# Patient Record
Sex: FEMALE | Race: WHITE | NOT HISPANIC OR LATINO | Employment: FULL TIME | URBAN - METROPOLITAN AREA
[De-identification: names, ages, dates, MRNs, and addresses within clinical notes are randomized per-mention and may not be internally consistent; named-entity substitution may affect disease eponyms.]

---

## 2018-03-06 ENCOUNTER — APPOINTMENT (OUTPATIENT)
Dept: GENERAL RADIOLOGY | Facility: HOSPITAL | Age: 53
End: 2018-03-06

## 2018-03-06 ENCOUNTER — HOSPITAL ENCOUNTER (INPATIENT)
Facility: HOSPITAL | Age: 53
LOS: 5 days | Discharge: HOME | End: 2018-03-11
Attending: EMERGENCY MEDICINE | Admitting: INTERNAL MEDICINE

## 2018-03-06 DIAGNOSIS — J18.9 PNEUMONIA OF RIGHT UPPER LOBE DUE TO INFECTIOUS ORGANISM: ICD-10-CM

## 2018-03-06 DIAGNOSIS — E11.11 DIABETIC KETOACIDOSIS WITH COMA ASSOCIATED WITH TYPE 2 DIABETES MELLITUS (HCC): Primary | ICD-10-CM

## 2018-03-06 DIAGNOSIS — IMO0001: ICD-10-CM

## 2018-03-06 LAB
ALBUMIN SERPL-MCNC: 3.3 G/DL (ref 3.5–5.2)
ALBUMIN SERPL-MCNC: 4.7 G/DL (ref 3.5–5.2)
ALBUMIN/GLOB SERPL: 1.1 G/DL
ALBUMIN/GLOB SERPL: 1.4 G/DL
ALP SERPL-CCNC: 135 U/L (ref 39–117)
ALP SERPL-CCNC: 98 U/L (ref 39–117)
ALT SERPL W P-5'-P-CCNC: 13 U/L (ref 1–33)
ALT SERPL W P-5'-P-CCNC: 14 U/L (ref 1–33)
AMORPH URATE CRY URNS QL MICRO: ABNORMAL /HPF
ANION GAP SERPL CALCULATED.3IONS-SCNC: 34.3 MMOL/L
ANION GAP SERPL CALCULATED.3IONS-SCNC: 34.7 MMOL/L
AST SERPL-CCNC: 18 U/L (ref 1–32)
AST SERPL-CCNC: 19 U/L (ref 1–32)
ATMOSPHERIC PRESS: 747.2 MMHG
B-OH-BUTYR SERPL-SCNC: 11.18 MMOL/L (ref 0.02–0.27)
BACTERIA UR QL AUTO: ABNORMAL /HPF
BASE EXCESS BLDV CALC-SCNC: -23.8 MMOL/L
BDY SITE: ABNORMAL
BILIRUB SERPL-MCNC: <0.2 MG/DL (ref 0.1–1.2)
BILIRUB SERPL-MCNC: <0.2 MG/DL (ref 0.1–1.2)
BILIRUB UR QL STRIP: NEGATIVE
BUN BLD-MCNC: 29 MG/DL (ref 6–20)
BUN BLD-MCNC: 30 MG/DL (ref 6–20)
BUN/CREAT SERPL: 18 (ref 7–25)
BUN/CREAT SERPL: 21.9 (ref 7–25)
CALCIUM SPEC-SCNC: 7.9 MG/DL (ref 8.6–10.5)
CALCIUM SPEC-SCNC: 8.9 MG/DL (ref 8.6–10.5)
CHLORIDE SERPL-SCNC: 85 MMOL/L (ref 98–107)
CHLORIDE SERPL-SCNC: 95 MMOL/L (ref 98–107)
CLARITY UR: ABNORMAL
CO2 SERPL-SCNC: 2.3 MMOL/L (ref 22–29)
CO2 SERPL-SCNC: 6.7 MMOL/L (ref 22–29)
COLOR UR: YELLOW
CREAT BLD-MCNC: 1.37 MG/DL (ref 0.57–1)
CREAT BLD-MCNC: 1.61 MG/DL (ref 0.57–1)
D-LACTATE SERPL-SCNC: 2.3 MMOL/L (ref 0.5–2)
DEPRECATED RDW RBC AUTO: 48.6 FL (ref 37–54)
ERYTHROCYTE [DISTWIDTH] IN BLOOD BY AUTOMATED COUNT: 12.8 % (ref 11.7–13)
FLUAV AG NPH QL: NEGATIVE
FLUBV AG NPH QL IA: NEGATIVE
GFR SERPL CREATININE-BSD FRML MDRD: 33 ML/MIN/1.73
GFR SERPL CREATININE-BSD FRML MDRD: 40 ML/MIN/1.73
GLOBULIN UR ELPH-MCNC: 2.9 GM/DL
GLOBULIN UR ELPH-MCNC: 3.4 GM/DL
GLUCOSE BLD-MCNC: 651 MG/DL (ref 65–99)
GLUCOSE BLD-MCNC: 706 MG/DL (ref 65–99)
GLUCOSE BLD-MCNC: 781 MG/DL (ref 65–99)
GLUCOSE BLDC GLUCOMTR-MCNC: >599 MG/DL (ref 70–130)
GLUCOSE UR STRIP-MCNC: ABNORMAL MG/DL
HBA1C MFR BLD: 13.2 % (ref 4.8–5.6)
HCO3 BLDV-SCNC: 5.2 MMOL/L (ref 22–28)
HCT VFR BLD AUTO: 43.5 % (ref 35.6–45.5)
HGB BLD-MCNC: 12.9 G/DL (ref 11.9–15.5)
HGB UR QL STRIP.AUTO: ABNORMAL
HOLD SPECIMEN: NORMAL
HYALINE CASTS UR QL AUTO: ABNORMAL /LPF
KETONES UR QL STRIP: ABNORMAL
LEUKOCYTE ESTERASE UR QL STRIP.AUTO: NEGATIVE
LYMPHOCYTES # BLD MANUAL: 3.51 10*3/MM3 (ref 0.9–4.8)
LYMPHOCYTES NFR BLD MANUAL: 14 % (ref 19.6–45.3)
LYMPHOCYTES NFR BLD MANUAL: 7 % (ref 5–12)
MAGNESIUM SERPL-MCNC: 3 MG/DL (ref 1.6–2.6)
MCH RBC QN AUTO: 30.9 PG (ref 26.9–32)
MCHC RBC AUTO-ENTMCNC: 29.7 G/DL (ref 32.4–36.3)
MCV RBC AUTO: 104.1 FL (ref 80.5–98.2)
METAMYELOCYTES NFR BLD MANUAL: 2 % (ref 0–0)
MODALITY: ABNORMAL
MONOCYTES # BLD AUTO: 1.76 10*3/MM3 (ref 0.2–1.2)
NEUTROPHILS # BLD AUTO: 19.32 10*3/MM3 (ref 1.9–8.1)
NEUTROPHILS NFR BLD MANUAL: 77 % (ref 42.7–76)
NEUTS VAC BLD QL SMEAR: ABNORMAL
NITRITE UR QL STRIP: NEGATIVE
PCO2 BLDV: 19.4 MM HG (ref 41–51)
PH BLDV: 7.04 PH UNITS (ref 7.31–7.41)
PH UR STRIP.AUTO: <=5 [PH] (ref 5–8)
PHOSPHATE SERPL-MCNC: 7.2 MG/DL (ref 2.5–4.5)
PLAT MORPH BLD: NORMAL
PLATELET # BLD AUTO: 215 10*3/MM3 (ref 140–500)
PMV BLD AUTO: 10.8 FL (ref 6–12)
PO2 BLDV: 31.5 MM HG (ref 35–45)
POTASSIUM BLD-SCNC: 5.2 MMOL/L (ref 3.5–5.2)
POTASSIUM BLD-SCNC: 6.2 MMOL/L (ref 3.5–5.2)
PROCALCITONIN SERPL-MCNC: 47.8 NG/ML (ref 0.1–0.25)
PROT SERPL-MCNC: 6.2 G/DL (ref 6–8.5)
PROT SERPL-MCNC: 8.1 G/DL (ref 6–8.5)
PROT UR QL STRIP: ABNORMAL
RBC # BLD AUTO: 4.18 10*6/MM3 (ref 3.9–5.2)
RBC # UR: ABNORMAL /HPF
RBC MORPH BLD: NORMAL
REF LAB TEST METHOD: ABNORMAL
SAO2 % BLDCOA: 38.5 % (ref 92–99)
SCAN SLIDE: NORMAL
SODIUM BLD-SCNC: 126 MMOL/L (ref 136–145)
SODIUM BLD-SCNC: 132 MMOL/L (ref 136–145)
SP GR UR STRIP: 1.03 (ref 1–1.03)
SQUAMOUS #/AREA URNS HPF: ABNORMAL /HPF
TOTAL RATE: 20 BREATHS/MINUTE
TROPONIN T SERPL-MCNC: <0.01 NG/ML (ref 0–0.03)
UROBILINOGEN UR QL STRIP: ABNORMAL
WBC NRBC COR # BLD: 25.09 10*3/MM3 (ref 4.5–10.7)
WBC UR QL AUTO: ABNORMAL /HPF

## 2018-03-06 PROCEDURE — 25010000002 ENOXAPARIN PER 10 MG: Performed by: INTERNAL MEDICINE

## 2018-03-06 PROCEDURE — 80053 COMPREHEN METABOLIC PANEL: CPT | Performed by: EMERGENCY MEDICINE

## 2018-03-06 PROCEDURE — 82803 BLOOD GASES ANY COMBINATION: CPT | Performed by: INTERNAL MEDICINE

## 2018-03-06 PROCEDURE — 25010000002 VANCOMYCIN 10 G RECONSTITUTED SOLUTION: Performed by: EMERGENCY MEDICINE

## 2018-03-06 PROCEDURE — 85025 COMPLETE CBC W/AUTO DIFF WBC: CPT | Performed by: INTERNAL MEDICINE

## 2018-03-06 PROCEDURE — 81015 MICROSCOPIC EXAM OF URINE: CPT | Performed by: EMERGENCY MEDICINE

## 2018-03-06 PROCEDURE — 83735 ASSAY OF MAGNESIUM: CPT | Performed by: INTERNAL MEDICINE

## 2018-03-06 PROCEDURE — 63710000001 INSULIN REGULAR HUMAN PER 5 UNITS: Performed by: INTERNAL MEDICINE

## 2018-03-06 PROCEDURE — 36600 WITHDRAWAL OF ARTERIAL BLOOD: CPT | Performed by: INTERNAL MEDICINE

## 2018-03-06 PROCEDURE — 87804 INFLUENZA ASSAY W/OPTIC: CPT | Performed by: EMERGENCY MEDICINE

## 2018-03-06 PROCEDURE — 87581 M.PNEUMON DNA AMP PROBE: CPT | Performed by: INTERNAL MEDICINE

## 2018-03-06 PROCEDURE — 25010000002 PIPERACILLIN SOD-TAZOBACTAM PER 1 G: Performed by: EMERGENCY MEDICINE

## 2018-03-06 PROCEDURE — 93005 ELECTROCARDIOGRAM TRACING: CPT | Performed by: EMERGENCY MEDICINE

## 2018-03-06 PROCEDURE — 87798 DETECT AGENT NOS DNA AMP: CPT | Performed by: INTERNAL MEDICINE

## 2018-03-06 PROCEDURE — 83036 HEMOGLOBIN GLYCOSYLATED A1C: CPT | Performed by: INTERNAL MEDICINE

## 2018-03-06 PROCEDURE — 83605 ASSAY OF LACTIC ACID: CPT | Performed by: EMERGENCY MEDICINE

## 2018-03-06 PROCEDURE — 25810000003 POTASSIUM CHLORIDE PER 2 MEQ: Performed by: INTERNAL MEDICINE

## 2018-03-06 PROCEDURE — 80048 BASIC METABOLIC PNL TOTAL CA: CPT | Performed by: INTERNAL MEDICINE

## 2018-03-06 PROCEDURE — 82330 ASSAY OF CALCIUM: CPT | Performed by: INTERNAL MEDICINE

## 2018-03-06 PROCEDURE — 85007 BL SMEAR W/DIFF WBC COUNT: CPT | Performed by: INTERNAL MEDICINE

## 2018-03-06 PROCEDURE — 87040 BLOOD CULTURE FOR BACTERIA: CPT | Performed by: EMERGENCY MEDICINE

## 2018-03-06 PROCEDURE — 36415 COLL VENOUS BLD VENIPUNCTURE: CPT

## 2018-03-06 PROCEDURE — 51702 INSERT TEMP BLADDER CATH: CPT

## 2018-03-06 PROCEDURE — 84145 PROCALCITONIN (PCT): CPT | Performed by: EMERGENCY MEDICINE

## 2018-03-06 PROCEDURE — 87633 RESP VIRUS 12-25 TARGETS: CPT | Performed by: INTERNAL MEDICINE

## 2018-03-06 PROCEDURE — 93010 ELECTROCARDIOGRAM REPORT: CPT | Performed by: INTERNAL MEDICINE

## 2018-03-06 PROCEDURE — 82962 GLUCOSE BLOOD TEST: CPT

## 2018-03-06 PROCEDURE — 81003 URINALYSIS AUTO W/O SCOPE: CPT | Performed by: EMERGENCY MEDICINE

## 2018-03-06 PROCEDURE — 99291 CRITICAL CARE FIRST HOUR: CPT

## 2018-03-06 PROCEDURE — 71045 X-RAY EXAM CHEST 1 VIEW: CPT

## 2018-03-06 PROCEDURE — 84100 ASSAY OF PHOSPHORUS: CPT | Performed by: INTERNAL MEDICINE

## 2018-03-06 PROCEDURE — 82010 KETONE BODYS QUAN: CPT | Performed by: EMERGENCY MEDICINE

## 2018-03-06 PROCEDURE — 82803 BLOOD GASES ANY COMBINATION: CPT

## 2018-03-06 PROCEDURE — 84484 ASSAY OF TROPONIN QUANT: CPT | Performed by: EMERGENCY MEDICINE

## 2018-03-06 PROCEDURE — 83735 ASSAY OF MAGNESIUM: CPT | Performed by: EMERGENCY MEDICINE

## 2018-03-06 PROCEDURE — 80053 COMPREHEN METABOLIC PANEL: CPT | Performed by: INTERNAL MEDICINE

## 2018-03-06 PROCEDURE — 82947 ASSAY GLUCOSE BLOOD QUANT: CPT | Performed by: INTERNAL MEDICINE

## 2018-03-06 PROCEDURE — 87486 CHLMYD PNEUM DNA AMP PROBE: CPT | Performed by: INTERNAL MEDICINE

## 2018-03-06 RX ORDER — POTASSIUM CHLORIDE 1.5 G/1.77G
20 POWDER, FOR SOLUTION ORAL AS NEEDED
Status: DISCONTINUED | OUTPATIENT
Start: 2018-03-06 | End: 2018-03-10

## 2018-03-06 RX ORDER — SODIUM CHLORIDE 450 MG/100ML
250 INJECTION, SOLUTION INTRAVENOUS CONTINUOUS
Status: DISCONTINUED | OUTPATIENT
Start: 2018-03-06 | End: 2018-03-07

## 2018-03-06 RX ORDER — POTASSIUM CHLORIDE 1.5 G/1.77G
40 POWDER, FOR SOLUTION ORAL AS NEEDED
Status: CANCELLED | OUTPATIENT
Start: 2018-03-06

## 2018-03-06 RX ORDER — POTASSIUM CHLORIDE 1.5 G/1.77G
10 POWDER, FOR SOLUTION ORAL AS NEEDED
Status: CANCELLED | OUTPATIENT
Start: 2018-03-06

## 2018-03-06 RX ORDER — POTASSIUM CHLORIDE 750 MG/1
10 CAPSULE, EXTENDED RELEASE ORAL AS NEEDED
Status: CANCELLED | OUTPATIENT
Start: 2018-03-06

## 2018-03-06 RX ORDER — DEXTROSE, SODIUM CHLORIDE, AND POTASSIUM CHLORIDE 5; .45; .15 G/100ML; G/100ML; G/100ML
150 INJECTION INTRAVENOUS CONTINUOUS PRN
Status: CANCELLED | OUTPATIENT
Start: 2018-03-06

## 2018-03-06 RX ORDER — DEXTROSE MONOHYDRATE 25 G/50ML
12.5 INJECTION, SOLUTION INTRAVENOUS
Status: CANCELLED | OUTPATIENT
Start: 2018-03-06

## 2018-03-06 RX ORDER — SODIUM CHLORIDE AND POTASSIUM CHLORIDE 150; 450 MG/100ML; MG/100ML
250 INJECTION, SOLUTION INTRAVENOUS CONTINUOUS PRN
Status: DISCONTINUED | OUTPATIENT
Start: 2018-03-06 | End: 2018-03-10

## 2018-03-06 RX ORDER — DEXTROSE MONOHYDRATE 25 G/50ML
12.5 INJECTION, SOLUTION INTRAVENOUS
Status: DISCONTINUED | OUTPATIENT
Start: 2018-03-06 | End: 2018-03-10

## 2018-03-06 RX ORDER — POTASSIUM CHLORIDE 7.46 G/1000ML
INJECTION, SOLUTION INTRAVENOUS
Status: CANCELLED | OUTPATIENT
Start: 2018-03-06

## 2018-03-06 RX ORDER — POTASSIUM CHLORIDE 1.5 G/1.77G
10 POWDER, FOR SOLUTION ORAL AS NEEDED
Status: DISCONTINUED | OUTPATIENT
Start: 2018-03-06 | End: 2018-03-10

## 2018-03-06 RX ORDER — POTASSIUM CHLORIDE 750 MG/1
20 CAPSULE, EXTENDED RELEASE ORAL AS NEEDED
Status: CANCELLED | OUTPATIENT
Start: 2018-03-06

## 2018-03-06 RX ORDER — POTASSIUM CHLORIDE 7.46 G/1000ML
10 INJECTION, SOLUTION INTRAVENOUS
Status: DISCONTINUED | OUTPATIENT
Start: 2018-03-06 | End: 2018-03-10

## 2018-03-06 RX ORDER — POTASSIUM CHLORIDE 750 MG/1
40 CAPSULE, EXTENDED RELEASE ORAL AS NEEDED
Status: CANCELLED | OUTPATIENT
Start: 2018-03-06

## 2018-03-06 RX ORDER — MAGNESIUM SULFATE HEPTAHYDRATE 40 MG/ML
4 INJECTION, SOLUTION INTRAVENOUS AS NEEDED
Status: DISCONTINUED | OUTPATIENT
Start: 2018-03-06 | End: 2018-03-10

## 2018-03-06 RX ORDER — MAGNESIUM SULFATE HEPTAHYDRATE 40 MG/ML
2 INJECTION, SOLUTION INTRAVENOUS AS NEEDED
Status: DISCONTINUED | OUTPATIENT
Start: 2018-03-06 | End: 2018-03-10

## 2018-03-06 RX ORDER — POTASSIUM CHLORIDE 750 MG/1
20 CAPSULE, EXTENDED RELEASE ORAL AS NEEDED
Status: DISCONTINUED | OUTPATIENT
Start: 2018-03-06 | End: 2018-03-10

## 2018-03-06 RX ORDER — SODIUM CHLORIDE AND POTASSIUM CHLORIDE 150; 450 MG/100ML; MG/100ML
250 INJECTION, SOLUTION INTRAVENOUS CONTINUOUS PRN
Status: CANCELLED | OUTPATIENT
Start: 2018-03-06

## 2018-03-06 RX ORDER — POTASSIUM CHLORIDE 7.46 G/1000ML
10 INJECTION, SOLUTION INTRAVENOUS
Status: CANCELLED | OUTPATIENT
Start: 2018-03-06

## 2018-03-06 RX ORDER — DEXTROSE, SODIUM CHLORIDE, AND POTASSIUM CHLORIDE 5; .45; .15 G/100ML; G/100ML; G/100ML
150 INJECTION INTRAVENOUS CONTINUOUS PRN
Status: DISCONTINUED | OUTPATIENT
Start: 2018-03-06 | End: 2018-03-07

## 2018-03-06 RX ORDER — DEXTROSE AND SODIUM CHLORIDE 5; .45 G/100ML; G/100ML
150 INJECTION, SOLUTION INTRAVENOUS CONTINUOUS PRN
Status: CANCELLED | OUTPATIENT
Start: 2018-03-06

## 2018-03-06 RX ORDER — POTASSIUM CHLORIDE 1.5 G/1.77G
20 POWDER, FOR SOLUTION ORAL AS NEEDED
Status: CANCELLED | OUTPATIENT
Start: 2018-03-06

## 2018-03-06 RX ORDER — POTASSIUM CHLORIDE 1.5 G/1.77G
40 POWDER, FOR SOLUTION ORAL AS NEEDED
Status: DISCONTINUED | OUTPATIENT
Start: 2018-03-06 | End: 2018-03-10

## 2018-03-06 RX ORDER — SODIUM CHLORIDE 0.9 % (FLUSH) 0.9 %
1-10 SYRINGE (ML) INJECTION AS NEEDED
Status: DISCONTINUED | OUTPATIENT
Start: 2018-03-06 | End: 2018-03-11 | Stop reason: HOSPADM

## 2018-03-06 RX ORDER — POTASSIUM CHLORIDE 750 MG/1
10 CAPSULE, EXTENDED RELEASE ORAL AS NEEDED
Status: DISCONTINUED | OUTPATIENT
Start: 2018-03-06 | End: 2018-03-10

## 2018-03-06 RX ORDER — SODIUM CHLORIDE 450 MG/100ML
250 INJECTION, SOLUTION INTRAVENOUS CONTINUOUS
Status: CANCELLED | OUTPATIENT
Start: 2018-03-06

## 2018-03-06 RX ORDER — DEXTROSE AND SODIUM CHLORIDE 5; .45 G/100ML; G/100ML
150 INJECTION, SOLUTION INTRAVENOUS CONTINUOUS PRN
Status: DISCONTINUED | OUTPATIENT
Start: 2018-03-06 | End: 2018-03-10

## 2018-03-06 RX ORDER — POTASSIUM CHLORIDE 750 MG/1
40 CAPSULE, EXTENDED RELEASE ORAL AS NEEDED
Status: DISCONTINUED | OUTPATIENT
Start: 2018-03-06 | End: 2018-03-10

## 2018-03-06 RX ADMIN — SODIUM CHLORIDE 1000 ML: 9 INJECTION, SOLUTION INTRAVENOUS at 21:12

## 2018-03-06 RX ADMIN — INSULIN HUMAN 10 UNITS: 100 INJECTION, SOLUTION PARENTERAL at 21:39

## 2018-03-06 RX ADMIN — SODIUM BICARBONATE 50 MEQ: 84 INJECTION, SOLUTION INTRAVENOUS at 19:07

## 2018-03-06 RX ADMIN — ENOXAPARIN SODIUM 40 MG: 40 INJECTION SUBCUTANEOUS at 21:13

## 2018-03-06 RX ADMIN — VANCOMYCIN HYDROCHLORIDE 1250 MG: 10 INJECTION, POWDER, LYOPHILIZED, FOR SOLUTION INTRAVENOUS at 20:09

## 2018-03-06 RX ADMIN — SODIUM CHLORIDE 1974 ML: 9 INJECTION, SOLUTION INTRAVENOUS at 19:01

## 2018-03-06 RX ADMIN — POTASSIUM CHLORIDE AND SODIUM CHLORIDE 250 ML/HR: 450; 150 INJECTION, SOLUTION INTRAVENOUS at 23:00

## 2018-03-06 RX ADMIN — SODIUM CHLORIDE 0.1 UNITS/KG/HR: 9 INJECTION, SOLUTION INTRAVENOUS at 21:47

## 2018-03-06 RX ADMIN — TAZOBACTAM SODIUM AND PIPERACILLIN SODIUM 4.5 G: 500; 4 INJECTION, SOLUTION INTRAVENOUS at 19:09

## 2018-03-06 RX ADMIN — HUMAN INSULIN 10 UNITS: 100 INJECTION, SOLUTION SUBCUTANEOUS at 19:33

## 2018-03-06 RX ADMIN — SODIUM CHLORIDE 1000 ML: 9 INJECTION, SOLUTION INTRAVENOUS at 18:17

## 2018-03-06 RX ADMIN — SODIUM CHLORIDE, POTASSIUM CHLORIDE, SODIUM LACTATE AND CALCIUM CHLORIDE 1000 ML: 600; 310; 30; 20 INJECTION, SOLUTION INTRAVENOUS at 18:25

## 2018-03-06 RX ADMIN — SODIUM CHLORIDE 250 ML/HR: 4.5 INJECTION, SOLUTION INTRAVENOUS at 21:48

## 2018-03-06 NOTE — ED NOTES
Ems called for flu like symptoms, patient found to be lethargic and hypotensive by ems     Madeline Downing RN  03/06/18 9041

## 2018-03-06 NOTE — ED PROVIDER NOTES
EMERGENCY DEPARTMENT ENCOUNTER    CHIEF COMPLAINT  Chief Complaint: Hypotension and hypergylcemia  History given by: EMS  History limited by: Pt is not speaking  Room Number: 383/1  PMD: No Known Provider      HPI:  Pt is a 52 y.o. female who presents via EMS due to hypotension and hyperglycemia.  EMS was called by the Pt's  after he found her appearing extremely ill, confused, and lethargic when he came home from work today. EMS reports that Pt has been sick with flu-like symptoms for the past few days and taking Mucinex.  When EMS arrived to the home, Pt was hypotensive 72/44.  Pt is a known diabetic. Her blood glucose was 360 in the ambulance en route.  Upon arrival to ED, her glucose was found to be greater than 500.    Duration:  PTA  Onset: Unknown  Timing: Constant  Intensity/Severity: Severe  Progression: Worsening  Associated Symptoms: None reported  Aggravating Factors: Unknown  Alleviating Factors: None  Previous Episodes: None  Treatment before arrival: None    PAST MEDICAL HISTORY  Active Ambulatory Problems     Diagnosis Date Noted   • No Active Ambulatory Problems     Resolved Ambulatory Problems     Diagnosis Date Noted   • No Resolved Ambulatory Problems     Past Medical History:   Diagnosis Date   • Diabetes mellitus        PAST SURGICAL HISTORY  History reviewed. No pertinent surgical history.    FAMILY HISTORY  History reviewed. No pertinent family history.    SOCIAL HISTORY  Social History     Social History   • Marital status:      Spouse name: N/A   • Number of children: N/A   • Years of education: N/A     Occupational History   • Not on file.     Social History Main Topics   • Smoking status: Never Smoker   • Smokeless tobacco: Not on file   • Alcohol use No   • Drug use: No   • Sexual activity: Not on file     Other Topics Concern   • Not on file     Social History Narrative   • No narrative on file       ALLERGIES  Review of patient's allergies indicates no known  allergies.    REVIEW OF SYSTEMS  Review of Systems   Unable to perform ROS: Acuity of condition   Cardiovascular:        Hypotension   Endocrine:        Hyperglycemia       PHYSICAL EXAM  ED Triage Vitals   Temp Heart Rate Resp BP SpO2   03/06/18 1811 03/06/18 1803 03/06/18 1803 03/06/18 1804 03/06/18 1803   91.4 °F (33 °C) 90 20 104/54 100 %      Temp src Heart Rate Source Patient Position BP Location FiO2 (%)   03/06/18 1811 03/06/18 1803 03/06/18 1804 03/06/18 1804 --   Rectal Monitor Lying Right arm          Physical Exam   Constitutional: She appears lethargic.   HENT:   Head: Normocephalic and atraumatic.   Mouth/Throat: Mucous membranes are dry.   Tried blood in her throat.   Eyes: EOM are normal. Pupils are equal, round, and reactive to light.   Neck: Normal range of motion. Neck supple.   Cardiovascular: Normal rate, regular rhythm and normal heart sounds.    Pulmonary/Chest: Effort normal and breath sounds normal. No respiratory distress.   Abdominal: Soft. There is no tenderness. There is no rebound and no guarding.   Musculoskeletal: Normal range of motion. She exhibits no edema.   Neurological: She appears lethargic.   Pt moans in responds to her name being called.   Skin: Skin is dry. No rash noted.   Decreased turgor.   Psychiatric: Mood and affect normal.   Nursing note and vitals reviewed.      LAB RESULTS  Lab Results (last 24 hours)     Procedure Component Value Units Date/Time    Comprehensive Metabolic Panel [[id number]]  (Abnormal) Collected:  03/06/18 1810    Specimen:  Blood Updated:  03/06/18 1912     Glucose 781 (C) mg/dL      BUN 29 (H) mg/dL      Creatinine 1.61 (H) mg/dL      Sodium 126 (L) mmol/L      Potassium 6.2 (C) mmol/L      Chloride 85 (L) mmol/L      CO2 6.7 (L) mmol/L      Calcium 8.9 mg/dL      Total Protein 8.1 g/dL      Albumin 4.70 g/dL      ALT (SGPT) 14 U/L      AST (SGOT) 19 U/L      Alkaline Phosphatase 135 (H) U/L      Total Bilirubin <0.2 mg/dL      eGFR Non   "Amer 33 (L) mL/min/1.73      Globulin 3.4 gm/dL      A/G Ratio 1.4 g/dL      BUN/Creatinine Ratio 18.0     Anion Gap 34.3 mmol/L     Lactic Acid, Plasma [[id number]]  (Abnormal) Collected:  03/06/18 1810    Specimen:  Blood Updated:  03/06/18 1912     Lactate 2.3 (C) mmol/L     Blood Culture - Blood, [[id number]] Collected:  03/06/18 1810    Specimen:  Blood from Arm, Left Updated:  03/06/18 1828    Procalcitonin [[id number]]  (Abnormal) Collected:  03/06/18 1810    Specimen:  Blood Updated:  03/06/18 1913     Procalcitonin 47.80 (C) ng/mL     Narrative:       As a Marker for Sepsis (Non-Neonates):   1. <0.5 ng/mL represents a low risk of severe sepsis and/or septic shock.  1. >2 ng/mL represents a high risk of severe sepsis and/or septic shock.    As a Marker for Lower Respiratory Tract Infections that require antibiotic therapy:  PCT on Admission     Antibiotic Therapy             6-12 Hrs later  > 0.5                Strongly Recommended            >0.25 - <0.5         Recommended  0.1 - 0.25           Discouraged                   Remeasure/reassess PCT  <0.1                 Strongly Discouraged          Remeasure/reassess PCT      As 28 day mortality risk marker: \"Change in Procalcitonin Result\" (> 80 % or <=80 %) if Day 0 (or Day 1) and Day 4 values are available. Refer to http://www.[facility]-pct-calculator.com/   Change in PCT <=80 %   A decrease of PCT levels below or equal to 80 % defines a positive change in PCT test result representing a higher risk for 28-day all-cause mortality of patients diagnosed with severe sepsis or septic shock.  Change in PCT > 80 %   A decrease of PCT levels of more than 80 % defines a negative change in PCT result representing a lower risk for 28-day all-cause mortality of patients diagnosed with severe sepsis or septic shock.                Ketone Bodies, Serum (Not performed at [place]) [[id number]] Collected:  03/06/18 1810    Specimen:  Blood Updated:  03/06/18 1912    Narrative: "       The following orders were created for panel order Ketone Bodies, Serum (Not performed at [place]).  Procedure                               Abnormality         Status                     ---------                               -----------         ------                     Beta Hydroxybutyrate [place]...[[id number]]  Abnormal            Final result                 Please view results for these tests on the individual orders.    Beta Hydroxybutyrate Quantitative [[id number]]  (Abnormal) Collected:  03/06/18 1810    Specimen:  Blood Updated:  03/06/18 1912     Beta-Hydroxybutyrate Quant 11.178 (H) mmol/L     Troponin [[id number]]  (Normal) Collected:  03/06/18 1810    Specimen:  Blood Updated:  03/06/18 1857     Troponin T <0.010 ng/mL     Narrative:       Troponin T Reference Ranges:  Less than 0.03 ng/mL:    Negative for AMI  0.03 to 0.09 ng/mL:      Indeterminant for AMI  Greater than 0.09 ng/mL: Positive for AMI    Magnesium [[id number]]  (Abnormal) Collected:  03/06/18 1810    Specimen:  Blood Updated:  03/06/18 1856     Magnesium 3.0 (H) mg/dL     Lactic Acid, Reflex Timer (This will reflex a repeat order 3-3:15 hours after ordered.) [[id number]] Collected:  03/06/18 1810    Specimen:  Blood Updated:  03/06/18 1912    Urinalysis With / Culture If Indicated - Urine, Catheter [[id number]]  (Abnormal) Collected:  03/06/18 1813    Specimen:  Urine from Urine, Catheter Updated:  03/06/18 1853     Color, UA Yellow     Appearance, UA Cloudy (A)     pH, UA <=5.0     Specific Gravity, UA 1.026     Glucose, UA >=1000 mg/dL (3+) (A)     Ketones, UA 80 mg/dL (3+) (A)     Bilirubin, UA Negative     Blood, UA Small (1+) (A)     Protein,  mg/dL (2+) (A)     Leuk Esterase, UA Negative     Nitrite, UA Negative     Urobilinogen, UA 0.2 E.U./dL    Urinalysis, Microscopic Only - Urine, Clean Catch [[id number]]  (Abnormal) Collected:  03/06/18 1813    Specimen:  Urine from Urine, Catheter Updated:  03/06/18 1905     RBC, UA 0-2  /HPF      WBC, UA 0-2 /HPF      Bacteria, UA 1+ (A) /HPF      Squamous Epithelial Cells, UA 0-2 /HPF      Hyaline Casts, UA 0-2 /LPF      Amorphous Crystals, UA Large/3+ /HPF      Methodology Manual Light Microscopy    Blood Culture - Blood, [[id number]] Collected:  03/06/18 1819    Specimen:  Blood from Arm, Left Updated:  03/06/18 1830    Influenza Antigen, Rapid - Swab, Nasopharynx [[id number]]  (Normal) Collected:  03/06/18 1821    Specimen:  Swab from Nasopharynx Updated:  03/06/18 1849     Influenza A Ag, EIA Negative     Influenza B Ag, EIA Negative          I ordered the above labs and reviewed the results    RADIOLOGY  XR Chest 1 View   Preliminary Result       Pulmonary vascular enlargement without significant interstitial   pulmonary edema.       Prominence of the right hilar structures which may simply represent   prominent vasculature.  However, I cannot exclude the possibility of a   right hilar mass or potentially an adjacent infiltrate. I recommend   further evaluation with either a PA and lateral chest radiograph or a CT   scan of the chest.       Mild gaseous distention in the stomach.       These findings and recommendations were discussed with Dr. Eugene on   03/06/2018 at approximately 7 PM.                I ordered the above noted radiological studies. Interpreted by radiologist.  Reviewed by me in PACS.       PROCEDURES  Critical Care  Performed by: CRISTINA EUGENE  Authorized by: CRISTINA EUGENE     Critical care provider statement:     Critical care time (minutes):  60    Critical care start time:  3/6/2018 6:06 PM    Critical care was necessary to treat or prevent imminent or life-threatening deterioration of the following conditions:  Endocrine crisis and sepsis    Critical care was time spent personally by me on the following activities:  Development of treatment plan with patient or surrogate, evaluation of patient's response to treatment, discussions with consultants, examination of  patient, interpretation of cardiac output measurements, ordering and review of laboratory studies, ordering and performing treatments and interventions, ordering and review of radiographic studies, re-evaluation of patient's condition and review of old charts        EKG           EKG time: 1849  Rhythm/Rate: Sinus, 91  P waves and HI: First degree AV block  QRS, axis: Prolonged QT interval   ST and T waves: Normal     Interpreted Contemporaneously by me, independently viewed  No prior for comparison.        PROGRESS AND CONSULTS  ED Course     Medications   sodium chloride 0.9 % bolus 1,974 mL (1,974 mL Intravenous New Bag 3/6/18 1901)   sodium bicarbonate injection 8.4% 50 mEq (not administered)   vancomycin 1250 mg/250 mL 0.9% NS IVPB ([facility]) (not administered)   piperacillin-tazobactam (ZOSYN) 4.5 g in iso-osmotic dextrose 100 mL IVPB (premix) (not administered)   sodium chloride 0.9 % bolus 1,000 mL (0 mL Intravenous Stopped 3/6/18 1901)   lactated ringers bolus 1,000 mL (1,000 mL Intravenous New Bag 3/6/18 1825)   1808  Ordered Chest XR and blood work for further evaluation.     1815.  Rechecked Pt.  BP- 104/54 HR- 90 Temp- (!) 91.4 °F (33 °C) (Rectal) O2 sat- 100%.  Ordered bed warmer, and warm IV fluids for hypothermia.      1818  Blood gas shows pH 6.74.      1819  Pt's  is now at bedside. He reports that the Pt started feeling ill over the past few days and he thought that she had the same virus that he had last week.   last saw her normal this morning before he went to work.  He reports that pt was vomiting last night. Discussed ph level with  along with the seriousness of her her condition.      1828  Requested pulmonology consult.      1856  Spoke with Dr. Cordero who would like to give an amp of bicarb and 4-5 liters of fluid. He advised to hold CT of abdomen right now.  He would like to admit under Dr. Alcantar    1903  Chest XR shows increased markings in right hilum.  Could be pneumonia  vs mass.      1929  Dr. Alcantar is in the ED to see Pt.      MEDICAL DECISION MAKING  Results were reviewed/discussed with the patient and they were also made aware of online access. Pt also made aware that some labs, such as cultures, will not be resulted during ER visit and follow up with PMD is necessary.     MDM  Number of Diagnoses or Management Options  Diabetic ketoacidosis with coma associated with type 2 diabetes mellitus:   Pneumonia of right upper lobe due to infectious organism:      Amount and/or Complexity of Data Reviewed  Clinical lab tests: (Influenza is negative)  Tests in the medicine section of CPT®: ordered and reviewed (Blood gas shows pH 6.75, see EKG)  Discuss the patient with other providers: (Dr. Alcantar, Dr. Cordero)    Patient Progress  Patient progress: stable         DIAGNOSIS  Final diagnoses:   Diabetic ketoacidosis with coma associated with type 2 diabetes mellitus   Pneumonia of right upper lobe due to infectious organism   Hypothermia due to non-environmental cause, initial encounter       DISPOSITION  ADMISSION    Discussed treatment plan and reason for admission with pt/family and admitting physician.  Pt/family voiced understanding of the plan for admission for further testing/treatment as needed.         Latest Documented Vital Signs:  As of 11:24 PM  BP- (!) 85/48 HR- 116 Temp- (!) 91.4 °F (33 °C) (Rectal) O2 sat- 98%    --  Documentation assistance provided by maxime Bush for Dr. Eugene.  Information recorded by the scribe was done at my direction and has been verified and validated by me.            Carole Bush  03/06/18 1932       Ricco Eugene MD  03/06/18 4760

## 2018-03-07 ENCOUNTER — APPOINTMENT (OUTPATIENT)
Dept: GENERAL RADIOLOGY | Facility: HOSPITAL | Age: 53
End: 2018-03-07

## 2018-03-07 LAB
ANION GAP SERPL CALCULATED.3IONS-SCNC: 18.4 MMOL/L
ANION GAP SERPL CALCULATED.3IONS-SCNC: 18.9 MMOL/L
ANION GAP SERPL CALCULATED.3IONS-SCNC: 19.5 MMOL/L
ANION GAP SERPL CALCULATED.3IONS-SCNC: 21 MMOL/L
ANION GAP SERPL CALCULATED.3IONS-SCNC: 26.8 MMOL/L
ANION GAP SERPL CALCULATED.3IONS-SCNC: 31.8 MMOL/L
ARTERIAL PATENCY WRIST A: POSITIVE
ATMOSPHERIC PRESS: 747.1 MMHG
ATMOSPHERIC PRESS: 750 MMHG
B PERT DNA SPEC QL NAA+PROBE: NOT DETECTED
BASE EXCESS BLDA CALC-SCNC: ABNORMAL MMOL/L (ref 0–2)
BASE EXCESS BLDV CALC-SCNC: -17.5 MMOL/L
BDY SITE: ABNORMAL
BDY SITE: ABNORMAL
BUN BLD-MCNC: 30 MG/DL (ref 6–20)
BUN BLD-MCNC: 30 MG/DL (ref 6–20)
BUN BLD-MCNC: 31 MG/DL (ref 6–20)
BUN BLD-MCNC: 32 MG/DL (ref 6–20)
BUN BLD-MCNC: 32 MG/DL (ref 6–20)
BUN BLD-MCNC: 33 MG/DL (ref 6–20)
BUN/CREAT SERPL: 18.9 (ref 7–25)
BUN/CREAT SERPL: 19.3 (ref 7–25)
BUN/CREAT SERPL: 19.5 (ref 7–25)
BUN/CREAT SERPL: 20.4 (ref 7–25)
BUN/CREAT SERPL: 21.2 (ref 7–25)
BUN/CREAT SERPL: 21.5 (ref 7–25)
C PNEUM DNA NPH QL NAA+NON-PROBE: NOT DETECTED
CA-I BLD-MCNC: 4.9 MG/DL (ref 4.6–5.4)
CA-I BLD-MCNC: 5 MG/DL (ref 4.6–5.4)
CA-I SERPL ISE-MCNC: 1.23 MMOL/L (ref 1.15–1.35)
CA-I SERPL ISE-MCNC: 1.24 MMOL/L (ref 1.15–1.35)
CA-I SERPL ISE-MCNC: 1.25 MMOL/L (ref 1.15–1.35)
CA-I SERPL ISE-MCNC: 1.25 MMOL/L (ref 1.15–1.35)
CALCIUM SPEC-SCNC: 7.5 MG/DL (ref 8.6–10.5)
CALCIUM SPEC-SCNC: 7.6 MG/DL (ref 8.6–10.5)
CALCIUM SPEC-SCNC: 7.8 MG/DL (ref 8.6–10.5)
CALCIUM SPEC-SCNC: 7.8 MG/DL (ref 8.6–10.5)
CALCIUM SPEC-SCNC: 7.9 MG/DL (ref 8.6–10.5)
CALCIUM SPEC-SCNC: 8.2 MG/DL (ref 8.6–10.5)
CHLORIDE SERPL-SCNC: 105 MMOL/L (ref 98–107)
CHLORIDE SERPL-SCNC: 109 MMOL/L (ref 98–107)
CHLORIDE SERPL-SCNC: 110 MMOL/L (ref 98–107)
CHLORIDE SERPL-SCNC: 112 MMOL/L (ref 98–107)
CHLORIDE SERPL-SCNC: 112 MMOL/L (ref 98–107)
CHLORIDE SERPL-SCNC: 99 MMOL/L (ref 98–107)
CO2 SERPL-SCNC: 10.5 MMOL/L (ref 22–29)
CO2 SERPL-SCNC: 11 MMOL/L (ref 22–29)
CO2 SERPL-SCNC: 11.6 MMOL/L (ref 22–29)
CO2 SERPL-SCNC: 12.1 MMOL/L (ref 22–29)
CO2 SERPL-SCNC: 5.2 MMOL/L (ref 22–29)
CO2 SERPL-SCNC: 6.2 MMOL/L (ref 22–29)
CREAT BLD-MCNC: 1.44 MG/DL (ref 0.57–1)
CREAT BLD-MCNC: 1.51 MG/DL (ref 0.57–1)
CREAT BLD-MCNC: 1.54 MG/DL (ref 0.57–1)
CREAT BLD-MCNC: 1.59 MG/DL (ref 0.57–1)
CREAT BLD-MCNC: 1.62 MG/DL (ref 0.57–1)
CREAT BLD-MCNC: 1.66 MG/DL (ref 0.57–1)
D-LACTATE SERPL-SCNC: 1.2 MMOL/L (ref 0.5–2)
FLUAV H1 2009 PAND RNA NPH QL NAA+PROBE: NOT DETECTED
FLUAV H1 HA GENE NPH QL NAA+PROBE: NOT DETECTED
FLUAV H3 RNA NPH QL NAA+PROBE: NOT DETECTED
FLUAV SUBTYP SPEC NAA+PROBE: NOT DETECTED
FLUBV RNA ISLT QL NAA+PROBE: NOT DETECTED
GFR SERPL CREATININE-BSD FRML MDRD: 32 ML/MIN/1.73
GFR SERPL CREATININE-BSD FRML MDRD: 33 ML/MIN/1.73
GFR SERPL CREATININE-BSD FRML MDRD: 34 ML/MIN/1.73
GFR SERPL CREATININE-BSD FRML MDRD: 35 ML/MIN/1.73
GFR SERPL CREATININE-BSD FRML MDRD: 36 ML/MIN/1.73
GFR SERPL CREATININE-BSD FRML MDRD: 38 ML/MIN/1.73
GLUCOSE BLD-MCNC: 127 MG/DL (ref 65–99)
GLUCOSE BLD-MCNC: 146 MG/DL (ref 65–99)
GLUCOSE BLD-MCNC: 216 MG/DL (ref 65–99)
GLUCOSE BLD-MCNC: 226 MG/DL (ref 65–99)
GLUCOSE BLD-MCNC: 323 MG/DL (ref 65–99)
GLUCOSE BLD-MCNC: 515 MG/DL (ref 65–99)
GLUCOSE BLDC GLUCOMTR-MCNC: 111 MG/DL (ref 70–130)
GLUCOSE BLDC GLUCOMTR-MCNC: 118 MG/DL (ref 70–130)
GLUCOSE BLDC GLUCOMTR-MCNC: 127 MG/DL (ref 70–130)
GLUCOSE BLDC GLUCOMTR-MCNC: 143 MG/DL (ref 70–130)
GLUCOSE BLDC GLUCOMTR-MCNC: 144 MG/DL (ref 70–130)
GLUCOSE BLDC GLUCOMTR-MCNC: 156 MG/DL (ref 70–130)
GLUCOSE BLDC GLUCOMTR-MCNC: 184 MG/DL (ref 70–130)
GLUCOSE BLDC GLUCOMTR-MCNC: 187 MG/DL (ref 70–130)
GLUCOSE BLDC GLUCOMTR-MCNC: 193 MG/DL (ref 70–130)
GLUCOSE BLDC GLUCOMTR-MCNC: 201 MG/DL (ref 70–130)
GLUCOSE BLDC GLUCOMTR-MCNC: 203 MG/DL (ref 70–130)
GLUCOSE BLDC GLUCOMTR-MCNC: 214 MG/DL (ref 70–130)
GLUCOSE BLDC GLUCOMTR-MCNC: 290 MG/DL (ref 70–130)
GLUCOSE BLDC GLUCOMTR-MCNC: 292 MG/DL (ref 70–130)
GLUCOSE BLDC GLUCOMTR-MCNC: 332 MG/DL (ref 70–130)
GLUCOSE BLDC GLUCOMTR-MCNC: 361 MG/DL (ref 70–130)
GLUCOSE BLDC GLUCOMTR-MCNC: 373 MG/DL (ref 70–130)
GLUCOSE BLDC GLUCOMTR-MCNC: 431 MG/DL (ref 70–130)
HADV DNA SPEC NAA+PROBE: NOT DETECTED
HCO3 BLDA-SCNC: 1.9 MMOL/L (ref 22–28)
HCO3 BLDV-SCNC: 7.7 MMOL/L (ref 22–28)
HCOV 229E RNA SPEC QL NAA+PROBE: NOT DETECTED
HCOV HKU1 RNA SPEC QL NAA+PROBE: NOT DETECTED
HCOV NL63 RNA SPEC QL NAA+PROBE: NOT DETECTED
HCOV OC43 RNA SPEC QL NAA+PROBE: NOT DETECTED
HMPV RNA NPH QL NAA+NON-PROBE: DETECTED
HPIV1 RNA SPEC QL NAA+PROBE: NOT DETECTED
HPIV2 RNA SPEC QL NAA+PROBE: NOT DETECTED
HPIV3 RNA NPH QL NAA+PROBE: NOT DETECTED
HPIV4 P GENE NPH QL NAA+PROBE: NOT DETECTED
M PNEUMO IGG SER IA-ACNC: NOT DETECTED
MAGNESIUM SERPL-MCNC: 1.6 MG/DL (ref 1.6–2.6)
MAGNESIUM SERPL-MCNC: 1.8 MG/DL (ref 1.6–2.6)
MAGNESIUM SERPL-MCNC: 2.1 MG/DL (ref 1.6–2.6)
MAGNESIUM SERPL-MCNC: 2.3 MG/DL (ref 1.6–2.6)
MODALITY: ABNORMAL
MODALITY: ABNORMAL
PCO2 BLDA: ABNORMAL MM HG (ref 35–45)
PCO2 BLDV: 18.3 MM HG (ref 41–51)
PH BLDA: 6.82 PH UNITS (ref 7.35–7.45)
PH BLDV: 7.23 PH UNITS (ref 7.31–7.41)
PHOSPHATE SERPL-MCNC: 2.2 MG/DL (ref 2.5–4.5)
PHOSPHATE SERPL-MCNC: 2.5 MG/DL (ref 2.5–4.5)
PHOSPHATE SERPL-MCNC: 3.1 MG/DL (ref 2.5–4.5)
PHOSPHATE SERPL-MCNC: 3.3 MG/DL (ref 2.5–4.5)
PHOSPHATE SERPL-MCNC: 3.4 MG/DL (ref 2.5–4.5)
PHOSPHATE SERPL-MCNC: 3.5 MG/DL (ref 2.5–4.5)
PO2 BLDA: 113 MM HG (ref 80–100)
PO2 BLDV: 43.6 MM HG (ref 35–45)
POTASSIUM BLD-SCNC: 3.4 MMOL/L (ref 3.5–5.2)
POTASSIUM BLD-SCNC: 3.5 MMOL/L (ref 3.5–5.2)
POTASSIUM BLD-SCNC: 3.7 MMOL/L (ref 3.5–5.2)
POTASSIUM BLD-SCNC: 3.7 MMOL/L (ref 3.5–5.2)
POTASSIUM BLD-SCNC: 3.9 MMOL/L (ref 3.5–5.2)
POTASSIUM BLD-SCNC: 4.2 MMOL/L (ref 3.5–5.2)
RHINOVIRUS RNA SPEC NAA+PROBE: NOT DETECTED
RSV RNA NPH QL NAA+NON-PROBE: NOT DETECTED
SAO2 % BLDCOA: 72.3 % (ref 92–99)
SET MECH RESP RATE: 20
SODIUM BLD-SCNC: 136 MMOL/L (ref 136–145)
SODIUM BLD-SCNC: 138 MMOL/L (ref 136–145)
SODIUM BLD-SCNC: 140 MMOL/L (ref 136–145)
SODIUM BLD-SCNC: 141 MMOL/L (ref 136–145)
SODIUM BLD-SCNC: 142 MMOL/L (ref 136–145)
SODIUM BLD-SCNC: 143 MMOL/L (ref 136–145)
TOTAL RATE: 32 BREATHS/MINUTE

## 2018-03-07 PROCEDURE — 25010000002 MAGNESIUM SULFATE IN D5W 1G/100ML (PREMIX) 1-5 GM/100ML-% SOLUTION: Performed by: INTERNAL MEDICINE

## 2018-03-07 PROCEDURE — 25010000002 PROMETHAZINE PER 50 MG: Performed by: INTERNAL MEDICINE

## 2018-03-07 PROCEDURE — 80048 BASIC METABOLIC PNL TOTAL CA: CPT | Performed by: INTERNAL MEDICINE

## 2018-03-07 PROCEDURE — 25010000002 CEFTRIAXONE PER 250 MG: Performed by: INTERNAL MEDICINE

## 2018-03-07 PROCEDURE — 94799 UNLISTED PULMONARY SVC/PX: CPT

## 2018-03-07 PROCEDURE — 83735 ASSAY OF MAGNESIUM: CPT | Performed by: INTERNAL MEDICINE

## 2018-03-07 PROCEDURE — 25810000003 POTASSIUM CHLORIDE PER 2 MEQ: Performed by: INTERNAL MEDICINE

## 2018-03-07 PROCEDURE — 25010000002 POTASSIUM CHLORIDE PER 2 MEQ OF POTASSIUM: Performed by: INTERNAL MEDICINE

## 2018-03-07 PROCEDURE — 25010000002 ENOXAPARIN PER 10 MG: Performed by: INTERNAL MEDICINE

## 2018-03-07 PROCEDURE — 63710000001 INSULIN REGULAR HUMAN PER 5 UNITS: Performed by: INTERNAL MEDICINE

## 2018-03-07 PROCEDURE — 84100 ASSAY OF PHOSPHORUS: CPT | Performed by: INTERNAL MEDICINE

## 2018-03-07 PROCEDURE — 82330 ASSAY OF CALCIUM: CPT | Performed by: INTERNAL MEDICINE

## 2018-03-07 PROCEDURE — C1894 INTRO/SHEATH, NON-LASER: HCPCS

## 2018-03-07 PROCEDURE — 99255 IP/OBS CONSLTJ NEW/EST HI 80: CPT | Performed by: INTERNAL MEDICINE

## 2018-03-07 PROCEDURE — 25010000002 ONDANSETRON PER 1 MG: Performed by: INTERNAL MEDICINE

## 2018-03-07 PROCEDURE — 25010000003 POTASSIUM CHLORIDE 10 MEQ/100ML SOLUTION: Performed by: INTERNAL MEDICINE

## 2018-03-07 PROCEDURE — 02HV33Z INSERTION OF INFUSION DEVICE INTO SUPERIOR VENA CAVA, PERCUTANEOUS APPROACH: ICD-10-PCS | Performed by: INTERNAL MEDICINE

## 2018-03-07 PROCEDURE — 71045 X-RAY EXAM CHEST 1 VIEW: CPT

## 2018-03-07 PROCEDURE — 94640 AIRWAY INHALATION TREATMENT: CPT

## 2018-03-07 PROCEDURE — 82803 BLOOD GASES ANY COMBINATION: CPT

## 2018-03-07 PROCEDURE — 82962 GLUCOSE BLOOD TEST: CPT

## 2018-03-07 RX ORDER — CEFTRIAXONE SODIUM 1 G/50ML
1 INJECTION, SOLUTION INTRAVENOUS EVERY 24 HOURS
Status: DISCONTINUED | OUTPATIENT
Start: 2018-03-07 | End: 2018-03-11 | Stop reason: HOSPADM

## 2018-03-07 RX ORDER — IPRATROPIUM BROMIDE AND ALBUTEROL SULFATE 2.5; .5 MG/3ML; MG/3ML
3 SOLUTION RESPIRATORY (INHALATION) EVERY 4 HOURS PRN
Status: DISCONTINUED | OUTPATIENT
Start: 2018-03-07 | End: 2018-03-11 | Stop reason: HOSPADM

## 2018-03-07 RX ORDER — MAGNESIUM SULFATE 1 G/100ML
2 INJECTION INTRAVENOUS ONCE
Status: COMPLETED | OUTPATIENT
Start: 2018-03-07 | End: 2018-03-07

## 2018-03-07 RX ORDER — ONDANSETRON 2 MG/ML
4 INJECTION INTRAMUSCULAR; INTRAVENOUS EVERY 4 HOURS PRN
Status: DISCONTINUED | OUTPATIENT
Start: 2018-03-07 | End: 2018-03-11

## 2018-03-07 RX ORDER — DEXTROSE, SODIUM CHLORIDE, AND POTASSIUM CHLORIDE 5; .45; .15 G/100ML; G/100ML; G/100ML
125 INJECTION INTRAVENOUS CONTINUOUS
Status: DISCONTINUED | OUTPATIENT
Start: 2018-03-07 | End: 2018-03-08

## 2018-03-07 RX ORDER — PROMETHAZINE HYDROCHLORIDE 25 MG/ML
12.5 INJECTION, SOLUTION INTRAMUSCULAR; INTRAVENOUS EVERY 4 HOURS PRN
Status: DISCONTINUED | OUTPATIENT
Start: 2018-03-07 | End: 2018-03-11

## 2018-03-07 RX ADMIN — PROMETHAZINE HYDROCHLORIDE 12.5 MG: 25 INJECTION INTRAMUSCULAR; INTRAVENOUS at 22:45

## 2018-03-07 RX ADMIN — MAGNESIUM SULFATE HEPTAHYDRATE 2 G: 1 INJECTION, SOLUTION INTRAVENOUS at 15:06

## 2018-03-07 RX ADMIN — ONDANSETRON 4 MG: 2 INJECTION INTRAMUSCULAR; INTRAVENOUS at 13:48

## 2018-03-07 RX ADMIN — SODIUM CHLORIDE 0.88 UNITS/HR: 9 INJECTION, SOLUTION INTRAVENOUS at 23:08

## 2018-03-07 RX ADMIN — SODIUM BICARBONATE: 84 INJECTION, SOLUTION INTRAVENOUS at 23:13

## 2018-03-07 RX ADMIN — DOXYCYCLINE 100 MG: 100 INJECTION, POWDER, LYOPHILIZED, FOR SOLUTION INTRAVENOUS at 13:48

## 2018-03-07 RX ADMIN — CEFTRIAXONE SODIUM 1 G: 1 INJECTION, SOLUTION INTRAVENOUS at 13:48

## 2018-03-07 RX ADMIN — POTASSIUM CHLORIDE AND SODIUM CHLORIDE 250 ML/HR: 450; 150 INJECTION, SOLUTION INTRAVENOUS at 06:03

## 2018-03-07 RX ADMIN — POTASSIUM CHLORIDE 10 MEQ: 2 INJECTION, SOLUTION, CONCENTRATE INTRAVENOUS at 21:32

## 2018-03-07 RX ADMIN — ONDANSETRON 4 MG: 2 INJECTION INTRAMUSCULAR; INTRAVENOUS at 20:11

## 2018-03-07 RX ADMIN — POTASSIUM CHLORIDE, DEXTROSE MONOHYDRATE AND SODIUM CHLORIDE 125 ML/HR: 150; 5; 450 INJECTION, SOLUTION INTRAVENOUS at 18:23

## 2018-03-07 RX ADMIN — ONDANSETRON 4 MG: 2 INJECTION INTRAMUSCULAR; INTRAVENOUS at 01:30

## 2018-03-07 RX ADMIN — SODIUM CHLORIDE 0.88 UNITS/HR: 9 INJECTION, SOLUTION INTRAVENOUS at 23:05

## 2018-03-07 RX ADMIN — POTASSIUM CHLORIDE 10 MEQ: 2 INJECTION, SOLUTION, CONCENTRATE INTRAVENOUS at 23:11

## 2018-03-07 RX ADMIN — IPRATROPIUM BROMIDE AND ALBUTEROL SULFATE 3 ML: .5; 3 SOLUTION RESPIRATORY (INHALATION) at 23:38

## 2018-03-07 RX ADMIN — PROMETHAZINE HYDROCHLORIDE 12.5 MG: 25 INJECTION INTRAMUSCULAR; INTRAVENOUS at 16:33

## 2018-03-07 RX ADMIN — SODIUM CHLORIDE 2000 ML: 9 INJECTION, SOLUTION INTRAVENOUS at 10:26

## 2018-03-07 RX ADMIN — SODIUM BICARBONATE: 84 INJECTION, SOLUTION INTRAVENOUS at 16:04

## 2018-03-07 RX ADMIN — POTASSIUM CHLORIDE 10 MEQ: 2 INJECTION, SOLUTION, CONCENTRATE INTRAVENOUS at 19:59

## 2018-03-07 RX ADMIN — POTASSIUM CHLORIDE, DEXTROSE MONOHYDRATE AND SODIUM CHLORIDE 150 ML/HR: 150; 5; 450 INJECTION, SOLUTION INTRAVENOUS at 13:49

## 2018-03-07 RX ADMIN — ENOXAPARIN SODIUM 40 MG: 40 INJECTION SUBCUTANEOUS at 22:45

## 2018-03-07 RX ADMIN — PROMETHAZINE HYDROCHLORIDE 12.5 MG: 25 INJECTION INTRAMUSCULAR; INTRAVENOUS at 12:10

## 2018-03-07 RX ADMIN — PROMETHAZINE HYDROCHLORIDE 12.5 MG: 25 INJECTION INTRAMUSCULAR; INTRAVENOUS at 02:55

## 2018-03-07 RX ADMIN — POTASSIUM PHOSPHATE, MONOBASIC AND POTASSIUM PHOSPHATE, DIBASIC 20 MMOL: 224; 236 INJECTION, SOLUTION INTRAVENOUS at 01:51

## 2018-03-07 RX ADMIN — POTASSIUM CHLORIDE 10 MEQ: 2 INJECTION, SOLUTION, CONCENTRATE INTRAVENOUS at 17:08

## 2018-03-07 RX ADMIN — POTASSIUM CHLORIDE, DEXTROSE MONOHYDRATE AND SODIUM CHLORIDE 150 ML/HR: 150; 5; 450 INJECTION, SOLUTION INTRAVENOUS at 06:57

## 2018-03-07 RX ADMIN — SODIUM PHOSPHATE, MONOBASIC, MONOHYDRATE AND SODIUM PHOSPHATE, DIBASIC, ANHYDROUS 15 MMOL: 276; 142 INJECTION, SOLUTION INTRAVENOUS at 06:03

## 2018-03-07 RX ADMIN — POTASSIUM CHLORIDE 10 MEQ: 2 INJECTION, SOLUTION, CONCENTRATE INTRAVENOUS at 18:20

## 2018-03-07 NOTE — PLAN OF CARE
Problem: Patient Care Overview (Adult)  Goal: Plan of Care Review  Outcome: Ongoing (interventions implemented as appropriate)   03/07/18 0317   Coping/Psychosocial Response Interventions   Plan Of Care Reviewed With patient;spouse   Patient Care Overview   Progress improving   Outcome Evaluation   Outcome Summary/Follow up Plan Pt brought from ED in severe DKA w/pH 6.7 temp 91.4F. Started on warming blankets until temp reached 98F. DKA protocol. Pt originally unresponsive. Now A&Ox4.       Problem: Diabetes, Type 2 (Adult)  Goal: Signs and Symptoms of Listed Potential Problems Will be Absent or Manageable (Diabetes, Type 2)  Outcome: Ongoing (interventions implemented as appropriate)      Problem: Fall Risk (Adult)  Goal: Identify Related Risk Factors and Signs and Symptoms  Outcome: Ongoing (interventions implemented as appropriate)    Goal: Absence of Falls  Outcome: Ongoing (interventions implemented as appropriate)      Problem: Nutrition, Imbalanced: Inadequate Oral Intake (Adult)  Goal: Identify Related Risk Factors and Signs and Symptoms  Outcome: Ongoing (interventions implemented as appropriate)    Goal: Improved Oral Intake  Outcome: Ongoing (interventions implemented as appropriate)    Goal: Prevent Further Weight Loss  Outcome: Ongoing (interventions implemented as appropriate)

## 2018-03-07 NOTE — PROGRESS NOTES
Clinical Pharmacy Services: Medication History    Sonal De Dios is a 52 y.o. female presenting [facility]   Chief Complaint   Patient presents with   • Hypotension   • Hyperglycemia       She  has a past medical history of Diabetes mellitus.    Allergies as of 03/06/2018   • (No Known Allergies)       Medication information was obtained from: Pharmacy  Pharmacy and Phone Number: [facility] [phone]    Prior to Admission Medications     None            Medication notes: Patient is not compliant with insulin therapy. Per pharmacy provided by patient's , Humalog pens were last filled in February 2017 (5u TID) and 1 vial of Lantus in November 2016 (10u QNT)    This medication list is complete to the best of my knowledge as of 3/6/2018    Please call if questions.    Ayana Gaona, Medication History Technician  3/6/2018 7:25 PM

## 2018-03-07 NOTE — CONSULTS
52 y.o.  Patient Care Team:  No Known Provider as PCP - General      Chief Complaint   Patient presents with   • Hypotension   • Hyperglycemia   Severe diabetic ketoacidosis    HPI patient is a 52-year-old white female admitted to the hospital with severe C hypotension hyperglycemia and was evaluated in the emergency room and was noted to be severely acidotic with a pH of 6.7  She was placed on insulin therapy and IV fluids as well as bicarbonate intravenously  Patient is admitted to intensive care unit  Overnight patient's blood sugars have improved significantly but is bicarbonate still low  Patient's  is at the bedside and is often the history  Patient is very drowsy and not able to offer meaningful history   also reported that the entire family was sick recently and patient has not been feeling well for the past 3-4 days prior to admission to the emergency room    According to the  patient was diagnosed with gestational diabetes more than 20 years ago  Apparently she used to be on insulin during pregnancy and a few years after pregnancies  At some point she discontinued taking insulin  He also reports that she used to see Dr. Davey in our practice which was at least 4-5 years ago  He has noticed that she has taken insulin periodically intermittently  Patient is briefly awake and reported that she has not taken any insulin in the past 3-4 years    Patient denied any knowledge or symptoms of diabetic retinopathy nephropathy or neuropathy  Patient also denied any previous history of diabetic ketoacidosis    Patient reported that she has been symptomatic for the past 3-4 months with severe thirst and polyuria  Her blood sugars were apparently not too high to seek medical attention  There is no obvious history of weight loss      Past Medical History:   Diagnosis Date   • Diabetes mellitus        History reviewed. No pertinent family history.    Social History     Social History   • Marital  status:      Spouse name: N/A   • Number of children: N/A   • Years of education: N/A     Occupational History   • Not on file.     Social History Main Topics   • Smoking status: Never Smoker   • Smokeless tobacco: Not on file   • Alcohol use No   • Drug use: No   • Sexual activity: Not on file     Other Topics Concern   • Not on file     Social History Narrative   • No narrative on file       No Known Allergies      Current Facility-Administered Medications:   •  dextrose (D50W) solution 12.5 g, 12.5 g, Intravenous, Q15 Min PRN, Jacqueline Alcantar MD  •  dextrose 5 % and sodium chloride 0.45 % infusion, 150 mL/hr, Intravenous, Continuous PRN, Jacqueline Alcantar MD  •  dextrose 5 % and sodium chloride 0.45 % with KCl 20 mEq/L infusion, 150 mL/hr, Intravenous, Continuous PRN, Jacqueline Alcantar MD, Last Rate: 150 mL/hr at 03/07/18 0657, 150 mL/hr at 03/07/18 0657  •  enoxaparin (LOVENOX) syringe 40 mg, 40 mg, Subcutaneous, Q24H, Jacqueline Alcantar MD, 40 mg at 03/06/18 2113  •  insulin regular (humuLIN R,novoLIN R) 100 Units in sodium chloride 0.9 % 100 mL (1 Units/mL) infusion, 0.1 Units/kg/hr, Intravenous, Titrated, Jacqueline Alcantar MD, Last Rate: 3 mL/hr at 03/07/18 0947, 3 Units/hr at 03/07/18 0947  •  Magnesium Sulfate 2 gram Bolus, followed by 8 gram infusion (total Mg dose 10 grams)- Mg less than or equal to 1mg/dL, 2 g, Intravenous, PRN **OR** Magnesium Sulfate 6 gram Infusion (2 gm x 3) -Mg 1.1 -1.5 mg/dL, 2 g, Intravenous, PRN **OR** magnesium sulfate 4 gram infusion- Mg 1.6-1.9 mg/dL, 4 g, Intravenous, PRN, Jacqueline Alcantar MD  •  ondansetron (ZOFRAN) injection 4 mg, 4 mg, Intravenous, Q4H PRN, Jacqueline Alcantar MD, 4 mg at 03/07/18 0130  •  potassium chloride (MICRO-K) CR capsule 10 mEq, 10 mEq, Oral, PRN **OR** potassium chloride (KLOR-CON) packet 10 mEq, 10 mEq, Oral, PRN **OR** potassium chloride 10 mEq in 100 mL IVPB, 10 mEq, Intravenous, Q1H PRN, Jacqueline Alcantar MD  •  potassium chloride (MICRO-K) CR capsule 20 mEq,  20 mEq, Oral, PRN **OR** potassium chloride (KLOR-CON) packet 20 mEq, 20 mEq, Oral, PRN **OR** potassium chloride 10 mEq in 100 mL IVPB, 10 mEq, Intravenous, Q1H PRN, Jacqueline Alcantar MD  •  potassium chloride (MICRO-K) CR capsule 40 mEq, 40 mEq, Oral, PRN **OR** potassium chloride (KLOR-CON) packet 40 mEq, 40 mEq, Oral, PRN **OR** potassium chloride 10 mEq in 100 mL IVPB, 10 mEq, Intravenous, Q1H PRN, Jacqueline Alcantar MD  •  potassium phosphate 45 mmol in sodium chloride 0.9 % 500 mL infusion, 45 mmol, Intravenous, PRN **OR** potassium phosphate 30 mmol in sodium chloride 0.9 % 250 mL infusion, 30 mmol, Intravenous, PRN **OR** potassium phosphate 15 mmol in sodium chloride 0.9 % 100 mL infusion, 15 mmol, Intravenous, PRN **OR** sodium phosphates 45 mmol in sodium chloride 0.9 % 500 mL IVPB, 45 mmol, Intravenous, PRN **OR** sodium phosphates 30 mmol in sodium chloride 0.9 % 250 mL IVPB, 30 mmol, Intravenous, PRN **OR** sodium phosphates 15 mmol in sodium chloride 0.9 % 250 mL IVPB, 15 mmol, Intravenous, PRN, Jacqueline Alcantar MD, Last Rate: 62.5 mL/hr at 03/07/18 0603, 15 mmol at 03/07/18 0603  •  promethazine (PHENERGAN) injection 12.5 mg, 12.5 mg, Intravenous, Q4H PRN, Jacqueline Alcantar MD, 12.5 mg at 03/07/18 0255  •  sodium bicarbonate 8.4 % 100 mEq in sodium chloride 0.45 % 400 mL infusion, 100 mEq, Intravenous, Once PRN, Jacqueline Alacntar MD  •  sodium chloride 0.45 % infusion, 250 mL/hr, Intravenous, Continuous, Jacqueline Alcantar MD, Last Rate: 250 mL/hr at 03/06/18 2148, 250 mL/hr at 03/06/18 2148  •  sodium chloride 0.45 % with KCl 20 mEq/L infusion, 250 mL/hr, Intravenous, Continuous PRN, Jacqueline Alcantar MD, Stopped at 03/07/18 0657  •  sodium chloride 0.9 % bolus 2,000 mL, 2,000 mL, Intravenous, Once, Randall Pennington MD, Last Rate: 666.7 mL/hr at 03/07/18 1026, 2,000 mL at 03/07/18 1026  •  sodium chloride 0.9 % flush 1-10 mL, 1-10 mL, Intravenous, PRN, Jacqueline Alcantar MD         Review of Systems   Unable to perform  ROS: Acuity of condition   All other systems reviewed and are negative.    Objective     Vital Signs  Temp:  [91.4 °F (33 °C)-100.1 °F (37.8 °C)] 100 °F (37.8 °C)  Heart Rate:  [] 122  Resp:  [20] 20  BP: ()/(47-61) 85/48    Physical Exam  Physical Exam   Neck: Neck supple. No thyromegaly present.   Cardiovascular: Normal rate, regular rhythm, normal heart sounds and intact distal pulses.    Pulmonary/Chest: Effort normal and breath sounds normal.   Abdominal: Soft. Bowel sounds are normal. She exhibits distension. There is no tenderness.   Neurological:   Drowsy and barely arousable as a result of phenergan overnight   Skin: Skin is warm and dry.   Nursing note and vitals reviewed.      Results Review:    I reviewed the patient's new clinical results.  Glucose   Date/Time Value Ref Range Status   03/07/2018 0945 201 (H) 70 - 130 mg/dL Final   03/07/2018 0812 193 (H) 70 - 130 mg/dL Final   03/07/2018 0654 187 (H) 70 - 130 mg/dL Final   03/07/2018 0613 214 (H) 70 - 130 mg/dL Final   03/07/2018 0505 290 (H) 70 - 130 mg/dL Final   03/07/2018 0400 292 (H) 70 - 130 mg/dL Final   03/07/2018 0254 332 (H) 70 - 130 mg/dL Final   03/07/2018 0203 373 (H) 70 - 130 mg/dL Final   03/07/2018 0119 361 (H) 70 - 130 mg/dL Final   03/07/2018 0013 431 (H) 70 - 130 mg/dL Final   03/06/2018 1956 >599 (C) 70 - 130 mg/dL Final     Lab Results (last 72 hours)     Procedure Component Value Units Date/Time    Influenza Antigen, Rapid - Swab, Nasopharynx [[id number]]  (Normal) Collected:  03/06/18 1821    Specimen:  Swab from Nasopharynx Updated:  03/06/18 1849     Influenza A Ag, EIA Negative     Influenza B Ag, EIA Negative    Urinalysis With / Culture If Indicated - Urine, Catheter [[id number]]  (Abnormal) Collected:  03/06/18 1813    Specimen:  Urine from Urine, Catheter Updated:  03/06/18 6440     Color, UA Yellow     Appearance, UA Cloudy (A)     pH, UA <=5.0     Specific Gravity, UA 1.026     Glucose, UA >=1000 mg/dL (3+) (A)      Ketones, UA 80 mg/dL (3+) (A)     Bilirubin, UA Negative     Blood, UA Small (1+) (A)     Protein,  mg/dL (2+) (A)     Leuk Esterase, UA Negative     Nitrite, UA Negative     Urobilinogen, UA 0.2 E.U./dL    Magnesium [[id number]]  (Abnormal) Collected:  03/06/18 1810    Specimen:  Blood Updated:  03/06/18 1856     Magnesium 3.0 (H) mg/dL     Troponin [[id number]]  (Normal) Collected:  03/06/18 1810    Specimen:  Blood Updated:  03/06/18 1857     Troponin T <0.010 ng/mL     Narrative:       Troponin T Reference Ranges:  Less than 0.03 ng/mL:    Negative for AMI  0.03 to 0.09 ng/mL:      Indeterminant for AMI  Greater than 0.09 ng/mL: Positive for AMI    Urinalysis, Microscopic Only - Urine, Clean Catch [[id number]]  (Abnormal) Collected:  03/06/18 1813    Specimen:  Urine from Urine, Catheter Updated:  03/06/18 1905     RBC, UA 0-2 /HPF      WBC, UA 0-2 /HPF      Bacteria, UA 1+ (A) /HPF      Squamous Epithelial Cells, UA 0-2 /HPF      Hyaline Casts, UA 0-2 /LPF      Amorphous Crystals, UA Large/3+ /HPF      Methodology Manual Light Microscopy    Lactic Acid, Plasma [[id number]]  (Abnormal) Collected:  03/06/18 1810    Specimen:  Blood Updated:  03/06/18 1912     Lactate 2.3 (C) mmol/L     Comprehensive Metabolic Panel [[id number]]  (Abnormal) Collected:  03/06/18 1810    Specimen:  Blood Updated:  03/06/18 1912     Glucose 781 (C) mg/dL      BUN 29 (H) mg/dL      Creatinine 1.61 (H) mg/dL      Sodium 126 (L) mmol/L      Potassium 6.2 (C) mmol/L      Chloride 85 (L) mmol/L      CO2 6.7 (L) mmol/L      Calcium 8.9 mg/dL      Total Protein 8.1 g/dL      Albumin 4.70 g/dL      ALT (SGPT) 14 U/L      AST (SGOT) 19 U/L      Alkaline Phosphatase 135 (H) U/L      Total Bilirubin <0.2 mg/dL      eGFR Non African Amer 33 (L) mL/min/1.73      Globulin 3.4 gm/dL      A/G Ratio 1.4 g/dL      BUN/Creatinine Ratio 18.0     Anion Gap 34.3 mmol/L     Ketone Bodies, Serum (Not performed at [facility]) [[id number]] Collected:   "03/06/18 1810    Specimen:  Blood Updated:  03/06/18 1912    Narrative:       The following orders were created for panel order Ketone Bodies, Serum (Not performed at [facility]).  Procedure                               Abnormality         Status                     ---------                               -----------         ------                     Beta Hydroxybutyrate [place]...[[id number]]  Abnormal            Final result                 Please view results for these tests on the individual orders.    Beta Hydroxybutyrate Quantitative [[id number]]  (Abnormal) Collected:  03/06/18 1810    Specimen:  Blood Updated:  03/06/18 1912     Beta-Hydroxybutyrate Quant 11.178 (H) mmol/L     Procalcitonin [[id number]]  (Abnormal) Collected:  03/06/18 1810    Specimen:  Blood Updated:  03/06/18 1913     Procalcitonin 47.80 (C) ng/mL     Narrative:       As a Marker for Sepsis (Non-Neonates):   1. <0.5 ng/mL represents a low risk of severe sepsis and/or septic shock.  1. >2 ng/mL represents a high risk of severe sepsis and/or septic shock.    As a Marker for Lower Respiratory Tract Infections that require antibiotic therapy:  PCT on Admission     Antibiotic Therapy             6-12 Hrs later  > 0.5                Strongly Recommended            >0.25 - <0.5         Recommended  0.1 - 0.25           Discouraged                   Remeasure/reassess PCT  <0.1                 Strongly Discouraged          Remeasure/reassess PCT      As 28 day mortality risk marker: \"Change in Procalcitonin Result\" (> 80 % or <=80 %) if Day 0 (or Day 1) and Day 4 values are available. Refer to http://[email or web address]-pct-calculator.com/   Change in PCT <=80 %   A decrease of PCT levels below or equal to 80 % defines a positive change in PCT test result representing a higher risk for 28-day all-cause mortality of patients diagnosed with severe sepsis or septic shock.  Change in PCT > 80 %   A decrease of PCT levels of more than 80 % defines a negative change " in PCT result representing a lower risk for 28-day all-cause mortality of patients diagnosed with severe sepsis or septic shock.                POC Glucose Once [[id number]]  (Abnormal) Collected:  03/06/18 1956    Specimen:  Blood Updated:  03/06/18 2004     Glucose >599 (C) mg/dL     Narrative:       Meter: [id number] : [id number] Ruth REDMOND    Glucose, Random [[id number]]  (Abnormal) Collected:  03/06/18 2014    Specimen:  Blood Updated:  03/06/18 2059     Glucose 706 (C) mg/dL     Phosphorus [[id number]]  (Abnormal) Collected:  03/06/18 2014    Specimen:  Blood Updated:  03/06/18 2115     Phosphorus 7.2 (H) mg/dL     CBC & Differential [[id number]] Collected:  03/06/18 2118    Specimen:  Blood Updated:  03/06/18 2209    Narrative:       The following orders were created for panel order CBC & Differential.  Procedure                               Abnormality         Status                     ---------                               -----------         ------                     Manual Differential[[id number]]          Abnormal            Final result               Scan Slide[[id number]]                                       Final result               CBC Auto Differential[[id number]]        Abnormal            Final result                 Please view results for these tests on the individual orders.    CBC Auto Differential [[id number]]  (Abnormal) Collected:  03/06/18 2118    Specimen:  Blood Updated:  03/06/18 2209     WBC 25.09 (H) 10*3/mm3      RBC 4.18 10*6/mm3      Hemoglobin 12.9 g/dL      Hematocrit 43.5 %      .1 (H) fL      MCH 30.9 pg      MCHC 29.7 (L) g/dL      RDW 12.8 %      RDW-SD 48.6 fl      MPV 10.8 fL      Platelets 215 10*3/mm3     Scan Slide [[id number]] Collected:  03/06/18 2118    Specimen:  Blood Updated:  03/06/18 2209     Scan Slide --      See Manual Differential Results       Manual Differential [[id number]]  (Abnormal) Collected:  03/06/18 2118    Specimen:  Blood Updated:   03/06/18 2209     Neutrophil % 77.0 (H) %      Lymphocyte % 14.0 (L) %      Monocyte % 7.0 %      Metamyelocyte % 2.0 (H) %      Neutrophils Absolute 19.32 (H) 10*3/mm3      Lymphocytes Absolute 3.51 10*3/mm3      Monocytes Absolute 1.76 (H) 10*3/mm3      RBC Morphology Normal     Vacuolated Neutrophils Slight/1+     Platelet Morphology Normal    Hemoglobin A1c [[id number]]  (Abnormal) Collected:  03/06/18 2118    Specimen:  Blood Updated:  03/06/18 2213     Hemoglobin A1C 13.20 (H) %     Narrative:       Hemoglobin A1C Ranges:    Increased Risk for Diabetes  5.7% to 6.4%  Diabetes                     >= 6.5%  Diabetic Goal                < 7.0%    Lactic Acid, Reflex Timer (This will reflex a repeat order 3-3:15 hours after ordered.) [[id number]] Collected:  03/06/18 1810    Specimen:  Blood Updated:  03/06/18 2216     Extra Tube Hold for add-ons.      Auto resulted.       Comprehensive Metabolic Panel [[id number]]  (Abnormal) Collected:  03/06/18 2118    Specimen:  Blood Updated:  03/06/18 2250     Glucose 651 (C) mg/dL      BUN 30 (H) mg/dL      Creatinine 1.37 (H) mg/dL      Sodium 132 (L) mmol/L      Potassium 5.2 mmol/L      Chloride 95 (L) mmol/L      CO2 2.3 (L) mmol/L      Calcium 7.9 (L) mg/dL      Total Protein 6.2 g/dL      Albumin 3.30 (L) g/dL      ALT (SGPT) 13 U/L      AST (SGOT) 18 U/L      Alkaline Phosphatase 98 U/L      Total Bilirubin <0.2 mg/dL      eGFR Non African Amer 40 (L) mL/min/1.73      Globulin 2.9 gm/dL      A/G Ratio 1.1 g/dL      BUN/Creatinine Ratio 21.9     Anion Gap 34.7 mmol/L     Blood Gas, Venous [[id number]]  (Abnormal) Collected:  03/06/18 2355    Specimen:  Venous Blood Updated:  03/06/18 2357     Site N/A     pH, Venous 7.038 (L) pH Units      pCO2, Venous 19.4 (L) mm Hg      pO2, Venous 31.5 (L) mm Hg      HCO3, Venous 5.2 (L) mmol/L      Base Excess, Venous -23.8 mmol/L      O2 Saturation Calculated 38.5 (L) %      Barometric Pressure for Blood Gas 747.2 mmHg      Modality  Room Air     Rate 20 Breaths/minute     Narrative:       SAT 99 Meter: [id number] : [id number] Aracelis Blanton    Lactic Acid, Reflex [[id number]]  (Normal) Collected:  03/06/18 2347    Specimen:  Blood Updated:  03/07/18 0018     Lactate 1.2 mmol/L     POC Glucose Once [[id number]]  (Abnormal) Collected:  03/07/18 0013    Specimen:  Blood Updated:  03/07/18 0020     Glucose 431 (H) mg/dL     Narrative:       Result Not Confirmed Meter: [id number] : [id number] Ruth REDMOND    Phosphorus [[id number]]  (Normal) Collected:  03/06/18 2347    Specimen:  Blood Updated:  03/07/18 0047     Phosphorus 2.5 mg/dL     Basic Metabolic Panel [[id number]]  (Abnormal) Collected:  03/06/18 2347    Specimen:  Blood Updated:  03/07/18 0047     Glucose 515 (C) mg/dL      BUN 31 (H) mg/dL      Creatinine 1.44 (H) mg/dL      Sodium 136 mmol/L      Potassium 3.7 mmol/L      Chloride 99 mmol/L      CO2 5.2 (L) mmol/L      Calcium 7.5 (L) mg/dL      eGFR Non African Amer 38 (L) mL/min/1.73      BUN/Creatinine Ratio 21.5     Anion Gap 31.8 mmol/L     Narrative:       GFR Normal >60  Chronic Kidney Disease <60  Kidney Failure <15    Magnesium [[id number]]  (Normal) Collected:  03/06/18 2347    Specimen:  Blood Updated:  03/07/18 0047     Magnesium 1.8 mg/dL     Calcium, Ionized [[id number]]  (Normal) Collected:  03/06/18 2347    Specimen:  Blood Updated:  03/07/18 0047     Ionized Calcium 1.23 mmol/L      Ionized Calcium 4.9 mg/dL     POC Glucose Once [[id number]]  (Abnormal) Collected:  03/07/18 0119    Specimen:  Blood Updated:  03/07/18 0120     Glucose 361 (H) mg/dL     Narrative:       Meter: [id number] : [id number] Faiza Marcial RN    POC Glucose Once [[id number]]  (Abnormal) Collected:  03/07/18 0203    Specimen:  Blood Updated:  03/07/18 0230     Glucose 373 (H) mg/dL     Narrative:       Meter: [id number] : [id number] Faiza Marcial RN    POC Glucose Once [[id number]]  (Abnormal) Collected:  03/07/18 0254    Specimen:   Blood Updated:  03/07/18 0304     Glucose 332 (H) mg/dL     Narrative:       Meter: [id number] : [id number] Faiza Marcial RN    POC Glucose Once [[id number]]  (Abnormal) Collected:  03/07/18 0400    Specimen:  Blood Updated:  03/07/18 0402     Glucose 292 (H) mg/dL     Narrative:       Meter: [id number] : [id number] Faiza Marcial RN    Phosphorus [[id number]]  (Abnormal) Collected:  03/07/18 0406    Specimen:  Blood Updated:  03/07/18 0457     Phosphorus 2.2 (L) mg/dL     Magnesium [[id number]]  (Normal) Collected:  03/07/18 0406    Specimen:  Blood Updated:  03/07/18 0457     Magnesium 1.8 mg/dL     POC Glucose Once [[id number]]  (Abnormal) Collected:  03/07/18 0505    Specimen:  Blood Updated:  03/07/18 0507     Glucose 290 (H) mg/dL     Narrative:       Meter: [id number] : [id number] Faiza Marcial RN    Basic Metabolic Panel [[id number]]  (Abnormal) Collected:  03/07/18 0406    Specimen:  Blood Updated:  03/07/18 0519     Glucose 323 (H) mg/dL      BUN 32 (H) mg/dL      Creatinine 1.51 (H) mg/dL      Sodium 138 mmol/L      Potassium 4.2 mmol/L      Chloride 105 mmol/L      CO2 6.2 (L) mmol/L      Calcium 7.6 (L) mg/dL      eGFR Non African Amer 36 (L) mL/min/1.73      BUN/Creatinine Ratio 21.2     Anion Gap 26.8 mmol/L     Narrative:       GFR Normal >60  Chronic Kidney Disease <60  Kidney Failure <15    Calcium, Ionized [[id number]]  (Normal) Collected:  03/07/18 0406    Specimen:  Blood Updated:  03/07/18 0524     Ionized Calcium 1.24 mmol/L      Ionized Calcium 5.0 mg/dL     POC Glucose Once [[id number]]  (Abnormal) Collected:  03/07/18 0613    Specimen:  Blood Updated:  03/07/18 0614     Glucose 214 (H) mg/dL     Narrative:       Meter: [id number] : [id number] Ruth REDMOND    Blood Culture - Blood, [[id number]]  (Normal) Collected:  03/06/18 1819    Specimen:  Blood from Arm, Left Updated:  03/07/18 0631     Blood Culture No growth at less than 24 hours    Blood Culture - Blood, [[id number]]   (Normal) Collected:  03/06/18 1810    Specimen:  Blood from Arm, Left Updated:  03/07/18 0631     Blood Culture No growth at less than 24 hours    POC Glucose Once [[id number]]  (Abnormal) Collected:  03/07/18 0654    Specimen:  Blood Updated:  03/07/18 0700     Glucose 187 (H) mg/dL     Narrative:       Meter: [id number] : [id number] Faiza Marcial RN    Blood Gas, Arterial [[id number]]  (Abnormal) Collected:  03/06/18 2104    Specimen:  Arterial Blood Updated:  03/07/18 0707     Site Arterial: right radial     Morris's Test Positive     pH, Arterial 6.823 (C) pH units      pCO2, Arterial -- mm Hg       Results less than reportable range.        pO2, Arterial 113.0 (H) mm Hg      HCO3, Arterial 1.9 (L) mmol/L      Base Excess, Arterial -- mmol/L       Results less than reportable range.        Barometric Pressure for Blood Gas 747.1 mmHg      Modality Room Air     Set [facility] Resp Rate 20    Narrative:       Results reported to     Blood Gas, Venous [[id number]]  (Abnormal) Collected:  03/07/18 0807    Specimen:  Venous Blood Updated:  03/07/18 0809     Site N/A     pH, Venous 7.232 (L) pH Units      pCO2, Venous 18.3 (L) mm Hg      pO2, Venous 43.6 mm Hg      HCO3, Venous 7.7 (L) mmol/L      Base Excess, Venous -17.5 mmol/L      O2 Saturation Calculated 72.3 (L) %      Barometric Pressure for Blood Gas 750.0 mmHg      Modality Room Air     Rate 32 Breaths/minute     Narrative:       drawn by LAUREN [id number] Meter: [id number] : [id number] Tamiko Barbour    POC Glucose Once [[id number]]  (Abnormal) Collected:  03/07/18 0812    Specimen:  Blood Updated:  03/07/18 0840     Glucose 193 (H) mg/dL     Narrative:       Meter: [id number] : [id number] Jim Spaulding    Magnesium [[id number]]  (Normal) Collected:  03/07/18 0809    Specimen:  Blood Updated:  03/07/18 0859     Magnesium 1.8 mg/dL     Phosphorus [[id number]]  (Normal) Collected:  03/07/18 0809    Specimen:  Blood Updated:  03/07/18 0902      Phosphorus 3.1 mg/dL     Basic Metabolic Panel [[id number]]  (Abnormal) Collected:  03/07/18 0809    Specimen:  Blood Updated:  03/07/18 0905     Glucose 226 (H) mg/dL      BUN 33 (H) mg/dL      Creatinine 1.62 (H) mg/dL      Sodium 141 mmol/L      Potassium 3.9 mmol/L      Chloride 109 (H) mmol/L      CO2 11.0 (L) mmol/L      Calcium 7.8 (L) mg/dL      eGFR Non African Amer 33 (L) mL/min/1.73      BUN/Creatinine Ratio 20.4     Anion Gap 21.0 mmol/L     Narrative:       GFR Normal >60  Chronic Kidney Disease <60  Kidney Failure <15    Calcium, Ionized [[id number]]  (Normal) Collected:  03/07/18 0809    Specimen:  Blood Updated:  03/07/18 0916     Ionized Calcium 1.24 mmol/L      Ionized Calcium 5.0 mg/dL     POC Glucose Once [[id number]]  (Abnormal) Collected:  03/07/18 0945    Specimen:  Blood Updated:  03/07/18 0956     Glucose 201 (H) mg/dL     Narrative:       Meter: [id number] : [id number] Jim Spaulding          Imaging Results (last 72 hours)     Procedure Component Value Units Date/Time    XR Chest 1 View [[id number]] Collected:  03/07/18 0419     Updated:  03/07/18 0419    Narrative:       X-RAY CHEST 1 VIEW.     HISTORY: Follow-up perihilar infiltrate.     COMPARISON: Persistent right perihilar opacity concerning for  infiltrate.     FINDINGS:  Cardiomediastinal silhouette is within normal limits.         New opacity in the left lung base, infiltrate is suspected.              Impression:       New infiltrate in the left lung base is suspected. Follow-up is  recommended.  Right perihilar infiltrate is unchanged.           XR Chest 1 View [[id number]] Collected:  03/06/18 1925     Updated:  03/07/18 0809    Narrative:       PORTABLE RADIOGRAPHIC VIEW OF THE CHEST      CLINICAL HISTORY:  Hypotension and hyperglycemia.     A portable radiographic view of the chest demonstrates no acute  infiltrate.  There is some pulmonary vascular engorgement. The  cardiomediastinal silhouette is remarkable for prominence  of the right  hilum superiorly. A right hilar mass cannot be entirely excluded  although the findings may simply be due to prominent vascular structures  or potentially an adjacent infiltrate. I recommend further evaluation  with a PA and lateral radiographic view of the chest. There is a  somewhat gas-distended stomach. The osseous structures are unremarkable.       Impression:          Pulmonary vascular enlargement without significant interstitial  pulmonary edema.     Prominence of the right hilar structures which may simply represent  prominent vasculature.  However, I cannot exclude the possibility of a  right hilar mass or potentially an adjacent infiltrate. I recommend  further evaluation with either a PA and lateral chest radiograph or a CT  scan of the chest.     Mild gaseous distention of the stomach.     These findings and recommendations were discussed with Dr. Eugene on  03/06/2018 at approximately 7 PM.      This report was finalized on 3/7/2018 8:06 AM by Dr. Louis Crowder MD.             Assessment/Plan     Patient Active Problem List   Diagnosis   • Diabetic ketoacidosis with coma associated with type 2 diabetes mellitus   Uncontrolled diabetes mellitus  Noncompliance with management of diabetes  Diabetic ketoacidosis severe  Metabolic acidosis  AK I  Severe dehydration    I discussed with the patient and her   I also discussed with Dr. Randall Pennington    Will increase the IV fluids to 500 cc an hour for the next 2 or 3 L since she still appears to be very dehydrated  Will continue the IV insulin drip  Will check C-peptide and ALEXANDRE antibodies when patient's stable  We will try and get the old records from Dr. Davey's office  Patient is unaware of previous hemoglobin A1c    Patient's current hemoglobin A1c is 13.6% extremely high-fits with a history of polyuria and polydipsia for the past 3-4 months  Patient had been drinking more Mountain Dew than water in the past few months as per  "  Patient will need diabetes education as well as dietary education prior to discharge    Will continue to monitor the patient and then adjust insulin as needed.    The total time spent for old record and lab review and floor time was more than 110 min of which greater than 60 min of time was spent on counseling the patient on recommended evaluation and treatment options, instructions for management/treatment and /or follow up  and importance of compliance with chosen management or treatment options      Obed Conway MD FACE.  03/07/18  11:03 AM        EMR Dragon / transcription disclaimer:     \"Dictated utilizing Dragon dictation\".   "

## 2018-03-07 NOTE — PROGRESS NOTES
"  Daily Progress Note.   [facility] INTENSIVE CARE  3/7/2018    Patient:  Name:  Sonal De Dios  MRN:  [id number]  1965  52 y.o.  female         Interval History:  Severe DKA follow up.  On dka protocol, insulin drip, bicarb actually started given severity of her acidosis  Still nauseated.      Physical Exam:  BP (!) 85/48  Pulse (!) 122  Temp 100 °F (37.8 °C) (Rectal)   Resp 20  Ht 162.6 cm (64\")  Wt 70.3 kg (154 lb 15.7 oz)  SpO2 96%  BMI 26.6 kg/m2  Body mass index is 26.6 kg/(m^2).    Intake/Output Summary (Last 24 hours) at 03/07/18 1056  Last data filed at 03/07/18 0600   Gross per 24 hour   Intake             5051 ml   Output             1725 ml   Net             3326 ml     GENERAL:  illl appearing non toxic  HEENT:  EOMI, nonicteric sclera, no JVD  PULMONARY:   no wheeze, no crackles, no rhonchi, symmetric air entry  CARDIAC:  RRR no MRG, S1 S2  ABD: SNTND BS+  EXT: no c/c/e, pulses symmetric 2+ bilaterally  NEURO:  CNs II-XII intact, no focal deficits  SKIN: no lesions, no rash  PSYCH: appropriate mood    Data Review:  Notable Labs:    Results from last 7 days  Lab Units 03/06/18  2118   WBC 10*3/mm3 25.09*   HEMOGLOBIN g/dL 12.9   PLATELETS 10*3/mm3 215     Results from last 7 days  Lab Units 03/07/18  0809 03/07/18  0406 03/06/18  2347 03/06/18  2118 03/06/18 2014 03/06/18  1810   SODIUM mmol/L 141 138 136 132*  --  126*   POTASSIUM mmol/L 3.9 4.2 3.7 5.2  --  6.2*   CHLORIDE mmol/L 109* 105 99 95*  --  85*   CO2 mmol/L 11.0* 6.2* 5.2* 2.3*  --  6.7*   BUN mg/dL 33* 32* 31* 30*  --  29*   CREATININE mg/dL 1.62* 1.51* 1.44* 1.37*  --  1.61*   GLUCOSE mg/dL 226* 323* 515* 651* 706* 781*   CALCIUM mg/dL 7.8* 7.6* 7.5* 7.9*  --  8.9   MAGNESIUM mg/dL 1.8 1.8 1.8  --   --  3.0*   PHOSPHORUS mg/dL 3.1 2.2* 2.5  --  7.2*  --    Estimated Creatinine Clearance: 38.6 mL/min (by C-G formula based on Cr of 1.62).    Imaging:  CXR shows a left lower lobe pneumonia      Scheduled meds:  "     enoxaparin 40 mg Subcutaneous Q24H   sodium chloride 2,000 mL Intravenous Once       ASSESSMENT  /  PLAN:  DKA  Anion gap met acidosis  DM - insulin dependent  CAP  TME from her severe met abnormalities  HIRA  Hyperglycemia      Continue DKA protocol, im going to bolus another 2 liters at this point.  Will consult nephrology given the severity of her metabolic disturbances.  apprec endo - seems that pt only taken long acting and only on as needed basis hg above 13!  abx for cap - rvp   Hold azithromycin given qt prolongation, add doxy instead    CCT 35minutes managing life threatening dka and severe metabolic acidosis      Randall Pennington MD  [facility]  03/07/18  10:56 AM

## 2018-03-07 NOTE — CONSULTS
Adult Nutrition  Assessment/PES    Patient Name:  Sonal De Dios  YOB: 1965  MRN: [id number]  Admit Date:  3/6/2018    Assessment Date:  3/7/2018    Comments:  Nutrition assessment complete. Pt still a bit groggy and not feeling well. Spoke with spouse.  Told him we are available to review diet and educate on meal planning if pt wishes prior to discharge.  Brief education on DKA provided ( worried it was another chronic disease). Will follow.          Reason for Assessment       03/07/18 0859    Reason for Assessment    Reason For Assessment/Visit physician consult    Diagnosis Diagnosis    Endocrine DM   DKA with severe acidosis    Fluid Status Dehydration    Pulmonary/Critical Care Pneumonia    Renal HIRA              Nutrition/Diet History       03/07/18 0900    Nutrition/Diet History    Factors Affecting Nutritional Intake Factors    Reported/Observed By Patient;RN    Other still groggy.            Anthropometrics       03/07/18 0901    Anthropometrics    RD Documented Weight on Admission 70.3 kg (154 lb 15.7 oz)    Anthropometrics (Special Considerations)    RD Calculated BMI (kg/m2) 26.6    Body Mass Index (BMI)    BMI Grade 25 - 29.9 - overweight            Labs/Tests/Procedures/Meds       03/07/18 0901    Labs/Tests/Procedures/Meds    Diagnostic Test/Procedure Review reviewed, pertinent    Labs/Tests Review Reviewed;Glucose;Hgb A1C;BUN;Creat;Phos;Mg++    Medication Review Reviewed, pertinent;Insulin;Anticoag    Significant Vitals reviewed            Physical Findings       03/07/18 0903    Physical Findings/Assessment    Additional Documentation Physical Appearance (Group)    Physical Appearance    Skin other (see comments)   intact              Nutrition Prescription Ordered       03/07/18 0903    Nutrition Prescription PO    Current PO Diet NPO      Problem/Interventions:        Problem 1       03/07/18 0903    Nutrition Diagnoses Problem 1    Problem 1 Limited Adherence to Diet  Modifications    Etiology (related to) Medical Diagnosis    Endocrine DM   DKA    Signs/Symptoms (evidenced by) Biochemical    Specific Labs Noted Glucose;HgbA1C              Intervention Goal       03/07/18 0904    Intervention Goal    General Maintain nutrition;Improved nutrition related lab(s);Provide information regarding MNT for treatment/condition;Reduce/improve symptoms;Meet nutritional needs for age/condition;Disease management/therapy    PO Initiate feeding;Tolerate PO    Weight Maintain weight            Nutrition Intervention       03/07/18 0904    Nutrition Intervention    RD/Tech Action Await begin PO;Follow Tx progress;Care plan reviewd              Education/Evaluation       03/07/18 0904    Education    Education Will Instruct as appropriate    Monitor/Evaluation    Monitor Symptoms;Per protocol;I&O;Pertinent labs;Weight;Skin status        Electronically signed by:  Kelly Rod RD  03/07/18 9:05 AM

## 2018-03-07 NOTE — CONSULTS
[facility]                                                                                             Nephrology Initial Consult Note    Patient Identification:  Name: Sonal De Dios MRN: [id number]  Age: 52 y.o. : 1965  Sex: female  Date:3/7/2018    Requesting Physician: As per consult order.  Reason for Consultation: Acute renal failure versus chronic kidney disease  Information from:patient/ family/ chart      History of Present Illness: This is a 52 y.o. year old female  with insulin-dependent diabetes mellitus, developed diabetes with pregnancy more than 20 years ago and has been on sliding scale insulin at home per chart review.  Patient remains confused and is currently a poor historian, chart reviewed, the majority of the history per chart review.  There is no mention of any chronic kidney disease or prior renal failure, no previous creatinines to compare her current levels to.  Her creatinine was 0.5 and 2015.  She was brought to emergency department yesterday after she was found to be severely lethargic by her , blood sugars found to be greater than 700, AVG with critical acidosis, pH of 6.7 on admission.  She was started on IV fluids, insulin, DKA protocol.  Her CO2 level on admission was only 2!  She remains on IV fluids, insulin drip but is not currently on any pressors.  Creatinine was 1.3 on admission and has actually trended upward since admission yesterday, currently 1.6.  She was initially hyperkalemic and potassium levels have improved with insulin administration.  She currently complains of some nausea and occasional vomiting, no chest pain or shortness of breath, complains of some abdominal discomfort and cramping.      The following medical history and medications personally reviewed by me:    Problem List:   Patient Active Problem List    Diagnosis   • Diabetic  ketoacidosis with coma associated with type 2 diabetes mellitus [E13.11]       Past Medical History:  Past Medical History:   Diagnosis Date   • Diabetes mellitus        Past Surgical History:  History reviewed. No pertinent surgical history.     Home Meds:   No prescriptions prior to admission.       Current Meds:   Current Facility-Administered Medications   Medication Dose Route Frequency Provider Last Rate Last Dose   • cefTRIAXone (ROCEPHIN) IVPB 1 g  1 g Intravenous Q24H Randall Pennington MD       • dextrose (D50W) solution 12.5 g  12.5 g Intravenous Q15 Min PRN Jacqueline Alcantar MD       • dextrose 5 % and sodium chloride 0.45 % infusion  150 mL/hr Intravenous Continuous PRN Jacqueline Alcantar MD       • dextrose 5 % and sodium chloride 0.45 % with KCl 20 mEq/L infusion  150 mL/hr Intravenous Continuous PRN Jacqueline Alcantar  mL/hr at 03/07/18 0657 150 mL/hr at 03/07/18 0657   • doxycycline (VIBRAMYCIN) 100 mg/100 mL 0.9% NS MBP  100 mg Intravenous Q12H Randall Pennington MD       • enoxaparin (LOVENOX) syringe 40 mg  40 mg Subcutaneous Q24H Jacqueline Alcantar MD   40 mg at 03/06/18 2113   • insulin regular (humuLIN R,novoLIN R) 100 Units in sodium chloride 0.9 % 100 mL (1 Units/mL) infusion  0.1 Units/kg/hr Intravenous Titrated Jacqueline Alcantar MD 3.5 mL/hr at 03/07/18 1151 3.5 Units/hr at 03/07/18 1151   • Magnesium Sulfate 2 gram Bolus, followed by 8 gram infusion (total Mg dose 10 grams)- Mg less than or equal to 1mg/dL  2 g Intravenous PRN Jacqueline Alcantar MD        Or   • Magnesium Sulfate 6 gram Infusion (2 gm x 3) -Mg 1.1 -1.5 mg/dL  2 g Intravenous PRN Jacqueline Alcantar MD        Or   • magnesium sulfate 4 gram infusion- Mg 1.6-1.9 mg/dL  4 g Intravenous PRN Jacqueline Alcantar MD       • ondansetron (ZOFRAN) injection 4 mg  4 mg Intravenous Q4H PRN Jacqueline Alcantar MD   4 mg at 03/07/18 0130   • potassium chloride (MICRO-K) CR capsule 10 mEq  10 mEq Oral PRN Jacqueline Alcantar MD        Or   • potassium chloride  (KLOR-CON) packet 10 mEq  10 mEq Oral PRN Jacqueline Alcantar MD        Or   • potassium chloride 10 mEq in 100 mL IVPB  10 mEq Intravenous Q1H PRN Jacqueline Alcantar MD       • potassium chloride (MICRO-K) CR capsule 20 mEq  20 mEq Oral PRN Jacqueline Alcantar MD        Or   • potassium chloride (KLOR-CON) packet 20 mEq  20 mEq Oral PRN Jacqueline Alcantar MD        Or   • potassium chloride 10 mEq in 100 mL IVPB  10 mEq Intravenous Q1H PRN Jacqueline Alcantar MD       • potassium chloride (MICRO-K) CR capsule 40 mEq  40 mEq Oral PRN Jacqueline Alcantar MD        Or   • potassium chloride (KLOR-CON) packet 40 mEq  40 mEq Oral PRN Jacqueline Alcantar MD        Or   • potassium chloride 10 mEq in 100 mL IVPB  10 mEq Intravenous Q1H PRN Jacqueline Alcantar MD       • potassium phosphate 45 mmol in sodium chloride 0.9 % 500 mL infusion  45 mmol Intravenous PRN Jacqueline Alcantar MD        Or   • potassium phosphate 30 mmol in sodium chloride 0.9 % 250 mL infusion  30 mmol Intravenous PRN Jacqueline Alcantar MD        Or   • potassium phosphate 15 mmol in sodium chloride 0.9 % 100 mL infusion  15 mmol Intravenous PRN Jacqueline Alcantar MD        Or   • sodium phosphates 45 mmol in sodium chloride 0.9 % 500 mL IVPB  45 mmol Intravenous PRN Jacqueline Alcantar MD        Or   • sodium phosphates 30 mmol in sodium chloride 0.9 % 250 mL IVPB  30 mmol Intravenous PRN Jacqueline Alcantar MD        Or   • sodium phosphates 15 mmol in sodium chloride 0.9 % 250 mL IVPB  15 mmol Intravenous PRN Jacqueline Alcantar MD 62.5 mL/hr at 03/07/18 0603 15 mmol at 03/07/18 0603   • promethazine (PHENERGAN) injection 12.5 mg  12.5 mg Intravenous Q4H PRN Jacqueline Alcantar MD   12.5 mg at 03/07/18 1210   • sodium bicarbonate 8.4 % 100 mEq in sodium chloride 0.45 % 400 mL infusion  100 mEq Intravenous Once PRN Jacqueline Alcantar MD       • sodium chloride 0.45 % infusion  250 mL/hr Intravenous Continuous Jacqueline Alcantar  mL/hr at 03/06/18 2148 250 mL/hr at 03/06/18 2148   • sodium chloride 0.45 % with KCl 20  "mEq/L infusion  250 mL/hr Intravenous Continuous PRN Jacqueline Alcantar MD   Stopped at 18 0657   • sodium chloride 0.9 % flush 1-10 mL  1-10 mL Intravenous PRN Jacqueline Alcantar MD           Allergies:  No Known Allergies    Social History:   Social History     Social History   • Marital status:      Social History Main Topics   • Smoking status: Never Smoker   • Alcohol use No   • Drug use: No        Family History:  History reviewed. No pertinent family history.     Review of Systems: as per HPI, in addition:    General:       no weakness / fatigue,                       No fevers / chills                       no weight loss  HEENT:       no dysphagia / odynophagia  Neck:           normal range of motion, no swelling  Respiratory: no cough / congestion                      No shortness of air                       No wheezing  CV:              No chest pain                       No palpitations  Abdomen/GI: no nausea / vomiting                      No diarrhea / constipation                      No abdominal pain  :             no dysuria / urinary frequency                       No urgency, normal output  Endocrine:   no polyuria / polydipsia,                      No heat or cold intolerance  Skin:           no rashes or skin breakdown   Vascular:   No edema                     No claudication  Psych:        no depression/ anxiety  Neuro:        no focal weakness, no seizures  Musculoskeletal: no joint pain or deformities      Physical Exam:  Vitals:   Temp (24hrs), Av.1 °F (36.7 °C), Min:91.4 °F (33 °C), Max:100.4 °F (38 °C)    BP (!) 85/48  Pulse (!) 122  Temp 100.4 °F (38 °C) (Rectal)   Resp 20  Ht 162.6 cm (64\")  Wt 70.3 kg (154 lb 15.7 oz)  SpO2 96%  BMI 26.6 kg/m2  Intake/Output:     Intake/Output Summary (Last 24 hours) at 18 1336  Last data filed at 18 0600   Gross per 24 hour   Intake             5051 ml   Output             1725 ml   Net             3326 ml        Wt " Readings from Last 1 Encounters:   03/07/18 0600 70.3 kg (154 lb 15.7 oz)   03/06/18 1826 65.8 kg (145 lb)     Exam:    General Appearance:  Awakens to voice, somewhat lethargic, ill-appearing, no acute distress    Head and Face:  Normocephalic, atraumatic, mucus membranes moist, oropharynx clear   Eyes:  No icterus, pupils equal round and reactive to light, extraocular movements intact    ENMT: Moist mucosa, tongue symmetric    Neck: Supple  no jugular venous distention  no thyromegaly   Pulmonary:  Respiratory effort: Normal  Auscultation of lungs: Clear bilaterally  No wheezes  No rhonchi  Good air movement, good expansion   Chest wall:  No tenderness or deformity   Cardiovascular:  Auscultation of the heart: Normal rhythm, no murmurs  No edema of extremities    Abdomen:  Abdomen: soft, non-tender, normal bowel sounds all four quadrants, no masses   Liver and spleen: no hepatosplenomegaly   Musculoskeletal: Digits and nails: normal  Normal range of motion  No joint swelling or gross deformities    Skin: Skin inspection: color normal, no visible rashes or lesions  Skin palpation: texture, turgor normal, no palpable lesions   Lymphatic:  no cervical lymphadenopathy    Psychiatric: Judgement and insight: normal  Orientation to person place and time: normal  Mood and affect: normal       DATA:  Radiology and Labs:  The following labs independently reviewed by me  Old records independently reviewed showing Prior baseline creatinine 0.5 without 7 years ago  The following radiologic studies independently viewed by me, findings chest x-ray with new left lower lobe infiltrate, possible pneumonia  Interval notes, chart personally reviewed by me.   New problems include TKA, combined metabolic acidosis  Discussed with patient, chart reviewed  Platelet count 215    Risk/ complexity of medical care/ medical decision making  High given severe acidosis, high risk medication including insulin and IV fluids, severe renal failure  and metabolic acidosis    Patient is critically ill and I was called emergently to see due to the critical nature of the illness.  The acute nature of the renal failure carries a high probability of imminent and life-threatening deterioration in the patient's clinical condition if the renal failure is not aggressively treated.  Medical decision making is highly complex due to the critical nature of the patient's illness, need for ICU care, coordination of care.  Management of  renal failure requires careful assessment, manipulation and support of vital organ system functions to treat acute renal failure and prevent further deterioration of the patient's critical condition.  This consisted of a thorough review of pertinent laboratory tests, diagnostic tests, medical records and conversations medical staff directly involved in the patient care.    Total critical care time spent was 35 minutes minutes, from 12:30 PM to 1:05 PM              Labs:   Recent Results (from the past 24 hour(s))   Comprehensive Metabolic Panel    Collection Time: 03/06/18  6:10 PM   Result Value Ref Range    Glucose 781 (C) 65 - 99 mg/dL    BUN 29 (H) 6 - 20 mg/dL    Creatinine 1.61 (H) 0.57 - 1.00 mg/dL    Sodium 126 (L) 136 - 145 mmol/L    Potassium 6.2 (C) 3.5 - 5.2 mmol/L    Chloride 85 (L) 98 - 107 mmol/L    CO2 6.7 (L) 22.0 - 29.0 mmol/L    Calcium 8.9 8.6 - 10.5 mg/dL    Total Protein 8.1 6.0 - 8.5 g/dL    Albumin 4.70 3.50 - 5.20 g/dL    ALT (SGPT) 14 1 - 33 U/L    AST (SGOT) 19 1 - 32 U/L    Alkaline Phosphatase 135 (H) 39 - 117 U/L    Total Bilirubin <0.2 0.1 - 1.2 mg/dL    eGFR Non African Amer 33 (L) >60 mL/min/1.73    Globulin 3.4 gm/dL    A/G Ratio 1.4 g/dL    BUN/Creatinine Ratio 18.0 7.0 - 25.0    Anion Gap 34.3 mmol/L   Lactic Acid, Plasma    Collection Time: 03/06/18  6:10 PM   Result Value Ref Range    Lactate 2.3 (C) 0.5 - 2.0 mmol/L   Blood Culture - Blood,    Collection Time: 03/06/18  6:10 PM   Result Value Ref Range     Blood Culture No growth at less than 24 hours    Procalcitonin    Collection Time: 03/06/18  6:10 PM   Result Value Ref Range    Procalcitonin 47.80 (C) 0.10 - 0.25 ng/mL   Beta Hydroxybutyrate Quantitative    Collection Time: 03/06/18  6:10 PM   Result Value Ref Range    Beta-Hydroxybutyrate Quant 11.178 (H) 0.020 - 0.270 mmol/L   Troponin    Collection Time: 03/06/18  6:10 PM   Result Value Ref Range    Troponin T <0.010 0.000 - 0.030 ng/mL   Magnesium    Collection Time: 03/06/18  6:10 PM   Result Value Ref Range    Magnesium 3.0 (H) 1.6 - 2.6 mg/dL   Lactic Acid, Reflex Timer (This will reflex a repeat order 3-3:15 hours after ordered.)    Collection Time: 03/06/18  6:10 PM   Result Value Ref Range    Extra Tube Hold for add-ons.    Urinalysis With / Culture If Indicated - Urine, Catheter    Collection Time: 03/06/18  6:13 PM   Result Value Ref Range    Color, UA Yellow Yellow, Straw    Appearance, UA Cloudy (A) Clear    pH, UA <=5.0 5.0 - 8.0    Specific Gravity, UA 1.026 1.005 - 1.030    Glucose, UA >=1000 mg/dL (3+) (A) Negative    Ketones, UA 80 mg/dL (3+) (A) Negative    Bilirubin, UA Negative Negative    Blood, UA Small (1+) (A) Negative    Protein,  mg/dL (2+) (A) Negative    Leuk Esterase, UA Negative Negative    Nitrite, UA Negative Negative    Urobilinogen, UA 0.2 E.U./dL 0.2 - 1.0 E.U./dL   Urinalysis, Microscopic Only - Urine, Clean Catch    Collection Time: 03/06/18  6:13 PM   Result Value Ref Range    RBC, UA 0-2 None Seen, 0-2 /HPF    WBC, UA 0-2 None Seen, 0-2 /HPF    Bacteria, UA 1+ (A) None Seen /HPF    Squamous Epithelial Cells, UA 0-2 None Seen, 0-2 /HPF    Hyaline Casts, UA 0-2 None Seen /LPF    Amorphous Crystals, UA Large/3+ None Seen /HPF    Methodology Manual Light Microscopy    Blood Culture - Blood,    Collection Time: 03/06/18  6:19 PM   Result Value Ref Range    Blood Culture No growth at less than 24 hours    Influenza Antigen, Rapid - Swab, Nasopharynx    Collection Time:  03/06/18  6:21 PM   Result Value Ref Range    Influenza A Ag, EIA Negative Negative    Influenza B Ag, EIA Negative Negative   Respiratory Panel, PCR - Swab, Nasopharynx    Collection Time: 03/06/18  6:21 PM   Result Value Ref Range    ADENOVIRUS, PCR Not Detected Not Detected    Coronavirus 229E Not Detected Not Detected    Coronavirus HKU1 Not Detected Not Detected    Coronavirus NL63 Not Detected Not Detected    Coronavirus OC43 Not Detected Not Detected    Human Metapneumovirus Detected (A) Not Detected    Human Rhinovirus/Enterovirus Not Detected Not Detected    Influenza B PCR Not Detected Not Detected    Parainfluenza Virus 1 Not Detected Not Detected    Parainfluenza Virus 2 Not Detected Not Detected    Parainfluenza Virus 3 Not Detected Not Detected    Parainfluenza Virus 4 Not Detected Not Detected    Bordetella pertussis pcr Not Detected Not Detected    Influenza A H1 2009 PCR Not Detected Not Detected    Chlamydophila pneumoniae PCR Not Detected Not Detected    Mycoplasma pneumo by PCR Not Detected Not Detected    Influenza A PCR Not Detected Not Detected    Influenza A H3 Not Detected Not Detected    Influenza A H1 Not Detected Not Detected    RSV, PCR Not Detected Not Detected   POC Glucose Once    Collection Time: 03/06/18  7:56 PM   Result Value Ref Range    Glucose >599 (C) 70 - 130 mg/dL   Glucose, Random    Collection Time: 03/06/18  8:14 PM   Result Value Ref Range    Glucose 706 (C) 65 - 99 mg/dL   Phosphorus    Collection Time: 03/06/18  8:14 PM   Result Value Ref Range    Phosphorus 7.2 (H) 2.5 - 4.5 mg/dL   Blood Gas, Arterial    Collection Time: 03/06/18  9:04 PM   Result Value Ref Range    Site Arterial: right radial     Morris's Test Positive     pH, Arterial 6.823 (C) 7.350 - 7.450 pH units    pCO2, Arterial  35.0 - 45.0 mm Hg    pO2, Arterial 113.0 (H) 80.0 - 100.0 mm Hg    HCO3, Arterial 1.9 (L) 22.0 - 28.0 mmol/L    Base Excess, Arterial  0.0 - 2.0 mmol/L    Barometric Pressure for  Blood Gas 747.1 mmHg    Modality Room Air     Set [facility] Resp Rate 20    Hemoglobin A1c    Collection Time: 03/06/18  9:18 PM   Result Value Ref Range    Hemoglobin A1C 13.20 (H) 4.80 - 5.60 %   CBC Auto Differential    Collection Time: 03/06/18  9:18 PM   Result Value Ref Range    WBC 25.09 (H) 4.50 - 10.70 10*3/mm3    RBC 4.18 3.90 - 5.20 10*6/mm3    Hemoglobin 12.9 11.9 - 15.5 g/dL    Hematocrit 43.5 35.6 - 45.5 %    .1 (H) 80.5 - 98.2 fL    MCH 30.9 26.9 - 32.0 pg    MCHC 29.7 (L) 32.4 - 36.3 g/dL    RDW 12.8 11.7 - 13.0 %    RDW-SD 48.6 37.0 - 54.0 fl    MPV 10.8 6.0 - 12.0 fL    Platelets 215 140 - 500 10*3/mm3   Scan Slide    Collection Time: 03/06/18  9:18 PM   Result Value Ref Range    Scan Slide     Comprehensive Metabolic Panel    Collection Time: 03/06/18  9:18 PM   Result Value Ref Range    Glucose 651 (C) 65 - 99 mg/dL    BUN 30 (H) 6 - 20 mg/dL    Creatinine 1.37 (H) 0.57 - 1.00 mg/dL    Sodium 132 (L) 136 - 145 mmol/L    Potassium 5.2 3.5 - 5.2 mmol/L    Chloride 95 (L) 98 - 107 mmol/L    CO2 2.3 (L) 22.0 - 29.0 mmol/L    Calcium 7.9 (L) 8.6 - 10.5 mg/dL    Total Protein 6.2 6.0 - 8.5 g/dL    Albumin 3.30 (L) 3.50 - 5.20 g/dL    ALT (SGPT) 13 1 - 33 U/L    AST (SGOT) 18 1 - 32 U/L    Alkaline Phosphatase 98 39 - 117 U/L    Total Bilirubin <0.2 0.1 - 1.2 mg/dL    eGFR Non African Amer 40 (L) >60 mL/min/1.73    Globulin 2.9 gm/dL    A/G Ratio 1.1 g/dL    BUN/Creatinine Ratio 21.9 7.0 - 25.0    Anion Gap 34.7 mmol/L   Manual Differential    Collection Time: 03/06/18  9:18 PM   Result Value Ref Range    Neutrophil % 77.0 (H) 42.7 - 76.0 %    Lymphocyte % 14.0 (L) 19.6 - 45.3 %    Monocyte % 7.0 5.0 - 12.0 %    Metamyelocyte % 2.0 (H) 0.0 - 0.0 %    Neutrophils Absolute 19.32 (H) 1.90 - 8.10 10*3/mm3    Lymphocytes Absolute 3.51 0.90 - 4.80 10*3/mm3    Monocytes Absolute 1.76 (H) 0.20 - 1.20 10*3/mm3    RBC Morphology Normal Normal    Vacuolated Neutrophils Slight/1+ None Seen    Platelet Morphology  Normal Normal   Phosphorus    Collection Time: 03/06/18 11:47 PM   Result Value Ref Range    Phosphorus 2.5 2.5 - 4.5 mg/dL   Basic Metabolic Panel    Collection Time: 03/06/18 11:47 PM   Result Value Ref Range    Glucose 515 (C) 65 - 99 mg/dL    BUN 31 (H) 6 - 20 mg/dL    Creatinine 1.44 (H) 0.57 - 1.00 mg/dL    Sodium 136 136 - 145 mmol/L    Potassium 3.7 3.5 - 5.2 mmol/L    Chloride 99 98 - 107 mmol/L    CO2 5.2 (L) 22.0 - 29.0 mmol/L    Calcium 7.5 (L) 8.6 - 10.5 mg/dL    eGFR Non African Amer 38 (L) >60 mL/min/1.73    BUN/Creatinine Ratio 21.5 7.0 - 25.0    Anion Gap 31.8 mmol/L   Magnesium    Collection Time: 03/06/18 11:47 PM   Result Value Ref Range    Magnesium 1.8 1.6 - 2.6 mg/dL   Calcium, Ionized    Collection Time: 03/06/18 11:47 PM   Result Value Ref Range    Ionized Calcium 1.23 1.15 - 1.35 mmol/L    Ionized Calcium 4.9 4.6 - 5.4 mg/dL   Lactic Acid, Reflex    Collection Time: 03/06/18 11:47 PM   Result Value Ref Range    Lactate 1.2 0.5 - 2.0 mmol/L   Blood Gas, Venous    Collection Time: 03/06/18 11:55 PM   Result Value Ref Range    Site N/A     pH, Venous 7.038 (L) 7.310 - 7.410 pH Units    pCO2, Venous 19.4 (L) 41.0 - 51.0 mm Hg    pO2, Venous 31.5 (L) 35.0 - 45.0 mm Hg    HCO3, Venous 5.2 (L) 22.0 - 28.0 mmol/L    Base Excess, Venous -23.8 mmol/L    O2 Saturation Calculated 38.5 (L) 92.0 - 99.0 %    Barometric Pressure for Blood Gas 747.2 mmHg    Modality Room Air     Rate 20 Breaths/minute   POC Glucose Once    Collection Time: 03/07/18 12:13 AM   Result Value Ref Range    Glucose 431 (H) 70 - 130 mg/dL   POC Glucose Once    Collection Time: 03/07/18  1:19 AM   Result Value Ref Range    Glucose 361 (H) 70 - 130 mg/dL   POC Glucose Once    Collection Time: 03/07/18  2:03 AM   Result Value Ref Range    Glucose 373 (H) 70 - 130 mg/dL   POC Glucose Once    Collection Time: 03/07/18  2:54 AM   Result Value Ref Range    Glucose 332 (H) 70 - 130 mg/dL   POC Glucose Once    Collection Time: 03/07/18   4:00 AM   Result Value Ref Range    Glucose 292 (H) 70 - 130 mg/dL   Phosphorus    Collection Time: 03/07/18  4:06 AM   Result Value Ref Range    Phosphorus 2.2 (L) 2.5 - 4.5 mg/dL   Basic Metabolic Panel    Collection Time: 03/07/18  4:06 AM   Result Value Ref Range    Glucose 323 (H) 65 - 99 mg/dL    BUN 32 (H) 6 - 20 mg/dL    Creatinine 1.51 (H) 0.57 - 1.00 mg/dL    Sodium 138 136 - 145 mmol/L    Potassium 4.2 3.5 - 5.2 mmol/L    Chloride 105 98 - 107 mmol/L    CO2 6.2 (L) 22.0 - 29.0 mmol/L    Calcium 7.6 (L) 8.6 - 10.5 mg/dL    eGFR Non African Amer 36 (L) >60 mL/min/1.73    BUN/Creatinine Ratio 21.2 7.0 - 25.0    Anion Gap 26.8 mmol/L   Magnesium    Collection Time: 03/07/18  4:06 AM   Result Value Ref Range    Magnesium 1.8 1.6 - 2.6 mg/dL   Calcium, Ionized    Collection Time: 03/07/18  4:06 AM   Result Value Ref Range    Ionized Calcium 1.24 1.15 - 1.35 mmol/L    Ionized Calcium 5.0 4.6 - 5.4 mg/dL   POC Glucose Once    Collection Time: 03/07/18  5:05 AM   Result Value Ref Range    Glucose 290 (H) 70 - 130 mg/dL   POC Glucose Once    Collection Time: 03/07/18  6:13 AM   Result Value Ref Range    Glucose 214 (H) 70 - 130 mg/dL   POC Glucose Once    Collection Time: 03/07/18  6:54 AM   Result Value Ref Range    Glucose 187 (H) 70 - 130 mg/dL   Blood Gas, Venous    Collection Time: 03/07/18  8:07 AM   Result Value Ref Range    Site N/A     pH, Venous 7.232 (L) 7.310 - 7.410 pH Units    pCO2, Venous 18.3 (L) 41.0 - 51.0 mm Hg    pO2, Venous 43.6 35.0 - 45.0 mm Hg    HCO3, Venous 7.7 (L) 22.0 - 28.0 mmol/L    Base Excess, Venous -17.5 mmol/L    O2 Saturation Calculated 72.3 (L) 92.0 - 99.0 %    Barometric Pressure for Blood Gas 750.0 mmHg    Modality Room Air     Rate 32 Breaths/minute   Phosphorus    Collection Time: 03/07/18  8:09 AM   Result Value Ref Range    Phosphorus 3.1 2.5 - 4.5 mg/dL   Basic Metabolic Panel    Collection Time: 03/07/18  8:09 AM   Result Value Ref Range    Glucose 226 (H) 65 - 99 mg/dL     BUN 33 (H) 6 - 20 mg/dL    Creatinine 1.62 (H) 0.57 - 1.00 mg/dL    Sodium 141 136 - 145 mmol/L    Potassium 3.9 3.5 - 5.2 mmol/L    Chloride 109 (H) 98 - 107 mmol/L    CO2 11.0 (L) 22.0 - 29.0 mmol/L    Calcium 7.8 (L) 8.6 - 10.5 mg/dL    eGFR Non African Amer 33 (L) >60 mL/min/1.73    BUN/Creatinine Ratio 20.4 7.0 - 25.0    Anion Gap 21.0 mmol/L   Magnesium    Collection Time: 03/07/18  8:09 AM   Result Value Ref Range    Magnesium 1.8 1.6 - 2.6 mg/dL   Calcium, Ionized    Collection Time: 03/07/18  8:09 AM   Result Value Ref Range    Ionized Calcium 1.24 1.15 - 1.35 mmol/L    Ionized Calcium 5.0 4.6 - 5.4 mg/dL   POC Glucose Once    Collection Time: 03/07/18  8:12 AM   Result Value Ref Range    Glucose 193 (H) 70 - 130 mg/dL   POC Glucose Once    Collection Time: 03/07/18  9:45 AM   Result Value Ref Range    Glucose 201 (H) 70 - 130 mg/dL   POC Glucose Once    Collection Time: 03/07/18 11:22 AM   Result Value Ref Range    Glucose 203 (H) 70 - 130 mg/dL   Phosphorus    Collection Time: 03/07/18 11:57 AM   Result Value Ref Range    Phosphorus 3.4 2.5 - 4.5 mg/dL   Basic Metabolic Panel    Collection Time: 03/07/18 11:57 AM   Result Value Ref Range    Glucose 216 (H) 65 - 99 mg/dL    BUN 32 (H) 6 - 20 mg/dL    Creatinine 1.66 (H) 0.57 - 1.00 mg/dL    Sodium 140 136 - 145 mmol/L    Potassium 3.5 3.5 - 5.2 mmol/L    Chloride 110 (H) 98 - 107 mmol/L    CO2 10.5 (L) 22.0 - 29.0 mmol/L    Calcium 7.8 (L) 8.6 - 10.5 mg/dL    eGFR Non African Amer 32 (L) >60 mL/min/1.73    BUN/Creatinine Ratio 19.3 7.0 - 25.0    Anion Gap 19.5 mmol/L   Magnesium    Collection Time: 03/07/18 11:57 AM   Result Value Ref Range    Magnesium 1.6 1.6 - 2.6 mg/dL   Calcium, Ionized    Collection Time: 03/07/18 11:57 AM   Result Value Ref Range    Ionized Calcium 1.25 1.15 - 1.35 mmol/L    Ionized Calcium 5.0 4.6 - 5.4 mg/dL       Radiology:  Imaging Results (last 24 hours)     Procedure Component Value Units Date/Time    XR Chest 1 View  [[id number]] Collected:  03/07/18 0419     Updated:  03/07/18 0419    Narrative:       X-RAY CHEST 1 VIEW.     HISTORY: Follow-up perihilar infiltrate.     COMPARISON: Persistent right perihilar opacity concerning for  infiltrate.     FINDINGS:  Cardiomediastinal silhouette is within normal limits.         New opacity in the left lung base, infiltrate is suspected.              Impression:       New infiltrate in the left lung base is suspected. Follow-up is  recommended.  Right perihilar infiltrate is unchanged.           XR Chest 1 View [[id number]] Collected:  03/06/18 1925     Updated:  03/07/18 0809    Narrative:       PORTABLE RADIOGRAPHIC VIEW OF THE CHEST      CLINICAL HISTORY:  Hypotension and hyperglycemia.     A portable radiographic view of the chest demonstrates no acute  infiltrate.  There is some pulmonary vascular engorgement. The  cardiomediastinal silhouette is remarkable for prominence of the right  hilum superiorly. A right hilar mass cannot be entirely excluded  although the findings may simply be due to prominent vascular structures  or potentially an adjacent infiltrate. I recommend further evaluation  with a PA and lateral radiographic view of the chest. There is a  somewhat gas-distended stomach. The osseous structures are unremarkable.       Impression:          Pulmonary vascular enlargement without significant interstitial  pulmonary edema.     Prominence of the right hilar structures which may simply represent  prominent vasculature.  However, I cannot exclude the possibility of a  right hilar mass or potentially an adjacent infiltrate. I recommend  further evaluation with either a PA and lateral chest radiograph or a CT  scan of the chest.     Mild gaseous distention of the stomach.     These findings and recommendations were discussed with Dr. Eugene on  03/06/2018 at approximately 7 PM.      This report was finalized on 3/7/2018 8:06 AM by Dr. Louis Crowder MD.                   Assessment:   Problem List:   New acute renal failure versus chronic kidney disease secondary to diabetes.  Nonoliguric, may have some element of ATN from profound dehydration  Diabetic ketoacidosis  Profound metabolic acidosis, combined anion gap acidosis from DKA and non-anion gap acidosis from renal failure  Severe dehydration  Toxic metabolic encephalopathy  Insulin-dependent diabetes mellitus  Left lower lobe infiltrate/pneumonia  Hyperkalemia on admission from DKA, improved  Hypophosphatemia secondary to insulin administration, replaced, monitor      Plan:   She has a combined anion gap acidosis and non-anion gap acidosis from renal failure  *Acidosis still fairly profound even with correction of the DKA state  We'll change IV fluids to bicarbonate drip  Monitor and replace electrolytes as needed  Will check renal ultrasound, urine electrolytes  Renally dose Zosyn/vancomycin for her current GFR around 40  She remains critically ill in the ICU  Will continue to monitor closely, monitor volume and electrolytes, ordered any potential nephrotoxins      Thank you very much for the referral.    I appreciate the opportunity to participate in this patient's care.  Please call with any questions or concerns.         Quinn Ying M.D  [facility]  [phone]  3/7/2018        Dictation via Dragon dictation software

## 2018-03-07 NOTE — H&P
History and Physical    Patient Name: Sonal De Dios  Age/Sex: 52 y.o. female  : 1965  MRN: [id number]    Date of Admission: 3/6/2018  Date of Encounter Visit: 18  Encounter Provider: Jacqueline Alcantar MD  Referring Provider: Jacqueline Alcantar MD  Place of Service: [facility]   Patient Care Team:  No Known Provider as PCP - General      Subjective:     Chief Complaint: Severe acidemia    History of Present Illness:  Sonal De Dios is a 52 y.o. female is negative past medical history except for insulin dependent diabetes mellitus that started after she had gestational diabetes more than 20 years ago.  Patient has been on sliding scale insulin and did not require any long-acting insulin according to the .  She never had any problem with the diabetes, she is been on ace inhibitors to protect the kidneys according to the , she had no problem with any kidney disease or neuropathy or retinopathy or any of the micro-vascular diabetic complication.  Patient has been having some upper rest or symptom over the last several days and she was supposed to go to a immediate care center to be tested for the floor this morning.  Patient's  came back home this afternoon at 4:30 her very lethargic and difficult to arouse and very weak and unable to stand up.  EMS were called, her blood sugar was found to be elevated , patient was brought into the emergency room, her ABGs on presentation show severe acidosis with a pH of 6.7, patient had labs sent, insulin was not given until the potassium level came back by the time I saw the patient and it was above 6 so patient got 10 units of IV insulin stat, the ER physician already administered the bicarbonate given the low pH.  The chest x-ray showed some perihilar infiltrates especially on the right side and the patient was started on Zosyn and vancomycin.  At the time of my assessment the patient was lethargic but arousable, she was not labored in her  breathing she was just breathing deep which is part of the acidosis symptom trying to compensate, she had minimal abdominal tenderness and no obvious clinical symptom of acute abdomen.  Her CBC is still pending and being rerun by the lab probably because of an obvious abnormality, we'll follow-up on that one, but her pro-calcitonin was elevated at 47.  Her  there is no previous similar episodes      Pulmonary Functions Testing Results:    No results found for: FEV1, FVC, [id number], TLC, DLCO    Review of Systems:   Review of Systems  Unable to perform ROS: Acuity of condition, otherwise as discussed above mainly she had some respiratory symptoms   Past Medical History:  Past Medical History:   Diagnosis Date   • Diabetes mellitus        History reviewed. No pertinent surgical history.    Home Medications:   No prescriptions prior to admission.       Inpatient Medications:  Scheduled Meds:     Continuous Infusions:    sodium chloride 30 mL/kg Last Rate: 1,974 mL (03/06/18 1901)     PRN Meds:.    Allergies:  No Known Allergies    Past Social History:  Social History     Social History   • Marital status:      Social History Main Topics   • Smoking status: Never Smoker   • Alcohol use No   • Drug use: No       Past Family History:  History reviewed. No pertinent family history.        Objective:   Temp:  [91.4 °F (33 °C)] 91.4 °F (33 °C)  Heart Rate:  [90-99] 94  Resp:  [20] 20  BP: ()/(47-61) 84/49   SpO2:  [99 %-100 %] 100 %  on    O2 Device: room air    Intake/Output Summary (Last 24 hours) at 03/06/18 2026  Last data filed at 03/06/18 1944   Gross per 24 hour   Intake             1000 ml   Output              800 ml   Net              200 ml     Body mass index is 24.89 kg/(m^2).  Last 3 weights    03/06/18  1826   Weight: 65.8 kg (145 lb)     Weight change:     Physical Exam:   Physical Exam   General:    Lethargic, in acute distress, does respond to noxious stimulation, heavy breathing                    Head:    Normocephalic, atraumatic.   Eyes:          Conjunctivae and sclerae normal, no icterus, PERRLA   Throat:   No oral lesions, no thrush, oral mucous membranesDry .    Neck:   Supple, trachea midline.No JVD the neck is supple    Lungs:     Normal chest on inspection, CTAB, no wheezes. No rhonchi. No crackles. Respirations regular, even anLikely labored, typical expected to respond to the acidosis .     Heart:    Regular rhythm and normal rate.  No murmurs, gallops, or rubs noted.   Abdomen:     SoftMinimal tenderness to deep palpation with no rebound, non-distended, positive bowel sounds.    Extremities:   No clubbing, cyanosis, or edema.     Pulses:   Pulses palpable and equal bilaterally.    Skin:   No bleeding or rash.Skin is very dry    Neuro:   Non-focal.  Moves all extremities well.    Psychiatric:   Normal mood and affect.     Lab Review:     Results from last 7 days  Lab Units 03/06/18  1810   SODIUM mmol/L 126*   POTASSIUM mmol/L 6.2*   CHLORIDE mmol/L 85*   CO2 mmol/L 6.7*   BUN mg/dL 29*   CREATININE mg/dL 1.61*   GLUCOSE mg/dL 781*   CALCIUM mg/dL 8.9   AST (SGOT) U/L 19   ALT (SGPT) U/L 14   ALBUMIN g/dL 4.70       Results from last 7 days  Lab Units 03/06/18  1810   TROPONIN T ng/mL <0.010               Results from last 7 days  Lab Units 03/06/18  1810   MAGNESIUM mg/dL 3.0*           Invalid input(s): LDLCALC                Results from last 7 days  Lab Units 03/06/18  1810   PROCALCITONIN ng/mL 47.80*   LACTATE mmol/L 2.3*                   Results from last 7 days  Lab Units 03/06/18  1813   NITRITE UA  Negative   WBC UA /HPF 0-2   BACTERIA UA /HPF 1+*   SQUAM EPITHEL UA /HPF 0-2     Color, UA Yellow         Appearance, UA Cloudy (A)       pH, UA <=5.0       Specific Gravity, UA 1.026       Glucose, UA >=1000 mg/dL (3+) (A)       Ketones, UA 80 mg/dL (3+) (A)       Bilirubin, UA Negative       Blood, UA Small (1+) (A)       Protein,  mg/dL (2+) (A)       Leuk Esterase, UA  Negative       Nitrite, UA Negative       Urobilinogen, UA 0.2 E.U./dL                 Imaging:  Imaging Results (most recent)     Procedure Component Value Units Date/Time    XR Chest 1 View [[id number]] Updated:  03/06/18 1831          I personally viewed and interpreted the patient's imaging studies: Right perihilar infiltrate and to a less degree minimal left perihilar infiltrate with no cardia megaly no pleural effusion no masses no air bronchogram.    Assessment:   1. Acute diabetic ketoacidosis  2. Insulin-dependent diabetes mellitus for the last 20 years started as gestational diabetes with no previous DKA  3. Severe life-threatening acidosis with a pH of 6.7 on presentation  4. Abnormal chest x-ray was perihilar infiltrate with a recent viral respiratory infection symptoms  5. Severe dehydration with acute renal failure with a creatinine of 1.6 probably as a result of above  6. Toxic metabolic encephalopathy secondary to the acidemia      Plan:   · Patient will be admitted to the ICU and will initiate DKA protocol  · Patient was already started on antibiotic in the ER will continue with the vancomycin and Zosyn given the pulmonary infiltrate and the elevated pro-calcitonin  · Follow-up closely on potassium level and replace any potassium deficit which is anticipated as the patient acidosis is corrected and insulin is administered, as per the DKA protocol  · Correct any hypophosphatemia that is anticipated on treatment of DKA  · We'll follow-up on the blood acidity, consider another dose of sodium bicarbonate if the patient is still severely acidotic with a pH less than 6.9  · Repeat chest x-ray in the morning since pulmonary infiltrate might be more prominent as the dehydration factor is corrected  · Nasal cannula oxygen as needed to keep saturation above 92%  · Follow-up on the CBC once the results are available  · Discussed with the ER physician, discussed with the family at the bedside discussed with the  ICU staff        Time: Critical care 49 min    Jacqueline Alcantar MD  [facility]   03/06/18  8:26 PM    Dictated utilizing Dragon dictation:  Much of this encounter note is an electronic transcription/translation of spoken language to printed text. The electronic translation of spoken language may permit erroneous, or at times, nonsensical words or phrases to be inadvertently transcribed; Although I have reviewed the note for such errors, some may still exist.

## 2018-03-07 NOTE — PAYOR COMM NOTE
"Sonal De Dios (52 y.o. Female)                     ATTENTION;   MEDICAL REVIEW, AUTH PENDING [id number], CLINICALS FOR YOUR REVIEW,                 REPLY TO UR DEPT, GONZALO GONSALEZ N  OR UR  [phone] //                PATIENT ADMITTED TO INTENSIVE CARE UNIT .           Date of Birth Social Security Number Address Home Phone MRN    1965  [id number] [facility] [phone] [id number]    Restorationist Marital Status          Confucianist        Admission Date Admission Type Admitting Provider Attending Provider Department, Room/Bed    3/6/18 Emergency Jacqueline Alcantar MD Saad, Jacqueline CARTER MD [facility] INTENSIVE CARE, 383/    Discharge Date Discharge Disposition Discharge Destination                      Attending Provider: Jacqueline Alcantar MD     Allergies:  No Known Allergies    Isolation:  None   Infection:  None   Code Status:  FULL    Ht:  162.6 cm (64\")   Wt:  70.3 kg (154 lb 15.7 oz)    Admission Cmt:  None   Principal Problem:  None                Active Insurance as of 3/6/2018     Primary Coverage     Payor Plan Insurance Group Employer/Plan Group    CIGNA CIGNA [id number]     Payor Plan Address Payor Plan Phone Number Effective From Effective To    [address] [phone] 3/6/2018     [place] [address]       Subscriber Name Subscriber Birth Date Member ID       WIL DE DIOS 1961 [id number]                 Emergency Contacts      (Rel.) Home Phone Work Phone Mobile Phone    Wil De Dios (Spouse) [phone] -- [phone]               History & Physical      Jacqueline Alcantar MD at 3/6/2018  7:12 PM            History and Physical    Patient Name: Sonal De Dios  Age/Sex: 52 y.o. female  : 1965  MRN: [id number]    Date of Admission: 3/6/2018  Date of Encounter Visit: 18  Encounter Provider: Jacqueline Alcantar MD  Referring Provider: Jacqueline Alcantar MD  Place of Service: [facility]   Patient Care " Team:  No Known Provider as PCP - General      Subjective:     Chief Complaint: Severe acidemia    History of Present Illness:  Sonal De Dios is a 52 y.o. female is negative past medical history except for insulin dependent diabetes mellitus that started after she had gestational diabetes more than 20 years ago.  Patient has been on sliding scale insulin and did not require any long-acting insulin according to the .  She never had any problem with the diabetes, she is been on ace inhibitors to protect the kidneys according to the , she had no problem with any kidney disease or neuropathy or retinopathy or any of the micro-vascular diabetic complication.  Patient has been having some upper rest or symptom over the last several days and she was supposed to go to a immediate care center to be tested for the floor this morning.  Patient's  came back home this afternoon at 4:30 her very lethargic and difficult to arouse and very weak and unable to stand up.  EMS were called, her blood sugar was found to be elevated , patient was brought into the emergency room, her ABGs on presentation show severe acidosis with a pH of 6.7, patient had labs sent, insulin was not given until the potassium level came back by the time I saw the patient and it was above 6 so patient got 10 units of IV insulin stat, the ER physician already administered the bicarbonate given the low pH.  The chest x-ray showed some perihilar infiltrates especially on the right side and the patient was started on Zosyn and vancomycin.  At the time of my assessment the patient was lethargic but arousable, she was not labored in her breathing she was just breathing deep which is part of the acidosis symptom trying to compensate, she had minimal abdominal tenderness and no obvious clinical symptom of acute abdomen.  Her CBC is still pending and being rerun by the lab probably because of an obvious abnormality, we'll follow-up on that one,  but her pro-calcitonin was elevated at 47.  Her  there is no previous similar episodes      Pulmonary Functions Testing Results:    No results found for: FEV1, FVC, OGL0BKZ, TLC, DLCO    Review of Systems:   Review of Systems  Unable to perform ROS: Acuity of condition, otherwise as discussed above mainly she had some respiratory symptoms   Past Medical History:  Past Medical History:   Diagnosis Date   • Diabetes mellitus        History reviewed. No pertinent surgical history.    Home Medications:   No prescriptions prior to admission.       Inpatient Medications:  Scheduled Meds:     Continuous Infusions:    sodium chloride 30 mL/kg Last Rate: 1,974 mL (03/06/18 1901)     PRN Meds:.    Allergies:  No Known Allergies    Past Social History:  Social History     Social History   • Marital status:      Social History Main Topics   • Smoking status: Never Smoker   • Alcohol use No   • Drug use: No       Past Family History:  History reviewed. No pertinent family history.        Objective:   Temp:  [91.4 °F (33 °C)] 91.4 °F (33 °C)  Heart Rate:  [90-99] 94  Resp:  [20] 20  BP: ()/(47-61) 84/49   SpO2:  [99 %-100 %] 100 %  on    O2 Device: room air    Intake/Output Summary (Last 24 hours) at 03/06/18 2026  Last data filed at 03/06/18 1944   Gross per 24 hour   Intake             1000 ml   Output              800 ml   Net              200 ml     Body mass index is 24.89 kg/(m^2).  Last 3 weights    03/06/18  1826   Weight: 65.8 kg (145 lb)     Weight change:     Physical Exam:   Physical Exam   General:    Lethargic, in acute distress, does respond to noxious stimulation, heavy breathing                   Head:    Normocephalic, atraumatic.   Eyes:          Conjunctivae and sclerae normal, no icterus, PERRLA   Throat:   No oral lesions, no thrush, oral mucous membranesDry .    Neck:   Supple, trachea midline.No JVD the neck is supple    Lungs:     Normal chest on inspection, CTAB, no wheezes. No  rhonchi. No crackles. Respirations regular, even anLikely labored, typical expected to respond to the acidosis .     Heart:    Regular rhythm and normal rate.  No murmurs, gallops, or rubs noted.   Abdomen:     SoftMinimal tenderness to deep palpation with no rebound, non-distended, positive bowel sounds.    Extremities:   No clubbing, cyanosis, or edema.     Pulses:   Pulses palpable and equal bilaterally.    Skin:   No bleeding or rash.Skin is very dry    Neuro:   Non-focal.  Moves all extremities well.    Psychiatric:   Normal mood and affect.     Lab Review:     Results from last 7 days  Lab Units 03/06/18  1810   SODIUM mmol/L 126*   POTASSIUM mmol/L 6.2*   CHLORIDE mmol/L 85*   CO2 mmol/L 6.7*   BUN mg/dL 29*   CREATININE mg/dL 1.61*   GLUCOSE mg/dL 781*   CALCIUM mg/dL 8.9   AST (SGOT) U/L 19   ALT (SGPT) U/L 14   ALBUMIN g/dL 4.70       Results from last 7 days  Lab Units 03/06/18  1810   TROPONIN T ng/mL <0.010               Results from last 7 days  Lab Units 03/06/18  1810   MAGNESIUM mg/dL 3.0*           Invalid input(s): LDLCALC                Results from last 7 days  Lab Units 03/06/18  1810   PROCALCITONIN ng/mL 47.80*   LACTATE mmol/L 2.3*                   Results from last 7 days  Lab Units 03/06/18  1813   NITRITE UA  Negative   WBC UA /HPF 0-2   BACTERIA UA /HPF 1+*   SQUAM EPITHEL UA /HPF 0-2     Color, UA Yellow         Appearance, UA Cloudy (A)       pH, UA <=5.0       Specific Gravity, UA 1.026       Glucose, UA >=1000 mg/dL (3+) (A)       Ketones, UA 80 mg/dL (3+) (A)       Bilirubin, UA Negative       Blood, UA Small (1+) (A)       Protein,  mg/dL (2+) (A)       Leuk Esterase, UA Negative       Nitrite, UA Negative       Urobilinogen, UA 0.2 E.U./dL                 Imaging:  Imaging Results (most recent)     Procedure Component Value Units Date/Time    XR Chest 1 View [[id number]] Updated:  03/06/18 1831          I personally viewed and interpreted the patient's imaging studies: Right  perihilar infiltrate and to a less degree minimal left perihilar infiltrate with no cardia megaly no pleural effusion no masses no air bronchogram.    Assessment:   1. Acute diabetic ketoacidosis  2. Insulin-dependent diabetes mellitus for the last 20 years started as gestational diabetes with no previous DKA  3. Severe life-threatening acidosis with a pH of 6.7 on presentation  4. Abnormal chest x-ray was perihilar infiltrate with a recent viral respiratory infection symptoms  5. Severe dehydration with acute renal failure with a creatinine of 1.6 probably as a result of above  6. Toxic metabolic encephalopathy secondary to the acidemia      Plan:   · Patient will be admitted to the ICU and will initiate DKA protocol  · Patient was already started on antibiotic in the ER will continue with the vancomycin and Zosyn given the pulmonary infiltrate and the elevated pro-calcitonin  · Follow-up closely on potassium level and replace any potassium deficit which is anticipated as the patient acidosis is corrected and insulin is administered, as per the DKA protocol  · Correct any hypophosphatemia that is anticipated on treatment of DKA  · We'll follow-up on the blood acidity, consider another dose of sodium bicarbonate if the patient is still severely acidotic with a pH less than 6.9  · Repeat chest x-ray in the morning since pulmonary infiltrate might be more prominent as the dehydration factor is corrected  · Nasal cannula oxygen as needed to keep saturation above 92%  · Follow-up on the CBC once the results are available  · Discussed with the ER physician, discussed with the family at the bedside discussed with the ICU staff        Time: Critical care 49 min    Jacqueline Alcantar MD  [facility]   03/06/18  8:26 PM    Dictated utilizing Dragon dictation:  Much of this encounter note is an electronic transcription/translation of spoken language to printed text. The electronic translation of spoken language may  permit erroneous, or at times, nonsensical words or phrases to be inadvertently transcribed; Although I have reviewed the note for such errors, some may still exist.           Electronically signed by Jacqueline Alcantar MD at 3/6/2018  8:29 PM           Emergency Department Notes      Madeline Downing RN at 3/6/2018  6:01 PM          Ems called for flu like symptoms, patient found to be lethargic and hypotensive by ems     Madeline Downing RN  03/06/18 1802       Electronically signed by Madeline Downing RN at 3/6/2018  6:02 PM      Ricco Eugene MD at 3/6/2018  6:05 PM      Procedure Orders:    1. Critical Care [[id number]] ordered by Ricco Eugene MD at 03/06/18 1816                 EMERGENCY DEPARTMENT ENCOUNTER    CHIEF COMPLAINT  Chief Complaint: Hypotension and hypergylcemia  History given by: EMS  History limited by: Pt is not speaking  Room Number: 383/1  PMD: No Known Provider      HPI:  Pt is a 52 y.o. female who presents via EMS due to hypotension and hyperglycemia.  EMS was called by the Pt's  after he found her appearing extremely ill, confused, and lethargic when he came home from work today. EMS reports that Pt has been sick with flu-like symptoms for the past few days and taking Mucinex.  When EMS arrived to the home, Pt was hypotensive 72/44.  Pt is a known diabetic. Her blood glucose was 360 in the ambulance en route.  Upon arrival to ED, her glucose was found to be greater than 500.    Duration:  PTA  Onset: Unknown  Timing: Constant  Intensity/Severity: Severe  Progression: Worsening  Associated Symptoms: None reported  Aggravating Factors: Unknown  Alleviating Factors: None  Previous Episodes: None  Treatment before arrival: None    PAST MEDICAL HISTORY  Active Ambulatory Problems     Diagnosis Date Noted   • No Active Ambulatory Problems     Resolved Ambulatory Problems     Diagnosis Date Noted   • No Resolved Ambulatory Problems     Past Medical History:    Diagnosis Date   • Diabetes mellitus        PAST SURGICAL HISTORY  History reviewed. No pertinent surgical history.    FAMILY HISTORY  History reviewed. No pertinent family history.    SOCIAL HISTORY  Social History     Social History   • Marital status:      Spouse name: N/A   • Number of children: N/A   • Years of education: N/A     Occupational History   • Not on file.     Social History Main Topics   • Smoking status: Never Smoker   • Smokeless tobacco: Not on file   • Alcohol use No   • Drug use: No   • Sexual activity: Not on file     Other Topics Concern   • Not on file     Social History Narrative   • No narrative on file       ALLERGIES  Review of patient's allergies indicates no known allergies.    REVIEW OF SYSTEMS  Review of Systems   Unable to perform ROS: Acuity of condition   Cardiovascular:        Hypotension   Endocrine:        Hyperglycemia       PHYSICAL EXAM  ED Triage Vitals   Temp Heart Rate Resp BP SpO2   03/06/18 1811 03/06/18 1803 03/06/18 1803 03/06/18 1804 03/06/18 1803   91.4 °F (33 °C) 90 20 104/54 100 %      Temp src Heart Rate Source Patient Position BP Location FiO2 (%)   03/06/18 1811 03/06/18 1803 03/06/18 1804 03/06/18 1804 --   Rectal Monitor Lying Right arm          Physical Exam   Constitutional: She appears lethargic.   HENT:   Head: Normocephalic and atraumatic.   Mouth/Throat: Mucous membranes are dry.   Tried blood in her throat.   Eyes: EOM are normal. Pupils are equal, round, and reactive to light.   Neck: Normal range of motion. Neck supple.   Cardiovascular: Normal rate, regular rhythm and normal heart sounds.    Pulmonary/Chest: Effort normal and breath sounds normal. No respiratory distress.   Abdominal: Soft. There is no tenderness. There is no rebound and no guarding.   Musculoskeletal: Normal range of motion. She exhibits no edema.   Neurological: She appears lethargic.   Pt moans in responds to her name being called.   Skin: Skin is dry. No rash noted.  "  Decreased turgor.   Psychiatric: Mood and affect normal.   Nursing note and vitals reviewed.      LAB RESULTS  Lab Results (last 24 hours)     Procedure Component Value Units Date/Time    Comprehensive Metabolic Panel [[id number]]  (Abnormal) Collected:  03/06/18 1810    Specimen:  Blood Updated:  03/06/18 1912     Glucose 781 (C) mg/dL      BUN 29 (H) mg/dL      Creatinine 1.61 (H) mg/dL      Sodium 126 (L) mmol/L      Potassium 6.2 (C) mmol/L      Chloride 85 (L) mmol/L      CO2 6.7 (L) mmol/L      Calcium 8.9 mg/dL      Total Protein 8.1 g/dL      Albumin 4.70 g/dL      ALT (SGPT) 14 U/L      AST (SGOT) 19 U/L      Alkaline Phosphatase 135 (H) U/L      Total Bilirubin <0.2 mg/dL      eGFR Non African Amer 33 (L) mL/min/1.73      Globulin 3.4 gm/dL      A/G Ratio 1.4 g/dL      BUN/Creatinine Ratio 18.0     Anion Gap 34.3 mmol/L     Lactic Acid, Plasma [[id number]]  (Abnormal) Collected:  03/06/18 1810    Specimen:  Blood Updated:  03/06/18 1912     Lactate 2.3 (C) mmol/L     Blood Culture - Blood, [[id number]] Collected:  03/06/18 1810    Specimen:  Blood from Arm, Left Updated:  03/06/18 1828    Procalcitonin [[id number]]  (Abnormal) Collected:  03/06/18 1810    Specimen:  Blood Updated:  03/06/18 1913     Procalcitonin 47.80 (C) ng/mL     Narrative:       As a Marker for Sepsis (Non-Neonates):   1. <0.5 ng/mL represents a low risk of severe sepsis and/or septic shock.  1. >2 ng/mL represents a high risk of severe sepsis and/or septic shock.    As a Marker for Lower Respiratory Tract Infections that require antibiotic therapy:  PCT on Admission     Antibiotic Therapy             6-12 Hrs later  > 0.5                Strongly Recommended            >0.25 - <0.5         Recommended  0.1 - 0.25           Discouraged                   Remeasure/reassess PCT  <0.1                 Strongly Discouraged          Remeasure/reassess PCT      As 28 day mortality risk marker: \"Change in Procalcitonin Result\" (> 80 % or <=80 %) " if Day 0 (or Day 1) and Day 4 values are available. Refer to http://www.[facility]-pct-calculator.com/   Change in PCT <=80 %   A decrease of PCT levels below or equal to 80 % defines a positive change in PCT test result representing a higher risk for 28-day all-cause mortality of patients diagnosed with severe sepsis or septic shock.  Change in PCT > 80 %   A decrease of PCT levels of more than 80 % defines a negative change in PCT result representing a lower risk for 28-day all-cause mortality of patients diagnosed with severe sepsis or septic shock.                Ketone Bodies, Serum (Not performed at [place]) [[id number]] Collected:  03/06/18 1810    Specimen:  Blood Updated:  03/06/18 1912    Narrative:       The following orders were created for panel order Ketone Bodies, Serum (Not performed at [place]).  Procedure                               Abnormality         Status                     ---------                               -----------         ------                     Beta Hydroxybutyrate [place]...[[id number]]  Abnormal            Final result                 Please view results for these tests on the individual orders.    Beta Hydroxybutyrate Quantitative [[id number]]  (Abnormal) Collected:  03/06/18 1810    Specimen:  Blood Updated:  03/06/18 1912     Beta-Hydroxybutyrate Quant 11.178 (H) mmol/L     Troponin [[id number]]  (Normal) Collected:  03/06/18 1810    Specimen:  Blood Updated:  03/06/18 1857     Troponin T <0.010 ng/mL     Narrative:       Troponin T Reference Ranges:  Less than 0.03 ng/mL:    Negative for AMI  0.03 to 0.09 ng/mL:      Indeterminant for AMI  Greater than 0.09 ng/mL: Positive for AMI    Magnesium [[id number]]  (Abnormal) Collected:  03/06/18 1810    Specimen:  Blood Updated:  03/06/18 1856     Magnesium 3.0 (H) mg/dL     Lactic Acid, Reflex Timer (This will reflex a repeat order 3-3:15 hours after ordered.) [[id number]] Collected:  03/06/18 1810    Specimen:  Blood Updated:  03/06/18  1912    Urinalysis With / Culture If Indicated - Urine, Catheter [[id number]]  (Abnormal) Collected:  03/06/18 1813    Specimen:  Urine from Urine, Catheter Updated:  03/06/18 1853     Color, UA Yellow     Appearance, UA Cloudy (A)     pH, UA <=5.0     Specific Gravity, UA 1.026     Glucose, UA >=1000 mg/dL (3+) (A)     Ketones, UA 80 mg/dL (3+) (A)     Bilirubin, UA Negative     Blood, UA Small (1+) (A)     Protein,  mg/dL (2+) (A)     Leuk Esterase, UA Negative     Nitrite, UA Negative     Urobilinogen, UA 0.2 E.U./dL    Urinalysis, Microscopic Only - Urine, Clean Catch [[id number]]  (Abnormal) Collected:  03/06/18 1813    Specimen:  Urine from Urine, Catheter Updated:  03/06/18 1905     RBC, UA 0-2 /HPF      WBC, UA 0-2 /HPF      Bacteria, UA 1+ (A) /HPF      Squamous Epithelial Cells, UA 0-2 /HPF      Hyaline Casts, UA 0-2 /LPF      Amorphous Crystals, UA Large/3+ /HPF      Methodology Manual Light Microscopy    Blood Culture - Blood, [[id number]] Collected:  03/06/18 1819    Specimen:  Blood from Arm, Left Updated:  03/06/18 1830    Influenza Antigen, Rapid - Swab, Nasopharynx [[id number]]  (Normal) Collected:  03/06/18 1821    Specimen:  Swab from Nasopharynx Updated:  03/06/18 1849     Influenza A Ag, EIA Negative     Influenza B Ag, EIA Negative          I ordered the above labs and reviewed the results    RADIOLOGY  XR Chest 1 View   Preliminary Result       Pulmonary vascular enlargement without significant interstitial   pulmonary edema.       Prominence of the right hilar structures which may simply represent   prominent vasculature.  However, I cannot exclude the possibility of a   right hilar mass or potentially an adjacent infiltrate. I recommend   further evaluation with either a PA and lateral chest radiograph or a CT   scan of the chest.       Mild gaseous distention in the stomach.       These findings and recommendations were discussed with Dr. Eugene on   03/06/2018 at approximately 7 PM.                 I ordered the above noted radiological studies. Interpreted by radiologist.  Reviewed by me in PACS.       PROCEDURES  Critical Care  Performed by: CRISTINA DENTON  Authorized by: CRISTINA DENTON     Critical care provider statement:     Critical care time (minutes):  60    Critical care start time:  3/6/2018 6:06 PM    Critical care was necessary to treat or prevent imminent or life-threatening deterioration of the following conditions:  Endocrine crisis and sepsis    Critical care was time spent personally by me on the following activities:  Development of treatment plan with patient or surrogate, evaluation of patient's response to treatment, discussions with consultants, examination of patient, interpretation of cardiac output measurements, ordering and review of laboratory studies, ordering and performing treatments and interventions, ordering and review of radiographic studies, re-evaluation of patient's condition and review of old charts        EKG           EKG time: 1849  Rhythm/Rate: Sinus, 91  P waves and WI: First degree AV block  QRS, axis: Prolonged QT interval   ST and T waves: Normal     Interpreted Contemporaneously by me, independently viewed  No prior for comparison.        PROGRESS AND CONSULTS  ED Course     Medications   sodium chloride 0.9 % bolus 1,974 mL (1,974 mL Intravenous New Bag 3/6/18 1901)   sodium bicarbonate injection 8.4% 50 mEq (not administered)   vancomycin 1250 mg/250 mL 0.9% NS IVPB ([facility]) (not administered)   piperacillin-tazobactam (ZOSYN) 4.5 g in iso-osmotic dextrose 100 mL IVPB (premix) (not administered)   sodium chloride 0.9 % bolus 1,000 mL (0 mL Intravenous Stopped 3/6/18 1901)   lactated ringers bolus 1,000 mL (1,000 mL Intravenous New Bag 3/6/18 1825)   1808  Ordered Chest XR and blood work for further evaluation.     1815.  Rechecked Pt.  BP- 104/54 HR- 90 Temp- (!) 91.4 °F (33 °C) (Rectal) O2 sat- 100%.  Ordered bed warmer, and warm IV fluids for  hypothermia.      1818  Blood gas shows pH 6.74.      1819  Pt's  is now at bedside. He reports that the Pt started feeling ill over the past few days and he thought that she had the same virus that he had last week.   last saw her normal this morning before he went to work.  He reports that pt was vomiting last night. Discussed ph level with  along with the seriousness of her her condition.      1828  Requested pulmonology consult.      1856  Spoke with Dr. Cordero who would like to give an amp of bicarb and 4-5 liters of fluid. He advised to hold CT of abdomen right now.  He would like to admit under Dr. Alcantar    1903  Chest XR shows increased markings in right hilum.  Could be pneumonia vs mass.      1929  Dr. Alcantar is in the ED to see Pt.      MEDICAL DECISION MAKING  Results were reviewed/discussed with the patient and they were also made aware of online access. Pt also made aware that some labs, such as cultures, will not be resulted during ER visit and follow up with PMD is necessary.     MDM  Number of Diagnoses or Management Options  Diabetic ketoacidosis with coma associated with type 2 diabetes mellitus:   Pneumonia of right upper lobe due to infectious organism:      Amount and/or Complexity of Data Reviewed  Clinical lab tests: (Influenza is negative)  Tests in the medicine section of CPT®:  ordered and reviewed (Blood gas shows pH 6.75, see EKG)  Discuss the patient with other providers: (Dr. Alcantar, Dr. Cordero)    Patient Progress  Patient progress: stable         DIAGNOSIS  Final diagnoses:   Diabetic ketoacidosis with coma associated with type 2 diabetes mellitus   Pneumonia of right upper lobe due to infectious organism   Hypothermia due to non-environmental cause, initial encounter       DISPOSITION  ADMISSION    Discussed treatment plan and reason for admission with pt/family and admitting physician.  Pt/family voiced understanding of the plan for admission for further  testing/treatment as needed.         Latest Documented Vital Signs:  As of 11:24 PM  BP- (!) 85/48 HR- 116 Temp- (!) 91.4 °F (33 °C) (Rectal) O2 sat- 98%    --  Documentation assistance provided by maxime Bush for Dr. Eugene.  Information recorded by the scribe was done at my direction and has been verified and validated by me.            Carole Bush  03/06/18 1932       Ricco Eugene MD  03/06/18 2325       Electronically signed by Ricco Eugene MD at 3/6/2018 11:25 PM      Carole Hernandes RN at 3/6/2018  6:12 PM                 Carole Hernandes RN  03/06/18 1835       Electronically signed by Carole Hernandes RN at 3/6/2018  6:35 PM      Madeline Downing, RN at 3/6/2018  6:16 PM          oneil ellis applied     Madeline Downing RN  03/06/18 1816       Electronically signed by Madeline Downing, RN at 3/6/2018  6:16 PM      Carole Hernandes RN at 3/6/2018  6:25 PM          Patient's  at bedside.     Carole Hernandes RN  03/06/18 1825       Electronically signed by Carole Hernandes RN at 3/6/2018  6:25 PM      Carole Hernandes RN at 3/6/2018  7:16 PM          Dr. Alcantar at bedside.     Carole Hernandes RN  03/06/18 1917       Electronically signed by Carole Hernandes RN at 3/6/2018  7:17 PM        Lines, Drains & Airways    Active LDAs     Name:   Placement date:   Placement time:   Site:   Days:    PICC Line - Triple Lumen 03/07/18 0055 basilic vein (medial side of arm), left 5 Fr  03/07/18    0055      less than 1    Peripheral IV Line - Single Lumen 03/06/18 1805 median cubital vein (antecubital fossa), left 20 gauge  03/06/18    1805      less than 1    Peripheral IV Line - Single Lumen 03/06/18 1805 median cubital vein (antecubital fossa), right 20 gauge  03/06/18    1805      less than 1    Urethral Catheter 03/06/18 1822 silver hydrogel antimicrobial coated 16 10 10  03/06/18    1822      less than 1         Inactive LDAs     None                Hospital Medications (all)        Dose Frequency Start End    dextrose (D50W) solution 12.5 g 12.5 g Every 15 Minutes PRN 3/6/2018     Sig - Route: Infuse 25 mL into a venous catheter Every 15 (Fifteen) Minutes As Needed for Low Blood Sugar (for blood glucose < 100 mg/dL). - Intravenous    dextrose 5 % and sodium chloride 0.45 % infusion 150 mL/hr Continuous PRN 3/6/2018     Sig - Route: Infuse 150 mL/hr into a venous catheter Continuous As Needed (Once Glucose Less Than or Equal to 200 mg/dL and Serum Potassium Greater Than 5). - Intravenous    dextrose 5 % and sodium chloride 0.45 % with KCl 20 mEq/L infusion 150 mL/hr Continuous PRN 3/6/2018     Sig - Route: Infuse 150 mL/hr into a venous catheter Continuous As Needed (Once Glucose Less Than or Equal to 200 mg/dL and Serum Potassium Less Than or Equal to 5). - Intravenous    enoxaparin (LOVENOX) syringe 40 mg 40 mg Every 24 Hours 3/6/2018     Sig - Route: Inject 0.4 mL under the skin Daily. - Subcutaneous    insulin regular (humuLIN R,novoLIN R) 100 Units in sodium chloride 0.9 % 100 mL (1 Units/mL) infusion 0.1 Units/kg/hr × 65.8 kg Titrated 3/6/2018     Sig - Route: Infuse 6.6 Units/hr into a venous catheter Dose Adjusted By Provider As Needed. - Intravenous    insulin regular (humuLIN R,novoLIN R) injection 10 Units 10 Units Once 3/6/2018 3/6/2018    Sig - Route: Infuse 10 Units into a venous catheter 1 (One) Time. - Intravenous    insulin regular (humuLIN R,novoLIN R) injection 10 Units 10 Units Once As Needed 3/6/2018 3/6/2018    Sig - Route: Infuse 10 Units into a venous catheter 1 (One) Time As Needed for High Blood Sugar (if the glucose does not decrease by 10% in the first hour after start of infusion). - Intravenous    lactated ringers bolus 1,000 mL 1,000 mL Once 3/6/2018 3/6/2018    Sig - Route: Infuse 1,000 mL into a venous catheter 1 (One) Time. - Intravenous    Magnesium Sulfate 2 gram Bolus, followed by 8 gram infusion (total Mg dose 10 grams)- Mg less than or equal to 1mg/dL 2 g  "As Needed 3/6/2018     Sig - Route: Infuse 50 mL into a venous catheter As Needed (Mg less than or equal to 1mg/dL). - Intravenous    Linked Group 1:  \"Or\" Linked Group Details        magnesium sulfate 4 gram infusion- Mg 1.6-1.9 mg/dL 4 g As Needed 3/6/2018     Sig - Route: Infuse 100 mL into a venous catheter As Needed (Mg 1.6-1.9 mg/dL). - Intravenous    Linked Group 1:  \"Or\" Linked Group Details        Magnesium Sulfate 6 gram Infusion (2 gm x 3) -Mg 1.1 -1.5 mg/dL 2 g As Needed 3/6/2018     Sig - Route: Infuse 50 mL into a venous catheter As Needed (Mg 1.1 -1.5 mg/dL). - Intravenous    Linked Group 1:  \"Or\" Linked Group Details        ondansetron (ZOFRAN) injection 4 mg 4 mg Every 4 Hours PRN 3/7/2018     Sig - Route: Infuse 2 mL into a venous catheter Every 4 (Four) Hours As Needed for Nausea or Vomiting. - Intravenous    piperacillin-tazobactam (ZOSYN) 4.5 g in iso-osmotic dextrose 100 mL IVPB (premix) 4.5 g Once 3/6/2018 3/6/2018    Sig - Route: Infuse 100 mL into a venous catheter 1 (One) Time. - Intravenous    potassium chloride (KLOR-CON) packet 10 mEq 10 mEq As Needed 3/6/2018     Sig - Route: Take 10 mEq by mouth As Needed (Potassium replacement per admin instructions). - Oral    Linked Group 2:  \"Or\" Linked Group Details        potassium chloride (KLOR-CON) packet 20 mEq 20 mEq As Needed 3/6/2018     Sig - Route: Take 20 mEq by mouth As Needed (Potassium replacement per admin instructions). - Oral    Linked Group 3:  \"Or\" Linked Group Details        potassium chloride (KLOR-CON) packet 40 mEq 40 mEq As Needed 3/6/2018     Sig - Route: Take 40 mEq by mouth As Needed (Potassium replacement per admin instructions). - Oral    Linked Group 4:  \"Or\" Linked Group Details        potassium chloride (MICRO-K) CR capsule 10 mEq 10 mEq As Needed 3/6/2018     Sig - Route: Take 1 capsule by mouth As Needed (Potassium replacement per admin instructions). - Oral    Linked Group 2:  \"Or\" Linked Group Details        " "potassium chloride (MICRO-K) CR capsule 20 mEq 20 mEq As Needed 3/6/2018     Sig - Route: Take 2 capsules by mouth As Needed (Potassium replacement per admin instructions). - Oral    Linked Group 3:  \"Or\" Linked Group Details        potassium chloride (MICRO-K) CR capsule 40 mEq 40 mEq As Needed 3/6/2018     Sig - Route: Take 4 capsules by mouth As Needed (Potassium replacement per admin instructions). - Oral    Linked Group 4:  \"Or\" Linked Group Details        potassium chloride 10 mEq in 100 mL IVPB 10 mEq Every 1 Hour PRN 3/6/2018     Sig - Route: Infuse 100 mL into a venous catheter Every 1 (One) Hour As Needed (Potassium replacement per admin instructions). - Intravenous    Linked Group 4:  \"Or\" Linked Group Details        potassium chloride 10 mEq in 100 mL IVPB 10 mEq Every 1 Hour PRN 3/6/2018     Sig - Route: Infuse 100 mL into a venous catheter Every 1 (One) Hour As Needed (Potassium replacement per admin instructions). - Intravenous    Linked Group 3:  \"Or\" Linked Group Details        potassium chloride 10 mEq in 100 mL IVPB 10 mEq Every 1 Hour PRN 3/6/2018     Sig - Route: Infuse 100 mL into a venous catheter Every 1 (One) Hour As Needed (Potassium replacement per admin instructions). - Intravenous    Linked Group 2:  \"Or\" Linked Group Details        potassium phosphate 15 mmol in sodium chloride 0.9 % 100 mL infusion 15 mmol As Needed 3/6/2018     Sig - Route: Infuse 15 mmol into a venous catheter As Needed (Phosphorus replacement - see admin instructions). - Intravenous    Linked Group 5:  \"Or\" Linked Group Details        potassium phosphate 20 mmol in sodium chloride 0.9 % 500 mL infusion 20 mmol Once 3/7/2018 3/7/2018    Sig - Route: Infuse 20 mmol into a venous catheter 1 (One) Time. - Intravenous    potassium phosphate 30 mmol in sodium chloride 0.9 % 250 mL infusion 30 mmol As Needed 3/6/2018     Sig - Route: Infuse 30 mmol into a venous catheter As Needed (Phosphorus replacement - see admin " "instructions). - Intravenous    Linked Group 5:  \"Or\" Linked Group Details        potassium phosphate 45 mmol in sodium chloride 0.9 % 500 mL infusion 45 mmol As Needed 3/6/2018     Sig - Route: Infuse 45 mmol into a venous catheter As Needed (Phosphorus replacement - see admin instruction). - Intravenous    Linked Group 5:  \"Or\" Linked Group Details        promethazine (PHENERGAN) injection 12.5 mg 12.5 mg Every 4 Hours PRN 3/7/2018     Sig - Route: Infuse 0.5 mL into a venous catheter Every 4 (Four) Hours As Needed for Nausea or Vomiting. - Intravenous    sodium bicarbonate 8.4 % 100 mEq in sodium chloride 0.45 % 400 mL infusion 100 mEq Once As Needed 3/6/2018     Sig - Route: Infuse 100 mEq into a venous catheter 1 (One) Time As Needed (If pH is less than 6.9: give 100 mEQ in 400 mL 1/2 NS over 2 hours). - Intravenous    Notes to Pharmacy: Per [facility] protocol for PH less than 6.9    sodium bicarbonate injection 8.4% 50 mEq 50 mEq Once 3/6/2018 3/6/2018    Sig - Route: Infuse 50 mL into a venous catheter 1 (One) Time. - Intravenous    sodium chloride 0.45 % infusion 250 mL/hr Continuous 3/6/2018     Sig - Route: Infuse 250 mL/hr into a venous catheter Continuous. - Intravenous    sodium chloride 0.45 % with KCl 20 mEq/L infusion 250 mL/hr Continuous PRN 3/6/2018     Sig - Route: Infuse 250 mL/hr into a venous catheter Continuous As Needed (After Initial 2 Hours - If Potassium Level is Less Than or Equal to 5 (If Greater Than 5 use 0.45%)). - Intravenous    sodium chloride 0.9 % bolus 1,000 mL 1,000 mL Once 3/6/2018 3/6/2018    Sig - Route: Infuse 1,000 mL into a venous catheter 1 (One) Time. - Intravenous    sodium chloride 0.9 % bolus 1,000 mL 1,000 mL Once 3/6/2018 3/6/2018    Sig - Route: Infuse 1,000 mL into a venous catheter 1 (One) Time. - Intravenous    sodium chloride 0.9 % bolus 1,974 mL 30 mL/kg × 65.8 kg Continuous 3/6/2018 3/6/2018    Sig - Route: Infuse 1,974 mL into a venous catheter Continuous. - " "Intravenous    sodium chloride 0.9 % bolus 2,000 mL 2,000 mL Once 3/7/2018     Sig - Route: Infuse 2,000 mL into a venous catheter 1 (One) Time. - Intravenous    sodium chloride 0.9 % flush 1-10 mL 1-10 mL As Needed 3/6/2018     Sig - Route: Infuse 1-10 mL into a venous catheter As Needed for Line Care. - Intravenous    sodium phosphates 15 mmol in sodium chloride 0.9 % 250 mL IVPB 15 mmol As Needed 3/6/2018     Sig - Route: Infuse 15 mmol into a venous catheter As Needed (Peripheral IV - Phosphorus 1.8 - 2.5 & Potassium Greater Than 4). - Intravenous    Linked Group 5:  \"Or\" Linked Group Details        sodium phosphates 30 mmol in sodium chloride 0.9 % 250 mL IVPB 30 mmol As Needed 3/6/2018     Sig - Route: Infuse 30 mmol into a venous catheter As Needed (Peripheral IV - Phosphorus 1.3-1.7 & Potassium Greater Than 4). - Intravenous    Linked Group 5:  \"Or\" Linked Group Details        sodium phosphates 45 mmol in sodium chloride 0.9 % 500 mL IVPB 45 mmol As Needed 3/6/2018     Sig - Route: Infuse 45 mmol into a venous catheter As Needed (Peripheral IV - Phosphorus Less Than 1.3 & Potassium Greater Than 4). - Intravenous    Linked Group 5:  \"Or\" Linked Group Details        vancomycin 1250 mg/250 mL 0.9% NS IVPB ([facility]) 20 mg/kg × 65.8 kg Once 3/6/2018 3/6/2018    Sig - Route: Infuse 250 mL into a venous catheter 1 (One) Time. - Intravenous    lactated ringers bolus 1,000 mL (Discontinued) 1,000 mL Once 3/6/2018 3/6/2018    Sig - Route: Infuse 1,000 mL into a venous catheter 1 (One) Time. - Intravenous          Orders (last 72 hrs)     Start     Ordered    03/07/18 1000  sodium chloride 0.9 % bolus 2,000 mL  Once      03/07/18 0925    03/07/18 0957  POC Glucose Once  Once      03/07/18 0956    03/07/18 0924  Inpatient Nephrology Consult  Once     Specialty:  Nephrology  Provider:  Quinn Ying MD    03/07/18 0924    03/07/18 0841  POC Glucose Once  Once      03/07/18 0840    03/07/18 0810  Blood Gas, Venous  " Once      03/07/18 0809    03/07/18 0800  Blood Gas, Venous  Once      03/07/18 0619    03/07/18 0701  POC Glucose Once  Once      03/07/18 0700    03/07/18 0657  Blood Gas, Arterial  Once      03/07/18 0658    03/07/18 0615  POC Glucose Once  Once      03/07/18 0614    03/07/18 0600  Lactic Acid, Plasma  Morning Draw,   Status:  Canceled      03/06/18 2049 03/07/18 0600  XR Chest 1 View  1 Time Imaging      03/06/18 2049 03/07/18 0508  POC Glucose Once  Once      03/07/18 0507    03/07/18 0403  POC Glucose Once  Once      03/07/18 0402    03/07/18 0305  POC Glucose Once  Once      03/07/18 0304    03/07/18 0246  promethazine (PHENERGAN) injection 12.5 mg  Every 4 Hours PRN      03/07/18 0246    03/07/18 0231  POC Glucose Once  Once      03/07/18 0230    03/07/18 0145  potassium phosphate 20 mmol in sodium chloride 0.9 % 500 mL infusion  Once      03/07/18 0104    03/07/18 0122  ondansetron (ZOFRAN) injection 4 mg  Every 4 Hours PRN      03/07/18 0123    03/07/18 0121  POC Glucose Once  Once      03/07/18 0120    03/07/18 0023  Inpatient IV Team Consult  Once     Provider:  (Not yet assigned)    03/07/18 0022    03/07/18 0021  POC Glucose Once  Once      03/07/18 0020    03/07/18 0000  Strict Intake & Output  Every 4 Hours     Comments:  While on insulin infusion.    03/06/18 2049 03/07/18 0000  Phosphorus  Every 4 Hours     Comments:  After 24 hours, change frequency to q 6 hours until DKA resolved.     03/06/18 2049 03/07/18 0000  Basic Metabolic Panel  Every 4 Hours     Comments:  After 24 hours, change frequency to q 6 hours until DKA resolved.     If Potassium is greater than 5mEq/L, obtain BMP every 2 hours until less than 5 mEq/L.    03/06/18 2049 03/07/18 0000  Magnesium  Every 4 Hours     Comments:  After 24 hours, change frequency to q 6 hours until DKA resolved.     03/06/18 2049 03/07/18 0000  Calcium, Ionized  Every 4 Hours     Comments:  After 24 hours, change frequency to q 6 hours  until DKA resolved.    03/06/18 2049 03/06/18 2358  Blood Gas, Venous  Once      03/06/18 2357    03/06/18 2311  Verify informed consent  Once      03/06/18 2311    03/06/18 2217  Lactic Acid, Reflex  STAT      03/06/18 2216    03/06/18 2215  sodium chloride 0.45 % infusion  Continuous      03/06/18 2049 03/06/18 2206  Manual Differential  Once      03/06/18 2205 03/06/18 2156  Comprehensive Metabolic Panel  STAT      03/06/18 2155 03/06/18 2152  Scan Slide  Once      03/06/18 2151 03/06/18 2130  sodium chloride 0.9 % bolus 1,000 mL  Once      03/06/18 2049 03/06/18 2130  insulin regular (humuLIN R,novoLIN R) 100 Units in sodium chloride 0.9 % 100 mL (1 Units/mL) infusion  Titrated      03/06/18 2049 03/06/18 2130  enoxaparin (LOVENOX) syringe 40 mg  Every 24 Hours      03/06/18 2049 03/06/18 2118  Glucose, Random  STAT,   Status:  Canceled      03/06/18 2117 03/06/18 2102  Phosphorus  STAT      03/06/18 2101    03/06/18 2100  POC Glucose Q1H  Every Hour      03/06/18 2049 03/06/18 2100  Vital Signs Every Hour and Per Hospital Policy Based on Patient Condition  Every Hour      03/06/18 2049 03/06/18 2100  Intake and Output  Every Hour      03/06/18 2049 03/06/18 2100  Blood Gas, Arterial  Once     Comments:  On Room Air    03/06/18 2049 03/06/18 2050  Daily Weights  Daily      03/06/18 2049 03/06/18 2042  Comprehensive Metabolic Panel  STAT,   Status:  Canceled      03/06/18 2042 03/06/18 2013  sodium bicarbonate 8.4 % 100 mEq in sodium chloride 0.45 % 400 mL infusion  Once As Needed     Comments:  Per DKA protocol for PH less than 6.9    03/06/18 2049 03/06/18 2010  potassium phosphate 45 mmol in sodium chloride 0.9 % 500 mL infusion  As Needed      03/06/18 2049 03/06/18 2010  potassium phosphate 30 mmol in sodium chloride 0.9 % 250 mL infusion  As Needed      03/06/18 2049    03/06/18 2010  potassium phosphate 15 mmol in sodium chloride 0.9 % 100 mL infusion  As  Needed      03/06/18 2049 03/06/18 2010  sodium phosphates 45 mmol in sodium chloride 0.9 % 500 mL IVPB  As Needed      03/06/18 2049 03/06/18 2010  sodium phosphates 30 mmol in sodium chloride 0.9 % 250 mL IVPB  As Needed      03/06/18 2049 03/06/18 2010  sodium phosphates 15 mmol in sodium chloride 0.9 % 250 mL IVPB  As Needed      03/06/18 2049 03/06/18 2010  Glucose, Random  STAT      03/06/18 2009 03/06/18 2009  Magnesium Sulfate 2 gram Bolus, followed by 8 gram infusion (total Mg dose 10 grams)- Mg less than or equal to 1mg/dL  As Needed      03/06/18 2049 03/06/18 2009  Magnesium Sulfate 6 gram Infusion (2 gm x 3) -Mg 1.1 -1.5 mg/dL  As Needed      03/06/18 2049 03/06/18 2009  magnesium sulfate 4 gram infusion- Mg 1.6-1.9 mg/dL  As Needed      03/06/18 2049 03/06/18 2009  Blood Culture - Blood,  Once,   Status:  Canceled      03/06/18 2049 03/06/18 2009  Blood Culture - Blood,  Once,   Status:  Canceled     Comments:  From Different Site Than 1st Blood Culture    03/06/18 2049 03/06/18 2009  Urine Culture - Urine, Urine, Clean Catch  Once,   Status:  Canceled      03/06/18 2049 03/06/18 2008  Phosphorus  STAT,   Status:  Canceled      03/06/18 2049 03/06/18 2007  Place Sequential Compression Device  Once      03/06/18 2049 03/06/18 2007  Maintain Sequential Compression Device  Continuous      03/06/18 2049 03/06/18 2007  Saline Lock  Continuous,   Status:  Canceled     Comments:  For standing ICU/CCU standing orders    03/06/18 2049 03/06/18 2007  Oxygen Therapy- Nasal Cannula; Titrate for SPO2: 90%, Greater Than or Equal To  Continuous     Comments:  For unresponsiveness, acute dyspnea, cyanosis or suspected hypoxemia    03/06/18 2049 03/06/18 2007  Continuous Pulse Oximetry  Continuous,   Status:  Canceled     Comments:  Unresponsiveness, Acute Dyspnea, Cyanosis, Hypoxemia    03/06/18 2049 03/06/18 2007  ACLS Protocol For Life Threatening Dysrhythmias (If  Not DNR Order)  Continuous      03/06/18 2049 03/06/18 2007  Notify Provider if ACLS Protocol Activated  Once      03/06/18 2049 03/06/18 2007  Place Order to Consult Intensivist For Critical Care Management (If Patient Not Admitted to Cardiology for Primary Cardiology Condition)  Continuous     Comments:  For standing ICU/CCU standing orders    03/06/18 2049 03/06/18 2007  Notify All Physicians When Patient is Transferred  Once     Comments:  For standing ICU/CCU standing orders    03/06/18 2049 03/06/18 2007  Inpatient Consult to Endocrinology  Once     Provider:  Dayron Boland MD    03/06/18 2049 03/06/18 2006  Inpatient Admission  Once      03/06/18 2049 03/06/18 2006  Full Code  Continuous      03/06/18 2049 03/06/18 2006  VTE Risk Assessment - Moderate Risk  Once      03/06/18 2049 03/06/18 2005  POC Glucose Once  Once      03/06/18 2004 03/06/18 2005  Use a Dedicated Line for Insulin Infusion (If Possible).  May Use a Carrier Fluid of NS at KVO Rate if Insulin Rate is Insufficient to Maintain IV Patency.  May Prime IV Line With Insulin Infusion  Continuous      03/06/18 2049 03/06/18 2005  Notify Provider - SBP Less Than 90, Urine Output Less Than 50 mL/hr, Changes in Mental Status, Low Calcium Level or Resolution of DKA  Until Discontinued      03/06/18 2049 03/06/18 2005  Initiate Hypoglycemia Protocol if BG is Less Than 70 mg/dL  Continuous      03/06/18 2049 03/06/18 2005  Potassium Replacement Orders Per DKA Replacement Protocol  Continuous      03/06/18 2049 03/06/18 2005  DKA - Blood Glucose Testing (BG) TARGET Range is 150-200 mg/dl. See Insulin Infusion Table.  Continuous      03/06/18 2049 03/06/18 2005  DKA - May Use Fingerstick, Venous or Arterial Blood As Indicated By Patient Condition  Continuous      03/06/18 2049 03/06/18 2005  DKA - STAT Serum Glucose if Fingerstick is Greater Than 450 or Less Than 50  Continuous      03/06/18 2049 03/06/18  2005  DKA - If Hourly Glucose Remains in Desired Range for 4 Consecutive Hours May Test Glucose Every 2 Hours  Continuous      03/06/18 2049 03/06/18 2005  DKA - If Every 2 Hour Glucose Remains in Target Range for 4 Consecutive Checks (8 Hours) May Test Blood Glucose Every 4 Hours  Continuous      03/06/18 2049 03/06/18 2005  DKA - If Clinical Condition Changes Resume Hourly Glucose Monitoring  Continuous     Comments:  Clinical Condition Change:  Glucose Outside Target Range  Insulin Dose Change  Hemodynamically Unstable  Change in Level of Consciousness    03/06/18 2049 03/06/18 2005  NPO Diet  Diet Effective Now      03/06/18 2049 03/06/18 2005  Inpatient Consult to Nutrition  Once     Provider:  (Not yet assigned)    03/06/18 2049 03/06/18 2005  Inpatient Consult to Diabetes Educator  Once     Provider:  (Not yet assigned)    03/06/18 2049 03/06/18 2005  Hemoglobin A1c  STAT      03/06/18 2049 03/06/18 2005  CBC & Differential  STAT      03/06/18 2049 03/06/18 2005  Comprehensive Metabolic Panel  STAT,   Status:  Canceled      03/06/18 2049 03/06/18 2005  Urinalysis With / Culture If Indicated - Urine, Clean Catch  STAT,   Status:  Canceled      03/06/18 2049 03/06/18 2005  Calculate Corrected Serum Sodium Level With Each BMP - Contact Provider if Less Than 130mEq/L  Continuous     Comments:  Corrected Serum Sodium Formula - Use Approved Formula for Your Facility    03/06/18 2049 03/06/18 2005  Contact Provider for IV Fluid Orders When DKA is Resolved  Continuous     Comments:  DKA Resolved When:  - Glucose Less Than 200 mg/dL AND  2 of the Following:  - Serum Bicarb / CO2 Greater Than or Equal to 15  - Venous pH Greater Than 7.3  - Anion Gap Less Than or Equal to 12    03/06/18 2049 03/06/18 2005  No Potassium Replacement  if K+ 4.5 mmol/L or Greater  Continuous      03/06/18 2049 03/06/18 2005  If initial BG is less than 200 mg/dL contact provider for subcutaneous insulin  orders  Once     Comments:  If initial BG is less than 200 mg/dL contact provider for subcutaneous insulin orders    03/06/18 2049 03/06/18 2005  Cardiac Monitoring  Continuous      03/06/18 2049 03/06/18 2005  Continuous Pulse Oximetry  Continuous      03/06/18 2049 03/06/18 2005  Strict Bed Rest  Until Discontinued      03/06/18 2049 03/06/18 2005  Use Mobility Guidelines for Advancement of Activity  Continuous      03/06/18 2049 03/06/18 2005  Height & Weight  Once      03/06/18 2049 03/06/18 2005  Insert Peripheral IV  Once      03/06/18 2049 03/06/18 2005  Saline Lock & Maintain IV Access  Continuous      03/06/18 2049 03/06/18 2005  CBC Auto Differential  PROCEDURE ONCE      03/06/18 2049 03/06/18 2004  sodium chloride 0.9 % flush 1-10 mL  As Needed      03/06/18 2049 03/06/18 2004  sodium chloride 0.45 % with KCl 20 mEq/L infusion  Continuous PRN      03/06/18 2049 03/06/18 2004  dextrose 5 % and sodium chloride 0.45 % infusion  Continuous PRN      03/06/18 2049 03/06/18 2004  dextrose 5 % and sodium chloride 0.45 % with KCl 20 mEq/L infusion  Continuous PRN      03/06/18 2049 03/06/18 2004  potassium chloride (MICRO-K) CR capsule 40 mEq  As Needed      03/06/18 2049 03/06/18 2004  potassium chloride (KLOR-CON) packet 40 mEq  As Needed      03/06/18 2049 03/06/18 2004  potassium chloride 10 mEq in 100 mL IVPB  Every 1 Hour PRN      03/06/18 2049 03/06/18 2004  potassium chloride (MICRO-K) CR capsule 20 mEq  As Needed      03/06/18 2049 03/06/18 2004  potassium chloride (KLOR-CON) packet 20 mEq  As Needed      03/06/18 2049 03/06/18 2004  potassium chloride 10 mEq in 100 mL IVPB  Every 1 Hour PRN      03/06/18 2049 03/06/18 2004  potassium chloride (MICRO-K) CR capsule 10 mEq  As Needed      03/06/18 2049 03/06/18 2004  potassium chloride (KLOR-CON) packet 10 mEq  As Needed      03/06/18 2049 03/06/18 2004  potassium chloride 10 mEq in 100 mL IVPB   Every 1 Hour PRN      03/06/18 2049 03/06/18 2004  insulin regular (humuLIN R,novoLIN R) injection 10 Units  Once As Needed      03/06/18 2049 03/06/18 2004  dextrose (D50W) solution 12.5 g  Every 15 Minutes PRN      03/06/18 2049 03/06/18 1929  insulin regular (humuLIN R,novoLIN R) injection 10 Units  Once      03/06/18 1927 03/06/18 1913  Lactic Acid, Reflex Timer (This will reflex a repeat order 3-3:15 hours after ordered.)  Once      03/06/18 1912    03/06/18 1905  vancomycin 1250 mg/250 mL 0.9% NS IVPB ([facility])  Once      03/06/18 1903    03/06/18 1905  piperacillin-tazobactam (ZOSYN) 4.5 g in iso-osmotic dextrose 100 mL IVPB (premix)  Once      03/06/18 1903    03/06/18 1905  Inpatient Admission  Once      03/06/18 1904    03/06/18 1902  sodium chloride 0.9 % bolus 1,974 mL  Continuous      03/06/18 1900    03/06/18 1902  sodium bicarbonate injection 8.4% 50 mEq  Once      03/06/18 1900    03/06/18 1837  Urinalysis, Microscopic Only - Urine, Clean Catch  Once      03/06/18 1836    03/06/18 1830  lactated ringers bolus 1,000 mL  Once,   Status:  Discontinued      03/06/18 1828    03/06/18 1829  Pulmonology (on-call MD unless specified)  Once     Specialty:  Pulmonary Disease  Provider:  (Not yet assigned)    03/06/18 1828    03/06/18 1827  Comprehensive Metabolic Panel  Once      03/06/18 1808    03/06/18 1827  Procalcitonin  Once      03/06/18 1808    03/06/18 1827  Troponin  Once      03/06/18 1809    03/06/18 1827  Magnesium  Once      03/06/18 1813    03/06/18 1827  lactated ringers bolus 1,000 mL  Once      03/06/18 1825    03/06/18 1817  Critical Care  Once     Comments:  This order was created via procedure documentation    03/06/18 1816    03/06/18 1816  Apply warming blanket  Once     Comments:  oneil ellis    03/06/18 1816    03/06/18 1816  Fluid Warmer  Once     Comments:  Warm IV fluids    03/06/18 1816    03/06/18 1809  Insert 2nd peripheral IV  Once      03/06/18 1808    03/06/18 1809   ECG 12 Lead  Once      03/06/18 1808    03/06/18 1809  XR Chest 1 View  1 Time Imaging      03/06/18 1808    03/06/18 1809  Insert Indwelling Urinary Catheter  Once      03/06/18 1809 03/06/18 1809  Assess Need for Indwelling Urinary Catheter - Follow Removal Protocol  Continuous     Comments:  Indwelling Urinary Catheter Removal Criteria  Discontinue Indwelling Urinary Catheter Unless One of the Following is Present  Urinary Retention or Obstruction  Chronic Castro Catheter Use  End of Life  Critical Illness with Strict I/O   Tract or Abdominal Surgery  Stage 3/4 Sacral / Perineal Wound  Required Activity Restriction: Trauma  Required Activity Restriction: Spine Surgery  If Patient is Being Followed by Urology Contact Them PRIOR to Removal  Do Not Remove Indwelling Urinary Catheter Order is Present with a CLINICAL REASON to Maintain the Catheter. Provider is Required to Include a Clinical Reason to Maintain a Urinary Catheter    Chronic Castro Catheter Use (Present on Admission)  Assess for Continued Need & Document Medical Necessity  If Infection is Suspected, Contact the Provider        03/06/18 1809 03/06/18 1809  Catheter Care  Every Shift      03/06/18 1809 03/06/18 1808  CBC & Differential  Once,   Status:  Canceled      03/06/18 1808    03/06/18 1808  Urinalysis With / Culture If Indicated - Urine, Catheter  Once      03/06/18 1808    03/06/18 1808  Lactic Acid, Plasma  Once      03/06/18 1808    03/06/18 1808  Blood Culture - Blood,  Once      03/06/18 1808    03/06/18 1808  Blood Culture - Blood,  Once      03/06/18 1808    03/06/18 1808  Influenza Antigen, Rapid - Swab, Nasopharynx  Once      03/06/18 1808    03/06/18 1808  Ketone Bodies, Serum (Not performed at [place])  Once      03/06/18 1808    03/06/18 1808  CBC Auto Differential  PROCEDURE ONCE,   Status:  Canceled      03/06/18 1808    03/06/18 1808  Beta Hydroxybutyrate Quantitative  PROCEDURE ONCE      03/06/18 1808    03/06/18 1805   sodium chloride 0.9 % bolus 1,000 mL  Once      03/06/18 1803    03/06/18 1804  Blood Gas, Arterial  STAT      03/06/18 1803    Unscheduled  ECG 12 Lead  As Needed     Comments:  For standing ICU/CCU Standing Orders.  Nurse to Release if Patient Experiences Acute Chest Pain or Dysrhythmias    03/06/18 2049    Unscheduled  Potassium  As Needed     Comments:  Sudden Ventricular Dysrhythmias.   Notify Physician.    03/06/18 2049    Unscheduled  Magnesium  As Needed     Comments:  For ICU/CCU Standing Orders    03/06/18 2049

## 2018-03-07 NOTE — PLAN OF CARE
Problem: Nutrition, Imbalanced: Inadequate Oral Intake (Adult)  Intervention: Promote/Optimize Nutrition   03/07/18 0908   Nutrition Interventions   Oral Nutrition Promotion nutritional therapy counseling offered for DM       Goal: Identify Related Risk Factors and Signs and Symptoms  Outcome: Ongoing (interventions implemented as appropriate)    Goal: Improved Oral Intake  Outcome: Ongoing (interventions implemented as appropriate)    Goal: Prevent Further Weight Loss  Outcome: Ongoing (interventions implemented as appropriate)

## 2018-03-08 LAB
ANION GAP SERPL CALCULATED.3IONS-SCNC: 13.6 MMOL/L
ANION GAP SERPL CALCULATED.3IONS-SCNC: 14.2 MMOL/L
ANION GAP SERPL CALCULATED.3IONS-SCNC: 17.2 MMOL/L
ANION GAP SERPL CALCULATED.3IONS-SCNC: 18 MMOL/L
ANION GAP SERPL CALCULATED.3IONS-SCNC: 23.2 MMOL/L
BASOPHILS # BLD AUTO: 0.01 10*3/MM3 (ref 0–0.2)
BASOPHILS NFR BLD AUTO: 0.1 % (ref 0–1.5)
BUN BLD-MCNC: 20 MG/DL (ref 6–20)
BUN BLD-MCNC: 21 MG/DL (ref 6–20)
BUN BLD-MCNC: 25 MG/DL (ref 6–20)
BUN BLD-MCNC: 27 MG/DL (ref 6–20)
BUN BLD-MCNC: 28 MG/DL (ref 6–20)
BUN/CREAT SERPL: 17.4 (ref 7–25)
BUN/CREAT SERPL: 17.4 (ref 7–25)
BUN/CREAT SERPL: 17.6 (ref 7–25)
BUN/CREAT SERPL: 18.1 (ref 7–25)
BUN/CREAT SERPL: 18.9 (ref 7–25)
CA-I BLD-MCNC: 5 MG/DL (ref 4.6–5.4)
CA-I BLD-MCNC: 5.1 MG/DL (ref 4.6–5.4)
CA-I BLD-MCNC: 5.1 MG/DL (ref 4.6–5.4)
CA-I SERPL ISE-MCNC: 1.25 MMOL/L (ref 1.15–1.35)
CA-I SERPL ISE-MCNC: 1.27 MMOL/L (ref 1.15–1.35)
CA-I SERPL ISE-MCNC: 1.28 MMOL/L (ref 1.15–1.35)
CALCIUM SPEC-SCNC: 8 MG/DL (ref 8.6–10.5)
CALCIUM SPEC-SCNC: 8 MG/DL (ref 8.6–10.5)
CALCIUM SPEC-SCNC: 8.1 MG/DL (ref 8.6–10.5)
CALCIUM SPEC-SCNC: 8.3 MG/DL (ref 8.6–10.5)
CALCIUM SPEC-SCNC: 8.3 MG/DL (ref 8.6–10.5)
CHLORIDE SERPL-SCNC: 108 MMOL/L (ref 98–107)
CHLORIDE SERPL-SCNC: 108 MMOL/L (ref 98–107)
CHLORIDE SERPL-SCNC: 110 MMOL/L (ref 98–107)
CHLORIDE SERPL-SCNC: 112 MMOL/L (ref 98–107)
CHLORIDE SERPL-SCNC: 112 MMOL/L (ref 98–107)
CO2 SERPL-SCNC: 13 MMOL/L (ref 22–29)
CO2 SERPL-SCNC: 15.8 MMOL/L (ref 22–29)
CO2 SERPL-SCNC: 18.8 MMOL/L (ref 22–29)
CO2 SERPL-SCNC: 22.4 MMOL/L (ref 22–29)
CO2 SERPL-SCNC: 9.8 MMOL/L (ref 22–29)
CREAT BLD-MCNC: 1.15 MG/DL (ref 0.57–1)
CREAT BLD-MCNC: 1.21 MG/DL (ref 0.57–1)
CREAT BLD-MCNC: 1.42 MG/DL (ref 0.57–1)
CREAT BLD-MCNC: 1.48 MG/DL (ref 0.57–1)
CREAT BLD-MCNC: 1.49 MG/DL (ref 0.57–1)
DEPRECATED RDW RBC AUTO: 46.3 FL (ref 37–54)
EOSINOPHIL # BLD AUTO: 0 10*3/MM3 (ref 0–0.7)
EOSINOPHIL NFR BLD AUTO: 0 % (ref 0.3–6.2)
ERYTHROCYTE [DISTWIDTH] IN BLOOD BY AUTOMATED COUNT: 13.6 % (ref 11.7–13)
GFR SERPL CREATININE-BSD FRML MDRD: 37 ML/MIN/1.73
GFR SERPL CREATININE-BSD FRML MDRD: 37 ML/MIN/1.73
GFR SERPL CREATININE-BSD FRML MDRD: 39 ML/MIN/1.73
GFR SERPL CREATININE-BSD FRML MDRD: 47 ML/MIN/1.73
GFR SERPL CREATININE-BSD FRML MDRD: 49 ML/MIN/1.73
GLUCOSE BLD-MCNC: 120 MG/DL (ref 65–99)
GLUCOSE BLD-MCNC: 179 MG/DL (ref 65–99)
GLUCOSE BLD-MCNC: 239 MG/DL (ref 65–99)
GLUCOSE BLD-MCNC: 245 MG/DL (ref 65–99)
GLUCOSE BLD-MCNC: 268 MG/DL (ref 65–99)
GLUCOSE BLDC GLUCOMTR-MCNC: 117 MG/DL (ref 70–130)
GLUCOSE BLDC GLUCOMTR-MCNC: 122 MG/DL (ref 70–130)
GLUCOSE BLDC GLUCOMTR-MCNC: 124 MG/DL (ref 70–130)
GLUCOSE BLDC GLUCOMTR-MCNC: 125 MG/DL (ref 70–130)
GLUCOSE BLDC GLUCOMTR-MCNC: 147 MG/DL (ref 70–130)
GLUCOSE BLDC GLUCOMTR-MCNC: 168 MG/DL (ref 70–130)
GLUCOSE BLDC GLUCOMTR-MCNC: 174 MG/DL (ref 70–130)
GLUCOSE BLDC GLUCOMTR-MCNC: 192 MG/DL (ref 70–130)
GLUCOSE BLDC GLUCOMTR-MCNC: 210 MG/DL (ref 70–130)
GLUCOSE BLDC GLUCOMTR-MCNC: 210 MG/DL (ref 70–130)
GLUCOSE BLDC GLUCOMTR-MCNC: 211 MG/DL (ref 70–130)
GLUCOSE BLDC GLUCOMTR-MCNC: 216 MG/DL (ref 70–130)
GLUCOSE BLDC GLUCOMTR-MCNC: 220 MG/DL (ref 70–130)
GLUCOSE BLDC GLUCOMTR-MCNC: 232 MG/DL (ref 70–130)
GLUCOSE BLDC GLUCOMTR-MCNC: 233 MG/DL (ref 70–130)
GLUCOSE BLDC GLUCOMTR-MCNC: 242 MG/DL (ref 70–130)
GLUCOSE BLDC GLUCOMTR-MCNC: 248 MG/DL (ref 70–130)
HCT VFR BLD AUTO: 34.7 % (ref 35.6–45.5)
HGB BLD-MCNC: 11.5 G/DL (ref 11.9–15.5)
IMM GRANULOCYTES # BLD: 0.02 10*3/MM3 (ref 0–0.03)
IMM GRANULOCYTES NFR BLD: 0.2 % (ref 0–0.5)
LYMPHOCYTES # BLD AUTO: 0.69 10*3/MM3 (ref 0.9–4.8)
LYMPHOCYTES NFR BLD AUTO: 7.3 % (ref 19.6–45.3)
MAGNESIUM SERPL-MCNC: 2 MG/DL (ref 1.6–2.6)
MAGNESIUM SERPL-MCNC: 2 MG/DL (ref 1.6–2.6)
MAGNESIUM SERPL-MCNC: 2.2 MG/DL (ref 1.6–2.6)
MAGNESIUM SERPL-MCNC: 2.2 MG/DL (ref 1.6–2.6)
MCH RBC QN AUTO: 31 PG (ref 26.9–32)
MCHC RBC AUTO-ENTMCNC: 33.1 G/DL (ref 32.4–36.3)
MCV RBC AUTO: 93.5 FL (ref 80.5–98.2)
MONOCYTES # BLD AUTO: 0.7 10*3/MM3 (ref 0.2–1.2)
MONOCYTES NFR BLD AUTO: 7.4 % (ref 5–12)
NEUTROPHILS # BLD AUTO: 8.01 10*3/MM3 (ref 1.9–8.1)
NEUTROPHILS NFR BLD AUTO: 85 % (ref 42.7–76)
PHOSPHATE SERPL-MCNC: 2.5 MG/DL (ref 2.5–4.5)
PHOSPHATE SERPL-MCNC: 2.6 MG/DL (ref 2.5–4.5)
PHOSPHATE SERPL-MCNC: 3 MG/DL (ref 2.5–4.5)
PHOSPHATE SERPL-MCNC: 3.6 MG/DL (ref 2.5–4.5)
PLATELET # BLD AUTO: 120 10*3/MM3 (ref 140–500)
PMV BLD AUTO: 10.4 FL (ref 6–12)
POTASSIUM BLD-SCNC: 3.3 MMOL/L (ref 3.5–5.2)
POTASSIUM BLD-SCNC: 3.3 MMOL/L (ref 3.5–5.2)
POTASSIUM BLD-SCNC: 3.5 MMOL/L (ref 3.5–5.2)
POTASSIUM BLD-SCNC: 3.6 MMOL/L (ref 3.5–5.2)
POTASSIUM BLD-SCNC: 3.7 MMOL/L (ref 3.5–5.2)
RBC # BLD AUTO: 3.71 10*6/MM3 (ref 3.9–5.2)
SODIUM BLD-SCNC: 141 MMOL/L (ref 136–145)
SODIUM BLD-SCNC: 143 MMOL/L (ref 136–145)
SODIUM BLD-SCNC: 143 MMOL/L (ref 136–145)
SODIUM BLD-SCNC: 144 MMOL/L (ref 136–145)
SODIUM BLD-SCNC: 145 MMOL/L (ref 136–145)
WBC NRBC COR # BLD: 9.43 10*3/MM3 (ref 4.5–10.7)

## 2018-03-08 PROCEDURE — 82962 GLUCOSE BLOOD TEST: CPT

## 2018-03-08 PROCEDURE — 83735 ASSAY OF MAGNESIUM: CPT | Performed by: INTERNAL MEDICINE

## 2018-03-08 PROCEDURE — 80048 BASIC METABOLIC PNL TOTAL CA: CPT | Performed by: INTERNAL MEDICINE

## 2018-03-08 PROCEDURE — 94799 UNLISTED PULMONARY SVC/PX: CPT

## 2018-03-08 PROCEDURE — 25010000002 CEFTRIAXONE PER 250 MG: Performed by: INTERNAL MEDICINE

## 2018-03-08 PROCEDURE — 25010000002 POTASSIUM CHLORIDE PER 2 MEQ OF POTASSIUM: Performed by: INTERNAL MEDICINE

## 2018-03-08 PROCEDURE — 82330 ASSAY OF CALCIUM: CPT | Performed by: INTERNAL MEDICINE

## 2018-03-08 PROCEDURE — 84100 ASSAY OF PHOSPHORUS: CPT | Performed by: INTERNAL MEDICINE

## 2018-03-08 PROCEDURE — 99233 SBSQ HOSP IP/OBS HIGH 50: CPT | Performed by: INTERNAL MEDICINE

## 2018-03-08 PROCEDURE — 25010000002 PROMETHAZINE PER 50 MG: Performed by: INTERNAL MEDICINE

## 2018-03-08 PROCEDURE — 25010000003 POTASSIUM CHLORIDE 10 MEQ/100ML SOLUTION: Performed by: INTERNAL MEDICINE

## 2018-03-08 PROCEDURE — 85025 COMPLETE CBC W/AUTO DIFF WBC: CPT | Performed by: INTERNAL MEDICINE

## 2018-03-08 PROCEDURE — 25010000003 POTASSIUM CHLORIDE PER 2 MEQ: Performed by: INTERNAL MEDICINE

## 2018-03-08 PROCEDURE — 25010000002 ENOXAPARIN PER 10 MG: Performed by: INTERNAL MEDICINE

## 2018-03-08 PROCEDURE — 25010000002 ONDANSETRON PER 1 MG: Performed by: INTERNAL MEDICINE

## 2018-03-08 RX ORDER — POTASSIUM CHLORIDE 29.8 MG/ML
20 INJECTION INTRAVENOUS
Status: DISCONTINUED | OUTPATIENT
Start: 2018-03-08 | End: 2018-03-10

## 2018-03-08 RX ADMIN — IPRATROPIUM BROMIDE AND ALBUTEROL SULFATE 3 ML: .5; 3 SOLUTION RESPIRATORY (INHALATION) at 03:31

## 2018-03-08 RX ADMIN — SODIUM BICARBONATE: 84 INJECTION, SOLUTION INTRAVENOUS at 18:50

## 2018-03-08 RX ADMIN — DOXYCYCLINE 100 MG: 100 INJECTION, POWDER, LYOPHILIZED, FOR SOLUTION INTRAVENOUS at 00:09

## 2018-03-08 RX ADMIN — POTASSIUM CHLORIDE 10 MEQ: 2 INJECTION, SOLUTION, CONCENTRATE INTRAVENOUS at 00:17

## 2018-03-08 RX ADMIN — IPRATROPIUM BROMIDE AND ALBUTEROL SULFATE 3 ML: .5; 3 SOLUTION RESPIRATORY (INHALATION) at 20:12

## 2018-03-08 RX ADMIN — POTASSIUM CHLORIDE 10 MEQ: 2 INJECTION, SOLUTION, CONCENTRATE INTRAVENOUS at 06:01

## 2018-03-08 RX ADMIN — POTASSIUM CHLORIDE 10 MEQ: 2 INJECTION, SOLUTION, CONCENTRATE INTRAVENOUS at 11:10

## 2018-03-08 RX ADMIN — POTASSIUM CHLORIDE 10 MEQ: 2 INJECTION, SOLUTION, CONCENTRATE INTRAVENOUS at 10:12

## 2018-03-08 RX ADMIN — CEFTRIAXONE SODIUM 1 G: 1 INJECTION, SOLUTION INTRAVENOUS at 11:37

## 2018-03-08 RX ADMIN — POTASSIUM CHLORIDE 20 MEQ: 400 INJECTION, SOLUTION INTRAVENOUS at 16:57

## 2018-03-08 RX ADMIN — DOXYCYCLINE 100 MG: 100 INJECTION, POWDER, LYOPHILIZED, FOR SOLUTION INTRAVENOUS at 23:06

## 2018-03-08 RX ADMIN — POTASSIUM CHLORIDE 10 MEQ: 2 INJECTION, SOLUTION, CONCENTRATE INTRAVENOUS at 12:30

## 2018-03-08 RX ADMIN — POTASSIUM CHLORIDE, DEXTROSE MONOHYDRATE AND SODIUM CHLORIDE 125 ML/HR: 150; 5; 450 INJECTION, SOLUTION INTRAVENOUS at 03:11

## 2018-03-08 RX ADMIN — ENOXAPARIN SODIUM 40 MG: 40 INJECTION SUBCUTANEOUS at 21:09

## 2018-03-08 RX ADMIN — DOXYCYCLINE 100 MG: 100 INJECTION, POWDER, LYOPHILIZED, FOR SOLUTION INTRAVENOUS at 11:37

## 2018-03-08 RX ADMIN — POTASSIUM CHLORIDE 10 MEQ: 2 INJECTION, SOLUTION, CONCENTRATE INTRAVENOUS at 14:00

## 2018-03-08 RX ADMIN — ONDANSETRON 4 MG: 2 INJECTION INTRAMUSCULAR; INTRAVENOUS at 00:16

## 2018-03-08 RX ADMIN — POTASSIUM CHLORIDE 20 MEQ: 400 INJECTION, SOLUTION INTRAVENOUS at 18:00

## 2018-03-08 RX ADMIN — SODIUM BICARBONATE: 84 INJECTION, SOLUTION INTRAVENOUS at 10:10

## 2018-03-08 RX ADMIN — POTASSIUM CHLORIDE 10 MEQ: 2 INJECTION, SOLUTION, CONCENTRATE INTRAVENOUS at 07:01

## 2018-03-08 RX ADMIN — POTASSIUM CHLORIDE 10 MEQ: 2 INJECTION, SOLUTION, CONCENTRATE INTRAVENOUS at 02:22

## 2018-03-08 RX ADMIN — SODIUM BICARBONATE: 84 INJECTION, SOLUTION INTRAVENOUS at 08:09

## 2018-03-08 RX ADMIN — PROMETHAZINE HYDROCHLORIDE 12.5 MG: 25 INJECTION INTRAMUSCULAR; INTRAVENOUS at 19:53

## 2018-03-08 RX ADMIN — POTASSIUM CHLORIDE 10 MEQ: 2 INJECTION, SOLUTION, CONCENTRATE INTRAVENOUS at 03:38

## 2018-03-08 RX ADMIN — IPRATROPIUM BROMIDE AND ALBUTEROL SULFATE 3 ML: .5; 3 SOLUTION RESPIRATORY (INHALATION) at 23:36

## 2018-03-08 NOTE — PLAN OF CARE
Problem: Patient Care Overview (Adult)  Goal: Plan of Care Review  Outcome: Ongoing (interventions implemented as appropriate)   03/08/18 0522   Coping/Psychosocial Response Interventions   Plan Of Care Reviewed With patient;spouse   Patient Care Overview   Progress improving   Outcome Evaluation   Outcome Summary/Follow up Plan Making small improvements overnight, magnesium and phos WNL, still continuing to treat low potassium. Pt still intermittently nauseous throughout the night, frequently requests ice chips. Nausea moderately controlled with PRN phenergan and zofran. Pt appropriate this shift, A/O x4, continues to be tachycardic on monitor. Expiratory wheezes and tachypnea noted throughout the shift, PRN breathing treatments added with some improvement of symptoms. Awaiting AM lab results. Insulin gtt titrated up to 4.375 units/hr this shift for increasing blood sugars. IVF and bicarb gtt continued. Pt got up to BS 2-assist and had formed BM. Will continue to monitor.        Problem: Diabetes, Type 2 (Adult)  Goal: Signs and Symptoms of Listed Potential Problems Will be Absent or Manageable (Diabetes, Type 2)  Outcome: Ongoing (interventions implemented as appropriate)   03/08/18 0522   Diabetes, Type 2   Problems Assessed (Type 2 Diabetes) all   Problems Present (Type 2 Diabetes) diabetic ketoacidosis (DKA)/hyperosmolar hyperglycemic state (HHS);situational response       Problem: Fall Risk (Adult)  Goal: Identify Related Risk Factors and Signs and Symptoms  Outcome: Ongoing (interventions implemented as appropriate)   03/08/18 0522   Fall Risk   Fall Risk: Related Risk Factors fatigue/slow reaction;gait/mobility problems;environment unfamiliar   Fall Risk: Signs and Symptoms presence of risk factors     Goal: Absence of Falls  Outcome: Ongoing (interventions implemented as appropriate)   03/08/18 0522   Fall Risk (Adult)   Absence of Falls making progress toward outcome       Problem: Nutrition, Imbalanced:  Inadequate Oral Intake (Adult)  Goal: Identify Related Risk Factors and Signs and Symptoms  Outcome: Ongoing (interventions implemented as appropriate)   03/08/18 0522   Nutrition, Imbalanced: Inadequate Oral Intake   Nutrition Imbalanced: Less than Body Requirements: Related Risk Factors clear liquids prolonged   Signs and Symptoms (Nutrition Imbalance, Inadequate Oral Intake: Signs and Symptoms) weakness/lethargy     Goal: Improved Oral Intake  Outcome: Ongoing (interventions implemented as appropriate)   03/08/18 0522   Nutrition, Imbalanced: Inadequate Oral Intake (Adult)   Improved Oral Intake making progress toward outcome     Goal: Prevent Further Weight Loss  Outcome: Ongoing (interventions implemented as appropriate)   03/08/18 0522   Nutrition, Imbalanced: Inadequate Oral Intake (Adult)   Prevent Further Weight Loss making progress toward outcome       Problem: Pressure Ulcer Risk (Schuyler Scale) (Adult,Obstetrics,Pediatric)  Goal: Identify Related Risk Factors and Signs and Symptoms  Outcome: Ongoing (interventions implemented as appropriate)   03/08/18 0522   Pressure Ulcer Risk (Schuyler Scale)   Related Risk Factors (Pressure Ulcer Risk (Schuyler Scale)) critical care admission;infection     Goal: Skin Integrity  Outcome: Ongoing (interventions implemented as appropriate)   03/08/18 0522   Pressure Ulcer Risk (Schuyler Scale) (Adult,Obstetrics,Pediatric)   Skin Integrity making progress toward outcome

## 2018-03-08 NOTE — PROGRESS NOTES
52 y.o.   LOS: 2 days   Patient Care Team:  No Known Provider as PCP - General    Chief Complaint:  Uncontrolled diabetes with DKA    Chief Complaint   Patient presents with   • Hypotension   • Hyperglycemia       Subjective     HPI  Patient is slightly more alert and answering questions today  She still on IV fluids and IV insulin  Her blood sugars appear to be stabilizing  Patient is still nothing by mouth  She denies hunger       HPI patient is a 52-year-old white female admitted to the hospital with severe C hypotension hyperglycemia and was evaluated in the emergency room and was noted to be severely acidotic with a pH of 6.7  She was placed on insulin therapy and IV fluids as well as bicarbonate intravenously  Patient is admitted to intensive care unit  Overnight patient's blood sugars have improved significantly but is bicarbonate still low  Patient's  is at the bedside and is often the history  Patient is very drowsy and not able to offer meaningful history   also reported that the entire family was sick recently and patient has not been feeling well for the past 3-4 days prior to admission to the emergency room     According to the  patient was diagnosed with gestational diabetes more than 20 years ago  Apparently she used to be on insulin during pregnancy and a few years after pregnancies  At some point she discontinued taking insulin  He also reports that she used to see Dr. Davey in our practice which was at least 4-5 years ago  He has noticed that she has taken insulin periodically intermittently  Patient is briefly awake and reported that she has not taken any insulin in the past 3-4 years     Patient denied any knowledge or symptoms of diabetic retinopathy nephropathy or neuropathy  Patient also denied any previous history of diabetic ketoacidosis     Patient reported that she has been symptomatic for the past 3-4 months with severe thirst and polyuria  Her blood sugars were  apparently not too high to seek medical attention  There is no obvious history of weight loss     Review of Systems:      Review of Systems   Constitutional: Positive for fatigue.   Respiratory: Negative.    Cardiovascular: Negative.    Gastrointestinal: Negative.    Psychiatric/Behavioral: Positive for decreased concentration.   All other systems reviewed and are negative.    Objective     Vital Signs   Temp:  [99.7 °F (37.6 °C)-100.7 °F (38.2 °C)] 99.7 °F (37.6 °C)  Heart Rate:  [109-129] 109  Resp:  [22-28] 28  BP: (114-152)/(62-90) 136/87    Physical Exam:  Physical Exam   HENT:   Dry oral mucosa   Eyes: EOM are normal. Pupils are equal, round, and reactive to light.   Neck: Normal range of motion. Neck supple.   Cardiovascular: Normal rate, regular rhythm, normal heart sounds and intact distal pulses.    Pulmonary/Chest: Effort normal and breath sounds normal.   Abdominal: Soft. Bowel sounds are normal.   Musculoskeletal: Normal range of motion. She exhibits no edema.   Neurological: She is alert.   Skin: Skin is warm and dry.   Nursing note and vitals reviewed.  Results Review:     I reviewed the patient's new clinical results.      Glucose   Date/Time Value Ref Range Status   03/08/2018 0430 245 (H) 65 - 99 mg/dL Final   03/08/2018 0020 268 (H) 65 - 99 mg/dL Final   03/07/2018 2004 146 (H) 65 - 99 mg/dL Final   03/07/2018 1637 127 (H) 65 - 99 mg/dL Final   03/07/2018 1157 216 (H) 65 - 99 mg/dL Final   03/07/2018 0809 226 (H) 65 - 99 mg/dL Final   03/07/2018 0406 323 (H) 65 - 99 mg/dL Final   03/06/2018 2347 515 (C) 65 - 99 mg/dL Final   03/06/2018 2118 651 (C) 65 - 99 mg/dL Final   03/06/2018 2014 706 (C) 65 - 99 mg/dL Final   03/06/2018 1810 781 (C) 65 - 99 mg/dL Final     Lab Results (last 72 hours)     Procedure Component Value Units Date/Time    Influenza Antigen, Rapid - Swab, Nasopharynx [[id number]]  (Normal) Collected:  03/06/18 1821    Specimen:  Swab from Nasopharynx Updated:  03/06/18 1848      Influenza A Ag, EIA Negative     Influenza B Ag, EIA Negative    Urinalysis With / Culture If Indicated - Urine, Catheter [[id number]]  (Abnormal) Collected:  03/06/18 1813    Specimen:  Urine from Urine, Catheter Updated:  03/06/18 1853     Color, UA Yellow     Appearance, UA Cloudy (A)     pH, UA <=5.0     Specific Gravity, UA 1.026     Glucose, UA >=1000 mg/dL (3+) (A)     Ketones, UA 80 mg/dL (3+) (A)     Bilirubin, UA Negative     Blood, UA Small (1+) (A)     Protein,  mg/dL (2+) (A)     Leuk Esterase, UA Negative     Nitrite, UA Negative     Urobilinogen, UA 0.2 E.U./dL    Magnesium [[id number]]  (Abnormal) Collected:  03/06/18 1810    Specimen:  Blood Updated:  03/06/18 1856     Magnesium 3.0 (H) mg/dL     Troponin [[id number]]  (Normal) Collected:  03/06/18 1810    Specimen:  Blood Updated:  03/06/18 1857     Troponin T <0.010 ng/mL     Narrative:       Troponin T Reference Ranges:  Less than 0.03 ng/mL:    Negative for AMI  0.03 to 0.09 ng/mL:      Indeterminant for AMI  Greater than 0.09 ng/mL: Positive for AMI    Urinalysis, Microscopic Only - Urine, Clean Catch [[id number]]  (Abnormal) Collected:  03/06/18 1813    Specimen:  Urine from Urine, Catheter Updated:  03/06/18 1905     RBC, UA 0-2 /HPF      WBC, UA 0-2 /HPF      Bacteria, UA 1+ (A) /HPF      Squamous Epithelial Cells, UA 0-2 /HPF      Hyaline Casts, UA 0-2 /LPF      Amorphous Crystals, UA Large/3+ /HPF      Methodology Manual Light Microscopy    Lactic Acid, Plasma [[id number]]  (Abnormal) Collected:  03/06/18 1810    Specimen:  Blood Updated:  03/06/18 1912     Lactate 2.3 (C) mmol/L     Comprehensive Metabolic Panel [[id number]]  (Abnormal) Collected:  03/06/18 1810    Specimen:  Blood Updated:  03/06/18 1912     Glucose 781 (C) mg/dL      BUN 29 (H) mg/dL      Creatinine 1.61 (H) mg/dL      Sodium 126 (L) mmol/L      Potassium 6.2 (C) mmol/L      Chloride 85 (L) mmol/L      CO2 6.7 (L) mmol/L      Calcium 8.9 mg/dL      Total Protein 8.1  "g/dL      Albumin 4.70 g/dL      ALT (SGPT) 14 U/L      AST (SGOT) 19 U/L      Alkaline Phosphatase 135 (H) U/L      Total Bilirubin <0.2 mg/dL      eGFR Non African Amer 33 (L) mL/min/1.73      Globulin 3.4 gm/dL      A/G Ratio 1.4 g/dL      BUN/Creatinine Ratio 18.0     Anion Gap 34.3 mmol/L     Ketone Bodies, Serum (Not performed at [facility]) [[id number]] Collected:  03/06/18 1810    Specimen:  Blood Updated:  03/06/18 1912    Narrative:       The following orders were created for panel order Ketone Bodies, Serum (Not performed at [facility]).  Procedure                               Abnormality         Status                     ---------                               -----------         ------                     Beta Hydroxybutyrate Frederick...[[id number]]  Abnormal            Final result                 Please view results for these tests on the individual orders.    Beta Hydroxybutyrate Quantitative [[id number]]  (Abnormal) Collected:  03/06/18 1810    Specimen:  Blood Updated:  03/06/18 1912     Beta-Hydroxybutyrate Quant 11.178 (H) mmol/L     Procalcitonin [[id number]]  (Abnormal) Collected:  03/06/18 1810    Specimen:  Blood Updated:  03/06/18 1913     Procalcitonin 47.80 (C) ng/mL     Narrative:       As a Marker for Sepsis (Non-Neonates):   1. <0.5 ng/mL represents a low risk of severe sepsis and/or septic shock.  1. >2 ng/mL represents a high risk of severe sepsis and/or septic shock.    As a Marker for Lower Respiratory Tract Infections that require antibiotic therapy:  PCT on Admission     Antibiotic Therapy             6-12 Hrs later  > 0.5                Strongly Recommended            >0.25 - <0.5         Recommended  0.1 - 0.25           Discouraged                   Remeasure/reassess PCT  <0.1                 Strongly Discouraged          Remeasure/reassess PCT      As 28 day mortality risk marker: \"Change in Procalcitonin Result\" (> 80 % or <=80 %) if Day 0 (or Day 1) and Day 4 values are available. " Refer to [email or web address]   Change in PCT <=80 %   A decrease of PCT levels below or equal to 80 % defines a positive change in PCT test result representing a higher risk for 28-day all-cause mortality of patients diagnosed with severe sepsis or septic shock.  Change in PCT > 80 %   A decrease of PCT levels of more than 80 % defines a negative change in PCT result representing a lower risk for 28-day all-cause mortality of patients diagnosed with severe sepsis or septic shock.                POC Glucose Once [[id number]]  (Abnormal) Collected:  03/06/18 1956    Specimen:  Blood Updated:  03/06/18 2004     Glucose >599 (C) mg/dL     Narrative:       Meter: [id number] : [id number] Ruth Aide RUY    Glucose, Random [[id number]]  (Abnormal) Collected:  03/06/18 2014    Specimen:  Blood Updated:  03/06/18 2059     Glucose 706 (C) mg/dL     Phosphorus [[id number]]  (Abnormal) Collected:  03/06/18 2014    Specimen:  Blood Updated:  03/06/18 2115     Phosphorus 7.2 (H) mg/dL     CBC & Differential [[id number]] Collected:  03/06/18 2118    Specimen:  Blood Updated:  03/06/18 2209    Narrative:       The following orders were created for panel order CBC & Differential.  Procedure                               Abnormality         Status                     ---------                               -----------         ------                     Manual Differential[[id number]]          Abnormal            Final result               Scan Slide[[id number]]                                       Final result               CBC Auto Differential[[id number]]        Abnormal            Final result                 Please view results for these tests on the individual orders.    CBC Auto Differential [[id number]]  (Abnormal) Collected:  03/06/18 2118    Specimen:  Blood Updated:  03/06/18 2209     WBC 25.09 (H) 10*3/mm3      RBC 4.18 10*6/mm3      Hemoglobin 12.9 g/dL      Hematocrit 43.5 %      .1 (H) fL      MCH  30.9 pg      MCHC 29.7 (L) g/dL      RDW 12.8 %      RDW-SD 48.6 fl      MPV 10.8 fL      Platelets 215 10*3/mm3     Scan Slide [[id number]] Collected:  03/06/18 2118    Specimen:  Blood Updated:  03/06/18 2209     Scan Slide --      See Manual Differential Results       Manual Differential [[id number]]  (Abnormal) Collected:  03/06/18 2118    Specimen:  Blood Updated:  03/06/18 2209     Neutrophil % 77.0 (H) %      Lymphocyte % 14.0 (L) %      Monocyte % 7.0 %      Metamyelocyte % 2.0 (H) %      Neutrophils Absolute 19.32 (H) 10*3/mm3      Lymphocytes Absolute 3.51 10*3/mm3      Monocytes Absolute 1.76 (H) 10*3/mm3      RBC Morphology Normal     Vacuolated Neutrophils Slight/1+     Platelet Morphology Normal    Hemoglobin A1c [[id number]]  (Abnormal) Collected:  03/06/18 2118    Specimen:  Blood Updated:  03/06/18 2213     Hemoglobin A1C 13.20 (H) %     Narrative:       Hemoglobin A1C Ranges:    Increased Risk for Diabetes  5.7% to 6.4%  Diabetes                     >= 6.5%  Diabetic Goal                < 7.0%    Lactic Acid, Reflex Timer (This will reflex a repeat order 3-3:15 hours after ordered.) [[id number]] Collected:  03/06/18 1810    Specimen:  Blood Updated:  03/06/18 2216     Extra Tube Hold for add-ons.      Auto resulted.       Comprehensive Metabolic Panel [[id number]]  (Abnormal) Collected:  03/06/18 2118    Specimen:  Blood Updated:  03/06/18 2250     Glucose 651 (C) mg/dL      BUN 30 (H) mg/dL      Creatinine 1.37 (H) mg/dL      Sodium 132 (L) mmol/L      Potassium 5.2 mmol/L      Chloride 95 (L) mmol/L      CO2 2.3 (L) mmol/L      Calcium 7.9 (L) mg/dL      Total Protein 6.2 g/dL      Albumin 3.30 (L) g/dL      ALT (SGPT) 13 U/L      AST (SGOT) 18 U/L      Alkaline Phosphatase 98 U/L      Total Bilirubin <0.2 mg/dL      eGFR Non African Amer 40 (L) mL/min/1.73      Globulin 2.9 gm/dL      A/G Ratio 1.1 g/dL      BUN/Creatinine Ratio 21.9     Anion Gap 34.7 mmol/L     Blood Gas, Venous [[id number]]   (Abnormal) Collected:  03/06/18 2355    Specimen:  Venous Blood Updated:  03/06/18 2357     Site N/A     pH, Venous 7.038 (L) pH Units      pCO2, Venous 19.4 (L) mm Hg      pO2, Venous 31.5 (L) mm Hg      HCO3, Venous 5.2 (L) mmol/L      Base Excess, Venous -23.8 mmol/L      O2 Saturation Calculated 38.5 (L) %      Barometric Pressure for Blood Gas 747.2 mmHg      Modality Room Air     Rate 20 Breaths/minute     Narrative:       SAT 99 Meter: [id number] : [id number] Aracelis Franny    Lactic Acid, Reflex [[id number]]  (Normal) Collected:  03/06/18 2347    Specimen:  Blood Updated:  03/07/18 0018     Lactate 1.2 mmol/L     POC Glucose Once [[id number]]  (Abnormal) Collected:  03/07/18 0013    Specimen:  Blood Updated:  03/07/18 0020     Glucose 431 (H) mg/dL     Narrative:       Result Not Confirmed Meter: [id number] : [id number] Ruth Noble RUY    Phosphorus [[id number]]  (Normal) Collected:  03/06/18 2347    Specimen:  Blood Updated:  03/07/18 0047     Phosphorus 2.5 mg/dL     Basic Metabolic Panel [[id number]]  (Abnormal) Collected:  03/06/18 2347    Specimen:  Blood Updated:  03/07/18 0047     Glucose 515 (C) mg/dL      BUN 31 (H) mg/dL      Creatinine 1.44 (H) mg/dL      Sodium 136 mmol/L      Potassium 3.7 mmol/L      Chloride 99 mmol/L      CO2 5.2 (L) mmol/L      Calcium 7.5 (L) mg/dL      eGFR Non African Amer 38 (L) mL/min/1.73      BUN/Creatinine Ratio 21.5     Anion Gap 31.8 mmol/L     Narrative:       GFR Normal >60  Chronic Kidney Disease <60  Kidney Failure <15    Magnesium [[id number]]  (Normal) Collected:  03/06/18 2347    Specimen:  Blood Updated:  03/07/18 0047     Magnesium 1.8 mg/dL     Calcium, Ionized [[id number]]  (Normal) Collected:  03/06/18 2347    Specimen:  Blood Updated:  03/07/18 0047     Ionized Calcium 1.23 mmol/L      Ionized Calcium 4.9 mg/dL     POC Glucose Once [[id number]]  (Abnormal) Collected:  03/07/18 0119    Specimen:  Blood Updated:  03/07/18 0120     Glucose 361 (H)  mg/dL     Narrative:       Meter: [id number] : [id number] Faiza Marcial RN    POC Glucose Once [[id number]]  (Abnormal) Collected:  03/07/18 0203    Specimen:  Blood Updated:  03/07/18 0230     Glucose 373 (H) mg/dL     Narrative:       Meter: [id number] : [id number] Faiza Marcial RN    POC Glucose Once [[id number]]  (Abnormal) Collected:  03/07/18 0254    Specimen:  Blood Updated:  03/07/18 0304     Glucose 332 (H) mg/dL     Narrative:       Meter: [id number] : [id number] Faiza Marcial RN    POC Glucose Once [[id number]]  (Abnormal) Collected:  03/07/18 0400    Specimen:  Blood Updated:  03/07/18 0402     Glucose 292 (H) mg/dL     Narrative:       Meter: [id number] : [id number] Faiza Marcial RN    Phosphorus [[id number]]  (Abnormal) Collected:  03/07/18 0406    Specimen:  Blood Updated:  03/07/18 0457     Phosphorus 2.2 (L) mg/dL     Magnesium [[id number]]  (Normal) Collected:  03/07/18 0406    Specimen:  Blood Updated:  03/07/18 0457     Magnesium 1.8 mg/dL     POC Glucose Once [[id number]]  (Abnormal) Collected:  03/07/18 0505    Specimen:  Blood Updated:  03/07/18 0507     Glucose 290 (H) mg/dL     Narrative:       Meter: [id number] : [id number] Faiza Marcial RN    Basic Metabolic Panel [[id number]]  (Abnormal) Collected:  03/07/18 0406    Specimen:  Blood Updated:  03/07/18 0519     Glucose 323 (H) mg/dL      BUN 32 (H) mg/dL      Creatinine 1.51 (H) mg/dL      Sodium 138 mmol/L      Potassium 4.2 mmol/L      Chloride 105 mmol/L      CO2 6.2 (L) mmol/L      Calcium 7.6 (L) mg/dL      eGFR Non African Amer 36 (L) mL/min/1.73      BUN/Creatinine Ratio 21.2     Anion Gap 26.8 mmol/L     Narrative:       GFR Normal >60  Chronic Kidney Disease <60  Kidney Failure <15    Calcium, Ionized [[id number]]  (Normal) Collected:  03/07/18 0406    Specimen:  Blood Updated:  03/07/18 0524     Ionized Calcium 1.24 mmol/L      Ionized Calcium 5.0 mg/dL     POC Glucose Once [[id number]]  (Abnormal) Collected:   03/07/18 0613    Specimen:  Blood Updated:  03/07/18 0614     Glucose 214 (H) mg/dL     Narrative:       Meter: [id number] : [id number] Ruth Aide RUY    POC Glucose Once [[id number]]  (Abnormal) Collected:  03/07/18 0654    Specimen:  Blood Updated:  03/07/18 0700     Glucose 187 (H) mg/dL     Narrative:       Meter: [id number] : [id number] Faiza Marcial RN    Blood Gas, Arterial [[id number]]  (Abnormal) Collected:  03/06/18 2104    Specimen:  Arterial Blood Updated:  03/07/18 0707     Site Arterial: right radial     Morris's Test Positive     pH, Arterial 6.823 (C) pH units      pCO2, Arterial -- mm Hg       Results less than reportable range.        pO2, Arterial 113.0 (H) mm Hg      HCO3, Arterial 1.9 (L) mmol/L      Base Excess, Arterial -- mmol/L       Results less than reportable range.        Barometric Pressure for Blood Gas 747.1 mmHg      Modality Room Air     Set [facility] Resp Rate 20    Narrative:       Results reported to     Blood Gas, Venous [[id number]]  (Abnormal) Collected:  03/07/18 0807    Specimen:  Venous Blood Updated:  03/07/18 0809     Site N/A     pH, Venous 7.232 (L) pH Units      pCO2, Venous 18.3 (L) mm Hg      pO2, Venous 43.6 mm Hg      HCO3, Venous 7.7 (L) mmol/L      Base Excess, Venous -17.5 mmol/L      O2 Saturation Calculated 72.3 (L) %      Barometric Pressure for Blood Gas 750.0 mmHg      Modality Room Air     Rate 32 Breaths/minute     Narrative:       drawn by RN [id number] Meter: [id number] : [id number] Tamiko Barbour    POC Glucose Once [[id number]]  (Abnormal) Collected:  03/07/18 0812    Specimen:  Blood Updated:  03/07/18 0840     Glucose 193 (H) mg/dL     Narrative:       Meter: [id number] : [id number] Jim Spaudling    Magnesium [[id number]]  (Normal) Collected:  03/07/18 0809    Specimen:  Blood Updated:  03/07/18 0859     Magnesium 1.8 mg/dL     Phosphorus [[id number]]  (Normal) Collected:  03/07/18 0809    Specimen:  Blood Updated:  03/07/18 0902      Phosphorus 3.1 mg/dL     Basic Metabolic Panel [[id number]]  (Abnormal) Collected:  03/07/18 0809    Specimen:  Blood Updated:  03/07/18 0905     Glucose 226 (H) mg/dL      BUN 33 (H) mg/dL      Creatinine 1.62 (H) mg/dL      Sodium 141 mmol/L      Potassium 3.9 mmol/L      Chloride 109 (H) mmol/L      CO2 11.0 (L) mmol/L      Calcium 7.8 (L) mg/dL      eGFR Non African Amer 33 (L) mL/min/1.73      BUN/Creatinine Ratio 20.4     Anion Gap 21.0 mmol/L     Narrative:       GFR Normal >60  Chronic Kidney Disease <60  Kidney Failure <15    Calcium, Ionized [[id number]]  (Normal) Collected:  03/07/18 0809    Specimen:  Blood Updated:  03/07/18 0916     Ionized Calcium 1.24 mmol/L      Ionized Calcium 5.0 mg/dL     POC Glucose Once [[id number]]  (Abnormal) Collected:  03/07/18 0945    Specimen:  Blood Updated:  03/07/18 0956     Glucose 201 (H) mg/dL     Narrative:       Meter: [id number] : [id number] Jim Spaulding    POC Glucose Once [[id number]]  (Abnormal) Collected:  03/07/18 1122    Specimen:  Blood Updated:  03/07/18 1127     Glucose 203 (H) mg/dL     Narrative:       Meter: [id number] : [id number] Cydney REDMOND    Calcium, Ionized [[id number]]  (Normal) Collected:  03/07/18 1157    Specimen:  Blood Updated:  03/07/18 1245     Ionized Calcium 1.25 mmol/L      Ionized Calcium 5.0 mg/dL     Phosphorus [[id number]]  (Normal) Collected:  03/07/18 1157    Specimen:  Blood Updated:  03/07/18 1245     Phosphorus 3.4 mg/dL     Basic Metabolic Panel [[id number]]  (Abnormal) Collected:  03/07/18 1157    Specimen:  Blood Updated:  03/07/18 1245     Glucose 216 (H) mg/dL      BUN 32 (H) mg/dL      Creatinine 1.66 (H) mg/dL      Sodium 140 mmol/L      Potassium 3.5 mmol/L      Chloride 110 (H) mmol/L      CO2 10.5 (L) mmol/L      Calcium 7.8 (L) mg/dL      eGFR Non African Amer 32 (L) mL/min/1.73      BUN/Creatinine Ratio 19.3     Anion Gap 19.5 mmol/L     Narrative:       GFR Normal >60  Chronic Kidney Disease  <60  Kidney Failure <15    Magnesium [[id number]]  (Normal) Collected:  03/07/18 1157    Specimen:  Blood Updated:  03/07/18 1245     Magnesium 1.6 mg/dL     Respiratory Panel, PCR - Swab, Nasopharynx [[id number]]  (Abnormal) Collected:  03/06/18 1821    Specimen:  Swab from Nasopharynx Updated:  03/07/18 1252     ADENOVIRUS, PCR Not Detected     Coronavirus 229E Not Detected     Coronavirus HKU1 Not Detected     Coronavirus NL63 Not Detected     Coronavirus OC43 Not Detected     Human Metapneumovirus Detected (A)     Human Rhinovirus/Enterovirus Not Detected     Influenza B PCR Not Detected     Parainfluenza Virus 1 Not Detected     Parainfluenza Virus 2 Not Detected     Parainfluenza Virus 3 Not Detected     Parainfluenza Virus 4 Not Detected     Bordetella pertussis pcr Not Detected     Influenza A H1 2009 PCR Not Detected     Chlamydophila pneumoniae PCR Not Detected     Mycoplasma pneumo by PCR Not Detected     Influenza A PCR Not Detected     Influenza A H3 Not Detected     Influenza A H1 Not Detected     RSV, PCR Not Detected    POC Glucose Once [[id number]]  (Abnormal) Collected:  03/07/18 1343    Specimen:  Blood Updated:  03/07/18 1355     Glucose 144 (H) mg/dL     Narrative:       Meter: [id number] : [id number] Jim Spaulding    POC Glucose Once [[id number]]  (Abnormal) Collected:  03/07/18 1510    Specimen:  Blood Updated:  03/07/18 1521     Glucose 143 (H) mg/dL     Narrative:       Meter: [id number] : [id number] Jim Spaulding    POC Glucose Once [[id number]]  (Normal) Collected:  03/07/18 1704    Specimen:  Blood Updated:  03/07/18 1716     Glucose 118 mg/dL     Narrative:       Meter: [id number] : [id number] Jim Spaulding    Phosphorus [[id number]]  (Normal) Collected:  03/07/18 1637    Specimen:  Blood Updated:  03/07/18 1731     Phosphorus 3.3 mg/dL     Basic Metabolic Panel [[id number]]  (Abnormal) Collected:  03/07/18 1637    Specimen:  Blood Updated:  03/07/18 1731     Glucose 127  (H) mg/dL      BUN 30 (H) mg/dL      Creatinine 1.59 (H) mg/dL      Sodium 142 mmol/L      Potassium 3.4 (L) mmol/L      Chloride 112 (H) mmol/L      CO2 11.6 (L) mmol/L      Calcium 8.2 (L) mg/dL      eGFR Non African Amer 34 (L) mL/min/1.73      BUN/Creatinine Ratio 18.9     Anion Gap 18.4 mmol/L     Narrative:       GFR Normal >60  Chronic Kidney Disease <60  Kidney Failure <15    Calcium, Ionized [[id number]]  (Normal) Collected:  03/07/18 1637    Specimen:  Blood Updated:  03/07/18 1742     Ionized Calcium 1.24 mmol/L      Ionized Calcium 5.0 mg/dL     Magnesium [[id number]]  (Normal) Collected:  03/07/18 1637    Specimen:  Blood Updated:  03/07/18 1744     Magnesium 2.1 mg/dL     Blood Culture - Blood, [[id number]]  (Normal) Collected:  03/06/18 1810    Specimen:  Blood from Arm, Left Updated:  03/07/18 1831     Blood Culture No growth at 24 hours    Blood Culture - Blood, [[id number]]  (Normal) Collected:  03/06/18 1819    Specimen:  Blood from Arm, Left Updated:  03/07/18 1831     Blood Culture No growth at 24 hours    POC Glucose Once [[id number]]  (Normal) Collected:  03/07/18 1828    Specimen:  Blood Updated:  03/07/18 1840     Glucose 111 mg/dL     Narrative:       Meter: [id number] : [id number] Jim Spaulding    POC Glucose Once [[id number]]  (Normal) Collected:  03/07/18 1954    Specimen:  Blood Updated:  03/07/18 2006     Glucose 127 mg/dL     Narrative:       Meter: [id number] : [id number] Abi Hawk RN    POC Glucose Once [[id number]]  (Abnormal) Collected:  03/07/18 2130    Specimen:  Blood Updated:  03/07/18 2131     Glucose 156 (H) mg/dL     Narrative:       Meter: [id number] : [id number] Katie Sosa RN    Phosphorus [[id number]]  (Normal) Collected:  03/07/18 2004    Specimen:  Blood Updated:  03/07/18 2146     Phosphorus 3.5 mg/dL     Basic Metabolic Panel [[id number]]  (Abnormal) Collected:  03/07/18 2004    Specimen:  Blood Updated:  03/07/18 2146     Glucose 146 (H) mg/dL       BUN 30 (H) mg/dL      Creatinine 1.54 (H) mg/dL      Sodium 143 mmol/L      Potassium 3.7 mmol/L      Chloride 112 (H) mmol/L      CO2 12.1 (L) mmol/L      Calcium 7.9 (L) mg/dL      eGFR Non African Amer 35 (L) mL/min/1.73      BUN/Creatinine Ratio 19.5     Anion Gap 18.9 mmol/L     Narrative:       GFR Normal >60  Chronic Kidney Disease <60  Kidney Failure <15    Magnesium [[id number]]  (Normal) Collected:  03/07/18 2004    Specimen:  Blood Updated:  03/07/18 2146     Magnesium 2.3 mg/dL     Calcium, Ionized [[id number]]  (Normal) Collected:  03/07/18 2004    Specimen:  Blood Updated:  03/07/18 2213     Ionized Calcium 1.25 mmol/L      Ionized Calcium 5.0 mg/dL     POC Glucose Once [[id number]]  (Abnormal) Collected:  03/07/18 2244    Specimen:  Blood Updated:  03/07/18 2246     Glucose 184 (H) mg/dL     Narrative:       Meter: [id number] : [id number] Abi Hawk RN    POC Glucose Once [[id number]]  (Abnormal) Collected:  03/08/18 0008    Specimen:  Blood Updated:  03/08/18 0009     Glucose 242 (H) mg/dL     Narrative:       Meter: [id number] : [id number] Abi Hawk RN    POC Glucose Once [[id number]]  (Abnormal) Collected:  03/08/18 0119    Specimen:  Blood Updated:  03/08/18 0119     Glucose 248 (H) mg/dL     Narrative:       Meter: [id number] : [id number] Abi Hawk RN    Calcium, Ionized [[id number]]  (Normal) Collected:  03/08/18 0020    Specimen:  Blood Updated:  03/08/18 0123     Ionized Calcium 1.27 mmol/L      Ionized Calcium 5.1 mg/dL     Magnesium [[id number]]  (Normal) Collected:  03/08/18 0020    Specimen:  Blood Updated:  03/08/18 0130     Magnesium 2.2 mg/dL     Phosphorus [[id number]]  (Normal) Collected:  03/08/18 0020    Specimen:  Blood Updated:  03/08/18 0130     Phosphorus 3.6 mg/dL     Basic Metabolic Panel [[id number]]  (Abnormal) Collected:  03/08/18 0020    Specimen:  Blood Updated:  03/08/18 0137     Glucose 268 (H) mg/dL      BUN 28 (H) mg/dL      Creatinine 1.48 (H) mg/dL       Sodium 141 mmol/L      Potassium 3.7 mmol/L      Chloride 108 (H) mmol/L      CO2 9.8 (L) mmol/L      Calcium 8.3 (L) mg/dL      eGFR Non African Amer 37 (L) mL/min/1.73      BUN/Creatinine Ratio 18.9     Anion Gap 23.2 mmol/L     Narrative:       GFR Normal >60  Chronic Kidney Disease <60  Kidney Failure <15    POC Glucose Once [[id number]]  (Abnormal) Collected:  03/08/18 0220    Specimen:  Blood Updated:  03/08/18 0231     Glucose 232 (H) mg/dL     Narrative:       Meter: [id number] : [id number] Abi Hawk RN    POC Glucose Once [[id number]]  (Abnormal) Collected:  03/08/18 0323    Specimen:  Blood Updated:  03/08/18 0334     Glucose 210 (H) mg/dL     Narrative:       Meter: [id number] : [id number] Abi Hawk RN    POC Glucose Once [[id number]]  (Abnormal) Collected:  03/08/18 0424    Specimen:  Blood Updated:  03/08/18 0425     Glucose 233 (H) mg/dL     Narrative:       Meter: [id number] : [id number] Abi Hawk RN    CBC & Differential [[id number]] Collected:  03/08/18 0430    Specimen:  Blood Updated:  03/08/18 0524    Narrative:       The following orders were created for panel order CBC & Differential.  Procedure                               Abnormality         Status                     ---------                               -----------         ------                     CBC Auto Differential[[id number]]        Abnormal            Final result                 Please view results for these tests on the individual orders.    CBC Auto Differential [[id number]]  (Abnormal) Collected:  03/08/18 0430    Specimen:  Blood Updated:  03/08/18 0524     WBC 9.43 10*3/mm3      RBC 3.71 (L) 10*6/mm3      Hemoglobin 11.5 (L) g/dL      Hematocrit 34.7 (L) %      MCV 93.5 fL      MCH 31.0 pg      MCHC 33.1 g/dL      RDW 13.6 (H) %      RDW-SD 46.3 fl      MPV 10.4 fL      Platelets 120 (L) 10*3/mm3      Neutrophil % 85.0 (H) %      Lymphocyte % 7.3 (L) %      Monocyte % 7.4 %      Eosinophil % 0.0 (L) %       Basophil % 0.1 %      Immature Grans % 0.2 %      Neutrophils, Absolute 8.01 10*3/mm3      Lymphocytes, Absolute 0.69 (L) 10*3/mm3      Monocytes, Absolute 0.70 10*3/mm3      Eosinophils, Absolute 0.00 10*3/mm3      Basophils, Absolute 0.01 10*3/mm3      Immature Grans, Absolute 0.02 10*3/mm3     Calcium, Ionized [[id number]]  (Normal) Collected:  03/08/18 0430    Specimen:  Blood Updated:  03/08/18 0533     Ionized Calcium 1.28 mmol/L      Ionized Calcium 5.1 mg/dL     POC Glucose Once [[id number]]  (Abnormal) Collected:  03/08/18 0532    Specimen:  Blood Updated:  03/08/18 0534     Glucose 216 (H) mg/dL     Narrative:       Meter: [id number] : [id number] Abi Hawk RN    Magnesium [[id number]]  (Normal) Collected:  03/08/18 0430    Specimen:  Blood Updated:  03/08/18 0535     Magnesium 2.2 mg/dL     Phosphorus [[id number]]  (Normal) Collected:  03/08/18 0430    Specimen:  Blood Updated:  03/08/18 0535     Phosphorus 3.0 mg/dL     Basic Metabolic Panel [[id number]]  (Abnormal) Collected:  03/08/18 0430    Specimen:  Blood Updated:  03/08/18 0543     Glucose 245 (H) mg/dL      BUN 27 (H) mg/dL      Creatinine 1.49 (H) mg/dL      Sodium 143 mmol/L      Potassium 3.6 mmol/L      Chloride 112 (H) mmol/L      CO2 13.0 (L) mmol/L      Calcium 8.1 (L) mg/dL      eGFR Non African Amer 37 (L) mL/min/1.73      BUN/Creatinine Ratio 18.1     Anion Gap 18.0 mmol/L     Narrative:       GFR Normal >60  Chronic Kidney Disease <60  Kidney Failure <15    POC Glucose Once [[id number]]  (Abnormal) Collected:  03/08/18 0633    Specimen:  Blood Updated:  03/08/18 0644     Glucose 192 (H) mg/dL     Narrative:       Meter: [id number] : [id number] Abi Hawk RN    POC Glucose Once [[id number]]  (Abnormal) Collected:  03/08/18 0817    Specimen:  Blood Updated:  03/08/18 0828     Glucose 168 (H) mg/dL     Narrative:       Meter: [id number] : [id number] Jim Spaulding    Magnesium [[id number]]  (Normal) Collected:  03/08/18 0810     Specimen:  Blood Updated:  03/08/18 0918     Magnesium 2.0 mg/dL     Calcium, Ionized [[id number]]  (Normal) Collected:  03/08/18 0810    Specimen:  Blood Updated:  03/08/18 0922     Ionized Calcium 1.25 mmol/L      Ionized Calcium 5.0 mg/dL         Imaging Results (last 72 hours)     Procedure Component Value Units Date/Time    XR Chest 1 View [[id number]] Collected:  03/06/18 1925     Updated:  03/07/18 0809    Narrative:       PORTABLE RADIOGRAPHIC VIEW OF THE CHEST      CLINICAL HISTORY:  Hypotension and hyperglycemia.     A portable radiographic view of the chest demonstrates no acute  infiltrate.  There is some pulmonary vascular engorgement. The  cardiomediastinal silhouette is remarkable for prominence of the right  hilum superiorly. A right hilar mass cannot be entirely excluded  although the findings may simply be due to prominent vascular structures  or potentially an adjacent infiltrate. I recommend further evaluation  with a PA and lateral radiographic view of the chest. There is a  somewhat gas-distended stomach. The osseous structures are unremarkable.       Impression:          Pulmonary vascular enlargement without significant interstitial  pulmonary edema.     Prominence of the right hilar structures which may simply represent  prominent vasculature.  However, I cannot exclude the possibility of a  right hilar mass or potentially an adjacent infiltrate. I recommend  further evaluation with either a PA and lateral chest radiograph or a CT  scan of the chest.     Mild gaseous distention of the stomach.     These findings and recommendations were discussed with Dr. Eugene on  03/06/2018 at approximately 7 PM.      This report was finalized on 3/7/2018 8:06 AM by Dr. Louis Crowder MD.       XR Chest 1 View [[id number]] Collected:  03/07/18 0419     Updated:  03/07/18 2313    Narrative:       X-RAY CHEST 1 VIEW.     HISTORY: Follow-up perihilar infiltrate.     COMPARISON: Persistent right perihilar opacity  concerning for  infiltrate.     FINDINGS:  Cardiomediastinal silhouette is within normal limits.         New opacity in the left lung base, infiltrate is suspected.              Impression:       New infiltrate in the left lung base is suspected. Follow-up is  recommended.  Right perihilar infiltrate is unchanged.         This report was finalized on 3/7/2018 11:10 PM by Dr. Smith Li MD.             Medication Review:       Current Facility-Administered Medications:   •  cefTRIAXone (ROCEPHIN) IVPB 1 g, 1 g, Intravenous, Q24H, Randall Pennington MD, Last Rate: 100 mL/hr at 03/07/18 1348, 1 g at 03/07/18 1348  •  dextrose (D50W) solution 12.5 g, 12.5 g, Intravenous, Q15 Min PRN, Jacqueline Alcantar MD  •  dextrose 5 % 850 mL with sodium bicarbonate 8.4 % 150 mEq infusion, , Intravenous, Continuous, Quinn Ying MD  •  dextrose 5 % and sodium chloride 0.45 % infusion, 150 mL/hr, Intravenous, Continuous PRN, Jacqueline Alcantar MD  •  doxycycline (VIBRAMYCIN) 100 mg/100 mL 0.9% NS MBP, 100 mg, Intravenous, Q12H, Randall Pennington MD, 100 mg at 03/08/18 0009  •  enoxaparin (LOVENOX) syringe 40 mg, 40 mg, Subcutaneous, Q24H, Jacqueline Alcantar MD, 40 mg at 03/07/18 2245  •  insulin regular (humuLIN R,novoLIN R) 100 Units in sodium chloride 0.9 % 100 mL (1 Units/mL) infusion, 0.1 Units/kg/hr, Intravenous, Titrated, Jacqueline Alcantar MD, Last Rate: 4.88 mL/hr at 03/08/18 0533, 4.875 Units/hr at 03/08/18 0533  •  ipratropium-albuterol (DUO-NEB) nebulizer solution 3 mL, 3 mL, Nebulization, Q4H PRN, Jacqueline Alcantar MD, 3 mL at 03/08/18 0331  •  Magnesium Sulfate 2 gram Bolus, followed by 8 gram infusion (total Mg dose 10 grams)- Mg less than or equal to 1mg/dL, 2 g, Intravenous, PRN **OR** Magnesium Sulfate 6 gram Infusion (2 gm x 3) -Mg 1.1 -1.5 mg/dL, 2 g, Intravenous, PRN **OR** magnesium sulfate 4 gram infusion- Mg 1.6-1.9 mg/dL, 4 g, Intravenous, PRN, Jacqueline Alcantar MD  •  ondansetron (ZOFRAN) injection 4 mg, 4 mg,  Intravenous, Q4H PRN, Jacqueline Alcantar MD, 4 mg at 03/08/18 0016  •  potassium chloride (MICRO-K) CR capsule 10 mEq, 10 mEq, Oral, PRN **OR** potassium chloride (KLOR-CON) packet 10 mEq, 10 mEq, Oral, PRN **OR** potassium chloride 10 mEq in 100 mL IVPB, 10 mEq, Intravenous, Q1H PRN, Jacqueline Alcantar MD, Last Rate: 100 mL/hr at 03/07/18 1708, 10 mEq at 03/07/18 1708  •  potassium chloride (MICRO-K) CR capsule 20 mEq, 20 mEq, Oral, PRN **OR** potassium chloride (KLOR-CON) packet 20 mEq, 20 mEq, Oral, PRN **OR** potassium chloride 10 mEq in 100 mL IVPB, 10 mEq, Intravenous, Q1H PRN, Jacqueline Alcantar MD, Last Rate: 100 mL/hr at 03/08/18 0701, 10 mEq at 03/08/18 0701  •  potassium chloride (MICRO-K) CR capsule 40 mEq, 40 mEq, Oral, PRN **OR** potassium chloride (KLOR-CON) packet 40 mEq, 40 mEq, Oral, PRN **OR** potassium chloride 10 mEq in 100 mL IVPB, 10 mEq, Intravenous, Q1H PRN, Jacqueline Alcantar MD, Last Rate: 100 mL/hr at 03/07/18 1959, 10 mEq at 03/07/18 1959  •  potassium phosphate 45 mmol in sodium chloride 0.9 % 500 mL infusion, 45 mmol, Intravenous, PRN **OR** potassium phosphate 30 mmol in sodium chloride 0.9 % 250 mL infusion, 30 mmol, Intravenous, PRN **OR** potassium phosphate 15 mmol in sodium chloride 0.9 % 100 mL infusion, 15 mmol, Intravenous, PRN **OR** sodium phosphates 45 mmol in sodium chloride 0.9 % 500 mL IVPB, 45 mmol, Intravenous, PRN **OR** sodium phosphates 30 mmol in sodium chloride 0.9 % 250 mL IVPB, 30 mmol, Intravenous, PRN **OR** sodium phosphates 15 mmol in sodium chloride 0.9 % 250 mL IVPB, 15 mmol, Intravenous, PRN, Jacqueline Alcantar MD, Last Rate: 62.5 mL/hr at 03/07/18 0603, 15 mmol at 03/07/18 0603  •  promethazine (PHENERGAN) injection 12.5 mg, 12.5 mg, Intravenous, Q4H PRN, Jacqueline Alcantar MD, 12.5 mg at 03/07/18 5637  •  sodium bicarbonate 8.4 % 100 mEq in sodium chloride 0.45 % 400 mL infusion, 100 mEq, Intravenous, Once PRN, Jacqueline Alcantar MD  •  sodium chloride 0.45 % with KCl 20 mEq/L  "infusion, 250 mL/hr, Intravenous, Continuous PRN, Jacqueline Alcantar MD, Stopped at 03/07/18 0657  •  sodium chloride 0.9 % flush 1-10 mL, 1-10 mL, Intravenous, PRN, Jacqueline Alcantar MD    Assessment/Plan     Patient Active Problem List   Diagnosis   • Diabetic ketoacidosis with coma associated with type 2 diabetes mellitus   Uncontrolled diabetes mellitus  Noncompliance with management of diabetes  Diabetic ketoacidosis severe  Metabolic acidosis  AK I  Severe dehydration     I discussed with the patient and her   I also discussed with Dr. Randall Pennington     Will increase the IV fluids to 500 cc an hour for the next 2 or 3 L since she still appears to be very dehydrated  Will continue the IV insulin drip  Will check C-peptide and ALEXANDRE antibodies when patient's stable  We will try and get the old records from Dr. Davey's office  Patient is unaware of previous hemoglobin A1c     Patient's current hemoglobin A1c is 13.6% extremely high-fits with a history of polyuria and polydipsia for the past 3-4 months  Patient had been drinking more Mountain Dew than water in the past few months as per   Patient will need diabetes education as well as dietary education prior to discharge     Will continue to monitor the patient and then adjust insulin as needed.  Patient still has a slight gap at 18  Will continue the IV insulin as well as IV fluids   nephrology also following fluid replacement and bicarbonate replacement  I discussed with the patient's     Obed Conway MD FACE.  03/08/18  9:23 AM      EM[facility] Dragon / transcription disclaimer:     \"Dictated utilizing Dragon dictation\".   "

## 2018-03-08 NOTE — PLAN OF CARE
Problem: Patient Care Overview (Adult)  Goal: Plan of Care Review  Outcome: Ongoing (interventions implemented as appropriate)  Patients anion gap correcting, Dr. Carreon wanting to continue insulin gtt overnight, will continue monitoring BG Q1h, patient hemodynamically stable, bradley catheter d/c'd, able to stand and walk to bathroom with assistance   03/08/18 3539   Coping/Psychosocial Response Interventions   Plan Of Care Reviewed With patient   Patient Care Overview   Progress improving

## 2018-03-08 NOTE — PROGRESS NOTES
"   LOS: 2 days   Patient Care Team:  No Known Provider as PCP - General    Chief Complaint/ Reason for encounter: Renal failure, metabolic acidosis  Chief Complaint   Patient presents with   • Hypotension   • Hyperglycemia         Subjective     History of Present Illness    Subjective:  Symptoms:  Stable.  She reports weakness.  No shortness of breath or chest pain.  (She feels better today  Only tolerating sips of water, still with nausea  Much more awake, alert and much less lethargic today  No shortness of breath, continues to have a cough and some wheezing  No fevers or chills).    Diet:  Adequate intake.  No nausea.    Activity level: Impaired due to weakness.    Pain:  She reports no pain.          History taken from: Patient and chart    Objective     Vital Signs  Temp:  [99.7 °F (37.6 °C)-100.7 °F (38.2 °C)] 99.7 °F (37.6 °C)  Heart Rate:  [109-129] 109  Resp:  [22-28] 28  BP: (114-152)/(62-90) 136/87       Wt Readings from Last 1 Encounters:   03/07/18 0600 70.3 kg (154 lb 15.7 oz)   03/06/18 1826 65.8 kg (145 lb)       Objective:  General Appearance:  Comfortable, ill-appearing, in no acute distress and not in pain.    Vital signs: (most recent): Blood pressure 136/87, pulse 109, temperature 99.7 °F (37.6 °C), temperature source Oral, resp. rate 28, height 162.6 cm (64\"), weight 70.3 kg (154 lb 15.7 oz), SpO2 94 %.  Vital signs are normal.  No fever.    Output: Producing urine.    HEENT: Normal HEENT exam.    Lungs:  Normal respiratory rate and normal effort.  She is not in respiratory distress.  Breath sounds clear to auscultation.  There are decreased breath sounds.  No wheezes.    Heart: Normal rate.  Regular rhythm.  S1 normal.  No murmur.   Abdomen: Abdomen is soft and non-distended.  Bowel sounds are normal.   There is no abdominal tenderness.  There is no epigastric area or no suprapubic area tenderness.  There is no rebound tenderness.   There is no mass.   Extremities: Normal range of motion.  " There is no deformity or dependent edema.    Neurological: Patient is alert and oriented to person, place and time.    Pupils:  Pupils are equal, round, and reactive to light.    Skin:  Warm and dry.  No rash or cyanosis.             Results Review:    Past Medical History: reviewed and updated  Past Medical History:   Diagnosis Date   • Diabetes mellitus          Allergies:  No Known Allergies    Intake/Output:     Intake/Output Summary (Last 24 hours) at 03/08/18 0819  Last data filed at 03/08/18 0534   Gross per 24 hour   Intake             7803 ml   Output             2350 ml   Net             5453 ml         DATA:  Radiology and Labs:  The following labs independently reviewed by me.  Interval notes, chart personally reviewed by me.   Old records independently reviewed showing Normal renal function prior to admission  The following radiologic studies independently viewed by me, findings chest x-ray with new left lower lobe infiltrate, pneumonia  New problems include persistent metabolic acidosis, unchanged despite bicarbonate drip  Discussed with Dr. Pennington, pt, her , interval history obtained from him as well      Risk/ complexity of medical care/ medical decision making high, high risk medication, bicarbonate drip requiring close monitoring of renal function and electrolytes, acid-base status                Labs:   Recent Results (from the past 24 hour(s))   POC Glucose Once    Collection Time: 03/07/18  9:45 AM   Result Value Ref Range    Glucose 201 (H) 70 - 130 mg/dL   POC Glucose Once    Collection Time: 03/07/18 11:22 AM   Result Value Ref Range    Glucose 203 (H) 70 - 130 mg/dL   Phosphorus    Collection Time: 03/07/18 11:57 AM   Result Value Ref Range    Phosphorus 3.4 2.5 - 4.5 mg/dL   Basic Metabolic Panel    Collection Time: 03/07/18 11:57 AM   Result Value Ref Range    Glucose 216 (H) 65 - 99 mg/dL    BUN 32 (H) 6 - 20 mg/dL    Creatinine 1.66 (H) 0.57 - 1.00 mg/dL    Sodium 140 136 - 145  mmol/L    Potassium 3.5 3.5 - 5.2 mmol/L    Chloride 110 (H) 98 - 107 mmol/L    CO2 10.5 (L) 22.0 - 29.0 mmol/L    Calcium 7.8 (L) 8.6 - 10.5 mg/dL    eGFR Non African Amer 32 (L) >60 mL/min/1.73    BUN/Creatinine Ratio 19.3 7.0 - 25.0    Anion Gap 19.5 mmol/L   Magnesium    Collection Time: 03/07/18 11:57 AM   Result Value Ref Range    Magnesium 1.6 1.6 - 2.6 mg/dL   Calcium, Ionized    Collection Time: 03/07/18 11:57 AM   Result Value Ref Range    Ionized Calcium 1.25 1.15 - 1.35 mmol/L    Ionized Calcium 5.0 4.6 - 5.4 mg/dL   POC Glucose Once    Collection Time: 03/07/18  1:43 PM   Result Value Ref Range    Glucose 144 (H) 70 - 130 mg/dL   POC Glucose Once    Collection Time: 03/07/18  3:10 PM   Result Value Ref Range    Glucose 143 (H) 70 - 130 mg/dL   Phosphorus    Collection Time: 03/07/18  4:37 PM   Result Value Ref Range    Phosphorus 3.3 2.5 - 4.5 mg/dL   Basic Metabolic Panel    Collection Time: 03/07/18  4:37 PM   Result Value Ref Range    Glucose 127 (H) 65 - 99 mg/dL    BUN 30 (H) 6 - 20 mg/dL    Creatinine 1.59 (H) 0.57 - 1.00 mg/dL    Sodium 142 136 - 145 mmol/L    Potassium 3.4 (L) 3.5 - 5.2 mmol/L    Chloride 112 (H) 98 - 107 mmol/L    CO2 11.6 (L) 22.0 - 29.0 mmol/L    Calcium 8.2 (L) 8.6 - 10.5 mg/dL    eGFR Non African Amer 34 (L) >60 mL/min/1.73    BUN/Creatinine Ratio 18.9 7.0 - 25.0    Anion Gap 18.4 mmol/L   Magnesium    Collection Time: 03/07/18  4:37 PM   Result Value Ref Range    Magnesium 2.1 1.6 - 2.6 mg/dL   Calcium, Ionized    Collection Time: 03/07/18  4:37 PM   Result Value Ref Range    Ionized Calcium 1.24 1.15 - 1.35 mmol/L    Ionized Calcium 5.0 4.6 - 5.4 mg/dL   POC Glucose Once    Collection Time: 03/07/18  5:04 PM   Result Value Ref Range    Glucose 118 70 - 130 mg/dL   POC Glucose Once    Collection Time: 03/07/18  6:28 PM   Result Value Ref Range    Glucose 111 70 - 130 mg/dL   POC Glucose Once    Collection Time: 03/07/18  7:54 PM   Result Value Ref Range    Glucose 127 70 -  130 mg/dL   Phosphorus    Collection Time: 03/07/18  8:04 PM   Result Value Ref Range    Phosphorus 3.5 2.5 - 4.5 mg/dL   Basic Metabolic Panel    Collection Time: 03/07/18  8:04 PM   Result Value Ref Range    Glucose 146 (H) 65 - 99 mg/dL    BUN 30 (H) 6 - 20 mg/dL    Creatinine 1.54 (H) 0.57 - 1.00 mg/dL    Sodium 143 136 - 145 mmol/L    Potassium 3.7 3.5 - 5.2 mmol/L    Chloride 112 (H) 98 - 107 mmol/L    CO2 12.1 (L) 22.0 - 29.0 mmol/L    Calcium 7.9 (L) 8.6 - 10.5 mg/dL    eGFR Non African Amer 35 (L) >60 mL/min/1.73    BUN/Creatinine Ratio 19.5 7.0 - 25.0    Anion Gap 18.9 mmol/L   Magnesium    Collection Time: 03/07/18  8:04 PM   Result Value Ref Range    Magnesium 2.3 1.6 - 2.6 mg/dL   Calcium, Ionized    Collection Time: 03/07/18  8:04 PM   Result Value Ref Range    Ionized Calcium 1.25 1.15 - 1.35 mmol/L    Ionized Calcium 5.0 4.6 - 5.4 mg/dL   POC Glucose Once    Collection Time: 03/07/18  9:30 PM   Result Value Ref Range    Glucose 156 (H) 70 - 130 mg/dL   POC Glucose Once    Collection Time: 03/07/18 10:44 PM   Result Value Ref Range    Glucose 184 (H) 70 - 130 mg/dL   POC Glucose Once    Collection Time: 03/08/18 12:08 AM   Result Value Ref Range    Glucose 242 (H) 70 - 130 mg/dL   Basic Metabolic Panel    Collection Time: 03/08/18 12:20 AM   Result Value Ref Range    Glucose 268 (H) 65 - 99 mg/dL    BUN 28 (H) 6 - 20 mg/dL    Creatinine 1.48 (H) 0.57 - 1.00 mg/dL    Sodium 141 136 - 145 mmol/L    Potassium 3.7 3.5 - 5.2 mmol/L    Chloride 108 (H) 98 - 107 mmol/L    CO2 9.8 (L) 22.0 - 29.0 mmol/L    Calcium 8.3 (L) 8.6 - 10.5 mg/dL    eGFR Non African Amer 37 (L) >60 mL/min/1.73    BUN/Creatinine Ratio 18.9 7.0 - 25.0    Anion Gap 23.2 mmol/L   Magnesium    Collection Time: 03/08/18 12:20 AM   Result Value Ref Range    Magnesium 2.2 1.6 - 2.6 mg/dL   Phosphorus    Collection Time: 03/08/18 12:20 AM   Result Value Ref Range    Phosphorus 3.6 2.5 - 4.5 mg/dL   Calcium, Ionized    Collection Time:  03/08/18 12:20 AM   Result Value Ref Range    Ionized Calcium 1.27 1.15 - 1.35 mmol/L    Ionized Calcium 5.1 4.6 - 5.4 mg/dL   POC Glucose Once    Collection Time: 03/08/18  1:19 AM   Result Value Ref Range    Glucose 248 (H) 70 - 130 mg/dL   POC Glucose Once    Collection Time: 03/08/18  2:20 AM   Result Value Ref Range    Glucose 232 (H) 70 - 130 mg/dL   POC Glucose Once    Collection Time: 03/08/18  3:23 AM   Result Value Ref Range    Glucose 210 (H) 70 - 130 mg/dL   POC Glucose Once    Collection Time: 03/08/18  4:24 AM   Result Value Ref Range    Glucose 233 (H) 70 - 130 mg/dL   Magnesium    Collection Time: 03/08/18  4:30 AM   Result Value Ref Range    Magnesium 2.2 1.6 - 2.6 mg/dL   Basic Metabolic Panel    Collection Time: 03/08/18  4:30 AM   Result Value Ref Range    Glucose 245 (H) 65 - 99 mg/dL    BUN 27 (H) 6 - 20 mg/dL    Creatinine 1.49 (H) 0.57 - 1.00 mg/dL    Sodium 143 136 - 145 mmol/L    Potassium 3.6 3.5 - 5.2 mmol/L    Chloride 112 (H) 98 - 107 mmol/L    CO2 13.0 (L) 22.0 - 29.0 mmol/L    Calcium 8.1 (L) 8.6 - 10.5 mg/dL    eGFR Non African Amer 37 (L) >60 mL/min/1.73    BUN/Creatinine Ratio 18.1 7.0 - 25.0    Anion Gap 18.0 mmol/L   Calcium, Ionized    Collection Time: 03/08/18  4:30 AM   Result Value Ref Range    Ionized Calcium 1.28 1.15 - 1.35 mmol/L    Ionized Calcium 5.1 4.6 - 5.4 mg/dL   Phosphorus    Collection Time: 03/08/18  4:30 AM   Result Value Ref Range    Phosphorus 3.0 2.5 - 4.5 mg/dL   CBC Auto Differential    Collection Time: 03/08/18  4:30 AM   Result Value Ref Range    WBC 9.43 4.50 - 10.70 10*3/mm3    RBC 3.71 (L) 3.90 - 5.20 10*6/mm3    Hemoglobin 11.5 (L) 11.9 - 15.5 g/dL    Hematocrit 34.7 (L) 35.6 - 45.5 %    MCV 93.5 80.5 - 98.2 fL    MCH 31.0 26.9 - 32.0 pg    MCHC 33.1 32.4 - 36.3 g/dL    RDW 13.6 (H) 11.7 - 13.0 %    RDW-SD 46.3 37.0 - 54.0 fl    MPV 10.4 6.0 - 12.0 fL    Platelets 120 (L) 140 - 500 10*3/mm3    Neutrophil % 85.0 (H) 42.7 - 76.0 %    Lymphocyte % 7.3  (L) 19.6 - 45.3 %    Monocyte % 7.4 5.0 - 12.0 %    Eosinophil % 0.0 (L) 0.3 - 6.2 %    Basophil % 0.1 0.0 - 1.5 %    Immature Grans % 0.2 0.0 - 0.5 %    Neutrophils, Absolute 8.01 1.90 - 8.10 10*3/mm3    Lymphocytes, Absolute 0.69 (L) 0.90 - 4.80 10*3/mm3    Monocytes, Absolute 0.70 0.20 - 1.20 10*3/mm3    Eosinophils, Absolute 0.00 0.00 - 0.70 10*3/mm3    Basophils, Absolute 0.01 0.00 - 0.20 10*3/mm3    Immature Grans, Absolute 0.02 0.00 - 0.03 10*3/mm3   POC Glucose Once    Collection Time: 03/08/18  5:32 AM   Result Value Ref Range    Glucose 216 (H) 70 - 130 mg/dL   POC Glucose Once    Collection Time: 03/08/18  6:33 AM   Result Value Ref Range    Glucose 192 (H) 70 - 130 mg/dL       Radiology:  Imaging Results (last 24 hours)     Procedure Component Value Units Date/Time    XR Chest 1 View [[id number]] Collected:  03/07/18 0419     Updated:  03/07/18 6363    Narrative:       X-RAY CHEST 1 VIEW.     HISTORY: Follow-up perihilar infiltrate.     COMPARISON: Persistent right perihilar opacity concerning for  infiltrate.     FINDINGS:  Cardiomediastinal silhouette is within normal limits.         New opacity in the left lung base, infiltrate is suspected.              Impression:       New infiltrate in the left lung base is suspected. Follow-up is  recommended.  Right perihilar infiltrate is unchanged.         This report was finalized on 3/7/2018 11:10 PM by Dr. Smith Li MD.                Medications have been reviewed:  Current Facility-Administered Medications   Medication Dose Route Frequency Provider Last Rate Last Dose   • cefTRIAXone (ROCEPHIN) IVPB 1 g  1 g Intravenous Q24H Randall Pennington  mL/hr at 03/07/18 1348 1 g at 03/07/18 1348   • dextrose (D50W) solution 12.5 g  12.5 g Intravenous Q15 Min PRN Jacqueline Alcantar MD       • dextrose 5 % 850 mL with sodium bicarbonate 8.4 % 150 mEq infusion   Intravenous Continuous Quinn Ying MD       • dextrose 5 % and sodium chloride 0.45 %  infusion  150 mL/hr Intravenous Continuous PRN Jacqueline Alcantar MD       • doxycycline (VIBRAMYCIN) 100 mg/100 mL 0.9% NS MBP  100 mg Intravenous Q12H Randall Pennington MD   100 mg at 03/08/18 0009   • enoxaparin (LOVENOX) syringe 40 mg  40 mg Subcutaneous Q24H Jacqueline Alcantar MD   40 mg at 03/07/18 2245   • insulin regular (humuLIN R,novoLIN R) 100 Units in sodium chloride 0.9 % 100 mL (1 Units/mL) infusion  0.1 Units/kg/hr Intravenous Titrated Jacqueline Alcantar MD 4.88 mL/hr at 03/08/18 0533 4.875 Units/hr at 03/08/18 0533   • ipratropium-albuterol (DUO-NEB) nebulizer solution 3 mL  3 mL Nebulization Q4H PRN Jacqueline Alcantar MD   3 mL at 03/08/18 0331   • Magnesium Sulfate 2 gram Bolus, followed by 8 gram infusion (total Mg dose 10 grams)- Mg less than or equal to 1mg/dL  2 g Intravenous PRN Jacqueline Alcantar MD        Or   • Magnesium Sulfate 6 gram Infusion (2 gm x 3) -Mg 1.1 -1.5 mg/dL  2 g Intravenous PRN Jacqueline Alcantar MD        Or   • magnesium sulfate 4 gram infusion- Mg 1.6-1.9 mg/dL  4 g Intravenous PRN Jacqueline Alcantar MD       • ondansetron (ZOFRAN) injection 4 mg  4 mg Intravenous Q4H PRN Jacqueline Alcantar MD   4 mg at 03/08/18 0016   • potassium chloride (MICRO-K) CR capsule 10 mEq  10 mEq Oral PRN Jacqueline Alcantar MD        Or   • potassium chloride (KLOR-CON) packet 10 mEq  10 mEq Oral PRN Jacqueline Alcantar MD        Or   • potassium chloride 10 mEq in 100 mL IVPB  10 mEq Intravenous Q1H PRN Jacqueline Alcantar  mL/hr at 03/07/18 1708 10 mEq at 03/07/18 1708   • potassium chloride (MICRO-K) CR capsule 20 mEq  20 mEq Oral PRN Jacqueline Alcantar MD        Or   • potassium chloride (KLOR-CON) packet 20 mEq  20 mEq Oral PRN Jacqueline Alcantar MD        Or   • potassium chloride 10 mEq in 100 mL IVPB  10 mEq Intravenous Q1H PRN Jacqueline Alcantar  mL/hr at 03/08/18 0701 10 mEq at 03/08/18 0701   • potassium chloride (MICRO-K) CR capsule 40 mEq  40 mEq Oral PRN Jacqueline Alcantar MD        Or   • potassium chloride (KLOR-CON)  packet 40 mEq  40 mEq Oral PRN Jacqueline Alcantar MD        Or   • potassium chloride 10 mEq in 100 mL IVPB  10 mEq Intravenous Q1H PRN Jacqueline Alcantar  mL/hr at 03/07/18 1959 10 mEq at 03/07/18 1959   • potassium phosphate 45 mmol in sodium chloride 0.9 % 500 mL infusion  45 mmol Intravenous PRN Jacqueline Alcantar MD        Or   • potassium phosphate 30 mmol in sodium chloride 0.9 % 250 mL infusion  30 mmol Intravenous PRN Jacqueline Alcantar MD        Or   • potassium phosphate 15 mmol in sodium chloride 0.9 % 100 mL infusion  15 mmol Intravenous PRN Jacqueline Alcantar MD        Or   • sodium phosphates 45 mmol in sodium chloride 0.9 % 500 mL IVPB  45 mmol Intravenous PRN Jacqueline Alcantar MD        Or   • sodium phosphates 30 mmol in sodium chloride 0.9 % 250 mL IVPB  30 mmol Intravenous PRN Jacqueline Alcantar MD        Or   • sodium phosphates 15 mmol in sodium chloride 0.9 % 250 mL IVPB  15 mmol Intravenous PRN Jacqueline Alcantar MD 62.5 mL/hr at 03/07/18 0603 15 mmol at 03/07/18 0603   • promethazine (PHENERGAN) injection 12.5 mg  12.5 mg Intravenous Q4H PRN Jacqueline Alcantar MD   12.5 mg at 03/07/18 2245   • sodium bicarbonate 8.4 % 100 mEq in sodium chloride 0.45 % 400 mL infusion  100 mEq Intravenous Once PRN Jacqueline Alcantar MD       • sodium chloride 0.45 % with KCl 20 mEq/L infusion  250 mL/hr Intravenous Continuous PRN Jacqueline Alcantar MD   Stopped at 03/07/18 0657   • sodium chloride 0.9 % flush 1-10 mL  1-10 mL Intravenous PRN Jacqueline Alcantar MD           Assessment/Plan     Active Problems:    Diabetic ketoacidosis with coma associated with type 2 diabetes mellitus      Assessment:  (   Assessment:   acute renal failure versus chronic kidney disease secondary to diabetes.  Nonoliguric, may have some element of ATN from profound dehydration  Diabetic ketoacidosis, slowly improving but still with a small anion gap  Profound metabolic acidosis, combined anion gap acidosis from DKA and non-anion gap acidosis from renal failure, had  improved but worse overnight  Severe dehydration  Toxic metabolic encephalopathy  Insulin-dependent diabetes mellitus  Left lower lobe infiltrate/pneumonia, possible aspiration  Hyperkalemia on admission from DKA, improved  Hypophosphatemia secondary to insulin administration, replaced, monitor        Plan:   Renal function fairly stable today, volume acceptable on exam  Change to bicarbonate drip given persistent acidosis  Continue to monitor and replace electrolytes as needed  Renal dosing of Zosyn, monitor vancomycin levels closely  Remains critically ill but some improvement over the last 24 hours  ).     Plan:   Encourage ambulation.              Continue to monitor renal function, electroytes and volume closely   Please call me with any questions or concerns      Quinn Ying MD   [facility]   [phone]    03/08/18  8:19 AM      Dictation performed using Dragon dictation software

## 2018-03-08 NOTE — PROGRESS NOTES
"  Daily Progress Note.   [facility] INTENSIVE CARE  3/8/2018    Patient:  Name:  Sonal De Dios  MRN:  [id number]  1965  52 y.o.  female         Interval History:  Severe DKA follow up.  On dka protocol, improving still with gap  Uncomfortable but alert.  Speaks in full sentences.  No chest pain or soa.  Still some nausea but appetite improving      Physical Exam:  /74  Pulse 107  Temp 99.7 °F (37.6 °C) (Oral)   Resp 28  Ht 162.6 cm (64\")  Wt 70.3 kg (154 lb 15.7 oz)  SpO2 95%  BMI 26.6 kg/m2  Body mass index is 26.6 kg/(m^2).    Intake/Output Summary (Last 24 hours) at 03/08/18 1326  Last data filed at 03/08/18 0534   Gross per 24 hour   Intake             7803 ml   Output             2350 ml   Net             5453 ml     GENERAL:  illl appearing non toxic  HEENT:  EOMI, nonicteric sclera, no JVD  PULMONARY:   no wheeze, no crackles, no rhonchi, symmetric air entry  CARDIAC:  RRR no MRG, S1 S2  ABD: SNTND BS+  EXT: no c/c/e, pulses symmetric 2+ bilaterally  NEURO:  CNs II-XII intact, no focal deficits  SKIN: no lesions, no rash  PSYCH: appropriate mood    Data Review:  Notable Labs:    Results from last 7 days  Lab Units 03/08/18  0430 03/06/18  2118   WBC 10*3/mm3 9.43 25.09*   HEMOGLOBIN g/dL 11.5* 12.9   PLATELETS 10*3/mm3 120* 215       Results from last 7 days  Lab Units 03/08/18  0810 03/08/18  0430 03/08/18  0020 03/07/18  2004 03/07/18  1637 03/07/18  1157 03/07/18  0809   SODIUM mmol/L 145 143 141 143 142 140 141   POTASSIUM mmol/L 3.5 3.6 3.7 3.7 3.4* 3.5 3.9   CHLORIDE mmol/L 112* 112* 108* 112* 112* 110* 109*   CO2 mmol/L 15.8* 13.0* 9.8* 12.1* 11.6* 10.5* 11.0*   BUN mg/dL 25* 27* 28* 30* 30* 32* 33*   CREATININE mg/dL 1.42* 1.49* 1.48* 1.54* 1.59* 1.66* 1.62*   GLUCOSE mg/dL 179* 245* 268* 146* 127* 216* 226*   CALCIUM mg/dL 8.0* 8.1* 8.3* 7.9* 8.2* 7.8* 7.8*   MAGNESIUM mg/dL 2.0 2.2 2.2 2.3 2.1 1.6 1.8   PHOSPHORUS mg/dL 2.5 3.0 3.6 3.5 3.3 3.4 3.1   Estimated " Creatinine Clearance: 44 mL/min (by C-G formula based on Cr of 1.42).    Imaging:  CXR shows a left lower lobe pneumonia      Scheduled meds:      ceftriaxone 1 g Intravenous Q24H   doxycycline 100 mg Intravenous Q12H   enoxaparin 40 mg Subcutaneous Q24H       ASSESSMENT  /  PLAN:  DKA  Anion gap met acidosis  DM - insulin dependent  CAP  TME from her severe met abnormalities  HIRA  Hyperglycemia    Continue DKA protocol adjustments prn guided by endocrinology  Apreciate nephrology expertise  abx for cap - rvp  + but still given lobar consolidation will treat for bacterial.  Hold azithromycin given qt prolongation,doxy instead    Randall Pennington MD  [facility]  03/08/18  10:00 AM

## 2018-03-08 NOTE — PROGRESS NOTES
Discharge Planning Assessment  [facility]     Patient Name: Sonal De Dios  MRN: [id number]  Today's Date: 3/8/2018    Admit Date: 3/6/2018          Discharge Needs Assessment       03/08/18 1552    Living Environment    Lives With spouse    Living Arrangements house    Home Accessibility no concerns    Stair Railings at Home none    Type of Financial/Environmental Concern none    Transportation Available family or friend will provide    Living Environment    Provides Primary Care For no one    Quality Of Family Relationships supportive    Discharge Needs Assessment    Concerns To Be Addressed denies needs/concerns at this time    Readmission Within The Last 30 Days no previous admission in last 30 days    Equipment Currently Used at Home glucometer    Discharge Facility/Level Of Care Needs other (see comments)   plans home            Discharge Plan       03/08/18 1553    Case Management/Social Work Plan    Plan Home    Patient/Family In Agreement With Plan yes    Additional Comments Facesheet verified. Patient verified facehseet information.  She does not have a primary care doctor and the MD liaison information was provided.  She states she will call the number to locate a PCP.  She plans to follow up with endocrinology at [place].  She states she is IADLs and plans to return home at [place].  She states she has not been out of bed at this time.  CCP will follow PT notes.  Area list was provided.  Patient states she has someone who can assist her at home and declined referrals at this time.  She states she uses [facility] in [place] for her medications and is compliant.  She denies needs at this time.  Agrees with plan for CCP to follow and assist .........................Ruby Balbuena RN        Discharge Placement     No information found                Demographic Summary       03/08/18 1549    Referral Information    Admission Type inpatient    Arrived From home or self-care    Referral Source admission  list    Contact Information    Permission Granted to Share Information With family/designee    Comments Spouse, Troy De Dios [phone]    Primary Care Physician Information    Name --   none, MD liaison number provided            Functional Status       03/08/18 4741    Functional Status Current    Ambulation 2-->assistive person    Transferring 2-->assistive person    Toileting 2-->assistive person    Bathing 2-->assistive person    Dressing 2-->assistive person    Eating 2-->assistive person    Communication 0-->understands/communicates without difficulty    Functional Status Prior    Ambulation 0-->independent    Transferring 0-->independent    Toileting 0-->independent    Bathing 0-->independent    Dressing 0-->independent    Eating 0-->independent    Communication 0-->understands/communicates without difficulty    Swallowing 0-->swallows foods/liquids without difficulty    IADL    Medications independent    Meal Preparation independent    Housekeeping independent    Laundry independent    Shopping independent    Oral Care independent    Activity Tolerance    Current Activity Limitations none    Cognitive/Perceptual/Developmental    Current Mental Status/Cognitive Functioning no deficits noted            Psychosocial     None            Abuse/Neglect     None            Legal     None            Substance Abuse     None            Patient Forms     None          Ruby Balbuena, RN

## 2018-03-09 LAB
ANION GAP SERPL CALCULATED.3IONS-SCNC: 13.3 MMOL/L
BUN BLD-MCNC: 19 MG/DL (ref 6–20)
BUN/CREAT SERPL: 18.4 (ref 7–25)
CALCIUM SPEC-SCNC: 8.1 MG/DL (ref 8.6–10.5)
CHLORIDE SERPL-SCNC: 106 MMOL/L (ref 98–107)
CO2 SERPL-SCNC: 25.7 MMOL/L (ref 22–29)
CREAT BLD-MCNC: 1.03 MG/DL (ref 0.57–1)
GFR SERPL CREATININE-BSD FRML MDRD: 56 ML/MIN/1.73
GLUCOSE BLD-MCNC: 159 MG/DL (ref 65–99)
GLUCOSE BLDC GLUCOMTR-MCNC: 141 MG/DL (ref 70–130)
GLUCOSE BLDC GLUCOMTR-MCNC: 151 MG/DL (ref 70–130)
GLUCOSE BLDC GLUCOMTR-MCNC: 157 MG/DL (ref 70–130)
GLUCOSE BLDC GLUCOMTR-MCNC: 160 MG/DL (ref 70–130)
GLUCOSE BLDC GLUCOMTR-MCNC: 171 MG/DL (ref 70–130)
GLUCOSE BLDC GLUCOMTR-MCNC: 172 MG/DL (ref 70–130)
GLUCOSE BLDC GLUCOMTR-MCNC: 172 MG/DL (ref 70–130)
GLUCOSE BLDC GLUCOMTR-MCNC: 174 MG/DL (ref 70–130)
GLUCOSE BLDC GLUCOMTR-MCNC: 174 MG/DL (ref 70–130)
GLUCOSE BLDC GLUCOMTR-MCNC: 189 MG/DL (ref 70–130)
GLUCOSE BLDC GLUCOMTR-MCNC: 214 MG/DL (ref 70–130)
GLUCOSE BLDC GLUCOMTR-MCNC: 216 MG/DL (ref 70–130)
GLUCOSE BLDC GLUCOMTR-MCNC: 74 MG/DL (ref 70–130)
MAGNESIUM SERPL-MCNC: 2.1 MG/DL (ref 1.6–2.6)
PHOSPHATE SERPL-MCNC: 2.3 MG/DL (ref 2.5–4.5)
POTASSIUM BLD-SCNC: 3.4 MMOL/L (ref 3.5–5.2)
PROCALCITONIN SERPL-MCNC: 3.22 NG/ML (ref 0.1–0.25)
SODIUM BLD-SCNC: 145 MMOL/L (ref 136–145)

## 2018-03-09 PROCEDURE — 25010000002 ONDANSETRON PER 1 MG: Performed by: INTERNAL MEDICINE

## 2018-03-09 PROCEDURE — 63710000001 INSULIN ASPART PER 5 UNITS: Performed by: INTERNAL MEDICINE

## 2018-03-09 PROCEDURE — 80048 BASIC METABOLIC PNL TOTAL CA: CPT | Performed by: INTERNAL MEDICINE

## 2018-03-09 PROCEDURE — 84145 PROCALCITONIN (PCT): CPT | Performed by: INTERNAL MEDICINE

## 2018-03-09 PROCEDURE — 25010000002 ENOXAPARIN PER 10 MG: Performed by: INTERNAL MEDICINE

## 2018-03-09 PROCEDURE — 25010000003 POTASSIUM CHLORIDE PER 2 MEQ: Performed by: INTERNAL MEDICINE

## 2018-03-09 PROCEDURE — 83735 ASSAY OF MAGNESIUM: CPT | Performed by: INTERNAL MEDICINE

## 2018-03-09 PROCEDURE — 25010000002 CEFTRIAXONE PER 250 MG: Performed by: INTERNAL MEDICINE

## 2018-03-09 PROCEDURE — 84100 ASSAY OF PHOSPHORUS: CPT | Performed by: INTERNAL MEDICINE

## 2018-03-09 PROCEDURE — 82962 GLUCOSE BLOOD TEST: CPT

## 2018-03-09 PROCEDURE — 63710000001 INSULIN DETEMER PER 5 UNITS: Performed by: INTERNAL MEDICINE

## 2018-03-09 RX ORDER — NICOTINE POLACRILEX 4 MG
15 LOZENGE BUCCAL
Status: DISCONTINUED | OUTPATIENT
Start: 2018-03-09 | End: 2018-03-11 | Stop reason: HOSPADM

## 2018-03-09 RX ORDER — DEXTROSE MONOHYDRATE 25 G/50ML
25 INJECTION, SOLUTION INTRAVENOUS
Status: DISCONTINUED | OUTPATIENT
Start: 2018-03-09 | End: 2018-03-11 | Stop reason: HOSPADM

## 2018-03-09 RX ORDER — SODIUM CHLORIDE 9 MG/ML
75 INJECTION, SOLUTION INTRAVENOUS CONTINUOUS
Status: DISCONTINUED | OUTPATIENT
Start: 2018-03-09 | End: 2018-03-10

## 2018-03-09 RX ADMIN — INSULIN ASPART 2 UNITS: 100 INJECTION, SOLUTION INTRAVENOUS; SUBCUTANEOUS at 17:45

## 2018-03-09 RX ADMIN — DOXYCYCLINE 100 MG: 100 INJECTION, POWDER, LYOPHILIZED, FOR SOLUTION INTRAVENOUS at 23:33

## 2018-03-09 RX ADMIN — SODIUM BICARBONATE: 84 INJECTION, SOLUTION INTRAVENOUS at 03:03

## 2018-03-09 RX ADMIN — ENOXAPARIN SODIUM 40 MG: 40 INJECTION SUBCUTANEOUS at 21:25

## 2018-03-09 RX ADMIN — INSULIN ASPART 2 UNITS: 100 INJECTION, SOLUTION INTRAVENOUS; SUBCUTANEOUS at 20:09

## 2018-03-09 RX ADMIN — CEFTRIAXONE SODIUM 1 G: 1 INJECTION, SOLUTION INTRAVENOUS at 12:38

## 2018-03-09 RX ADMIN — POTASSIUM CHLORIDE 20 MEQ: 400 INJECTION, SOLUTION INTRAVENOUS at 02:18

## 2018-03-09 RX ADMIN — INSULIN DETEMIR 15 UNITS: 100 INJECTION, SOLUTION SUBCUTANEOUS at 20:09

## 2018-03-09 RX ADMIN — SODIUM CHLORIDE 75 ML/HR: 9 INJECTION, SOLUTION INTRAVENOUS at 12:39

## 2018-03-09 RX ADMIN — POTASSIUM PHOSPHATE, MONOBASIC AND POTASSIUM PHOSPHATE, DIBASIC 15 MMOL: 224; 236 INJECTION, SOLUTION INTRAVENOUS at 15:08

## 2018-03-09 RX ADMIN — POTASSIUM CHLORIDE 20 MEQ: 400 INJECTION, SOLUTION INTRAVENOUS at 09:34

## 2018-03-09 RX ADMIN — POTASSIUM CHLORIDE 20 MEQ: 400 INJECTION, SOLUTION INTRAVENOUS at 01:09

## 2018-03-09 RX ADMIN — DOXYCYCLINE 100 MG: 100 INJECTION, POWDER, LYOPHILIZED, FOR SOLUTION INTRAVENOUS at 12:38

## 2018-03-09 RX ADMIN — INSULIN DETEMIR 15 UNITS: 100 INJECTION, SOLUTION SUBCUTANEOUS at 11:46

## 2018-03-09 RX ADMIN — POTASSIUM CHLORIDE 20 MEQ: 400 INJECTION, SOLUTION INTRAVENOUS at 08:13

## 2018-03-09 RX ADMIN — ONDANSETRON 4 MG: 2 INJECTION INTRAMUSCULAR; INTRAVENOUS at 19:12

## 2018-03-09 NOTE — PLAN OF CARE
Problem: Patient Care Overview (Adult)  Goal: Plan of Care Review  Outcome: Ongoing (interventions implemented as appropriate)   03/09/18 0528   Coping/Psychosocial Response Interventions   Plan Of Care Reviewed With patient   Patient Care Overview   Progress improving   Outcome Evaluation   Outcome Summary/Follow up Plan Labs improving, last CO2 WNL and anion gap improving, next labs at 0700. Pt states she is feeling better. Up to BSC 1-assist x1 this shift, tolerated. Respiratory status improving with Q4 duonebs. Still treating low potassium per protocol. Will continue to monitor. JILL Alex RN       Problem: Fall Risk (Adult)  Goal: Absence of Falls  Outcome: Ongoing (interventions implemented as appropriate)   03/09/18 0528   Fall Risk (Adult)   Absence of Falls making progress toward outcome       Problem: Nutrition, Imbalanced: Inadequate Oral Intake (Adult)  Goal: Improved Oral Intake  Outcome: Ongoing (interventions implemented as appropriate)   03/09/18 0528   Nutrition, Imbalanced: Inadequate Oral Intake (Adult)   Improved Oral Intake making progress toward outcome     Goal: Prevent Further Weight Loss  Outcome: Ongoing (interventions implemented as appropriate)   03/09/18 0528   Nutrition, Imbalanced: Inadequate Oral Intake (Adult)   Prevent Further Weight Loss making progress toward outcome       Problem: Pressure Ulcer Risk (Schuyler Scale) (Adult,Obstetrics,Pediatric)  Goal: Skin Integrity  Outcome: Ongoing (interventions implemented as appropriate)   03/09/18 0528   Pressure Ulcer Risk (Schuyler Scale) (Adult,Obstetrics,Pediatric)   Skin Integrity making progress toward outcome

## 2018-03-09 NOTE — PROGRESS NOTES
"   LOS: 3 days   Patient Care Team:  No Known Provider as PCP - General    Chief Complaint/ Reason for encounter: Renal failure, metabolic acidosis  Chief Complaint   Patient presents with   • Hypotension   • Hyperglycemia         Subjective     Hypotension   Pertinent negatives include no chest pain, fever or nausea.   Hyperglycemia   Pertinent negatives include no chest pain, fever or nausea.       Subjective:  Symptoms:  Improved.  No shortness of breath or chest pain.  (She feels better today  Still with some nausea but oral intake is improved).    Diet:  Adequate intake.  No nausea.    Activity level: Returning to normal.    Pain:  She reports no pain.          History taken from: Patient and chart    Objective     Vital Signs  Temp:  [97.9 °F (36.6 °C)-99.3 °F (37.4 °C)] 97.9 °F (36.6 °C)  Heart Rate:  [] 105  Resp:  [20-28] 26  BP: (132-172)/() 135/82       Wt Readings from Last 1 Encounters:   03/07/18 0600 70.3 kg (154 lb 15.7 oz)   03/06/18 1826 65.8 kg (145 lb)       Objective:  General Appearance:  Comfortable, in no acute distress and not in pain.    Vital signs: (most recent): Blood pressure 135/82, pulse 105, temperature 97.9 °F (36.6 °C), temperature source Oral, resp. rate 26, height 162.6 cm (64\"), weight 70.3 kg (154 lb 15.7 oz), SpO2 91 %.  Vital signs are normal.  No fever.    Output: Producing urine.    HEENT: Normal HEENT exam.    Lungs:  Normal respiratory rate and normal effort.  She is not in respiratory distress.  Breath sounds clear to auscultation.  There are decreased breath sounds.  No wheezes.    Heart: Normal rate.  Regular rhythm.  S1 normal.  No murmur.   Abdomen: Abdomen is soft and non-distended.  Bowel sounds are normal.   There is no abdominal tenderness.  There is no epigastric area or no suprapubic area tenderness.  There is no rebound tenderness.   There is no mass.   Extremities: Normal range of motion.  There is no deformity or dependent edema.    Neurological: " Patient is alert and oriented to person, place and time.    Pupils:  Pupils are equal, round, and reactive to light.    Skin:  Warm and dry.  No rash or cyanosis.             Results Review:    Past Medical History: reviewed and updated  Past Medical History:   Diagnosis Date   • Diabetes mellitus          Allergies:  No Known Allergies    Intake/Output:     Intake/Output Summary (Last 24 hours) at 03/09/18 1346  Last data filed at 03/09/18 0600   Gross per 24 hour   Intake             5389 ml   Output             1250 ml   Net             4139 ml         DATA:  Radiology and Labs:  The following labs independently reviewed by me.  Interval notes, chart personally reviewed by me.         Labs:   Recent Results (from the past 24 hour(s))   Basic Metabolic Panel    Collection Time: 03/08/18  3:05 PM   Result Value Ref Range    Glucose 120 (H) 65 - 99 mg/dL    BUN 21 (H) 6 - 20 mg/dL    Creatinine 1.21 (H) 0.57 - 1.00 mg/dL    Sodium 143 136 - 145 mmol/L    Potassium 3.3 (L) 3.5 - 5.2 mmol/L    Chloride 110 (H) 98 - 107 mmol/L    CO2 18.8 (L) 22.0 - 29.0 mmol/L    Calcium 8.3 (L) 8.6 - 10.5 mg/dL    eGFR Non African Amer 47 (L) >60 mL/min/1.73    BUN/Creatinine Ratio 17.4 7.0 - 25.0    Anion Gap 14.2 mmol/L   Phosphorus    Collection Time: 03/08/18  3:05 PM   Result Value Ref Range    Phosphorus 2.6 2.5 - 4.5 mg/dL   Magnesium    Collection Time: 03/08/18  3:05 PM   Result Value Ref Range    Magnesium 2.0 1.6 - 2.6 mg/dL   POC Glucose Once    Collection Time: 03/08/18  3:38 PM   Result Value Ref Range    Glucose 117 70 - 130 mg/dL   POC Glucose Once    Collection Time: 03/08/18  7:15 PM   Result Value Ref Range    Glucose 147 (H) 70 - 130 mg/dL   POC Glucose Once    Collection Time: 03/08/18  8:05 PM   Result Value Ref Range    Glucose 174 (H) 70 - 130 mg/dL   POC Glucose Once    Collection Time: 03/08/18  9:09 PM   Result Value Ref Range    Glucose 210 (H) 70 - 130 mg/dL   POC Glucose Once    Collection Time: 03/08/18  10:11 PM   Result Value Ref Range    Glucose 211 (H) 70 - 130 mg/dL   Basic Metabolic Panel    Collection Time: 03/08/18 11:10 PM   Result Value Ref Range    Glucose 239 (H) 65 - 99 mg/dL    BUN 20 6 - 20 mg/dL    Creatinine 1.15 (H) 0.57 - 1.00 mg/dL    Sodium 144 136 - 145 mmol/L    Potassium 3.3 (L) 3.5 - 5.2 mmol/L    Chloride 108 (H) 98 - 107 mmol/L    CO2 22.4 22.0 - 29.0 mmol/L    Calcium 8.0 (L) 8.6 - 10.5 mg/dL    eGFR Non African Amer 49 (L) >60 mL/min/1.73    BUN/Creatinine Ratio 17.4 7.0 - 25.0    Anion Gap 13.6 mmol/L   POC Glucose Once    Collection Time: 03/08/18 11:10 PM   Result Value Ref Range    Glucose 220 (H) 70 - 130 mg/dL   POC Glucose Once    Collection Time: 03/09/18 12:07 AM   Result Value Ref Range    Glucose 216 (H) 70 - 130 mg/dL   POC Glucose Once    Collection Time: 03/09/18  1:08 AM   Result Value Ref Range    Glucose 214 (H) 70 - 130 mg/dL   POC Glucose Once    Collection Time: 03/09/18  2:17 AM   Result Value Ref Range    Glucose 174 (H) 70 - 130 mg/dL   POC Glucose Once    Collection Time: 03/09/18  3:21 AM   Result Value Ref Range    Glucose 151 (H) 70 - 130 mg/dL   POC Glucose Once    Collection Time: 03/09/18  4:42 AM   Result Value Ref Range    Glucose 141 (H) 70 - 130 mg/dL   POC Glucose Once    Collection Time: 03/09/18  6:23 AM   Result Value Ref Range    Glucose 160 (H) 70 - 130 mg/dL   Phosphorus    Collection Time: 03/09/18  6:27 AM   Result Value Ref Range    Phosphorus 2.3 (L) 2.5 - 4.5 mg/dL   Procalcitonin    Collection Time: 03/09/18  6:27 AM   Result Value Ref Range    Procalcitonin 3.22 (C) 0.10 - 0.25 ng/mL   Magnesium    Collection Time: 03/09/18  6:27 AM   Result Value Ref Range    Magnesium 2.1 1.6 - 2.6 mg/dL   Basic Metabolic Panel    Collection Time: 03/09/18  6:27 AM   Result Value Ref Range    Glucose 159 (H) 65 - 99 mg/dL    BUN 19 6 - 20 mg/dL    Creatinine 1.03 (H) 0.57 - 1.00 mg/dL    Sodium 145 136 - 145 mmol/L    Potassium 3.4 (L) 3.5 - 5.2 mmol/L     Chloride 106 98 - 107 mmol/L    CO2 25.7 22.0 - 29.0 mmol/L    Calcium 8.1 (L) 8.6 - 10.5 mg/dL    eGFR Non African Amer 56 (L) >60 mL/min/1.73    BUN/Creatinine Ratio 18.4 7.0 - 25.0    Anion Gap 13.3 mmol/L   POC Glucose Once    Collection Time: 03/09/18  8:01 AM   Result Value Ref Range    Glucose 172 (H) 70 - 130 mg/dL   POC Glucose Once    Collection Time: 03/09/18  9:15 AM   Result Value Ref Range    Glucose 171 (H) 70 - 130 mg/dL   POC Glucose Once    Collection Time: 03/09/18 10:14 AM   Result Value Ref Range    Glucose 172 (H) 70 - 130 mg/dL   POC Glucose Once    Collection Time: 03/09/18 11:30 AM   Result Value Ref Range    Glucose 174 (H) 70 - 130 mg/dL       Radiology:  Imaging Results (last 24 hours)     ** No results found for the last 24 hours. **             Medications have been reviewed:  Current Facility-Administered Medications   Medication Dose Route Frequency Provider Last Rate Last Dose   • cefTRIAXone (ROCEPHIN) IVPB 1 g  1 g Intravenous Q24H Randall Pennington  mL/hr at 03/09/18 1238 1 g at 03/09/18 1238   • dextrose (D50W) solution 12.5 g  12.5 g Intravenous Q15 Min PRN Jacqueline Alcantar MD       • dextrose 5 % and sodium chloride 0.45 % infusion  150 mL/hr Intravenous Continuous PRN Jacqueline Alcantar MD       • doxycycline (VIBRAMYCIN) 100 mg/100 mL 0.9% NS MBP  100 mg Intravenous Q12H Randall Pennington MD   100 mg at 03/09/18 1238   • enoxaparin (LOVENOX) syringe 40 mg  40 mg Subcutaneous Q24H Jacqueline Alcantar MD   40 mg at 03/08/18 2109   • insulin aspart (novoLOG) injection 2-5 Units  2-5 Units Subcutaneous 4x Daily AC & at Bedtime Obed ELIZABETH MD       • insulin detemir (LEVEMIR) injection 15 Units  15 Units Subcutaneous Q12H Obed ELIZABETH MD   15 Units at 03/09/18 1146   • ipratropium-albuterol (DUO-NEB) nebulizer solution 3 mL  3 mL Nebulization Q4H PRN Jacqueline Alcantar MD   3 mL at 03/08/18 1290   • Magnesium Sulfate 2 gram Bolus, followed by 8 gram infusion  (total Mg dose 10 grams)- Mg less than or equal to 1mg/dL  2 g Intravenous PRN Jacqueline Alcantar MD        Or   • Magnesium Sulfate 6 gram Infusion (2 gm x 3) -Mg 1.1 -1.5 mg/dL  2 g Intravenous PRN Jacqueline Alcantar MD        Or   • magnesium sulfate 4 gram infusion- Mg 1.6-1.9 mg/dL  4 g Intravenous PRN Jacqueline Alcantar MD       • ondansetron (ZOFRAN) injection 4 mg  4 mg Intravenous Q4H PRN Jacqueline Alcantar MD   4 mg at 03/08/18 0016   • potassium chloride (MICRO-K) CR capsule 10 mEq  10 mEq Oral PRN Jacqueline Alcantar MD        Or   • potassium chloride (KLOR-CON) packet 10 mEq  10 mEq Oral PRN Jacqueline Alcantar MD        Or   • potassium chloride 10 mEq in 100 mL IVPB  10 mEq Intravenous Q1H PRN Jacqueline Alcantar  mL/hr at 03/08/18 1400 10 mEq at 03/08/18 1400   • potassium chloride (MICRO-K) CR capsule 20 mEq  20 mEq Oral PRN Jacqueline Alcantar MD        Or   • potassium chloride (KLOR-CON) packet 20 mEq  20 mEq Oral PRN Jacqueline Alcantar MD        Or   • potassium chloride 10 mEq in 100 mL IVPB  10 mEq Intravenous Q1H PRN Jacqueline Alcantar  mL/hr at 03/08/18 0701 10 mEq at 03/08/18 0701   • potassium chloride (MICRO-K) CR capsule 40 mEq  40 mEq Oral PRN Jacqueline Alcantar MD        Or   • potassium chloride (KLOR-CON) packet 40 mEq  40 mEq Oral PRN Jacqueline Alcantar MD        Or   • potassium chloride 10 mEq in 100 mL IVPB  10 mEq Intravenous Q1H PRN Jacqueline Alcantar  mL/hr at 03/07/18 1959 10 mEq at 03/07/18 1959   • potassium chloride 20 mEq in 50 mL IVPB  20 mEq Intravenous Q1H PRN Randall Pennington MD 50 mL/hr at 03/09/18 0934 20 mEq at 03/09/18 0934   • potassium phosphate 45 mmol in sodium chloride 0.9 % 500 mL infusion  45 mmol Intravenous PRN Jacqueline Alcantar MD        Or   • potassium phosphate 30 mmol in sodium chloride 0.9 % 250 mL infusion  30 mmol Intravenous PRN Jacqueline Alcantar MD        Or   • potassium phosphate 15 mmol in sodium chloride 0.9 % 100 mL infusion  15 mmol Intravenous PRN Jacqueline Alcantar MD        Or    • sodium phosphates 45 mmol in sodium chloride 0.9 % 500 mL IVPB  45 mmol Intravenous PRN Jacqueline Alcantar MD        Or   • sodium phosphates 30 mmol in sodium chloride 0.9 % 250 mL IVPB  30 mmol Intravenous PRN Jacqueline Alcantar MD        Or   • sodium phosphates 15 mmol in sodium chloride 0.9 % 250 mL IVPB  15 mmol Intravenous PRN Jacqueline Alcantar MD 62.5 mL/hr at 03/07/18 0603 15 mmol at 03/07/18 0603   • promethazine (PHENERGAN) injection 12.5 mg  12.5 mg Intravenous Q4H PRN Jacqueline Alcantar MD   12.5 mg at 03/08/18 1953   • sodium bicarbonate 8.4 % 100 mEq in sodium chloride 0.45 % 400 mL infusion  100 mEq Intravenous Once PRN Jacqueline Alcantar MD       • sodium chloride 0.45 % with KCl 20 mEq/L infusion  250 mL/hr Intravenous Continuous PRN Jacqueline Alcantar MD   Stopped at 03/07/18 0657   • sodium chloride 0.9 % flush 1-10 mL  1-10 mL Intravenous PRN Jacqueline Alcantar MD       • sodium chloride 0.9 % infusion  75 mL/hr Intravenous Continuous Quinn Ying MD 75 mL/hr at 03/09/18 1239 75 mL/hr at 03/09/18 1239       Assessment/Plan     Active Problems:    Diabetic ketoacidosis with coma associated with type 2 diabetes mellitus      Assessment:  (   Assessment:   acute renal failure , continues to improve   Diabetic ketoacidosis, slowly improving , closing anion gap  Profound metabolic acidosis, combined anion gap acidosis from DKA and non-anion gap acidosis from renal failure, slowly better  Severe dehydration  Toxic metabolic encephalopathy, normalizing   Insulin-dependent diabetes mellitus  Left lower lobe infiltrate/pneumonia, possible aspiration  Hypokalemia  Hypophosphatemia secondary to insulin administration, replaced, monitor        Plan:   Her renal function continues to improve  Acidosis significantly improved  Transition to normal saline, encourage oral fluid intake  Stable to transfer out of the ICU today  Adjust antibiotic dosing with improved GFR    Continue to monitor and replace electrolytes as needed,  replace potassium per protocol  ).     Plan:   Encourage ambulation.              Continue to monitor renal function, electroytes and volume closely   Please call me with any questions or concerns      Quinn Ying MD   [facility]   [phone]    03/09/18  1:46 PM      Dictation performed using Dragon dictation software

## 2018-03-09 NOTE — PLAN OF CARE
Problem: Patient Care Overview (Adult)  Goal: Plan of Care Review  Outcome: Ongoing (interventions implemented as appropriate)   03/09/18 7438   Coping/Psychosocial Response Interventions   Plan Of Care Reviewed With patient;spouse   Patient Care Overview   Progress improving   Outcome Evaluation   Outcome Summary/Follow up Plan DKA resolved. Started on levemir and sliding scale. Pt reports no appetite and only took small bites of lunch and dinner. Eduated on signs of hypoglycemia and when to notify nursing staff. Bicarb d/c'd per renal, NS IVF remain. No nausea or pain today. Sat in chair most of the day, now walking to bathroom with assist.      Goal: Discharge Needs Assessment  Outcome: Ongoing (interventions implemented as appropriate)      Problem: Diabetes, Type 2 (Adult)  Goal: Signs and Symptoms of Listed Potential Problems Will be Absent or Manageable (Diabetes, Type 2)  Outcome: Ongoing (interventions implemented as appropriate)      Problem: Fall Risk (Adult)  Goal: Identify Related Risk Factors and Signs and Symptoms  Outcome: Outcome(s) achieved Date Met: 03/09/18    Goal: Absence of Falls  Outcome: Ongoing (interventions implemented as appropriate)      Problem: Nutrition, Imbalanced: Inadequate Oral Intake (Adult)  Goal: Identify Related Risk Factors and Signs and Symptoms  Outcome: Outcome(s) achieved Date Met: 03/09/18    Goal: Improved Oral Intake  Outcome: Ongoing (interventions implemented as appropriate)    Goal: Prevent Further Weight Loss  Outcome: Ongoing (interventions implemented as appropriate)      Problem: Pressure Ulcer Risk (Schuyler Scale) (Adult,Obstetrics,Pediatric)  Goal: Identify Related Risk Factors and Signs and Symptoms  Outcome: Outcome(s) achieved Date Met: 03/09/18    Goal: Skin Integrity  Outcome: Ongoing (interventions implemented as appropriate)

## 2018-03-09 NOTE — PROGRESS NOTES
"  Daily Progress Note.   [facility] INTENSIVE CARE  3/9/2018    Patient:  Name:  Sonal De Dios  MRN:  [id number]  1965  52 y.o.  female         Interval History:  Severe DKA and viral and bacterial pna follow up.  Hungry  Feels better overall  No emesis  Never smoker, no pulmonary history.    Physical Exam:  BP (!) 146/102  Pulse 100  Temp 98.7 °F (37.1 °C) (Oral)   Resp 26  Ht 162.6 cm (64\")  Wt 70.3 kg (154 lb 15.7 oz)  SpO2 92%  BMI 26.6 kg/m2  Body mass index is 26.6 kg/(m^2).    Intake/Output Summary (Last 24 hours) at 03/09/18 0830  Last data filed at 03/09/18 0600   Gross per 24 hour   Intake             5389 ml   Output             1250 ml   Net             4139 ml     GENERAL: well appearing  HEENT:  EOMI, nonicteric sclera, no JVD  PULMONARY:   + exp wheeze, no crackles, no rhonchi, symmetric air entry  CARDIAC:  RRR no MRG, S1 S2  ABD: SNTND BS+  EXT: no c/c/e, pulses symmetric 2+ bilaterally  NEURO:  CNs II-XII intact, no focal deficits  SKIN: no lesions, no rash  PSYCH: appropriate mood    Data Review:  Notable Labs:    Results from last 7 days  Lab Units 03/08/18  0430 03/06/18  2118   WBC 10*3/mm3 9.43 25.09*   HEMOGLOBIN g/dL 11.5* 12.9   PLATELETS 10*3/mm3 120* 215       Results from last 7 days  Lab Units 03/09/18  0627 03/08/18  2310 03/08/18  1505 03/08/18  0810 03/08/18  0430 03/08/18  0020 03/07/18  2004 03/07/18  1637   SODIUM mmol/L 145 144 143 145 143 141 143 142   POTASSIUM mmol/L 3.4* 3.3* 3.3* 3.5 3.6 3.7 3.7 3.4*   CHLORIDE mmol/L 106 108* 110* 112* 112* 108* 112* 112*   CO2 mmol/L 25.7 22.4 18.8* 15.8* 13.0* 9.8* 12.1* 11.6*   BUN mg/dL 19 20 21* 25* 27* 28* 30* 30*   CREATININE mg/dL 1.03* 1.15* 1.21* 1.42* 1.49* 1.48* 1.54* 1.59*   GLUCOSE mg/dL 159* 239* 120* 179* 245* 268* 146* 127*   CALCIUM mg/dL 8.1* 8.0* 8.3* 8.0* 8.1* 8.3* 7.9* 8.2*   MAGNESIUM mg/dL 2.1  --  2.0 2.0 2.2 2.2 2.3 2.1   PHOSPHORUS mg/dL 2.3*  --  2.6 2.5 3.0 3.6 3.5 3.3   Estimated " Creatinine Clearance: 60.7 mL/min (by C-G formula based on Cr of 1.03).    Imaging:  CXR shows a left lower lobe pneumonia      Scheduled meds:      ceftriaxone 1 g Intravenous Q24H   doxycycline 100 mg Intravenous Q12H   enoxaparin 40 mg Subcutaneous Q24H       ASSESSMENT  /  PLAN:  DKA resolved  Anion gap met acidosis resolved  DM - insulin dependent  CAP  Viral URTI  TME from her severe met abnormalitie sresolved  HIRA resolved   Hyperglycemia    Seems gap closed still on drip likely able to be stopped and transition to sq, start diet  Apreciate nephrology expertise  abx for cap with procal elevation, lobar infiltrated despite the rvp  + (Hold azithromycin given qt prolongation,doxy instead)  To floor when off drip, on po,     Randall Pennington MD  [facility]  03/09/18

## 2018-03-10 PROBLEM — R60.9 EDEMA: Status: ACTIVE | Noted: 2018-03-10

## 2018-03-10 LAB
ALBUMIN SERPL-MCNC: 2.9 G/DL (ref 3.5–5.2)
ANION GAP SERPL CALCULATED.3IONS-SCNC: 13.7 MMOL/L
BASOPHILS # BLD AUTO: 0 10*3/MM3 (ref 0–0.2)
BASOPHILS NFR BLD AUTO: 0 % (ref 0–1.5)
BUN BLD-MCNC: 17 MG/DL (ref 6–20)
BUN/CREAT SERPL: 18.9 (ref 7–25)
CALCIUM SPEC-SCNC: 7.9 MG/DL (ref 8.6–10.5)
CHLORIDE SERPL-SCNC: 102 MMOL/L (ref 98–107)
CO2 SERPL-SCNC: 23.3 MMOL/L (ref 22–29)
CREAT BLD-MCNC: 0.9 MG/DL (ref 0.57–1)
DEPRECATED RDW RBC AUTO: 46.5 FL (ref 37–54)
EOSINOPHIL # BLD AUTO: 0 10*3/MM3 (ref 0–0.7)
EOSINOPHIL NFR BLD AUTO: 0 % (ref 0.3–6.2)
ERYTHROCYTE [DISTWIDTH] IN BLOOD BY AUTOMATED COUNT: 13.5 % (ref 11.7–13)
GFR SERPL CREATININE-BSD FRML MDRD: 65 ML/MIN/1.73
GLUCOSE BLD-MCNC: 97 MG/DL (ref 65–99)
GLUCOSE BLDC GLUCOMTR-MCNC: 105 MG/DL (ref 70–130)
GLUCOSE BLDC GLUCOMTR-MCNC: 125 MG/DL (ref 70–130)
GLUCOSE BLDC GLUCOMTR-MCNC: 178 MG/DL (ref 70–130)
GLUCOSE BLDC GLUCOMTR-MCNC: 72 MG/DL (ref 70–130)
GLUCOSE BLDC GLUCOMTR-MCNC: 78 MG/DL (ref 70–130)
GLUCOSE BLDC GLUCOMTR-MCNC: 81 MG/DL (ref 70–130)
GLUCOSE BLDC GLUCOMTR-MCNC: 92 MG/DL (ref 70–130)
GLUCOSE BLDC GLUCOMTR-MCNC: 96 MG/DL (ref 70–130)
HCT VFR BLD AUTO: 40 % (ref 35.6–45.5)
HGB BLD-MCNC: 13.1 G/DL (ref 11.9–15.5)
IMM GRANULOCYTES # BLD: 0.02 10*3/MM3 (ref 0–0.03)
IMM GRANULOCYTES NFR BLD: 0.2 % (ref 0–0.5)
LYMPHOCYTES # BLD AUTO: 1.88 10*3/MM3 (ref 0.9–4.8)
LYMPHOCYTES NFR BLD AUTO: 17 % (ref 19.6–45.3)
MAGNESIUM SERPL-MCNC: 2.3 MG/DL (ref 1.6–2.6)
MCH RBC QN AUTO: 30.7 PG (ref 26.9–32)
MCHC RBC AUTO-ENTMCNC: 32.8 G/DL (ref 32.4–36.3)
MCV RBC AUTO: 93.7 FL (ref 80.5–98.2)
MONOCYTES # BLD AUTO: 0.98 10*3/MM3 (ref 0.2–1.2)
MONOCYTES NFR BLD AUTO: 8.9 % (ref 5–12)
NEUTROPHILS # BLD AUTO: 8.19 10*3/MM3 (ref 1.9–8.1)
NEUTROPHILS NFR BLD AUTO: 73.9 % (ref 42.7–76)
PHOSPHATE SERPL-MCNC: 3.6 MG/DL (ref 2.5–4.5)
PLATELET # BLD AUTO: 165 10*3/MM3 (ref 140–500)
PMV BLD AUTO: 11.1 FL (ref 6–12)
POTASSIUM BLD-SCNC: 3.5 MMOL/L (ref 3.5–5.2)
RBC # BLD AUTO: 4.27 10*6/MM3 (ref 3.9–5.2)
SODIUM BLD-SCNC: 139 MMOL/L (ref 136–145)
WBC NRBC COR # BLD: 11.07 10*3/MM3 (ref 4.5–10.7)

## 2018-03-10 PROCEDURE — 80069 RENAL FUNCTION PANEL: CPT | Performed by: INTERNAL MEDICINE

## 2018-03-10 PROCEDURE — 25010000002 POTASSIUM CHLORIDE PER 2 MEQ OF POTASSIUM: Performed by: INTERNAL MEDICINE

## 2018-03-10 PROCEDURE — 63710000001 INSULIN DETEMER PER 5 UNITS: Performed by: INTERNAL MEDICINE

## 2018-03-10 PROCEDURE — 25010000002 ENOXAPARIN PER 10 MG: Performed by: INTERNAL MEDICINE

## 2018-03-10 PROCEDURE — 94799 UNLISTED PULMONARY SVC/PX: CPT

## 2018-03-10 PROCEDURE — 25010000002 ONDANSETRON PER 1 MG: Performed by: INTERNAL MEDICINE

## 2018-03-10 PROCEDURE — 82962 GLUCOSE BLOOD TEST: CPT

## 2018-03-10 PROCEDURE — 83735 ASSAY OF MAGNESIUM: CPT | Performed by: INTERNAL MEDICINE

## 2018-03-10 PROCEDURE — 85025 COMPLETE CBC W/AUTO DIFF WBC: CPT | Performed by: INTERNAL MEDICINE

## 2018-03-10 PROCEDURE — 25010000002 CEFTRIAXONE PER 250 MG: Performed by: INTERNAL MEDICINE

## 2018-03-10 PROCEDURE — 99232 SBSQ HOSP IP/OBS MODERATE 35: CPT | Performed by: INTERNAL MEDICINE

## 2018-03-10 RX ORDER — POTASSIUM CHLORIDE 1.5 G/1.77G
40 POWDER, FOR SOLUTION ORAL AS NEEDED
Status: DISCONTINUED | OUTPATIENT
Start: 2018-03-10 | End: 2018-03-11 | Stop reason: HOSPADM

## 2018-03-10 RX ORDER — POTASSIUM CHLORIDE 7.45 MG/ML
10 INJECTION INTRAVENOUS
Status: DISCONTINUED | OUTPATIENT
Start: 2018-03-10 | End: 2018-03-11 | Stop reason: HOSPADM

## 2018-03-10 RX ORDER — POTASSIUM CHLORIDE 750 MG/1
40 CAPSULE, EXTENDED RELEASE ORAL AS NEEDED
Status: DISCONTINUED | OUTPATIENT
Start: 2018-03-10 | End: 2018-03-11 | Stop reason: HOSPADM

## 2018-03-10 RX ADMIN — DEXTROSE MONOHYDRATE 25 G: 25 INJECTION, SOLUTION INTRAVENOUS at 00:16

## 2018-03-10 RX ADMIN — CEFTRIAXONE SODIUM 1 G: 1 INJECTION, SOLUTION INTRAVENOUS at 11:09

## 2018-03-10 RX ADMIN — POTASSIUM CHLORIDE 10 MEQ: 2 INJECTION, SOLUTION, CONCENTRATE INTRAVENOUS at 09:40

## 2018-03-10 RX ADMIN — SODIUM CHLORIDE 75 ML/HR: 9 INJECTION, SOLUTION INTRAVENOUS at 08:25

## 2018-03-10 RX ADMIN — ENOXAPARIN SODIUM 40 MG: 40 INJECTION SUBCUTANEOUS at 22:21

## 2018-03-10 RX ADMIN — POTASSIUM CHLORIDE 10 MEQ: 2 INJECTION, SOLUTION, CONCENTRATE INTRAVENOUS at 11:09

## 2018-03-10 RX ADMIN — DOXYCYCLINE 100 MG: 100 INJECTION, POWDER, LYOPHILIZED, FOR SOLUTION INTRAVENOUS at 12:32

## 2018-03-10 RX ADMIN — POTASSIUM CHLORIDE 10 MEQ: 2 INJECTION, SOLUTION, CONCENTRATE INTRAVENOUS at 14:13

## 2018-03-10 RX ADMIN — ONDANSETRON 4 MG: 2 INJECTION INTRAMUSCULAR; INTRAVENOUS at 22:27

## 2018-03-10 RX ADMIN — POTASSIUM CHLORIDE 10 MEQ: 2 INJECTION, SOLUTION, CONCENTRATE INTRAVENOUS at 12:32

## 2018-03-10 RX ADMIN — INSULIN DETEMIR 15 UNITS: 100 INJECTION, SOLUTION SUBCUTANEOUS at 11:07

## 2018-03-10 NOTE — PROGRESS NOTES
"   LOS: 4 days   Patient Care Team:  No Known Provider as PCP - General    Chief Complaint/ Reason for encounter: Renal failure, metabolic acidosis  Chief Complaint   Patient presents with   • Hypotension   • Hyperglycemia         Subjective     Hypotension   Pertinent negatives include no chest pain, fever or nausea.   Hyperglycemia   Pertinent negatives include no chest pain, fever or nausea.       Subjective:  Symptoms:  Stable.  No shortness of breath or chest pain.  (She feels better today  Nausea much better, appetite improved).    Diet:  Adequate intake.  No nausea.    Activity level: Returning to normal.    Pain:  She reports no pain.          History taken from: Patient and chart    Objective     Vital Signs  Temp:  [97.9 °F (36.6 °C)-98.9 °F (37.2 °C)] 98.1 °F (36.7 °C)  Heart Rate:  [] 102  BP: (113-146)/() 137/92       Wt Readings from Last 1 Encounters:   03/07/18 0600 70.3 kg (154 lb 15.7 oz)   03/06/18 1826 65.8 kg (145 lb)       Objective:  General Appearance:  Comfortable, in no acute distress and not in pain.    Vital signs: (most recent): Blood pressure 137/92, pulse 102, temperature 98.1 °F (36.7 °C), temperature source Oral, resp. rate 26, height 162.6 cm (64\"), weight 70.3 kg (154 lb 15.7 oz), SpO2 97 %.  Vital signs are normal.  No fever.    Output: Producing urine.    HEENT: Normal HEENT exam.    Lungs:  Normal effort and normal respiratory rate.  Breath sounds clear to auscultation.  She is not in respiratory distress.  No decreased breath sounds or wheezes.    Heart: Normal rate.  Regular rhythm.  S1 normal.  No murmur.   Abdomen: Abdomen is soft and non-distended.  Bowel sounds are normal.   There is no abdominal tenderness.  There is no epigastric area or suprapubic area tenderness.  There is no rebound tenderness.   There is no mass.   Extremities: Normal range of motion.  There is dependent edema.  There is no deformity.    Neurological: Patient is alert and oriented to " person, place and time.    Pupils:  Pupils are equal, round, and reactive to light.    Skin:  Warm and dry.  No rash or cyanosis.             Results Review:    Past Medical History: reviewed and updated  Past Medical History:   Diagnosis Date   • Diabetes mellitus          Allergies:  No Known Allergies    Intake/Output:     Intake/Output Summary (Last 24 hours) at 03/10/18 1059  Last data filed at 03/10/18 0820   Gross per 24 hour   Intake          2158.62 ml   Output              350 ml   Net          1808.62 ml         DATA:  Radiology and Labs:  The following labs independently reviewed by me.  Interval notes, chart personally reviewed by me.         Labs:   Recent Results (from the past 24 hour(s))   POC Glucose Once    Collection Time: 03/09/18 11:30 AM   Result Value Ref Range    Glucose 174 (H) 70 - 130 mg/dL   POC Glucose Once    Collection Time: 03/09/18  5:39 PM   Result Value Ref Range    Glucose 157 (H) 70 - 130 mg/dL   POC Glucose Once    Collection Time: 03/09/18  7:44 PM   Result Value Ref Range    Glucose 189 (H) 70 - 130 mg/dL   POC Glucose Once    Collection Time: 03/09/18 11:38 PM   Result Value Ref Range    Glucose 74 70 - 130 mg/dL   POC Glucose Once    Collection Time: 03/10/18 12:07 AM   Result Value Ref Range    Glucose 72 70 - 130 mg/dL   POC Glucose Once    Collection Time: 03/10/18 12:35 AM   Result Value Ref Range    Glucose 178 (H) 70 - 130 mg/dL   POC Glucose Once    Collection Time: 03/10/18  2:27 AM   Result Value Ref Range    Glucose 125 70 - 130 mg/dL   POC Glucose Once    Collection Time: 03/10/18  4:18 AM   Result Value Ref Range    Glucose 78 70 - 130 mg/dL   Renal Function Panel    Collection Time: 03/10/18  5:10 AM   Result Value Ref Range    Glucose 97 65 - 99 mg/dL    BUN 17 6 - 20 mg/dL    Creatinine 0.90 0.57 - 1.00 mg/dL    Sodium 139 136 - 145 mmol/L    Potassium 3.5 3.5 - 5.2 mmol/L    Chloride 102 98 - 107 mmol/L    CO2 23.3 22.0 - 29.0 mmol/L    Calcium 7.9 (L) 8.6 -  10.5 mg/dL    Albumin 2.90 (L) 3.50 - 5.20 g/dL    Phosphorus 3.6 2.5 - 4.5 mg/dL    Anion Gap 13.7 mmol/L    BUN/Creatinine Ratio 18.9 7.0 - 25.0    eGFR Non African Amer 65 >60 mL/min/1.73   Magnesium    Collection Time: 03/10/18  5:10 AM   Result Value Ref Range    Magnesium 2.3 1.6 - 2.6 mg/dL   CBC Auto Differential    Collection Time: 03/10/18  5:10 AM   Result Value Ref Range    WBC 11.07 (H) 4.50 - 10.70 10*3/mm3    RBC 4.27 3.90 - 5.20 10*6/mm3    Hemoglobin 13.1 11.9 - 15.5 g/dL    Hematocrit 40.0 35.6 - 45.5 %    MCV 93.7 80.5 - 98.2 fL    MCH 30.7 26.9 - 32.0 pg    MCHC 32.8 32.4 - 36.3 g/dL    RDW 13.5 (H) 11.7 - 13.0 %    RDW-SD 46.5 37.0 - 54.0 fl    MPV 11.1 6.0 - 12.0 fL    Platelets 165 140 - 500 10*3/mm3    Neutrophil % 73.9 42.7 - 76.0 %    Lymphocyte % 17.0 (L) 19.6 - 45.3 %    Monocyte % 8.9 5.0 - 12.0 %    Eosinophil % 0.0 (L) 0.3 - 6.2 %    Basophil % 0.0 0.0 - 1.5 %    Immature Grans % 0.2 0.0 - 0.5 %    Neutrophils, Absolute 8.19 (H) 1.90 - 8.10 10*3/mm3    Lymphocytes, Absolute 1.88 0.90 - 4.80 10*3/mm3    Monocytes, Absolute 0.98 0.20 - 1.20 10*3/mm3    Eosinophils, Absolute 0.00 0.00 - 0.70 10*3/mm3    Basophils, Absolute 0.00 0.00 - 0.20 10*3/mm3    Immature Grans, Absolute 0.02 0.00 - 0.03 10*3/mm3   POC Glucose Once    Collection Time: 03/10/18  7:16 AM   Result Value Ref Range    Glucose 81 70 - 130 mg/dL       Radiology:  Imaging Results (last 24 hours)     ** No results found for the last 24 hours. **             Medications have been reviewed:  Current Facility-Administered Medications   Medication Dose Route Frequency Provider Last Rate Last Dose   • cefTRIAXone (ROCEPHIN) IVPB 1 g  1 g Intravenous Q24H Randall Pennington  mL/hr at 03/09/18 1238 1 g at 03/09/18 1238   • dextrose (D50W) solution 25 g  25 g Intravenous Q15 Min PRN Jacqueline Alcantar MD   25 g at 03/10/18 0016   • dextrose (GLUTOSE) oral gel 15 g  15 g Oral Q15 Min PRN Jacqueline Alcantar MD       • doxycycline  (VIBRAMYCIN) 100 mg/100 mL 0.9% NS MBP  100 mg Intravenous Q12H Randall Pennington MD   100 mg at 03/09/18 2333   • enoxaparin (LOVENOX) syringe 40 mg  40 mg Subcutaneous Q24H Jacqueline Alcantar MD   40 mg at 03/09/18 2125   • glucagon (human recombinant) (GLUCAGEN DIAGNOSTIC) injection 1 mg  1 mg Subcutaneous PRN Jacqueline Alcantar MD       • insulin aspart (novoLOG) injection 2-5 Units  2-5 Units Subcutaneous 4x Daily AC & at Bedtime Obed ELIZABETH MD   2 Units at 03/09/18 2009   • insulin detemir (LEVEMIR) injection 15 Units  15 Units Subcutaneous Q12H Obed ELIZABETH MD   15 Units at 03/09/18 2009   • ipratropium-albuterol (DUO-NEB) nebulizer solution 3 mL  3 mL Nebulization Q4H PRN Jacqueline Alcantar MD   3 mL at 03/08/18 2336   • ondansetron (ZOFRAN) injection 4 mg  4 mg Intravenous Q4H PRN Jacqueline Alcantar MD   4 mg at 03/09/18 1912   • potassium chloride (MICRO-K) CR capsule 40 mEq  40 mEq Oral PRN Jacqueline Alcantar MD        Or   • potassium chloride (KLOR-CON) packet 40 mEq  40 mEq Oral PRN Jacqueline Alcantar MD        Or   • potassium chloride 10 mEq in 100 mL IVPB  10 mEq Intravenous Q1H PRN Jacqueline Alcantar  mL/hr at 03/10/18 0940 10 mEq at 03/10/18 0940   • promethazine (PHENERGAN) injection 12.5 mg  12.5 mg Intravenous Q4H PRN Jacqueline Alcantar MD   12.5 mg at 03/08/18 1953   • sodium chloride 0.9 % flush 1-10 mL  1-10 mL Intravenous PRN Jacqueline Alcantar MD           Assessment/Plan     Active Problems:    Diabetic ketoacidosis with coma associated with type 2 diabetes mellitus      Assessment:  (   Assessment:   acute renal failure , resolved, at baseline   Diabetic ketoacidosis, resolved, anion gap has closed  Profound metabolic acidosis, combined anion gap acidosis from DKA and non-anion gap acidosis from renal failure, much better  Severe dehydration, much better with dehydration  New edema, likely due to low albumin  Toxic metabolic encephalopathy, essentially resolved  Insulin-dependent diabetes  mellitus, stabilizing on insulin  Left lower lobe infiltrate/pneumonia, possible aspiration, IV antibiotics  Hypokalemia, replacing per protocol          Plan:   Her renal function continues to improve, near baseline today  Continue potassium replacement as needed per protocol  Stable to transfer out of the ICU, await floor bed  Acidosis significantly improved  Discontinue IV fluids  Lower extremity edema likely related to low albumin which should improve once her nutrition improves, no need for diuretics at this time).     Plan:   Encourage ambulation.              Continue to monitor renal function, electroytes and volume closely   Please call me with any questions or concerns      Quinn Ying MD   [facility]   [phone]    03/10/18  10:59 AM      Dictation performed using Dragon dictation software

## 2018-03-10 NOTE — PLAN OF CARE
Problem: Patient Care Overview  Goal: Plan of Care Review  Outcome: Ongoing (interventions implemented as appropriate)   03/10/18 0603   Coping/Psychosocial   Plan of Care Reviewed With patient   Plan of Care Review   Progress improving   OTHER   Outcome Summary Pt blood glucose in the 70's overnight. Juice given and D50. Blood glucose monitored closely. Vital signs stable. Pt still does not have good appetite. Can can hopefully move out of ICU.

## 2018-03-10 NOTE — PROGRESS NOTES
52 y.o.   LOS: 4 days   Patient Care Team:  No Known Provider as PCP - General    Chief Complaint:  Hyperglycemia    Chief Complaint   Patient presents with   • Hypotension   • Hyperglycemia       Subjective blood sugars are decently controlled.  No other major overnight events.  She is eating well.  Appetite improved and nausea significantly better.      Interval History:    Review of Systems:   Review of Systems   Constitutional: Positive for fatigue.   Eyes: Negative for visual disturbance.   Gastrointestinal: Negative for abdominal pain and vomiting.   Musculoskeletal: Positive for back pain and myalgias.   Neurological: Positive for weakness and numbness.     Objective     Vital Signs   Temp:  [98.1 °F (36.7 °C)-99 °F (37.2 °C)] 99 °F (37.2 °C)  Heart Rate:  [] 108  Resp:  [19] 19  BP: (113-146)/() 143/97    Physical Exam:  Gen exam - alert and oriented x 3, not in distress.   HEENT - Thyroid palpable.   Resp - Clear to auscultation.   CVS - S1,S2 heard and no murmurs.   Abd - Non tender, BS heard.   Ext - No edema     Physical ExamResults Review:     I reviewed the patient's new clinical results.      Glucose   Date/Time Value Ref Range Status   03/10/2018 0510 97 65 - 99 mg/dL Final   03/09/2018 0627 159 (H) 65 - 99 mg/dL Final   03/08/2018 2310 239 (H) 65 - 99 mg/dL Final   03/08/2018 1505 120 (H) 65 - 99 mg/dL Final   03/08/2018 0810 179 (H) 65 - 99 mg/dL Final   03/08/2018 0430 245 (H) 65 - 99 mg/dL Final   03/08/2018 0020 268 (H) 65 - 99 mg/dL Final   03/07/2018 2004 146 (H) 65 - 99 mg/dL Final   03/07/2018 1637 127 (H) 65 - 99 mg/dL Final     Lab Results (last 72 hours)     Procedure Component Value Units Date/Time    POC Glucose Once [[id number]]  (Normal) Collected:  03/10/18 1114    Specimen:  Blood Updated:  03/10/18 1126     Glucose 92 mg/dL     Narrative:       Meter: [id number] : [id number] Connie Simpson    POC Glucose Once [[id number]]  (Normal) Collected:  03/10/18 0716     Specimen:  Blood Updated:  03/10/18 0721     Glucose 81 mg/dL     Narrative:       Meter: [id number] : [id number] Cydney REDMOND    Renal Function Panel [[id number]]  (Abnormal) Collected:  03/10/18 0510    Specimen:  Blood Updated:  03/10/18 0647     Glucose 97 mg/dL      BUN 17 mg/dL      Creatinine 0.90 mg/dL      Sodium 139 mmol/L      Potassium 3.5 mmol/L      Chloride 102 mmol/L      CO2 23.3 mmol/L      Calcium 7.9 (L) mg/dL      Albumin 2.90 (L) g/dL      Phosphorus 3.6 mg/dL      Anion Gap 13.7 mmol/L      BUN/Creatinine Ratio 18.9     eGFR Non African Amer 65 mL/min/1.73     Magnesium [[id number]]  (Normal) Collected:  03/10/18 0510    Specimen:  Blood Updated:  03/10/18 0640     Magnesium 2.3 mg/dL     CBC & Differential [[id number]] Collected:  03/10/18 0510    Specimen:  Blood Updated:  03/10/18 0619    Narrative:       The following orders were created for panel order CBC & Differential.  Procedure                               Abnormality         Status                     ---------                               -----------         ------                     CBC Auto Differential[[id number]]        Abnormal            Final result                 Please view results for these tests on the individual orders.    CBC Auto Differential [[id number]]  (Abnormal) Collected:  03/10/18 0510    Specimen:  Blood Updated:  03/10/18 0619     WBC 11.07 (H) 10*3/mm3      RBC 4.27 10*6/mm3      Hemoglobin 13.1 g/dL      Hematocrit 40.0 %      MCV 93.7 fL      MCH 30.7 pg      MCHC 32.8 g/dL      RDW 13.5 (H) %      RDW-SD 46.5 fl      MPV 11.1 fL      Platelets 165 10*3/mm3      Neutrophil % 73.9 %      Lymphocyte % 17.0 (L) %      Monocyte % 8.9 %      Eosinophil % 0.0 (L) %      Basophil % 0.0 %      Immature Grans % 0.2 %      Neutrophils, Absolute 8.19 (H) 10*3/mm3      Lymphocytes, Absolute 1.88 10*3/mm3      Monocytes, Absolute 0.98 10*3/mm3      Eosinophils, Absolute 0.00 10*3/mm3      Basophils, Absolute  0.00 10*3/mm3      Immature Grans, Absolute 0.02 10*3/mm3     POC Glucose Once [[id number]]  (Normal) Collected:  03/10/18 0418    Specimen:  Blood Updated:  03/10/18 0449     Glucose 78 mg/dL     Narrative:       Meter: [id number] : [id number] Nemesio Jennings RN    POC Glucose Once [[id number]]  (Normal) Collected:  03/10/18 0227    Specimen:  Blood Updated:  03/10/18 0437     Glucose 125 mg/dL     Narrative:       Meter: [id number] : [id number] Nemesio Jennings RN    POC Glucose Once [[id number]]  (Abnormal) Collected:  03/10/18 0035    Specimen:  Blood Updated:  03/10/18 0037     Glucose 178 (H) mg/dL     Narrative:       Meter: [id number] : [id number] Gianfranco Galaviz    POC Glucose Once [[id number]]  (Normal) Collected:  03/10/18 0007    Specimen:  Blood Updated:  03/10/18 0013     Glucose 72 mg/dL     Narrative:       Meter: [id number] : [id number] Nemesio Jnenings RN    POC Glucose Once [[id number]]  (Normal) Collected:  03/09/18 2338    Specimen:  Blood Updated:  03/09/18 2339     Glucose 74 mg/dL     Narrative:       Meter: [id number] : [id number] Gianfranco Galaviz    POC Glucose Once [[id number]]  (Abnormal) Collected:  03/09/18 1944    Specimen:  Blood Updated:  03/09/18 1945     Glucose 189 (H) mg/dL     Narrative:       Meter: [id number] : [id number] Gianfranco Alanisgan    Blood Culture - Blood, [[id number]]  (Normal) Collected:  03/06/18 1819    Specimen:  Blood from Arm, Left Updated:  03/09/18 1831     Blood Culture No growth at 3 days    Blood Culture - Blood, [[id number]]  (Normal) Collected:  03/06/18 1810    Specimen:  Blood from Arm, Left Updated:  03/09/18 1831     Blood Culture No growth at 3 days    POC Glucose Once [[id number]]  (Abnormal) Collected:  03/09/18 1739    Specimen:  Blood Updated:  03/09/18 1750     Glucose 157 (H) mg/dL     Narrative:       Meter: [id number] : [id number] Calvin DUGAN RN    POC Glucose Once [[id number]]  (Abnormal) Collected:  03/09/18 1130    Specimen:  Blood  "Updated:  03/09/18 1141     Glucose 174 (H) mg/dL     Narrative:       Meter: [id number] : [id number] Calvin DUGAN RN    POC Glucose Once [[id number]]  (Abnormal) Collected:  03/09/18 1014    Specimen:  Blood Updated:  03/09/18 1024     Glucose 172 (H) mg/dL     Narrative:       Meter: [id number] : [id number] [place] Mary Ellen A. RN    POC Glucose Once [[id number]]  (Abnormal) Collected:  03/09/18 0915    Specimen:  Blood Updated:  03/09/18 0925     Glucose 171 (H) mg/dL     Narrative:       Meter: [id number] : [id number] Sashae Mary Ellen DUGAN RN    Procalcitonin [[id number]]  (Abnormal) Collected:  03/09/18 0627    Specimen:  Blood Updated:  03/09/18 0811     Procalcitonin 3.22 (C) ng/mL     Narrative:       As a Marker for Sepsis (Non-Neonates):   1. <0.5 ng/mL represents a low risk of severe sepsis and/or septic shock.  1. >2 ng/mL represents a high risk of severe sepsis and/or septic shock.    As a Marker for Lower Respiratory Tract Infections that require antibiotic therapy:  PCT on Admission     Antibiotic Therapy             6-12 Hrs later  > 0.5                Strongly Recommended            >0.25 - <0.5         Recommended  0.1 - 0.25           Discouraged                   Remeasure/reassess PCT  <0.1                 Strongly Discouraged          Remeasure/reassess PCT      As 28 day mortality risk marker: \"Change in Procalcitonin Result\" (> 80 % or <=80 %) if Day 0 (or Day 1) and Day 4 values are available. Refer to http://[email or web address]-pct-calculator.[email or web address]/   Change in PCT <=80 %   A decrease of PCT levels below or equal to 80 % defines a positive change in PCT test result representing a higher risk for 28-day all-cause mortality of patients diagnosed with severe sepsis or septic shock.  Change in PCT > 80 %   A decrease of PCT levels of more than 80 % defines a negative change in PCT result representing a lower risk for 28-day all-cause mortality of patients diagnosed with severe sepsis or septic " shock.                POC Glucose Once [[id number]]  (Abnormal) Collected:  03/09/18 0801    Specimen:  Blood Updated:  03/09/18 0811     Glucose 172 (H) mg/dL     Narrative:       Meter: [id number] : [id number] Calvin DUGAN RN    Phosphorus [[id number]]  (Abnormal) Collected:  03/09/18 0627    Specimen:  Blood Updated:  03/09/18 0758     Phosphorus 2.3 (L) mg/dL     Basic Metabolic Panel [[id number]]  (Abnormal) Collected:  03/09/18 0627    Specimen:  Blood Updated:  03/09/18 0758     Glucose 159 (H) mg/dL      BUN 19 mg/dL      Creatinine 1.03 (H) mg/dL      Sodium 145 mmol/L      Potassium 3.4 (L) mmol/L      Chloride 106 mmol/L      CO2 25.7 mmol/L      Calcium 8.1 (L) mg/dL      eGFR Non African Amer 56 (L) mL/min/1.73      BUN/Creatinine Ratio 18.4     Anion Gap 13.3 mmol/L     Narrative:       GFR Normal >60  Chronic Kidney Disease <60  Kidney Failure <15    Magnesium [[id number]]  (Normal) Collected:  03/09/18 0627    Specimen:  Blood Updated:  03/09/18 0758     Magnesium 2.1 mg/dL     POC Glucose Once [[id number]]  (Abnormal) Collected:  03/09/18 0623    Specimen:  Blood Updated:  03/09/18 0624     Glucose 160 (H) mg/dL     Narrative:       Meter: [id number] : [id number] Abi Hawk RN    POC Glucose Once [[id number]]  (Abnormal) Collected:  03/09/18 0442    Specimen:  Blood Updated:  03/09/18 0454     Glucose 141 (H) mg/dL     Narrative:       Meter: [id number] : [id number] Abi Hawk RN    POC Glucose Once [[id number]]  (Abnormal) Collected:  03/09/18 0321    Specimen:  Blood Updated:  03/09/18 0333     Glucose 151 (H) mg/dL     Narrative:       Meter: [id number] : [id number] Abi Hawk RN    POC Glucose Once [[id number]]  (Abnormal) Collected:  03/09/18 0217    Specimen:  Blood Updated:  03/09/18 0228     Glucose 174 (H) mg/dL     Narrative:       Meter: [id number] : [id number] Abi Hawk RN    POC Glucose Once [[id number]]  (Abnormal) Collected:  03/09/18 0108    Specimen:  Blood  Updated:  03/09/18 0109     Glucose 214 (H) mg/dL     Narrative:       Meter: [id number] : [id number] Hawk RN    POC Glucose Once [[id number]]  (Abnormal) Collected:  03/09/18 0007    Specimen:  Blood Updated:  03/09/18 0019     Glucose 216 (H) mg/dL     Narrative:       Meter: [id number] : [id number] Hawk RN    Basic Metabolic Panel [[id number]]  (Abnormal) Collected:  03/08/18 2310    Specimen:  Blood Updated:  03/08/18 2336     Glucose 239 (H) mg/dL      BUN 20 mg/dL      Creatinine 1.15 (H) mg/dL      Sodium 144 mmol/L      Potassium 3.3 (L) mmol/L      Chloride 108 (H) mmol/L      CO2 22.4 mmol/L      Calcium 8.0 (L) mg/dL      eGFR Non African Amer 49 (L) mL/min/1.73      BUN/Creatinine Ratio 17.4     Anion Gap 13.6 mmol/L     Narrative:       GFR Normal >60  Chronic Kidney Disease <60  Kidney Failure <15    POC Glucose Once [[id number]]  (Abnormal) Collected:  03/08/18 2310    Specimen:  Blood Updated:  03/08/18 2311     Glucose 220 (H) mg/dL     Narrative:       Meter: [id number] : Haritha Hawk RN    POC Glucose Once [[id number]]  (Abnormal) Collected:  03/08/18 2211    Specimen:  Blood Updated:  03/08/18 2222     Glucose 211 (H) mg/dL     Narrative:       Meter: [id number] : [id number] Hawk RN    POC Glucose Once [[id number]]  (Abnormal) Collected:  03/08/18 2109    Specimen:  Blood Updated:  03/08/18 2110     Glucose 210 (H) mg/dL     Narrative:       Meter: [id number] : [id number] Hawk RN    POC Glucose Once [[id number]]  (Abnormal) Collected:  03/08/18 2005    Specimen:  Blood Updated:  03/08/18 2006     Glucose 174 (H) mg/dL     Narrative:       Meter: [id number] : [id number] Hawk RN    POC Glucose Once [[id number]]  (Abnormal) Collected:  03/08/18 1915    Specimen:  Blood Updated:  03/08/18 1917     Glucose 147 (H) mg/dL     Narrative:       Meter: [id number] : [id number] Jim Spaulding    Basic Metabolic Panel [[id number]]   (Abnormal) Collected:  03/08/18 1505    Specimen:  Blood Updated:  03/08/18 1549     Glucose 120 (H) mg/dL      BUN 21 (H) mg/dL      Creatinine 1.21 (H) mg/dL      Sodium 143 mmol/L      Potassium 3.3 (L) mmol/L      Chloride 110 (H) mmol/L      CO2 18.8 (L) mmol/L      Calcium 8.3 (L) mg/dL      eGFR Non African Amer 47 (L) mL/min/1.73      BUN/Creatinine Ratio 17.4     Anion Gap 14.2 mmol/L     Narrative:       GFR Normal >60  Chronic Kidney Disease <60  Kidney Failure <15    Phosphorus [[id number]]  (Normal) Collected:  03/08/18 1505    Specimen:  Blood Updated:  03/08/18 1549     Phosphorus 2.6 mg/dL     Magnesium [[id number]]  (Normal) Collected:  03/08/18 1505    Specimen:  Blood Updated:  03/08/18 1549     Magnesium 2.0 mg/dL     POC Glucose Once [[id number]]  (Normal) Collected:  03/08/18 1538    Specimen:  Blood Updated:  03/08/18 1540     Glucose 117 mg/dL     Narrative:       Meter: [id number] : [id number] [employer]    POC Glucose Once [[id number]]  (Normal) Collected:  03/08/18 1307    Specimen:  Blood Updated:  03/08/18 1318     Glucose 124 mg/dL     Narrative:       Meter: [id number] : [id number] [employer]    POC Glucose Once [[id number]]  (Normal) Collected:  03/08/18 1133    Specimen:  Blood Updated:  03/08/18 1134     Glucose 122 mg/dL     Narrative:       Meter: [id number] : [id number] Lluvia Aicha RACHEAL    Basic Metabolic Panel [[id number]]  (Abnormal) Collected:  03/08/18 0810    Specimen:  Blood Updated:  03/08/18 1053     Glucose 179 (H) mg/dL      BUN 25 (H) mg/dL      Creatinine 1.42 (H) mg/dL      Sodium 145 mmol/L      Potassium 3.5 mmol/L      Chloride 112 (H) mmol/L      CO2 15.8 (L) mmol/L      Calcium 8.0 (L) mg/dL      eGFR Non African Amer 39 (L) mL/min/1.73      BUN/Creatinine Ratio 17.6     Anion Gap 17.2 mmol/L     Narrative:       GFR Normal >60  Chronic Kidney Disease <60  Kidney Failure <15    Phosphorus [[id number]]  (Normal) Collected:  03/08/18 0830     Specimen:  Blood Updated:  03/08/18 1052     Phosphorus 2.5 mg/dL     POC Glucose Once [[id number]]  (Normal) Collected:  03/08/18 1007    Specimen:  Blood Updated:  03/08/18 1016     Glucose 125 mg/dL     Narrative:       Meter: [id number] : [id number] Jim Spaulding    Calcium, Ionized [[id number]]  (Normal) Collected:  03/08/18 0810    Specimen:  Blood Updated:  03/08/18 0922     Ionized Calcium 1.25 mmol/L      Ionized Calcium 5.0 mg/dL     Magnesium [[id number]]  (Normal) Collected:  03/08/18 0810    Specimen:  Blood Updated:  03/08/18 0918     Magnesium 2.0 mg/dL     POC Glucose Once [[id number]]  (Abnormal) Collected:  03/08/18 0817    Specimen:  Blood Updated:  03/08/18 0828     Glucose 168 (H) mg/dL     Narrative:       Meter: [id number] : [id number] Jim Spaulding    POC Glucose Once [[id number]]  (Abnormal) Collected:  03/08/18 0633    Specimen:  Blood Updated:  03/08/18 0644     Glucose 192 (H) mg/dL     Narrative:       Meter: [id number] : [id number] Abi Hawk RN    Basic Metabolic Panel [[id number]]  (Abnormal) Collected:  03/08/18 0430    Specimen:  Blood Updated:  03/08/18 0543     Glucose 245 (H) mg/dL      BUN 27 (H) mg/dL      Creatinine 1.49 (H) mg/dL      Sodium 143 mmol/L      Potassium 3.6 mmol/L      Chloride 112 (H) mmol/L      CO2 13.0 (L) mmol/L      Calcium 8.1 (L) mg/dL      eGFR Non African Amer 37 (L) mL/min/1.73      BUN/Creatinine Ratio 18.1     Anion Gap 18.0 mmol/L     Narrative:       GFR Normal >60  Chronic Kidney Disease <60  Kidney Failure <15    Phosphorus [[id number]]  (Normal) Collected:  03/08/18 0430    Specimen:  Blood Updated:  03/08/18 0535     Phosphorus 3.0 mg/dL     Magnesium [[id number]]  (Normal) Collected:  03/08/18 0430    Specimen:  Blood Updated:  03/08/18 0535     Magnesium 2.2 mg/dL     POC Glucose Once [[id number]]  (Abnormal) Collected:  03/08/18 0532    Specimen:  Blood Updated:  03/08/18 0534     Glucose 216 (H) mg/dL     Narrative:       Meter:  [id number] : [id number] Abi Hawk RN    Calcium, Ionized [[id number]]  (Normal) Collected:  03/08/18 0430    Specimen:  Blood Updated:  03/08/18 0533     Ionized Calcium 1.28 mmol/L      Ionized Calcium 5.1 mg/dL     CBC & Differential [[id number]] Collected:  03/08/18 0430    Specimen:  Blood Updated:  03/08/18 0524    Narrative:       The following orders were created for panel order CBC & Differential.  Procedure                               Abnormality         Status                     ---------                               -----------         ------                     CBC Auto Differential[[id number]]        Abnormal            Final result                 Please view results for these tests on the individual orders.    CBC Auto Differential [[id number]]  (Abnormal) Collected:  03/08/18 0430    Specimen:  Blood Updated:  03/08/18 0524     WBC 9.43 10*3/mm3      RBC 3.71 (L) 10*6/mm3      Hemoglobin 11.5 (L) g/dL      Hematocrit 34.7 (L) %      MCV 93.5 fL      MCH 31.0 pg      MCHC 33.1 g/dL      RDW 13.6 (H) %      RDW-SD 46.3 fl      MPV 10.4 fL      Platelets 120 (L) 10*3/mm3      Neutrophil % 85.0 (H) %      Lymphocyte % 7.3 (L) %      Monocyte % 7.4 %      Eosinophil % 0.0 (L) %      Basophil % 0.1 %      Immature Grans % 0.2 %      Neutrophils, Absolute 8.01 10*3/mm3      Lymphocytes, Absolute 0.69 (L) 10*3/mm3      Monocytes, Absolute 0.70 10*3/mm3      Eosinophils, Absolute 0.00 10*3/mm3      Basophils, Absolute 0.01 10*3/mm3      Immature Grans, Absolute 0.02 10*3/mm3     POC Glucose Once [[id number]]  (Abnormal) Collected:  03/08/18 0424    Specimen:  Blood Updated:  03/08/18 0425     Glucose 233 (H) mg/dL     Narrative:       Meter: [id number] : [id number] Abi Hawk RN    POC Glucose Once [[id number]]  (Abnormal) Collected:  03/08/18 0323    Specimen:  Blood Updated:  03/08/18 0334     Glucose 210 (H) mg/dL     Narrative:       Meter: [id number] : [id number] Abi Hawk RN    POC  Glucose Once [[id number]]  (Abnormal) Collected:  03/08/18 0220    Specimen:  Blood Updated:  03/08/18 0231     Glucose 232 (H) mg/dL     Narrative:       Meter: [id number] : [id number] Abi Hawk RN    Basic Metabolic Panel [[id number]]  (Abnormal) Collected:  03/08/18 0020    Specimen:  Blood Updated:  03/08/18 0137     Glucose 268 (H) mg/dL      BUN 28 (H) mg/dL      Creatinine 1.48 (H) mg/dL      Sodium 141 mmol/L      Potassium 3.7 mmol/L      Chloride 108 (H) mmol/L      CO2 9.8 (L) mmol/L      Calcium 8.3 (L) mg/dL      eGFR Non African Amer 37 (L) mL/min/1.73      BUN/Creatinine Ratio 18.9     Anion Gap 23.2 mmol/L     Narrative:       GFR Normal >60  Chronic Kidney Disease <60  Kidney Failure <15    Phosphorus [[id number]]  (Normal) Collected:  03/08/18 0020    Specimen:  Blood Updated:  03/08/18 0130     Phosphorus 3.6 mg/dL     Magnesium [[id number]]  (Normal) Collected:  03/08/18 0020    Specimen:  Blood Updated:  03/08/18 0130     Magnesium 2.2 mg/dL     Calcium, Ionized [[id number]]  (Normal) Collected:  03/08/18 0020    Specimen:  Blood Updated:  03/08/18 0123     Ionized Calcium 1.27 mmol/L      Ionized Calcium 5.1 mg/dL     POC Glucose Once [[id number]]  (Abnormal) Collected:  03/08/18 0119    Specimen:  Blood Updated:  03/08/18 0119     Glucose 248 (H) mg/dL     Narrative:       Meter: [id number] : [id number] Abi Hawk RN    POC Glucose Once [[id number]]  (Abnormal) Collected:  03/08/18 0008    Specimen:  Blood Updated:  03/08/18 0009     Glucose 242 (H) mg/dL     Narrative:       Meter: [id number] : [id number] Abi Hawk RN    POC Glucose Once [[id number]]  (Abnormal) Collected:  03/07/18 2244    Specimen:  Blood Updated:  03/07/18 2246     Glucose 184 (H) mg/dL     Narrative:       Meter: [id number] : [id number] Abi Hawk RN    Calcium, Ionized [[id number]]  (Normal) Collected:  03/07/18 2004    Specimen:  Blood Updated:  03/07/18 2213     Ionized Calcium 1.25 mmol/L      Ionized  Calcium 5.0 mg/dL     Phosphorus [[id number]]  (Normal) Collected:  03/07/18 2004    Specimen:  Blood Updated:  03/07/18 2146     Phosphorus 3.5 mg/dL     Basic Metabolic Panel [[id number]]  (Abnormal) Collected:  03/07/18 2004    Specimen:  Blood Updated:  03/07/18 2146     Glucose 146 (H) mg/dL      BUN 30 (H) mg/dL      Creatinine 1.54 (H) mg/dL      Sodium 143 mmol/L      Potassium 3.7 mmol/L      Chloride 112 (H) mmol/L      CO2 12.1 (L) mmol/L      Calcium 7.9 (L) mg/dL      eGFR Non African Amer 35 (L) mL/min/1.73      BUN/Creatinine Ratio 19.5     Anion Gap 18.9 mmol/L     Narrative:       GFR Normal >60  Chronic Kidney Disease <60  Kidney Failure <15    Magnesium [[id number]]  (Normal) Collected:  03/07/18 2004    Specimen:  Blood Updated:  03/07/18 2146     Magnesium 2.3 mg/dL     POC Glucose Once [[id number]]  (Abnormal) Collected:  03/07/18 2130    Specimen:  Blood Updated:  03/07/18 2131     Glucose 156 (H) mg/dL     Narrative:       Meter: [id number] : [id number] Katie Sosa RN    POC Glucose Once [[id number]]  (Normal) Collected:  03/07/18 1954    Specimen:  Blood Updated:  03/07/18 2006     Glucose 127 mg/dL     Narrative:       Meter: [id number] : [id number] Abi Hawk RN    POC Glucose Once [[id number]]  (Normal) Collected:  03/07/18 1828    Specimen:  Blood Updated:  03/07/18 1840     Glucose 111 mg/dL     Narrative:       Meter: [id number] : [id number] Jim Spaulding    Magnesium [[id number]]  (Normal) Collected:  03/07/18 1637    Specimen:  Blood Updated:  03/07/18 1744     Magnesium 2.1 mg/dL     Calcium, Ionized [[id number]]  (Normal) Collected:  03/07/18 1637    Specimen:  Blood Updated:  03/07/18 1742     Ionized Calcium 1.24 mmol/L      Ionized Calcium 5.0 mg/dL     Phosphorus [[id number]]  (Normal) Collected:  03/07/18 1637    Specimen:  Blood Updated:  03/07/18 1731     Phosphorus 3.3 mg/dL     Basic Metabolic Panel [[id number]]  (Abnormal) Collected:  03/07/18 1637     Specimen:  Blood Updated:  03/07/18 1731     Glucose 127 (H) mg/dL      BUN 30 (H) mg/dL      Creatinine 1.59 (H) mg/dL      Sodium 142 mmol/L      Potassium 3.4 (L) mmol/L      Chloride 112 (H) mmol/L      CO2 11.6 (L) mmol/L      Calcium 8.2 (L) mg/dL      eGFR Non African Amer 34 (L) mL/min/1.73      BUN/Creatinine Ratio 18.9     Anion Gap 18.4 mmol/L     Narrative:       GFR Normal >60  Chronic Kidney Disease <60  Kidney Failure <15    POC Glucose Once [[id number]]  (Normal) Collected:  03/07/18 1704    Specimen:  Blood Updated:  03/07/18 1716     Glucose 118 mg/dL     Narrative:       Meter: [id number] : [id number] Jim Spaulding    POC Glucose Once [[id number]]  (Abnormal) Collected:  03/07/18 1510    Specimen:  Blood Updated:  03/07/18 1521     Glucose 143 (H) mg/dL     Narrative:       Meter: [id number] : [id number] Jim Spaulding    POC Glucose Once [[id number]]  (Abnormal) Collected:  03/07/18 1343    Specimen:  Blood Updated:  03/07/18 1355     Glucose 144 (H) mg/dL     Narrative:       Meter: [id number] : [id number] Jim Spaulding        Imaging Results (last 72 hours)     Procedure Component Value Units Date/Time    XR Chest 1 View [[id number]] Collected:  03/07/18 0419     Updated:  03/07/18 2313    Narrative:       X-RAY CHEST 1 VIEW.     HISTORY: Follow-up perihilar infiltrate.     COMPARISON: Persistent right perihilar opacity concerning for  infiltrate.     FINDINGS:  Cardiomediastinal silhouette is within normal limits.         New opacity in the left lung base, infiltrate is suspected.              Impression:       New infiltrate in the left lung base is suspected. Follow-up is  recommended.  Right perihilar infiltrate is unchanged.         This report was finalized on 3/7/2018 11:10 PM by Dr. Smith Li MD.             Medication Review: done      Current Facility-Administered Medications:   •  cefTRIAXone (ROCEPHIN) IVPB 1 g, 1 g, Intravenous, Q24H, Randall Pennington MD,  Last Rate: 100 mL/hr at 03/10/18 1109, 1 g at 03/10/18 1109  •  dextrose (D50W) solution 25 g, 25 g, Intravenous, Q15 Min PRN, Jacqueline Alcantar MD, 25 g at 03/10/18 0016  •  dextrose (GLUTOSE) oral gel 15 g, 15 g, Oral, Q15 Min PRN, Jacqueline Alcantar MD  •  doxycycline (VIBRAMYCIN) 100 mg/100 mL 0.9% NS MBP, 100 mg, Intravenous, Q12H, Randall Pennington MD, 100 mg at 03/10/18 1232  •  enoxaparin (LOVENOX) syringe 40 mg, 40 mg, Subcutaneous, Q24H, Jacqueline Alcantar MD, 40 mg at 03/09/18 2125  •  glucagon (human recombinant) (GLUCAGEN DIAGNOSTIC) injection 1 mg, 1 mg, Subcutaneous, PRN, Jacqueline Alcantar MD  •  insulin aspart (novoLOG) injection 2-5 Units, 2-5 Units, Subcutaneous, 4x Daily AC & at Bedtime, Obed ELIZABETH MD, 2 Units at 03/09/18 2009  •  insulin detemir (LEVEMIR) injection 15 Units, 15 Units, Subcutaneous, Q12H, Obed ELIZABETH MD, 15 Units at 03/10/18 1107  •  ipratropium-albuterol (DUO-NEB) nebulizer solution 3 mL, 3 mL, Nebulization, Q4H PRN, Jacqueline Alcantar MD, 3 mL at 03/08/18 2336  •  ondansetron (ZOFRAN) injection 4 mg, 4 mg, Intravenous, Q4H PRN, Jacqueline Alcantar MD, 4 mg at 03/09/18 1912  •  potassium chloride (MICRO-K) CR capsule 40 mEq, 40 mEq, Oral, PRN **OR** potassium chloride (KLOR-CON) packet 40 mEq, 40 mEq, Oral, PRN **OR** potassium chloride 10 mEq in 100 mL IVPB, 10 mEq, Intravenous, Q1H PRN, Jacqueline Alcantar MD, Last Rate: 100 mL/hr at 03/10/18 1232, 10 mEq at 03/10/18 1232  •  promethazine (PHENERGAN) injection 12.5 mg, 12.5 mg, Intravenous, Q4H PRN, Jacqueline Alcantar MD, 12.5 mg at 03/08/18 1953  •  sodium chloride 0.9 % flush 1-10 mL, 1-10 mL, Intravenous, PRN, Jacqueline Alcantar MD    Assessment/Plan     Active Hospital Problems (** Indicates Principal Problem)    Diagnosis Date Noted   • Edema [R60.9] 03/10/2018   • Diabetic ketoacidosis with coma associated with type 2 diabetes mellitus [E13.11] 03/06/2018      Resolved Hospital Problems    Diagnosis Date Noted Date Resolved   No  "resolved problems to display.     Diabetes mellitus  Would consider checking patient's C-peptide levels, arnoldo 65, islet cell antibody, insulin antibody levels as an outpatient.  Based on patient's phenotype and her presentation with DKA concern patient could have type 1 diabetes mellitus  Continue levemir 15 units in the morning  Decrease levemir to 12 units at bedtime.  Continue NovoLog low-dose sliding scale 3 times a day before meals and at bedtime  Discussed the patient to hold her NovoLog in case if her blood sugars are less than 100 mg/dL.  Okay for discharge from endocrine perspective.    Patient needs to follow-up with Dr. Conway upon discharge in a period of 4-6 weeks.      Geoffrey Lebron MD.  03/10/18  1:26 PM      EMR Dragon / transcription disclaimer:    \"Dictated utilizing Dragon dictation\".   "

## 2018-03-10 NOTE — PLAN OF CARE
Problem: Patient Care Overview (Adult)  Goal: Plan of Care Review  Outcome: Ongoing (interventions implemented as appropriate)   03/10/18 1701   Coping/Psychosocial Response Interventions   Plan Of Care Reviewed With patient;spouse   Patient Care Overview   Progress improving   Outcome Evaluation   Outcome Summary/Follow up Plan Pt transfered to [address] [place] [address], room 681.

## 2018-03-10 NOTE — PROGRESS NOTES
Tushar Lee MD                          [phone]      Patient ID:    Name:  Sonal De Dios    MRN:  [id number]    1965   52 y.o.  female            Patient Care Team:  No Known Provider as PCP - General    LOS: 4    CC/Reason for visit:     Subjective: Pt seen and examined this AM. No acute overnight events noted. Doing better.  Sitting in the chair having breakfast.        ROS:   Denies any fevers/ chills/ CP/ palpitations/ Nausea/ vomiting/ Diarrhoea  No Worsening cough or SOA.     Objective     Vital Signs past 24hrs    BP range: BP: (113-146)/() 137/92  Pulse range: Heart Rate:  [] 102  Resp rate range:    Temp range: Temp (24hrs), Av.5 °F (36.9 °C), Min:98.1 °F (36.7 °C), Max:99 °F (37.2 °C)      Ventilator/Non-Invasive Ventilation Settings     None          Device (Oxygen Therapy): room air       70.3 kg (154 lb 15.7 oz); Body mass index is 26.6 kg/m².      Intake/Output Summary (Last 24 hours) at 03/10/18 1154  Last data filed at 03/10/18 0820   Gross per 24 hour   Intake          2158.62 ml   Output              350 ml   Net          1808.62 ml       Exam:    GEN:  No respiratory distress, appears stated age  EYES:   EOM-i, anicteric sclera bilat  ENT:    External ears/nose normal, OP clear, Dry mucous membranes ,   NECK:  Supple, midline trachea, No JVD or cervical LApathy  LUNGS: Bilateral breath sounds heard, No adventitious sounds  CV:  Regular rate and rhythm, No murmur  ABD:  Non tender, no distended, no palpable liver or masses  EXT:  No cyanosis or clubbing, no peripheral/sacral edema,    Scheduled meds:    ceftriaxone 1 g Intravenous Q24H   doxycycline 100 mg Intravenous Q12H   enoxaparin 40 mg Subcutaneous Q24H   insulin aspart 2-5 Units Subcutaneous 4x Daily AC & at Bedtime   insulin detemir 15 Units Subcutaneous Q12H       IV meds:                           Data Review:        Results from last 7 days  Lab Units 03/10/18  6785  03/09/18  0627 03/08/18  2310  03/08/18  0430  03/06/18 2118 03/06/18 1810   SODIUM mmol/L 139 145 144  < > 143  < > 132*  --  126*   POTASSIUM mmol/L 3.5 3.4* 3.3*  < > 3.6  < > 5.2  --  6.2*   CHLORIDE mmol/L 102 106 108*  < > 112*  < > 95*  --  85*   CO2 mmol/L 23.3 25.7 22.4  < > 13.0*  < > 2.3*  --  6.7*   BUN mg/dL 17 19 20  < > 27*  < > 30*  --  29*   CREATININE mg/dL 0.90 1.03* 1.15*  < > 1.49*  < > 1.37*  --  1.61*   CALCIUM mg/dL 7.9* 8.1* 8.0*  < > 8.1*  < > 7.9*  --  8.9   BILIRUBIN mg/dL  --   --   --   --   --   --  <0.2  --  <0.2   ALK PHOS U/L  --   --   --   --   --   --  98  --  135*   ALT (SGPT) U/L  --   --   --   --   --   --  13  --  14   AST (SGOT) U/L  --   --   --   --   --   --  18  --  19   GLUCOSE mg/dL 97 159* 239*  < > 245*  < > 651*  < > 781*   WBC 10*3/mm3 11.07*  --   --   --  9.43  --  25.09*  --   --    HEMOGLOBIN g/dL 13.1  --   --   --  11.5*  --  12.9  --   --    PLATELETS 10*3/mm3 165  --   --   --  120*  --  215  --   --    PROCALCITONIN ng/mL  --  3.22*  --   --   --   --   --   --  47.80*   < > = values in this interval not displayed.    Lab Results   Component Value Date    CALCIUM 7.9 (L) 03/10/2018    PHOS 3.6 03/10/2018         Results from last 7 days  Lab Units 03/06/18  1819 03/06/18  1810   BLOODCX  No growth at 3 days No growth at 3 days         Results from last 7 days  Lab Units 03/06/18  1810   TROPONIN T ng/mL <0.010       Results Review:    I have reviewed the available laboratory results and reviewed the chest imaging personally    Imaging Results (all)     Procedure Component Value Units Date/Time    XR Chest 1 View [[id number]] Collected:  03/07/18 0419     Updated:  03/07/18 2313    Narrative:       X-RAY CHEST 1 VIEW.     HISTORY: Follow-up perihilar infiltrate.     COMPARISON: Persistent right perihilar opacity concerning for  infiltrate.     FINDINGS:  Cardiomediastinal silhouette is within normal limits.         New opacity in the left lung base,  infiltrate is suspected.              Impression:       New infiltrate in the left lung base is suspected. Follow-up is  recommended.  Right perihilar infiltrate is unchanged.         This report was finalized on 3/7/2018 11:10 PM by Dr. Smith Li MD.       XR Chest 1 View [[id number]] Collected:  03/06/18 1925     Updated:  03/07/18 0809    Narrative:       PORTABLE RADIOGRAPHIC VIEW OF THE CHEST      CLINICAL HISTORY:  Hypotension and hyperglycemia.     A portable radiographic view of the chest demonstrates no acute  infiltrate.  There is some pulmonary vascular engorgement. The  cardiomediastinal silhouette is remarkable for prominence of the right  hilum superiorly. A right hilar mass cannot be entirely excluded  although the findings may simply be due to prominent vascular structures  or potentially an adjacent infiltrate. I recommend further evaluation  with a PA and lateral radiographic view of the chest. There is a  somewhat gas-distended stomach. The osseous structures are unremarkable.       Impression:          Pulmonary vascular enlargement without significant interstitial  pulmonary edema.     Prominence of the right hilar structures which may simply represent  prominent vasculature.  However, I cannot exclude the possibility of a  right hilar mass or potentially an adjacent infiltrate. I recommend  further evaluation with either a PA and lateral chest radiograph or a CT  scan of the chest.     Mild gaseous distention of the stomach.     These findings and recommendations were discussed with Dr. Eugene on  03/06/2018 at approximately 7 PM.      This report was finalized on 3/7/2018 8:06 AM by Dr. Louis Crowder MD.             ASSESSMENT:   DKA resolved  Anion gap met acidosis resolved  DM - insulin dependent  CAP  HMPV infection  Metabolic encephalopathy resolved  HIRA resolved   Hyperglycemia     Recs  Apreciate nephrology input.  Will c/w doxy for CAP   Taking PO and c/w insulin.   We will await  endocrinology recommendations reg DM control.    Tx to floor.     I have discussed my findings and recommendations with patient, family and nursing staff.     Tushar Lee MD  3/10/2018

## 2018-03-11 VITALS
BODY MASS INDEX: 26.46 KG/M2 | OXYGEN SATURATION: 96 % | DIASTOLIC BLOOD PRESSURE: 84 MMHG | WEIGHT: 154.98 LBS | HEIGHT: 64 IN | RESPIRATION RATE: 18 BRPM | TEMPERATURE: 98.4 F | SYSTOLIC BLOOD PRESSURE: 122 MMHG | HEART RATE: 98 BPM

## 2018-03-11 PROBLEM — R60.9 EDEMA: Status: RESOLVED | Noted: 2018-03-10 | Resolved: 2018-03-11

## 2018-03-11 PROBLEM — E11.11 DIABETIC KETOACIDOSIS WITH COMA ASSOCIATED WITH TYPE 2 DIABETES MELLITUS: Status: RESOLVED | Noted: 2018-03-06 | Resolved: 2018-03-11

## 2018-03-11 LAB
ALBUMIN SERPL-MCNC: 2.3 G/DL (ref 3.5–5.2)
ANION GAP SERPL CALCULATED.3IONS-SCNC: 13.7 MMOL/L
BACTERIA SPEC AEROBE CULT: NORMAL
BACTERIA SPEC AEROBE CULT: NORMAL
BASOPHILS # BLD AUTO: 0.01 10*3/MM3 (ref 0–0.2)
BASOPHILS NFR BLD AUTO: 0.1 % (ref 0–1.5)
BUN BLD-MCNC: 15 MG/DL (ref 6–20)
BUN/CREAT SERPL: 16 (ref 7–25)
CALCIUM SPEC-SCNC: 7.8 MG/DL (ref 8.6–10.5)
CHLORIDE SERPL-SCNC: 103 MMOL/L (ref 98–107)
CO2 SERPL-SCNC: 23.3 MMOL/L (ref 22–29)
CREAT BLD-MCNC: 0.94 MG/DL (ref 0.57–1)
DEPRECATED RDW RBC AUTO: 46.3 FL (ref 37–54)
EOSINOPHIL # BLD AUTO: 0.03 10*3/MM3 (ref 0–0.7)
EOSINOPHIL NFR BLD AUTO: 0.4 % (ref 0.3–6.2)
ERYTHROCYTE [DISTWIDTH] IN BLOOD BY AUTOMATED COUNT: 13.2 % (ref 11.7–13)
GFR SERPL CREATININE-BSD FRML MDRD: 62 ML/MIN/1.73
GLUCOSE BLD-MCNC: 102 MG/DL (ref 65–99)
GLUCOSE BLDC GLUCOMTR-MCNC: 111 MG/DL (ref 70–130)
GLUCOSE BLDC GLUCOMTR-MCNC: 122 MG/DL (ref 70–130)
GLUCOSE BLDC GLUCOMTR-MCNC: 49 MG/DL (ref 70–130)
GLUCOSE BLDC GLUCOMTR-MCNC: 87 MG/DL (ref 70–130)
HCT VFR BLD AUTO: 41.7 % (ref 35.6–45.5)
HGB BLD-MCNC: 13.3 G/DL (ref 11.9–15.5)
IMM GRANULOCYTES # BLD: 0.02 10*3/MM3 (ref 0–0.03)
IMM GRANULOCYTES NFR BLD: 0.3 % (ref 0–0.5)
LYMPHOCYTES # BLD AUTO: 1.51 10*3/MM3 (ref 0.9–4.8)
LYMPHOCYTES NFR BLD AUTO: 19.4 % (ref 19.6–45.3)
MCH RBC QN AUTO: 30.6 PG (ref 26.9–32)
MCHC RBC AUTO-ENTMCNC: 31.9 G/DL (ref 32.4–36.3)
MCV RBC AUTO: 95.9 FL (ref 80.5–98.2)
MONOCYTES # BLD AUTO: 1.09 10*3/MM3 (ref 0.2–1.2)
MONOCYTES NFR BLD AUTO: 14 % (ref 5–12)
NEUTROPHILS # BLD AUTO: 5.11 10*3/MM3 (ref 1.9–8.1)
NEUTROPHILS NFR BLD AUTO: 65.8 % (ref 42.7–76)
PHOSPHATE SERPL-MCNC: 4 MG/DL (ref 2.5–4.5)
PLATELET # BLD AUTO: 196 10*3/MM3 (ref 140–500)
PMV BLD AUTO: 10.4 FL (ref 6–12)
POTASSIUM BLD-SCNC: 3.6 MMOL/L (ref 3.5–5.2)
RBC # BLD AUTO: 4.35 10*6/MM3 (ref 3.9–5.2)
SODIUM BLD-SCNC: 140 MMOL/L (ref 136–145)
WBC NRBC COR # BLD: 7.77 10*3/MM3 (ref 4.5–10.7)

## 2018-03-11 PROCEDURE — 82962 GLUCOSE BLOOD TEST: CPT

## 2018-03-11 PROCEDURE — 85025 COMPLETE CBC W/AUTO DIFF WBC: CPT | Performed by: INTERNAL MEDICINE

## 2018-03-11 PROCEDURE — 99233 SBSQ HOSP IP/OBS HIGH 50: CPT | Performed by: INTERNAL MEDICINE

## 2018-03-11 PROCEDURE — 80069 RENAL FUNCTION PANEL: CPT | Performed by: INTERNAL MEDICINE

## 2018-03-11 RX ORDER — DOXYCYCLINE HYCLATE 100 MG/1
100 CAPSULE ORAL EVERY 12 HOURS SCHEDULED
Qty: 13 CAPSULE | Refills: 0 | Status: SHIPPED | OUTPATIENT
Start: 2018-03-11 | End: 2018-03-18

## 2018-03-11 RX ORDER — ONDANSETRON 4 MG/1
4 TABLET, FILM COATED ORAL EVERY 4 HOURS PRN
Status: DISCONTINUED | OUTPATIENT
Start: 2018-03-11 | End: 2018-03-11 | Stop reason: HOSPADM

## 2018-03-11 RX ORDER — DOXYCYCLINE HYCLATE 50 MG/1
100 CAPSULE ORAL EVERY 12 HOURS SCHEDULED
Status: DISCONTINUED | OUTPATIENT
Start: 2018-03-11 | End: 2018-03-11 | Stop reason: HOSPADM

## 2018-03-11 RX ADMIN — DOXYCYCLINE HYCLATE 100 MG: 50 CAPSULE, GELATIN COATED ORAL at 10:42

## 2018-03-11 RX ADMIN — ONDANSETRON 4 MG: 4 TABLET, FILM COATED ORAL at 03:03

## 2018-03-11 RX ADMIN — POTASSIUM CHLORIDE 40 MEQ: 750 CAPSULE, EXTENDED RELEASE ORAL at 09:24

## 2018-03-11 NOTE — PROGRESS NOTES
52 y.o.   LOS: 5 days   Patient Care Team:  No Known Provider as PCP - General    Chief Complaint:  Hyperglycemia    Chief Complaint   Patient presents with   • Hypotension   • Hyperglycemia       Subjective  Noted that pt continues to have low BG at around 3 am even after decreasing the dose of her insulin yesterday.   Pt reports that she felt weak during that time.     Interval History:    Review of Systems:   Review of Systems   Constitutional: Positive for appetite change and fatigue.   Gastrointestinal: Negative for abdominal pain, nausea and vomiting.   Musculoskeletal: Positive for back pain and myalgias.   Skin: Negative for pallor.   Neurological: Positive for weakness and numbness.     Objective     Vital Signs   Temp:  [96.1 °F (35.6 °C)-98.7 °F (37.1 °C)] 98.4 °F (36.9 °C)  Heart Rate:  [] 98  Resp:  [17-18] 18  BP: (122-137)/(80-95) 122/84    Physical Exam:  Gen exam - alert and oriented x 3, not in distress.   HEENT - Thyroid palpable.   Resp - Clear to auscultation.   CVS - S1,S2 heard and no murmurs.   Abd - Non tender, BS heard.   Ext - No edema     Physical ExamResults Review:     I reviewed the patient's new clinical results.      Glucose   Date/Time Value Ref Range Status   03/11/2018 0508 102 (H) 65 - 99 mg/dL Final   03/10/2018 0510 97 65 - 99 mg/dL Final   03/09/2018 0627 159 (H) 65 - 99 mg/dL Final   03/08/2018 2310 239 (H) 65 - 99 mg/dL Final   03/08/2018 1505 120 (H) 65 - 99 mg/dL Final     Lab Results (last 72 hours)     Procedure Component Value Units Date/Time    POC Glucose Once [[id number]]  (Normal) Collected:  03/10/18 1114    Specimen:  Blood Updated:  03/10/18 1126     Glucose 92 mg/dL     Narrative:       Meter: [id number] : [id number] Connie Simpson    POC Glucose Once [[id number]]  (Normal) Collected:  03/10/18 0716    Specimen:  Blood Updated:  03/10/18 0721     Glucose 81 mg/dL     Narrative:       Meter: [id number] : [id number] Cydney REDMOND    Renal Function  Panel [[id number]]  (Abnormal) Collected:  03/10/18 0510    Specimen:  Blood Updated:  03/10/18 0647     Glucose 97 mg/dL      BUN 17 mg/dL      Creatinine 0.90 mg/dL      Sodium 139 mmol/L      Potassium 3.5 mmol/L      Chloride 102 mmol/L      CO2 23.3 mmol/L      Calcium 7.9 (L) mg/dL      Albumin 2.90 (L) g/dL      Phosphorus 3.6 mg/dL      Anion Gap 13.7 mmol/L      BUN/Creatinine Ratio 18.9     eGFR Non African Amer 65 mL/min/1.73     Magnesium [[id number]]  (Normal) Collected:  03/10/18 0510    Specimen:  Blood Updated:  03/10/18 0640     Magnesium 2.3 mg/dL     CBC & Differential [[id number]] Collected:  03/10/18 0510    Specimen:  Blood Updated:  03/10/18 0619    Narrative:       The following orders were created for panel order CBC & Differential.  Procedure                               Abnormality         Status                     ---------                               -----------         ------                     CBC Auto Differential[[id number]]        Abnormal            Final result                 Please view results for these tests on the individual orders.    CBC Auto Differential [[id number]]  (Abnormal) Collected:  03/10/18 0510    Specimen:  Blood Updated:  03/10/18 0619     WBC 11.07 (H) 10*3/mm3      RBC 4.27 10*6/mm3      Hemoglobin 13.1 g/dL      Hematocrit 40.0 %      MCV 93.7 fL      MCH 30.7 pg      MCHC 32.8 g/dL      RDW 13.5 (H) %      RDW-SD 46.5 fl      MPV 11.1 fL      Platelets 165 10*3/mm3      Neutrophil % 73.9 %      Lymphocyte % 17.0 (L) %      Monocyte % 8.9 %      Eosinophil % 0.0 (L) %      Basophil % 0.0 %      Immature Grans % 0.2 %      Neutrophils, Absolute 8.19 (H) 10*3/mm3      Lymphocytes, Absolute 1.88 10*3/mm3      Monocytes, Absolute 0.98 10*3/mm3      Eosinophils, Absolute 0.00 10*3/mm3      Basophils, Absolute 0.00 10*3/mm3      Immature Grans, Absolute 0.02 10*3/mm3     POC Glucose Once [[id number]]  (Normal) Collected:  03/10/18 0418    Specimen:  Blood Updated:   03/10/18 0449     Glucose 78 mg/dL     Narrative:       Meter: [id number] : [id number] Nemesio Jennings RN    POC Glucose Once [[id number]]  (Normal) Collected:  03/10/18 0227    Specimen:  Blood Updated:  03/10/18 0437     Glucose 125 mg/dL     Narrative:       Meter: [id number] : [id number] Nemesio Jennings RN    POC Glucose Once [[id number]]  (Abnormal) Collected:  03/10/18 0035    Specimen:  Blood Updated:  03/10/18 0037     Glucose 178 (H) mg/dL     Narrative:       Meter: [id number] : [id number] Gianfranco Galaviz    POC Glucose Once [[id number]]  (Normal) Collected:  03/10/18 0007    Specimen:  Blood Updated:  03/10/18 0013     Glucose 72 mg/dL     Narrative:       Meter: [id number] : [id number] Nemesio Jennings RN    POC Glucose Once [[id number]]  (Normal) Collected:  03/09/18 2338    Specimen:  Blood Updated:  03/09/18 2339     Glucose 74 mg/dL     Narrative:       Meter: [id number] : [id number] Gianfranco Galaviz    POC Glucose Once [[id number]]  (Abnormal) Collected:  03/09/18 1944    Specimen:  Blood Updated:  03/09/18 1945     Glucose 189 (H) mg/dL     Narrative:       Meter: [id number] : [id number] Gianfranco Galaviz    Blood Culture - Blood, [[id number]]  (Normal) Collected:  03/06/18 1819    Specimen:  Blood from Arm, Left Updated:  03/09/18 1831     Blood Culture No growth at 3 days    Blood Culture - Blood, [[id number]]  (Normal) Collected:  03/06/18 1810    Specimen:  Blood from Arm, Left Updated:  03/09/18 1831     Blood Culture No growth at 3 days    POC Glucose Once [[id number]]  (Abnormal) Collected:  03/09/18 1739    Specimen:  Blood Updated:  03/09/18 1750     Glucose 157 (H) mg/dL     Narrative:       Meter: [id number] : [id number] Calvin DUGAN RN    POC Glucose Once [[id number]]  (Abnormal) Collected:  03/09/18 1130    Specimen:  Blood Updated:  03/09/18 1141     Glucose 174 (H) mg/dL     Narrative:       Meter: [id number] : [id number] Calvin DUGAN RN    POC Glucose Once [[id number]]   "(Abnormal) Collected:  03/09/18 1014    Specimen:  Blood Updated:  03/09/18 1024     Glucose 172 (H) mg/dL     Narrative:       Meter: [id number] : [id number] Calvin DUGAN RN    POC Glucose Once [[id number]]  (Abnormal) Collected:  03/09/18 0915    Specimen:  Blood Updated:  03/09/18 0925     Glucose 171 (H) mg/dL     Narrative:       Meter: [id number] : [id number] Calvin DUGAN RN    Procalcitonin [[id number]]  (Abnormal) Collected:  03/09/18 0627    Specimen:  Blood Updated:  03/09/18 0811     Procalcitonin 3.22 (C) ng/mL     Narrative:       As a Marker for Sepsis (Non-Neonates):   1. <0.5 ng/mL represents a low risk of severe sepsis and/or septic shock.  1. >2 ng/mL represents a high risk of severe sepsis and/or septic shock.    As a Marker for Lower Respiratory Tract Infections that require antibiotic therapy:  PCT on Admission     Antibiotic Therapy             6-12 Hrs later  > 0.5                Strongly Recommended            >0.25 - <0.5         Recommended  0.1 - 0.25           Discouraged                   Remeasure/reassess PCT  <0.1                 Strongly Discouraged          Remeasure/reassess PCT      As 28 day mortality risk marker: \"Change in Procalcitonin Result\" (> 80 % or <=80 %) if Day 0 (or Day 1) and Day 4 values are available. Refer to [email or web address]-[email or web address]/   Change in PCT <=80 %   A decrease of PCT levels below or equal to 80 % defines a positive change in PCT test result representing a higher risk for 28-day all-cause mortality of patients diagnosed with severe sepsis or septic shock.  Change in PCT > 80 %   A decrease of PCT levels of more than 80 % defines a negative change in PCT result representing a lower risk for 28-day all-cause mortality of patients diagnosed with severe sepsis or septic shock.                POC Glucose Once [[id number]]  (Abnormal) Collected:  03/09/18 0801    Specimen:  Blood Updated:  03/09/18 0811     Glucose 172 (H) mg/dL     " Narrative:       Meter: [id number] : [id number] Calvin DUGAN RN    Phosphorus [[id number]]  (Abnormal) Collected:  03/09/18 0627    Specimen:  Blood Updated:  03/09/18 0758     Phosphorus 2.3 (L) mg/dL     Basic Metabolic Panel [[id number]]  (Abnormal) Collected:  03/09/18 0627    Specimen:  Blood Updated:  03/09/18 0758     Glucose 159 (H) mg/dL      BUN 19 mg/dL      Creatinine 1.03 (H) mg/dL      Sodium 145 mmol/L      Potassium 3.4 (L) mmol/L      Chloride 106 mmol/L      CO2 25.7 mmol/L      Calcium 8.1 (L) mg/dL      eGFR Non African Amer 56 (L) mL/min/1.73      BUN/Creatinine Ratio 18.4     Anion Gap 13.3 mmol/L     Narrative:       GFR Normal >60  Chronic Kidney Disease <60  Kidney Failure <15    Magnesium [[id number]]  (Normal) Collected:  03/09/18 0627    Specimen:  Blood Updated:  03/09/18 0758     Magnesium 2.1 mg/dL     POC Glucose Once [[id number]]  (Abnormal) Collected:  03/09/18 0623    Specimen:  Blood Updated:  03/09/18 0624     Glucose 160 (H) mg/dL     Narrative:       Meter: [id number] : [id number] Abi Hawk RN    POC Glucose Once [[id number]]  (Abnormal) Collected:  03/09/18 0442    Specimen:  Blood Updated:  03/09/18 0454     Glucose 141 (H) mg/dL     Narrative:       Meter: [id number] : [id number] Abi Hawk RN    POC Glucose Once [[id number]]  (Abnormal) Collected:  03/09/18 0321    Specimen:  Blood Updated:  03/09/18 0333     Glucose 151 (H) mg/dL     Narrative:       Meter: [id number] : [id number] Abi Hawk RN    POC Glucose Once [[id number]]  (Abnormal) Collected:  03/09/18 0217    Specimen:  Blood Updated:  03/09/18 0228     Glucose 174 (H) mg/dL     Narrative:       Meter: [id number] : [id number] Abi Hawk RN    POC Glucose Once [[id number]]  (Abnormal) Collected:  03/09/18 0108    Specimen:  Blood Updated:  03/09/18 0109     Glucose 214 (H) mg/dL     Narrative:       Meter: [id number] : [id number] Abi Hawk RN    POC Glucose Once [[id number]]  (Abnormal)  Collected:  03/09/18 0007    Specimen:  Blood Updated:  03/09/18 0019     Glucose 216 (H) mg/dL     Narrative:       Meter: [id number] : [id number] Abi Hawk RN    Basic Metabolic Panel [[id number]]  (Abnormal) Collected:  03/08/18 2310    Specimen:  Blood Updated:  03/08/18 2336     Glucose 239 (H) mg/dL      BUN 20 mg/dL      Creatinine 1.15 (H) mg/dL      Sodium 144 mmol/L      Potassium 3.3 (L) mmol/L      Chloride 108 (H) mmol/L      CO2 22.4 mmol/L      Calcium 8.0 (L) mg/dL      eGFR Non African Amer 49 (L) mL/min/1.73      BUN/Creatinine Ratio 17.4     Anion Gap 13.6 mmol/L     Narrative:       GFR Normal >60  Chronic Kidney Disease <60  Kidney Failure <15    POC Glucose Once [[id number]]  (Abnormal) Collected:  03/08/18 2310    Specimen:  Blood Updated:  03/08/18 2311     Glucose 220 (H) mg/dL     Narrative:       Meter: [id number] : [id number] Abi Hawk RN    POC Glucose Once [[id number]]  (Abnormal) Collected:  03/08/18 2211    Specimen:  Blood Updated:  03/08/18 2222     Glucose 211 (H) mg/dL     Narrative:       Meter: [id number] : [id number] Abi Hawk RN    POC Glucose Once [[id number]]  (Abnormal) Collected:  03/08/18 2109    Specimen:  Blood Updated:  03/08/18 2110     Glucose 210 (H) mg/dL     Narrative:       Meter: [id number] : [id number] Abi Hawk RN    POC Glucose Once [[id number]]  (Abnormal) Collected:  03/08/18 2005    Specimen:  Blood Updated:  03/08/18 2006     Glucose 174 (H) mg/dL     Narrative:       Meter: [id number] : [id number] Abi Hawk RN    POC Glucose Once [[id number]]  (Abnormal) Collected:  03/08/18 1915    Specimen:  Blood Updated:  03/08/18 1917     Glucose 147 (H) mg/dL     Narrative:       Meter: [id number] : [id number] Jim Spaulding    Basic Metabolic Panel [[id number]]  (Abnormal) Collected:  03/08/18 1505    Specimen:  Blood Updated:  03/08/18 1549     Glucose 120 (H) mg/dL      BUN 21 (H) mg/dL      Creatinine 1.21 (H) mg/dL      Sodium  143 mmol/L      Potassium 3.3 (L) mmol/L      Chloride 110 (H) mmol/L      CO2 18.8 (L) mmol/L      Calcium 8.3 (L) mg/dL      eGFR Non African Amer 47 (L) mL/min/1.73      BUN/Creatinine Ratio 17.4     Anion Gap 14.2 mmol/L     Narrative:       GFR Normal >60  Chronic Kidney Disease <60  Kidney Failure <15    Phosphorus [[id number]]  (Normal) Collected:  03/08/18 1505    Specimen:  Blood Updated:  03/08/18 1549     Phosphorus 2.6 mg/dL     Magnesium [[id number]]  (Normal) Collected:  03/08/18 1505    Specimen:  Blood Updated:  03/08/18 1549     Magnesium 2.0 mg/dL     POC Glucose Once [[id number]]  (Normal) Collected:  03/08/18 1538    Specimen:  Blood Updated:  03/08/18 1540     Glucose 117 mg/dL     Narrative:       Meter: [id number] : [id number] Jim Spaulding    POC Glucose Once [[id number]]  (Normal) Collected:  03/08/18 1307    Specimen:  Blood Updated:  03/08/18 1318     Glucose 124 mg/dL     Narrative:       Meter: [id number] : [id number] Jim Jasson    POC Glucose Once [[id number]]  (Normal) Collected:  03/08/18 1133    Specimen:  Blood Updated:  03/08/18 1134     Glucose 122 mg/dL     Narrative:       Meter: [id number] : [id number] Lluvia Holcomb RACHEAL    Basic Metabolic Panel [[id number]]  (Abnormal) Collected:  03/08/18 0810    Specimen:  Blood Updated:  03/08/18 1053     Glucose 179 (H) mg/dL      BUN 25 (H) mg/dL      Creatinine 1.42 (H) mg/dL      Sodium 145 mmol/L      Potassium 3.5 mmol/L      Chloride 112 (H) mmol/L      CO2 15.8 (L) mmol/L      Calcium 8.0 (L) mg/dL      eGFR Non African Amer 39 (L) mL/min/1.73      BUN/Creatinine Ratio 17.6     Anion Gap 17.2 mmol/L     Narrative:       GFR Normal >60  Chronic Kidney Disease <60  Kidney Failure <15    Phosphorus [[id number]]  (Normal) Collected:  03/08/18 0810    Specimen:  Blood Updated:  03/08/18 1052     Phosphorus 2.5 mg/dL     POC Glucose Once [[id number]]  (Normal) Collected:  03/08/18 1007    Specimen:  Blood Updated:  03/08/18 1016      Glucose 125 mg/dL     Narrative:       Meter: [id number] : [id number] Jim Spaulding    Calcium, Ionized [[id number]]  (Normal) Collected:  03/08/18 0810    Specimen:  Blood Updated:  03/08/18 0922     Ionized Calcium 1.25 mmol/L      Ionized Calcium 5.0 mg/dL     Magnesium [[id number]]  (Normal) Collected:  03/08/18 0810    Specimen:  Blood Updated:  03/08/18 0918     Magnesium 2.0 mg/dL     POC Glucose Once [[id number]]  (Abnormal) Collected:  03/08/18 0817    Specimen:  Blood Updated:  03/08/18 0828     Glucose 168 (H) mg/dL     Narrative:       Meter: [id number] : [id number] Jim Spaulding    POC Glucose Once [[id number]]  (Abnormal) Collected:  03/08/18 0633    Specimen:  Blood Updated:  03/08/18 0644     Glucose 192 (H) mg/dL     Narrative:       Meter: [id number] : [id number] Abi Hawk RN    Basic Metabolic Panel [[id number]]  (Abnormal) Collected:  03/08/18 0430    Specimen:  Blood Updated:  03/08/18 0543     Glucose 245 (H) mg/dL      BUN 27 (H) mg/dL      Creatinine 1.49 (H) mg/dL      Sodium 143 mmol/L      Potassium 3.6 mmol/L      Chloride 112 (H) mmol/L      CO2 13.0 (L) mmol/L      Calcium 8.1 (L) mg/dL      eGFR Non African Amer 37 (L) mL/min/1.73      BUN/Creatinine Ratio 18.1     Anion Gap 18.0 mmol/L     Narrative:       GFR Normal >60  Chronic Kidney Disease <60  Kidney Failure <15    Phosphorus [[id number]]  (Normal) Collected:  03/08/18 0430    Specimen:  Blood Updated:  03/08/18 0535     Phosphorus 3.0 mg/dL     Magnesium [[id number]]  (Normal) Collected:  03/08/18 0430    Specimen:  Blood Updated:  03/08/18 0535     Magnesium 2.2 mg/dL     POC Glucose Once [[id number]]  (Abnormal) Collected:  03/08/18 0532    Specimen:  Blood Updated:  03/08/18 0534     Glucose 216 (H) mg/dL     Narrative:       Meter: [id number] : [id number] Abi Hawk RN    Calcium, Ionized [[id number]]  (Normal) Collected:  03/08/18 0430    Specimen:  Blood Updated:  03/08/18 0533     Ionized Calcium  1.28 mmol/L      Ionized Calcium 5.1 mg/dL     CBC & Differential [[id number]] Collected:  03/08/18 0430    Specimen:  Blood Updated:  03/08/18 0524    Narrative:       The following orders were created for panel order CBC & Differential.  Procedure                               Abnormality         Status                     ---------                               -----------         ------                     CBC Auto Differential[[id number]]        Abnormal            Final result                 Please view results for these tests on the individual orders.    CBC Auto Differential [[id number]]  (Abnormal) Collected:  03/08/18 0430    Specimen:  Blood Updated:  03/08/18 0524     WBC 9.43 10*3/mm3      RBC 3.71 (L) 10*6/mm3      Hemoglobin 11.5 (L) g/dL      Hematocrit 34.7 (L) %      MCV 93.5 fL      MCH 31.0 pg      MCHC 33.1 g/dL      RDW 13.6 (H) %      RDW-SD 46.3 fl      MPV 10.4 fL      Platelets 120 (L) 10*3/mm3      Neutrophil % 85.0 (H) %      Lymphocyte % 7.3 (L) %      Monocyte % 7.4 %      Eosinophil % 0.0 (L) %      Basophil % 0.1 %      Immature Grans % 0.2 %      Neutrophils, Absolute 8.01 10*3/mm3      Lymphocytes, Absolute 0.69 (L) 10*3/mm3      Monocytes, Absolute 0.70 10*3/mm3      Eosinophils, Absolute 0.00 10*3/mm3      Basophils, Absolute 0.01 10*3/mm3      Immature Grans, Absolute 0.02 10*3/mm3     POC Glucose Once [[id number]]  (Abnormal) Collected:  03/08/18 0424    Specimen:  Blood Updated:  03/08/18 0425     Glucose 233 (H) mg/dL     Narrative:       Meter: [id number] : [id number] Abi Hawk RN    POC Glucose Once [[id number]]  (Abnormal) Collected:  03/08/18 0323    Specimen:  Blood Updated:  03/08/18 0334     Glucose 210 (H) mg/dL     Narrative:       Meter: [id number] : [id number] Abi Hawk RN    POC Glucose Once [[id number]]  (Abnormal) Collected:  03/08/18 0220    Specimen:  Blood Updated:  03/08/18 0231     Glucose 232 (H) mg/dL     Narrative:       Meter: [id number]  : [id number] Hawk RN    Basic Metabolic Panel [[id number]]  (Abnormal) Collected:  03/08/18 0020    Specimen:  Blood Updated:  03/08/18 0137     Glucose 268 (H) mg/dL      BUN 28 (H) mg/dL      Creatinine 1.48 (H) mg/dL      Sodium 141 mmol/L      Potassium 3.7 mmol/L      Chloride 108 (H) mmol/L      CO2 9.8 (L) mmol/L      Calcium 8.3 (L) mg/dL      eGFR Non African Amer 37 (L) mL/min/1.73      BUN/Creatinine Ratio 18.9     Anion Gap 23.2 mmol/L     Narrative:       GFR Normal >60  Chronic Kidney Disease <60  Kidney Failure <15    Phosphorus [[id number]]  (Normal) Collected:  03/08/18 0020    Specimen:  Blood Updated:  03/08/18 0130     Phosphorus 3.6 mg/dL     Magnesium [[id number]]  (Normal) Collected:  03/08/18 0020    Specimen:  Blood Updated:  03/08/18 0130     Magnesium 2.2 mg/dL     Calcium, Ionized [[id number]]  (Normal) Collected:  03/08/18 0020    Specimen:  Blood Updated:  03/08/18 0123     Ionized Calcium 1.27 mmol/L      Ionized Calcium 5.1 mg/dL     POC Glucose Once [[id number]]  (Abnormal) Collected:  03/08/18 0119    Specimen:  Blood Updated:  03/08/18 0119     Glucose 248 (H) mg/dL     Narrative:       Meter: [id number] : [id number] Hawk RN    POC Glucose Once [[id number]]  (Abnormal) Collected:  03/08/18 0008    Specimen:  Blood Updated:  03/08/18 0009     Glucose 242 (H) mg/dL     Narrative:       Meter: [id number] : [id number] Hawk RN    POC Glucose Once [[id number]]  (Abnormal) Collected:  03/07/18 2244    Specimen:  Blood Updated:  03/07/18 2246     Glucose 184 (H) mg/dL     Narrative:       Meter: [id number] : [id number] Hawk RN    Calcium, Ionized [[id number]]  (Normal) Collected:  03/07/18 2004    Specimen:  Blood Updated:  03/07/18 2213     Ionized Calcium 1.25 mmol/L      Ionized Calcium 5.0 mg/dL     Phosphorus [[id number]]  (Normal) Collected:  03/07/18 2004    Specimen:  Blood Updated:  03/07/18 2146     Phosphorus 3.5 mg/dL     Basic Metabolic  Panel [[id number]]  (Abnormal) Collected:  03/07/18 2004    Specimen:  Blood Updated:  03/07/18 2146     Glucose 146 (H) mg/dL      BUN 30 (H) mg/dL      Creatinine 1.54 (H) mg/dL      Sodium 143 mmol/L      Potassium 3.7 mmol/L      Chloride 112 (H) mmol/L      CO2 12.1 (L) mmol/L      Calcium 7.9 (L) mg/dL      eGFR Non African Amer 35 (L) mL/min/1.73      BUN/Creatinine Ratio 19.5     Anion Gap 18.9 mmol/L     Narrative:       GFR Normal >60  Chronic Kidney Disease <60  Kidney Failure <15    Magnesium [[id number]]  (Normal) Collected:  03/07/18 2004    Specimen:  Blood Updated:  03/07/18 2146     Magnesium 2.3 mg/dL     POC Glucose Once [[id number]]  (Abnormal) Collected:  03/07/18 2130    Specimen:  Blood Updated:  03/07/18 2131     Glucose 156 (H) mg/dL     Narrative:       Meter: [id number] : [id number] Katie Sosa RN    POC Glucose Once [[id number]]  (Normal) Collected:  03/07/18 1954    Specimen:  Blood Updated:  03/07/18 2006     Glucose 127 mg/dL     Narrative:       Meter: [id number] : [id number] Abi Hawk RN    POC Glucose Once [[id number]]  (Normal) Collected:  03/07/18 1828    Specimen:  Blood Updated:  03/07/18 1840     Glucose 111 mg/dL     Narrative:       Meter: [id number] : [id number] Jim Spaulding    Magnesium [[id number]]  (Normal) Collected:  03/07/18 1637    Specimen:  Blood Updated:  03/07/18 1744     Magnesium 2.1 mg/dL     Calcium, Ionized [[id number]]  (Normal) Collected:  03/07/18 1637    Specimen:  Blood Updated:  03/07/18 1742     Ionized Calcium 1.24 mmol/L      Ionized Calcium 5.0 mg/dL     Phosphorus [[id number]]  (Normal) Collected:  03/07/18 1637    Specimen:  Blood Updated:  03/07/18 1731     Phosphorus 3.3 mg/dL     Basic Metabolic Panel [[id number]]  (Abnormal) Collected:  03/07/18 0709    Specimen:  Blood Updated:  03/07/18 1739     Glucose 127 (H) mg/dL      BUN 30 (H) mg/dL      Creatinine 1.59 (H) mg/dL      Sodium 142 mmol/L      Potassium 3.4 (L) mmol/L       Chloride 112 (H) mmol/L      CO2 11.6 (L) mmol/L      Calcium 8.2 (L) mg/dL      eGFR Non African Amer 34 (L) mL/min/1.73      BUN/Creatinine Ratio 18.9     Anion Gap 18.4 mmol/L     Narrative:       GFR Normal >60  Chronic Kidney Disease <60  Kidney Failure <15    POC Glucose Once [[id number]]  (Normal) Collected:  03/07/18 1704    Specimen:  Blood Updated:  03/07/18 1716     Glucose 118 mg/dL     Narrative:       Meter: [id number] : [id number] Jim Jasson    POC Glucose Once [[id number]]  (Abnormal) Collected:  03/07/18 1510    Specimen:  Blood Updated:  03/07/18 1521     Glucose 143 (H) mg/dL     Narrative:       Meter: [id number] : [id number] Jim Jasson    POC Glucose Once [[id number]]  (Abnormal) Collected:  03/07/18 1343    Specimen:  Blood Updated:  03/07/18 1355     Glucose 144 (H) mg/dL     Narrative:       Meter: [id number] : [id number] Jim Spaulding        Imaging Results (last 72 hours)     Procedure Component Value Units Date/Time    XR Chest 1 View [[id number]] Collected:  03/07/18 0419     Updated:  03/07/18 4830    Narrative:       X-RAY CHEST 1 VIEW.     HISTORY: Follow-up perihilar infiltrate.     COMPARISON: Persistent right perihilar opacity concerning for  infiltrate.     FINDINGS:  Cardiomediastinal silhouette is within normal limits.         New opacity in the left lung base, infiltrate is suspected.              Impression:       New infiltrate in the left lung base is suspected. Follow-up is  recommended.  Right perihilar infiltrate is unchanged.         This report was finalized on 3/7/2018 11:10 PM by Dr. Smith Li MD.             Medication Review: done      Current Facility-Administered Medications:   •  cefTRIAXone (ROCEPHIN) IVPB 1 g, 1 g, Intravenous, Q24H, Randall Pennington MD, Last Rate: 100 mL/hr at 03/10/18 1109, 1 g at 03/10/18 1109  •  dextrose (D50W) solution 25 g, 25 g, Intravenous, Q15 Min PRN, Jacqueline Alcantar MD, 25 g at 03/10/18 0016  •   dextrose (GLUTOSE) oral gel 15 g, 15 g, Oral, Q15 Min PRN, Jacqueline Alcantar MD  •  doxycycline (VIBRAMYCIN) capsule 100 mg, 100 mg, Oral, Q12H, Nikita Barajas MD, 100 mg at 03/11/18 1042  •  enoxaparin (LOVENOX) syringe 40 mg, 40 mg, Subcutaneous, Q24H, Jacqueline Alcantar MD, 40 mg at 03/10/18 2221  •  glucagon (human recombinant) (GLUCAGEN DIAGNOSTIC) injection 1 mg, 1 mg, Subcutaneous, PRN, Jacqueline Alcantar MD  •  insulin aspart (novoLOG) injection 2-5 Units, 2-5 Units, Subcutaneous, 4x Daily AC & at Bedtime, Obed ELIZABETH MD, 2 Units at 03/09/18 2009  •  insulin detemir (LEVEMIR) injection 15 Units, 15 Units, Subcutaneous, QAM, Geoffrey Lebron MD, 15 Units at 03/11/18 0924  •  insulin detemir (LEVEMIR) injection 8 Units, 8 Units, Subcutaneous, Nightly, Geoffrey Lebron MD  •  ipratropium-albuterol (DUO-NEB) nebulizer solution 3 mL, 3 mL, Nebulization, Q4H PRN, Jacqueline Alcantar MD, 3 mL at 03/08/18 2336  •  ondansetron (ZOFRAN) tablet 4 mg, 4 mg, Oral, Q4H PRN, Nikita Barajas MD, 4 mg at 03/11/18 0303  •  potassium chloride (MICRO-K) CR capsule 40 mEq, 40 mEq, Oral, PRN, 40 mEq at 03/11/18 0924 **OR** potassium chloride (KLOR-CON) packet 40 mEq, 40 mEq, Oral, PRN **OR** potassium chloride 10 mEq in 100 mL IVPB, 10 mEq, Intravenous, Q1H PRN, Jacqueline Alcantar MD, Last Rate: 100 mL/hr at 03/10/18 1413, 10 mEq at 03/10/18 1413  •  sodium chloride 0.9 % flush 1-10 mL, 1-10 mL, Intravenous, PRN, Jacqueline Alcantar MD    Assessment/Plan     Active Hospital Problems (** Indicates Principal Problem)    Diagnosis Date Noted   No active problems to display.      Resolved Hospital Problems    Diagnosis Date Noted Date Resolved   • Edema [R60.9] 03/10/2018 03/11/2018   • Diabetic ketoacidosis with coma associated with type 2 diabetes mellitus [E13.11] 03/06/2018 03/11/2018     Diabetes mellitus  Would consider checking patient's C-peptide levels, arnoldo 65, islet cell antibody, insulin antibody levels as an outpatient.  Based on patient's  "phenotype and her presentation with DKA concern patient could have type 1 diabetes mellitus  Continue levemir 15 units in the morning  Decrease levemir to 8 units Q pm, along with holding parameters to hold if BG is less than 100 mg/dl.   Continue NovoLog low-dose sliding scale 3 times a day before meals and at bedtime  Okay for discharge from endocrine perspective.    Patient needs to follow-up with Dr. Conway upon discharge in a period of 4 weeks.    I am covering the pt over the week end and pt will be seen by Dr. Conway on Monday.     Discussed with the pt about Type 1 dm and Type 2 dm, explained to her that based on these tests if she is Type 2 dm, we can try oral hypoglycemic agents for the pt.   Pt voiced understanding and will talk about this during her visit with Dr. Ragland.     19 minutes out of 35 minutes face to face spent on floor managing care, discussing plan with nursing and counseling patient/family on treatment options, side effects of the medications.          Geoffrey Lebron MD.  03/11/18  1:26 PM      EMR Dragon / transcription disclaimer:    \"Dictated utilizing Dragon dictation\".   "

## 2018-03-11 NOTE — DISCHARGE SUMMARY
"                                                                  PHYSICIAN DISCHARGE SUMMARY                                                                          [facility]      Patient Identification:  Name: Sonal De Dios  Age: 52 y.o.  Sex: female  :  1965  MRN: [id number]    Admit date: 3/6/2018  Discharge date and time: 3/11/18  Discharged Condition: stable    Discharge Diagnoses:    DKA resolved  Anion gap met acidosis resolved  DM - insulin dependent  Community Acquired pneumonia  HMPV infection  Metabolic encephalopathy resolved  HIRA resolved   Sepsis, present on admission    HPI  \"Sonal De Dios is a 52 y.o. female is negative past medical history except for insulin dependent diabetes mellitus that started after she had gestational diabetes more than 20 years ago.  Patient has been on sliding scale insulin and did not require any long-acting insulin according to the .  She never had any problem with the diabetes, she is been on ace inhibitors to protect the kidneys according to the , she had no problem with any kidney disease or neuropathy or retinopathy or any of the micro-vascular diabetic complication.  Patient has been having some upper rest or symptom over the last several days and she was supposed to go to a immediate care center to be tested for the floor this morning.  Patient's  came back home this afternoon at 4:30 her very lethargic and difficult to arouse and very weak and unable to stand up.  EMS were called, her blood sugar was found to be elevated , patient was brought into the emergency room, her ABGs on presentation show severe acidosis with a pH of 6.7, patient had labs sent, insulin was not given until the potassium level came back by the time I saw the patient and it was above 6 so patient got 10 units of IV insulin stat, the ER physician already administered the bicarbonate given the low pH.  The chest x-ray showed some perihilar " "infiltrates especially on the right side and the patient was started on Zosyn and vancomycin.  At the time of my assessment the patient was lethargic but arousable, she was not labored in her breathing she was just breathing deep which is part of the acidosis symptom trying to compensate, she had minimal abdominal tenderness and no obvious clinical symptom of acute abdomen.  Her CBC is still pending and being rerun by the lab probably because of an obvious abnormality, we'll follow-up on that one, but her pro-calcitonin was elevated at 47.  Her  there is no previous similar episodes  \"    Hospital Course:   Patient was admitted to ICU and treated for diverticulitis acidosis along with severe metabolic acidosis.  She had some metabolic encephalopathy.  All the symptoms are improved with the treatment of diabetes.  Endocrinology was consult to help with her diabetes management.  Nephrology help with renal Failure.  All her lab works better.  Her insulin dosing required readjustment due to her system nocturnal hypoglycemia noted in the last 2 days.  She will need follow-up with endocrinology at discharge.  She will also require a new PCP and we have made recs.         Objective:   Temp:  [96.1 °F (35.6 °C)-98.7 °F (37.1 °C)] 98.4 °F (36.9 °C)  Heart Rate:  [] 98  Resp:  [17-18] 18  BP: (122-137)/(80-95) 122/84   SpO2:  [94 %-99 %] 96 %  on    Device (Oxygen Therapy): room air    Intake/Output Summary (Last 24 hours) at 03/11/18 1307  Last data filed at 03/11/18 0349   Gross per 24 hour   Intake              696 ml   Output                0 ml   Net              696 ml     Body mass index is 26.6 kg/m².  1    03/06/18  1826 03/07/18  0600   Weight: 65.8 kg (145 lb) 70.3 kg (154 lb 15.7 oz)     Weight change:       Physical Exam:  GEN:               No respiratory distress, appears stated age  EYES:              EOM-i, anicteric sclera bilat  ENT:                External ears/nose normal, OP clear, Dry " mucous membranes ,   NECK:             Supple, midline trachea, No JVD or cervical LApathy  LUNGS:           Bilateral breath sounds heard, No adventitious sounds  CV:                  Regular rate and rhythm, No murmur  ABD:                Non tender, no distended, no palpable liver or masses  EXT:                No cyanosis or clubbing, no peripheral/sacral edema,    Consults:   Patient Care Team:  No Known Provider as PCP - General    Significant Discharge Diagnostics   Procedures Performed:         Pertinent Lab Results:    Results from last 7 days  Lab Units 03/11/18  0508 03/10/18  0510 03/09/18  0627 03/08/18  2310 03/08/18  1505 03/08/18  0810 03/08/18  0430  03/06/18  2118  03/06/18  1810   SODIUM mmol/L 140 139 145 144 143 145 143  < > 132*  --  126*   POTASSIUM mmol/L 3.6 3.5 3.4* 3.3* 3.3* 3.5 3.6  < > 5.2  --  6.2*   CHLORIDE mmol/L 103 102 106 108* 110* 112* 112*  < > 95*  --  85*   CO2 mmol/L 23.3 23.3 25.7 22.4 18.8* 15.8* 13.0*  < > 2.3*  --  6.7*   BUN mg/dL 15 17 19 20 21* 25* 27*  < > 30*  --  29*   CREATININE mg/dL 0.94 0.90 1.03* 1.15* 1.21* 1.42* 1.49*  < > 1.37*  --  1.61*   GLUCOSE mg/dL 102* 97 159* 239* 120* 179* 245*  < > 651*  < > 781*   CALCIUM mg/dL 7.8* 7.9* 8.1* 8.0* 8.3* 8.0* 8.1*  < > 7.9*  --  8.9   AST (SGOT) U/L  --   --   --   --   --   --   --   --  18  --  19   ALT (SGPT) U/L  --   --   --   --   --   --   --   --  13  --  14   < > = values in this interval not displayed.    Results from last 7 days  Lab Units 03/06/18  1810   TROPONIN T ng/mL <0.010       Results from last 7 days  Lab Units 03/11/18  0508 03/10/18  0510 03/08/18  0430  03/06/18  2118   WBC 10*3/mm3 7.77 11.07* 9.43  --  25.09*   HEMOGLOBIN g/dL 13.3 13.1 11.5*  --  12.9   HEMATOCRIT % 41.7 40.0 34.7*  --  43.5   PLATELETS 10*3/mm3 196 165 120*  --  215   MCV fL 95.9 93.7 93.5  --  104.1*   MCH pg 30.6 30.7 31.0  --  30.9   MCHC g/dL 31.9* 32.8 33.1  --  29.7*   RDW % 13.2* 13.5* 13.6*  --  12.8   RDW-SD fl  46.3 46.5 46.3  --  48.6   MPV fL 10.4 11.1 10.4  --  10.8   NEUTROPHIL % % 65.8 73.9 85.0*  < >  --    LYMPHOCYTE % % 19.4* 17.0* 7.3*  < >  --    MONOCYTES % % 14.0* 8.9 7.4  < >  --    EOSINOPHIL % % 0.4 0.0* 0.0*  < >  --    BASOPHIL % % 0.1 0.0 0.1  < >  --    IMM GRAN % % 0.3 0.2 0.2  < >  --    NEUTROS ABS 10*3/mm3 5.11 8.19* 8.01  < > 19.32*   LYMPHS ABS 10*3/mm3 1.51 1.88 0.69*  < >  --    MONOS ABS 10*3/mm3 1.09 0.98 0.70  < >  --    EOS ABS 10*3/mm3 0.03 0.00 0.00  < >  --    BASOS ABS 10*3/mm3 0.01 0.00 0.01  < >  --    IMMATURE GRANS (ABS) 10*3/mm3 0.02 0.02 0.02  < >  --    < > = values in this interval not displayed.        Results from last 7 days  Lab Units 03/10/18  0510 03/09/18  0627 03/08/18  1505 03/08/18  0810 03/08/18  0430 03/08/18  0020 03/07/18  2004   MAGNESIUM mg/dL 2.3 2.1 2.0 2.0 2.2 2.2 2.3           Invalid input(s): LDLCALC            Results from last 7 days  Lab Units 03/06/18  2104   PH, ARTERIAL pH units 6.823*   PO2 ART mm Hg 113.0*   HCO3 ART mmol/L 1.9*       Results from last 7 days  Lab Units 03/09/18  0627 03/06/18  2347 03/06/18  1810   PROCALCITONIN ng/mL 3.22*  --  47.80*   LACTATE mmol/L  --  1.2 2.3*               Results from last 7 days  Lab Units 03/06/18  1819 03/06/18  1810   BLOODCX  No growth at 4 days No growth at 4 days       Results from last 7 days  Lab Units 03/06/18  1813   NITRITE UA  Negative   WBC UA /HPF 0-2   BACTERIA UA /HPF 1+*   SQUAM EPITHEL UA /HPF 0-2       Results from last 7 days  Lab Units 03/06/18  1821   ADENOVIRUS DETECTION BY PCR  Not Detected   CORONAVIRUS 229E  Not Detected   CORONAVIRUS HKU1  Not Detected   CORONAVIRUS NL63  Not Detected   CORONAVIRUS OC43  Not Detected   HUMAN METAPNEUMOVIRUS  Detected*   HUMAN RHINOVIRUS/ENTEROVIRUS  Not Detected   INFLUENZA B PCR  Not Detected   PARAINFLUENZA 1  Not Detected   PARAINFLUENZA VIRUS 2  Not Detected   PARAINFLUENZA VIRUS 3  Not Detected   PARAINFLUENZA VIRUS 4  Not Detected    BORDETELLA PERTUSSIS PCR  Not Detected   CHLAMYDOPHILA PNEUMONIAE PCR  Not Detected   MYCOPLAMA PNEUMO PCR  Not Detected   INFLUENZA A PCR  Not Detected   INFLUENZA A H3  Not Detected   INFLUENZA A H1  Not Detected   RSV, PCR  Not Detected           Imaging Results:  Imaging Results (all)     Procedure Component Value Units Date/Time    XR Chest 1 View [[id number]] Collected:  03/07/18 0419     Updated:  03/07/18 2313    Narrative:       X-RAY CHEST 1 VIEW.     HISTORY: Follow-up perihilar infiltrate.     COMPARISON: Persistent right perihilar opacity concerning for  infiltrate.     FINDINGS:  Cardiomediastinal silhouette is within normal limits.         New opacity in the left lung base, infiltrate is suspected.              Impression:       New infiltrate in the left lung base is suspected. Follow-up is  recommended.  Right perihilar infiltrate is unchanged.         This report was finalized on 3/7/2018 11:10 PM by Dr. Smith Li MD.       XR Chest 1 View [[id number]] Collected:  03/06/18 1925     Updated:  03/07/18 0809    Narrative:       PORTABLE RADIOGRAPHIC VIEW OF THE CHEST      CLINICAL HISTORY:  Hypotension and hyperglycemia.     A portable radiographic view of the chest demonstrates no acute  infiltrate.  There is some pulmonary vascular engorgement. The  cardiomediastinal silhouette is remarkable for prominence of the right  hilum superiorly. A right hilar mass cannot be entirely excluded  although the findings may simply be due to prominent vascular structures  or potentially an adjacent infiltrate. I recommend further evaluation  with a PA and lateral radiographic view of the chest. There is a  somewhat gas-distended stomach. The osseous structures are unremarkable.       Impression:          Pulmonary vascular enlargement without significant interstitial  pulmonary edema.     Prominence of the right hilar structures which may simply represent  prominent vasculature.  However, I cannot exclude the  possibility of a  right hilar mass or potentially an adjacent infiltrate. I recommend  further evaluation with either a PA and lateral chest radiograph or a CT  scan of the chest.     Mild gaseous distention of the stomach.     These findings and recommendations were discussed with Dr. Eugene on  03/06/2018 at approximately 7 PM.      This report was finalized on 3/7/2018 8:06 AM by Dr. Louis Crowder MD.             Discharge Medications and Instructions:     Discharge Medications   Juan RsanjuanaYessi panfer   Home Medication Instructions LISA:[id number]    Printed on:03/11/18 1306   Medication Information                      doxycycline (VIBRAMYCIN) 100 MG capsule  Take 1 capsule by mouth Every 12 (Twelve) Hours for 13 doses.             insulin detemir (LEVEMIR) 100 UNIT/ML injection  Inject 8 Units under the skin Every Night.             insulin detemir (LEVEMIR) 100 UNIT/ML injection  Inject 15 Units under the skin Every Morning.             Insulin Lispro (HUMALOG) 100 UNIT/ML solution cartridge  Glucose below 150 no extra insulin 150-200 give 2 units 201-250 every 3 units 251-300 give 4 units Greater than 300 give 5 units                 Disposition:  Home    Follow-up Information     Obed Conway MD. Schedule an appointment as soon as possible for a visit in 4 week(s).    Specialty:  Endocrinology  Contact information:  [address] [address]  [phone]             Sam Morales MD. Schedule an appointment as soon as possible for a visit in 1 week(s).    Specialties:  Family Medicine, Emergency Medicine  Why:  Pt needs a  PCP   Contact information:  [address] [id number] [phone]                   Total time spent discharging patient including evaluation, medication reconciliation, arranging follow up, and post hospitalization instructions and education total time exceeds 30 minutes.    Signed:  Tushar Lee  MD  3/11/2018  1:07 PM

## 2018-03-11 NOTE — NURSING NOTE
Called to replace PIV -- patient with bilateral general swelling of both arms and multiple previous PIV attempts.  Discussed POC patient states would prefer to take medications orally and not have IV attempted at this time.  Poor cannulation options as well.  Discussed with primary nurse Verna -- will not attempt PIV at this time bedside nurse states will contact physician to see if medications can be changed to alternative routes.

## 2018-03-11 NOTE — PROGRESS NOTES
"   LOS: 5 days   Patient Care Team:  No Known Provider as PCP - General    Chief Complaint/ Reason for encounter: Renal failure, metabolic acidosis  Chief Complaint   Patient presents with   • Hypotension   • Hyperglycemia         Subjective     Hypotension   Pertinent negatives include no chest pain, fever or nausea.   Hyperglycemia   Pertinent negatives include no chest pain, fever or nausea.       Subjective:  Symptoms:  Improved.  No shortness of breath or chest pain.  (She feels better today  Nausea much better, appetite improved).    Diet:  Adequate intake.  No nausea.    Activity level: Normal.    Pain:  She reports no pain.          History taken from: Patient and chart    Objective     Vital Signs  Temp:  [96.1 °F (35.6 °C)-99 °F (37.2 °C)] 96.4 °F (35.8 °C)  Heart Rate:  [103-117] 103  Resp:  [17-19] 18  BP: (127-143)/(80-97) 128/80       Wt Readings from Last 1 Encounters:   03/07/18 0600 70.3 kg (154 lb 15.7 oz)   03/06/18 1826 65.8 kg (145 lb)       Objective:  General Appearance:  Comfortable, in no acute distress and not in pain.    Vital signs: (most recent): Blood pressure 128/80, pulse 103, temperature 96.4 °F (35.8 °C), resp. rate 18, height 162.6 cm (64\"), weight 70.3 kg (154 lb 15.7 oz), SpO2 96 %.  Vital signs are normal.  No fever.    Output: Producing urine.    HEENT: Normal HEENT exam.    Lungs:  Normal effort and normal respiratory rate.  Breath sounds clear to auscultation.  She is not in respiratory distress.  No decreased breath sounds or wheezes.    Heart: Normal rate.  Regular rhythm.  S1 normal.  No murmur.   Abdomen: Abdomen is soft and non-distended.  Bowel sounds are normal.   There is no abdominal tenderness.  There is no epigastric area or suprapubic area tenderness.  There is no rebound tenderness.   There is no mass.   Extremities: Normal range of motion.  There is dependent edema.  There is no deformity.    Neurological: Patient is alert and oriented to person, place and time. "    Pupils:  Pupils are equal, round, and reactive to light.    Skin:  Warm and dry.  No rash or cyanosis.             Results Review:    Past Medical History: reviewed and updated  Past Medical History:   Diagnosis Date   • Diabetes mellitus          Allergies:  No Known Allergies    Intake/Output:     Intake/Output Summary (Last 24 hours) at 03/11/18 1012  Last data filed at 03/11/18 0349   Gross per 24 hour   Intake             1187 ml   Output                0 ml   Net             1187 ml         DATA:  Radiology and Labs:  The following labs independently reviewed by me.  Interval notes, chart personally reviewed by me.         Labs:   Recent Results (from the past 24 hour(s))   POC Glucose Once    Collection Time: 03/10/18 11:14 AM   Result Value Ref Range    Glucose 92 70 - 130 mg/dL   POC Glucose Once    Collection Time: 03/10/18  5:18 PM   Result Value Ref Range    Glucose 105 70 - 130 mg/dL   POC Glucose Once    Collection Time: 03/10/18  9:23 PM   Result Value Ref Range    Glucose 96 70 - 130 mg/dL   POC Glucose Once    Collection Time: 03/11/18  3:01 AM   Result Value Ref Range    Glucose 49 (C) 70 - 130 mg/dL   POC Glucose Once    Collection Time: 03/11/18  3:43 AM   Result Value Ref Range    Glucose 122 70 - 130 mg/dL   Renal Function Panel    Collection Time: 03/11/18  5:08 AM   Result Value Ref Range    Glucose 102 (H) 65 - 99 mg/dL    BUN 15 6 - 20 mg/dL    Creatinine 0.94 0.57 - 1.00 mg/dL    Sodium 140 136 - 145 mmol/L    Potassium 3.6 3.5 - 5.2 mmol/L    Chloride 103 98 - 107 mmol/L    CO2 23.3 22.0 - 29.0 mmol/L    Calcium 7.8 (L) 8.6 - 10.5 mg/dL    Albumin 2.30 (L) 3.50 - 5.20 g/dL    Phosphorus 4.0 2.5 - 4.5 mg/dL    Anion Gap 13.7 mmol/L    BUN/Creatinine Ratio 16.0 7.0 - 25.0    eGFR Non African Amer 62 >60 mL/min/1.73   CBC Auto Differential    Collection Time: 03/11/18  5:08 AM   Result Value Ref Range    WBC 7.77 4.50 - 10.70 10*3/mm3    RBC 4.35 3.90 - 5.20 10*6/mm3    Hemoglobin 13.3  11.9 - 15.5 g/dL    Hematocrit 41.7 35.6 - 45.5 %    MCV 95.9 80.5 - 98.2 fL    MCH 30.6 26.9 - 32.0 pg    MCHC 31.9 (L) 32.4 - 36.3 g/dL    RDW 13.2 (H) 11.7 - 13.0 %    RDW-SD 46.3 37.0 - 54.0 fl    MPV 10.4 6.0 - 12.0 fL    Platelets 196 140 - 500 10*3/mm3    Neutrophil % 65.8 42.7 - 76.0 %    Lymphocyte % 19.4 (L) 19.6 - 45.3 %    Monocyte % 14.0 (H) 5.0 - 12.0 %    Eosinophil % 0.4 0.3 - 6.2 %    Basophil % 0.1 0.0 - 1.5 %    Immature Grans % 0.3 0.0 - 0.5 %    Neutrophils, Absolute 5.11 1.90 - 8.10 10*3/mm3    Lymphocytes, Absolute 1.51 0.90 - 4.80 10*3/mm3    Monocytes, Absolute 1.09 0.20 - 1.20 10*3/mm3    Eosinophils, Absolute 0.03 0.00 - 0.70 10*3/mm3    Basophils, Absolute 0.01 0.00 - 0.20 10*3/mm3    Immature Grans, Absolute 0.02 0.00 - 0.03 10*3/mm3   POC Glucose Once    Collection Time: 03/11/18  5:59 AM   Result Value Ref Range    Glucose 87 70 - 130 mg/dL       Radiology:  Imaging Results (last 24 hours)     ** No results found for the last 24 hours. **             Medications have been reviewed:  Current Facility-Administered Medications   Medication Dose Route Frequency Provider Last Rate Last Dose   • cefTRIAXone (ROCEPHIN) IVPB 1 g  1 g Intravenous Q24H Randall Pennington  mL/hr at 03/10/18 1109 1 g at 03/10/18 1109   • dextrose (D50W) solution 25 g  25 g Intravenous Q15 Min PRN Jacqueline Alcantar MD   25 g at 03/10/18 0016   • dextrose (GLUTOSE) oral gel 15 g  15 g Oral Q15 Min PRN Jacqueline Alcantar MD       • doxycycline (VIBRAMYCIN) capsule 100 mg  100 mg Oral Q12H Nikita Barajas, MD       • enoxaparin (LOVENOX) syringe 40 mg  40 mg Subcutaneous Q24H Jacqueline Alcantar MD   40 mg at 03/10/18 2221   • glucagon (human recombinant) (GLUCAGEN DIAGNOSTIC) injection 1 mg  1 mg Subcutaneous PRN Jacqueline Alcantar MD       • insulin aspart (novoLOG) injection 2-5 Units  2-5 Units Subcutaneous 4x Daily AC & at Bedtime Obed ELIZABETH MD   2 Units at 03/09/18 2009   • insulin detemir (LEVEMIR) injection 12  Units  12 Units Subcutaneous Nightly Geoffrey Lebron MD       • insulin detemir (LEVEMIR) injection 15 Units  15 Units Subcutaneous QAM Geoffrey Lebron MD   15 Units at 03/11/18 0924   • ipratropium-albuterol (DUO-NEB) nebulizer solution 3 mL  3 mL Nebulization Q4H PRN Jacqueline Alcantar MD   3 mL at 03/08/18 2336   • ondansetron (ZOFRAN) tablet 4 mg  4 mg Oral Q4H PRN Nikita Barajas MD   4 mg at 03/11/18 0303   • potassium chloride (MICRO-K) CR capsule 40 mEq  40 mEq Oral PRN Jacqueline Alcantar MD   40 mEq at 03/11/18 0924    Or   • potassium chloride (KLOR-CON) packet 40 mEq  40 mEq Oral PRN Jacqueline Alcantar MD        Or   • potassium chloride 10 mEq in 100 mL IVPB  10 mEq Intravenous Q1H PRN Jacqueline Alcantar  mL/hr at 03/10/18 1413 10 mEq at 03/10/18 1413   • sodium chloride 0.9 % flush 1-10 mL  1-10 mL Intravenous PRN Jacqueline Alcantar MD           Assessment/Plan     Active Problems:    Diabetic ketoacidosis with coma associated with type 2 diabetes mellitus    Edema      Assessment:  (   Assessment:   acute renal failure , resolved, at baseline   Diabetic ketoacidosis, resolved, anion gap has closed  Profound metabolic acidosis, combined anion gap acidosis from DKA and non-anion gap acidosis from renal failure, much better  Severe dehydration, much better with dehydration  New edema, likely due to low albumin  Toxic metabolic encephalopathy, essentially resolved  Insulin-dependent diabetes mellitus, stabilizing on insulin  Left lower lobe infiltrate/pneumonia, possible aspiration, IV antibiotics  Hypokalemia, replacing per protocol          Plan:   Her renal function is significantly improved, near baseline today  Continue potassium replacement as needed per protocol  Acidosis significantly improved  Encourage oral fluid intake Lower extremity edema likely related to low albumin which should improve once her nutrition improves, no need for diuretics at this time  Add Glucerna nutritional supplement  Not much else to add  from a renal standpoint, will sign off, please call with any questions or concerns).     Plan:   Encourage ambulation.              Continue to monitor renal function, electroytes and volume closely   Please call me with any questions or concerns      Quinn Ying MD   [facility]   [phone]    03/11/18  10:12 AM      Dictation performed using Dragon dictation software

## 2018-03-11 NOTE — PLAN OF CARE
Problem: Patient Care Overview  Goal: Plan of Care Review   03/11/18 0612   Coping/Psychosocial   Plan of Care Reviewed With patient;spouse   Plan of Care Review   Progress improving   OTHER   Outcome Summary unable to restart iv, refused levimer due to dropping glucose at night-md aware and ordered, 49 at 0200-food given, up to 122. consult put in for dm rn and dietician      Goal: Discharge Needs Assessment  Outcome: Ongoing (interventions implemented as appropriate)      Problem: Diabetes, Type 2 (Adult)  Goal: Signs and Symptoms of Listed Potential Problems Will be Absent, Minimized or Managed (Diabetes, Type 2)  Outcome: Ongoing (interventions implemented as appropriate)      Problem: Nutrition, Imbalanced: Inadequate Oral Intake (Adult)  Goal: Improved Oral Intake  Outcome: Ongoing (interventions implemented as appropriate)

## 2018-03-29 ENCOUNTER — OFFICE VISIT (OUTPATIENT)
Dept: FAMILY MEDICINE CLINIC | Facility: CLINIC | Age: 53
End: 2018-03-29

## 2018-03-29 VITALS
HEIGHT: 64 IN | BODY MASS INDEX: 26.31 KG/M2 | SYSTOLIC BLOOD PRESSURE: 122 MMHG | WEIGHT: 154.1 LBS | OXYGEN SATURATION: 98 % | HEART RATE: 110 BPM | TEMPERATURE: 98.1 F | DIASTOLIC BLOOD PRESSURE: 80 MMHG

## 2018-03-29 DIAGNOSIS — Z00.00 HEALTHCARE MAINTENANCE: ICD-10-CM

## 2018-03-29 DIAGNOSIS — E11.9 TYPE 2 DIABETES MELLITUS WITHOUT COMPLICATION, WITHOUT LONG-TERM CURRENT USE OF INSULIN (HCC): Primary | ICD-10-CM

## 2018-03-29 DIAGNOSIS — Z13.29 SCREENING FOR THYROID DISORDER: ICD-10-CM

## 2018-03-29 DIAGNOSIS — Z79.899 MEDICATION MANAGEMENT: ICD-10-CM

## 2018-03-29 PROCEDURE — 99215 OFFICE O/P EST HI 40 MIN: CPT | Performed by: NURSE PRACTITIONER

## 2018-03-29 RX ORDER — ROPINIROLE 0.25 MG/1
TABLET, FILM COATED ORAL
Qty: 60 TABLET | Refills: 0 | Status: SHIPPED | OUTPATIENT
Start: 2018-03-29 | End: 2018-04-23 | Stop reason: SDUPTHER

## 2018-03-29 NOTE — PROGRESS NOTES
Subjective   Sonal De Dios is a 52 y.o. female presents as a new patient to get established. Recently admitted to the hospital for pneumonia and diabetic ketoacidosis. Previously developed gestational diabetes, treated for years but lost weight and was able to go off treatment. Presented to the hospital and was found to have elevated glucose > 800. She was admitted to the ICU and treated in the hospital for nearly a week. Here today to get established and further treatment.    Needs referral to GYN for mammogram and routine pap. States it has been several years.  Also has not had a diabetic eye exam and has been several years since her last exam. Agreeable to follow up.     Does have new onset of bilateral foot pain. States it occurred post hospitalization. Had significant swelling secondary to increased IV fluids. Slowly all the fluid came off but her feet have been hurting since. The pain is mostly at night, worse when standing on her feet for long periods. Feels hot. Has not tried any medication previously but agreeable to try something. Pain last evening after a concert was unbearable.  These problems are new to this provider.    Diabetes   She presents for her initial diabetic visit. She has type 2 diabetes mellitus. Her disease course has been improving. There are no hypoglycemic associated symptoms. Pertinent negatives for hypoglycemia include no headaches. Associated symptoms include foot paresthesias. Pertinent negatives for diabetes include no chest pain, no fatigue, no visual change and no weakness. There are no hypoglycemic complications. Symptoms are stable. Diabetic complications include peripheral neuropathy. Risk factors for coronary artery disease include diabetes mellitus. Current diabetic treatment includes insulin injections. She is compliant with treatment all of the time. Her weight is stable. She is following a diabetic diet. Her home blood glucose trend is decreasing steadily. (Ranges are  usually in mid to upper 100's, occasionally will increase and will dose insulin.)   Lower Extremity Issue   This is a new problem. The current episode started 1 to 4 weeks ago. The problem occurs intermittently. The problem has been waxing and waning. Associated symptoms include arthralgias (foot pain, worse at night, burning sensation). Pertinent negatives include no abdominal pain, anorexia, change in bowel habit, chest pain, chills, congestion, coughing, diaphoresis, fatigue, fever, headaches, joint swelling, myalgias, nausea, neck pain, numbness, rash, sore throat, swollen glands, urinary symptoms, vertigo, visual change, vomiting or weakness. The symptoms are aggravated by standing. She has tried nothing for the symptoms. The treatment provided no relief.        The following portions of the patient's history were reviewed and updated as appropriate: allergies, current medications, past family history, past medical history, past social history, past surgical history and problem list.    Review of Systems   Constitutional: Negative.  Negative for chills, diaphoresis, fatigue and fever.   HENT: Negative.  Negative for congestion and sore throat.    Eyes: Negative.    Respiratory: Negative.  Negative for cough.    Cardiovascular: Negative.  Negative for chest pain.   Gastrointestinal: Negative.  Negative for abdominal pain, anorexia, change in bowel habit, nausea and vomiting.   Endocrine: Negative.    Genitourinary: Negative.    Musculoskeletal: Positive for arthralgias (foot pain, worse at night, burning sensation). Negative for joint swelling, myalgias and neck pain.   Skin: Negative.  Negative for rash.   Allergic/Immunologic: Negative.    Neurological: Negative.  Negative for vertigo, weakness, numbness and headaches.   Hematological: Negative.    Psychiatric/Behavioral: Negative.        Objective   Physical Exam   Constitutional: She is oriented to person, place, and time. She appears well-developed and  well-nourished.   HENT:   Head: Normocephalic and atraumatic.   Right Ear: Tympanic membrane and ear canal normal.   Left Ear: Tympanic membrane and ear canal normal.   Nose: Nose normal.   Mouth/Throat: Uvula is midline, oropharynx is clear and moist and mucous membranes are normal.   Eyes: Pupils are equal, round, and reactive to light.   Neck: Neck supple. No JVD present. No thyromegaly present.   Cardiovascular: Normal rate, regular rhythm and normal heart sounds.  Exam reveals no gallop and no friction rub.    No murmur heard.  Pulmonary/Chest: Effort normal. No respiratory distress. She has decreased breath sounds. She has no wheezes. She has no rhonchi. She has no rales.   Lymphadenopathy:     She has no cervical adenopathy.   Neurological: She is alert and oriented to person, place, and time.   Skin: Skin is warm and dry.   Psychiatric: She has a normal mood and affect.   Vitals reviewed.      Assessment/Plan   Sonal was seen today for diabetes.    Diagnoses and all orders for this visit:    Screening for thyroid disorder  -     Thyroid Panel With TSH    Medication management  -     Comprehensive metabolic panel  -     CBC & Differential    Type 2 diabetes mellitus without complication, without long-term current use of insulin  -     Insulin Lispro (HUMALOG KWIKPEN) 100 UNIT/ML solution pen-injector; Glucose below 150 no extra insulin 150-200 give 2 units 201-250 every 3 units 251-300 give 4 units Greater than 300 give 5 units  -     Insulin Pen Needle 29G X 12.7MM misc; As directed  -     glucose blood test strip; Use as instructed  -     Ambulatory Referral to Ophthalmology  -     Hemoglobin A1c    Healthcare maintenance  -     Ambulatory Referral to Gynecology    Other orders  -     rOPINIRole (REQUIP) 0.25 MG tablet; Take 1 tab 1 hour before bedtime x 7 days, then increase to 2 tabs          Patient to continue injections, follow up with caroline as scheduled.  Will try requip, may consider  gabapentin if no improvement in symptoms or if experiences side effects.   Labs today.  Referral for diabetic eye exam.  Referral to GYN.  Declines vaccines today, states will do further research and make a decision.   Follow up for worsening symptoms.   Recommend repeat xray in next few months to follow up on pneumonia.

## 2018-03-29 NOTE — PATIENT INSTRUCTIONS
Start requip as prescribed.  Follow up with Dr. Arndt as scheduled.  Labs today. Results will be called.  Referral to Dr. Soliz, ophthalmology, and Dr. Beauchamp, OB/GYN

## 2018-03-30 DIAGNOSIS — E11.9 TYPE 2 DIABETES MELLITUS WITHOUT COMPLICATION, WITHOUT LONG-TERM CURRENT USE OF INSULIN (HCC): ICD-10-CM

## 2018-03-30 LAB
ALBUMIN SERPL-MCNC: 4.5 G/DL (ref 3.5–5.2)
ALBUMIN/GLOB SERPL: 1.6 G/DL
ALP SERPL-CCNC: 143 U/L (ref 39–117)
ALT SERPL-CCNC: 15 U/L (ref 1–33)
AST SERPL-CCNC: 12 U/L (ref 1–32)
BASOPHILS # BLD AUTO: 0.04 10*3/MM3 (ref 0–0.2)
BASOPHILS NFR BLD AUTO: 0.7 % (ref 0–1.5)
BILIRUB SERPL-MCNC: 0.3 MG/DL (ref 0.1–1.2)
BUN SERPL-MCNC: 12 MG/DL (ref 6–20)
BUN/CREAT SERPL: 17.4 (ref 7–25)
CALCIUM SERPL-MCNC: 10.3 MG/DL (ref 8.6–10.5)
CHLORIDE SERPL-SCNC: 99 MMOL/L (ref 98–107)
CO2 SERPL-SCNC: 24.8 MMOL/L (ref 22–29)
CREAT SERPL-MCNC: 0.69 MG/DL (ref 0.57–1)
EOSINOPHIL # BLD AUTO: 0.25 10*3/MM3 (ref 0–0.7)
EOSINOPHIL NFR BLD AUTO: 4.1 % (ref 0.3–6.2)
ERYTHROCYTE [DISTWIDTH] IN BLOOD BY AUTOMATED COUNT: 13.6 % (ref 11.7–13)
FT4I SERPL CALC-MCNC: 2.1 (ref 1.2–4.9)
GFR SERPLBLD CREATININE-BSD FMLA CKD-EPI: 108 ML/MIN/1.73
GFR SERPLBLD CREATININE-BSD FMLA CKD-EPI: 89 ML/MIN/1.73
GLOBULIN SER CALC-MCNC: 2.8 GM/DL
GLUCOSE SERPL-MCNC: 79 MG/DL (ref 65–99)
HBA1C MFR BLD: 10.98 % (ref 4.8–5.6)
HCT VFR BLD AUTO: 39.9 % (ref 35.6–45.5)
HGB BLD-MCNC: 12.2 G/DL (ref 11.9–15.5)
IMM GRANULOCYTES # BLD: 0.02 10*3/MM3 (ref 0–0.03)
IMM GRANULOCYTES NFR BLD: 0.3 % (ref 0–0.5)
LYMPHOCYTES # BLD AUTO: 2.25 10*3/MM3 (ref 0.9–4.8)
LYMPHOCYTES NFR BLD AUTO: 37.1 % (ref 19.6–45.3)
MCH RBC QN AUTO: 30 PG (ref 26.9–32)
MCHC RBC AUTO-ENTMCNC: 30.6 G/DL (ref 32.4–36.3)
MCV RBC AUTO: 98.3 FL (ref 80.5–98.2)
MONOCYTES # BLD AUTO: 0.49 10*3/MM3 (ref 0.2–1.2)
MONOCYTES NFR BLD AUTO: 8.1 % (ref 5–12)
NEUTROPHILS # BLD AUTO: 3.02 10*3/MM3 (ref 1.9–8.1)
NEUTROPHILS NFR BLD AUTO: 49.7 % (ref 42.7–76)
PLATELET # BLD AUTO: 409 10*3/MM3 (ref 140–500)
POTASSIUM SERPL-SCNC: 4.5 MMOL/L (ref 3.5–5.2)
PROT SERPL-MCNC: 7.3 G/DL (ref 6–8.5)
RBC # BLD AUTO: 4.06 10*6/MM3 (ref 3.9–5.2)
SODIUM SERPL-SCNC: 139 MMOL/L (ref 136–145)
T3RU NFR SERPL: 27 % (ref 24–39)
T4 SERPL-MCNC: 7.8 UG/DL (ref 4.5–12)
TSH SERPL DL<=0.005 MIU/L-ACNC: 4.18 UIU/ML (ref 0.45–4.5)
WBC # BLD AUTO: 6.07 10*3/MM3 (ref 4.5–10.7)

## 2018-04-05 ENCOUNTER — TRANSCRIBE ORDERS (OUTPATIENT)
Dept: OBSTETRICS AND GYNECOLOGY | Facility: CLINIC | Age: 53
End: 2018-04-05

## 2018-04-05 DIAGNOSIS — Z12.31 SCREENING MAMMOGRAM, ENCOUNTER FOR: Primary | ICD-10-CM

## 2018-04-10 ENCOUNTER — OFFICE VISIT (OUTPATIENT)
Dept: ENDOCRINOLOGY | Age: 53
End: 2018-04-10

## 2018-04-10 VITALS
HEIGHT: 64 IN | SYSTOLIC BLOOD PRESSURE: 140 MMHG | WEIGHT: 154.8 LBS | BODY MASS INDEX: 26.43 KG/M2 | DIASTOLIC BLOOD PRESSURE: 88 MMHG | HEART RATE: 107 BPM

## 2018-04-10 DIAGNOSIS — Z91.14 NONCOMPLIANCE WITH MEDICATION REGIMEN: ICD-10-CM

## 2018-04-10 DIAGNOSIS — E16.1 HYPERINSULINISM: ICD-10-CM

## 2018-04-10 DIAGNOSIS — Z79.4 ENCOUNTER FOR LONG-TERM (CURRENT) USE OF INSULIN (HCC): ICD-10-CM

## 2018-04-10 DIAGNOSIS — E11.9 SEVERE DIABETES MELLITUS (HCC): Primary | ICD-10-CM

## 2018-04-10 PROBLEM — Z91.148 NONCOMPLIANCE WITH MEDICATION REGIMEN: Status: ACTIVE | Noted: 2018-04-10

## 2018-04-10 PROCEDURE — 99214 OFFICE O/P EST MOD 30 MIN: CPT | Performed by: INTERNAL MEDICINE

## 2018-04-10 NOTE — PATIENT INSTRUCTIONS
Continue Levemir 15 units in the morning and 8 units at bedtime  Start Humalog 3 units before each meal  Continue the Humalog scale on top of the 3 units at each meal    The blood sugar goal is below 150  Until the blood sugar goal is achieved continue to increase the Humalog at mealtime by 1 unit once a week

## 2018-04-10 NOTE — PROGRESS NOTES
52 y.o.  Patient Care Team:  No Known Provider as PCP - General    Chief complaint NEW PATIENT HOSPITAL F/U FOR TYPE 2 DIABETES, UNCONTROLLED.    HPI   Patient is a 52-year-old white female with a past history of diabetes came for follow-up  Summary of hospitalization reviewed  Gestational diabetes  Patient was diagnosed with gestational diabetes approximately 20 years ago and was on insulin therapy during pregnancy and for a few years after pregnancy  She apparently stopped taking insulin 3-4 years ago  She was recently admitted to the hospital with severe diabetic ketoacidosis  She was discharged home on insulin therapy  Patient is currently taking Levemir 15 units in the morning and 8 units at night and Humalog 2-3 units each meal  Patient's blood sugars are ranging in the 100-280 range  She had no hypoglycemia  Patient denied any knowledge of diabetic retinopathy nephropathy or neuropathy  Patient has not had an eye examination in the past 2-3 years  I advised her to follow-up with the ophthalmologist as soon as possible  Hypoglycemia  Patient has not expressed any hypoglycemia recently.      Interval History summary of hospitalization    patient is a 52-year-old white female admitted to the hospital with severe C hypotension hyperglycemia and was evaluated in the emergency room and was noted to be severely acidotic with a pH of 6.7  She was placed on insulin therapy and IV fluids as well as bicarbonate intravenously  Patient is admitted to intensive care unit  Overnight patient's blood sugars have improved significantly but is bicarbonate still low  Patient's  is at the bedside and is often the history  Patient is very drowsy and not able to offer meaningful history   also reported that the entire family was sick recently and patient has not been feeling well for the past 3-4 days prior to admission to the emergency room     According to the  patient was diagnosed with gestational diabetes  more than 20 years ago  Apparently she used to be on insulin during pregnancy and a few years after pregnancies  At some point she discontinued taking insulin  He also reports that she used to see Dr. Davey in our practice which was at least 4-5 years ago  He has noticed that she has taken insulin periodically intermittently  Patient is briefly awake and reported that she has not taken any insulin in the past 3-4 years     Patient denied any knowledge or symptoms of diabetic retinopathy nephropathy or neuropathy  Patient also denied any previous history of diabetic ketoacidosis     Patient reported that she has been symptomatic for the past 3-4 months with severe thirst and polyuria  Her blood sugars were apparently not too high to seek medical attention  There is no obvious history of weight loss        The following portions of the patient's history were reviewed and updated as appropriate: allergies, current medications, past family history, past medical history, past social history, past surgical history and problem list.    Past Medical History:   Diagnosis Date   • Diabetes mellitus    • Pneumonia        Family History   Problem Relation Age of Onset   • Diabetes Mother        Social History     Social History   • Marital status:      Spouse name: N/A   • Number of children: N/A   • Years of education: N/A     Occupational History   • Not on file.     Social History Main Topics   • Smoking status: Never Smoker   • Smokeless tobacco: Never Used   • Alcohol use No   • Drug use: No   • Sexual activity: Not on file     Other Topics Concern   • Not on file     Social History Narrative    , working full time, 2 children       Allergies   Allergen Reactions   • Codeine GI Intolerance         Current Outpatient Prescriptions:   •  glucose blood test strip, Check 5 times daily, Disp: 100 each, Rfl: 12  •  insulin detemir (LEVEMIR) 100 UNIT/ML injection, Inject 15 Units under the skin Every Morning.  "(Patient taking differently: Inject 15 Units under the skin Every Morning. And 8 units at night), Disp: 1 pen, Rfl: 0  •  Insulin Lispro (HUMALOG KWIKPEN) 100 UNIT/ML solution pen-injector, Glucose below 150 no extra insulin 150-200 give 2 units 201-250 every 3 units 251-300 give 4 units Greater than 300 give 5 units, Disp: 3 pen, Rfl: 0  •  Insulin Pen Needle 29G X 12.7MM misc, As directed, Disp: 100 each, Rfl: 11  •  rOPINIRole (REQUIP) 0.25 MG tablet, Take 1 tab 1 hour before bedtime x 7 days, then increase to 2 tabs, Disp: 60 tablet, Rfl: 0           Review of Systems   Constitutional: Negative for chills, fatigue and fever.   HENT: Negative for sore throat, trouble swallowing and voice change.    Eyes: Negative for pain and redness.   Respiratory: Negative for shortness of breath and wheezing.    Cardiovascular: Negative for chest pain and palpitations.   Gastrointestinal: Negative for abdominal pain, constipation, diarrhea, nausea and vomiting.   Endocrine: Negative for cold intolerance and heat intolerance.   Genitourinary: Negative for frequency.   Musculoskeletal: Negative for joint swelling.   Skin: Negative for rash and wound.   Neurological: Negative for tremors, light-headedness and headaches.   Hematological: Does not bruise/bleed easily.   Psychiatric/Behavioral: Negative for confusion and sleep disturbance. The patient is not nervous/anxious.    All other systems reviewed and are negative.        Objective:  /88   Pulse 107   Ht 162.6 cm (64\")   Wt 70.2 kg (154 lb 12.8 oz)   BMI 26.57 kg/m²     Physical Exam   Constitutional: She is oriented to person, place, and time.   Eyes: EOM are normal. Pupils are equal, round, and reactive to light.   Neck: Normal range of motion. Neck supple. No thyromegaly present.   Cardiovascular: Normal rate, regular rhythm, normal heart sounds and intact distal pulses.    Pulmonary/Chest: Effort normal and breath sounds normal.   Abdominal: Soft. Bowel sounds " are normal.   Musculoskeletal: Normal range of motion. She exhibits no edema.   Neurological: She is alert and oriented to person, place, and time.   Skin: Skin is warm and dry.   Psychiatric: She has a normal mood and affect. Her behavior is normal.   Nursing note and vitals reviewed.        Results Review:    I reviewed the patient's new clinical results.  Office Visit on 03/29/2018   Component Date Value Ref Range Status   • Hemoglobin A1C 03/30/2018 10.98* 4.80 - 5.60 % Final    Comment: Hemoglobin A1C Ranges:  Increased Risk for Diabetes  5.7% to 6.4%  Diabetes                     >= 6.5%  Diabetic Goal                < 7.0%     • Glucose 03/30/2018 79  65 - 99 mg/dL Final   • BUN 03/30/2018 12  6 - 20 mg/dL Final   • Creatinine 03/30/2018 0.69  0.57 - 1.00 mg/dL Final   • eGFR Non  Am 03/30/2018 89  >60 mL/min/1.73 Final   • eGFR African Am 03/30/2018 108  >60 mL/min/1.73 Final   • BUN/Creatinine Ratio 03/30/2018 17.4  7.0 - 25.0 Final   • Sodium 03/30/2018 139  136 - 145 mmol/L Final   • Potassium 03/30/2018 4.5  3.5 - 5.2 mmol/L Final   • Chloride 03/30/2018 99  98 - 107 mmol/L Final   • Total CO2 03/30/2018 24.8  22.0 - 29.0 mmol/L Final   • Calcium 03/30/2018 10.3  8.6 - 10.5 mg/dL Final   • Total Protein 03/30/2018 7.3  6.0 - 8.5 g/dL Final   • Albumin 03/30/2018 4.50  3.50 - 5.20 g/dL Final   • Globulin 03/30/2018 2.8  gm/dL Final   • A/G Ratio 03/30/2018 1.6  g/dL Final   • Total Bilirubin 03/30/2018 0.3  0.1 - 1.2 mg/dL Final   • Alkaline Phosphatase 03/30/2018 143* 39 - 117 U/L Final   • AST (SGOT) 03/30/2018 12  1 - 32 U/L Final   • ALT (SGPT) 03/30/2018 15  1 - 33 U/L Final   • WBC 03/30/2018 6.07  4.50 - 10.70 10*3/mm3 Final   • RBC 03/30/2018 4.06  3.90 - 5.20 10*6/mm3 Final   • Hemoglobin 03/30/2018 12.2  11.9 - 15.5 g/dL Final   • Hematocrit 03/30/2018 39.9  35.6 - 45.5 % Final   • MCV 03/30/2018 98.3* 80.5 - 98.2 fL Final   • MCH 03/30/2018 30.0  26.9 - 32.0 pg Final   • MCHC 03/30/2018  30.6* 32.4 - 36.3 g/dL Final   • RDW 03/30/2018 13.6* 11.7 - 13.0 % Final   • Platelets 03/30/2018 409  140 - 500 10*3/mm3 Final   • Neutrophil Rel % 03/30/2018 49.7  42.7 - 76.0 % Final   • Lymphocyte Rel % 03/30/2018 37.1  19.6 - 45.3 % Final   • Monocyte Rel % 03/30/2018 8.1  5.0 - 12.0 % Final   • Eosinophil Rel % 03/30/2018 4.1  0.3 - 6.2 % Final   • Basophil Rel % 03/30/2018 0.7  0.0 - 1.5 % Final   • Neutrophils Absolute 03/30/2018 3.02  1.90 - 8.10 10*3/mm3 Final   • Lymphocytes Absolute 03/30/2018 2.25  0.90 - 4.80 10*3/mm3 Final   • Monocytes Absolute 03/30/2018 0.49  0.20 - 1.20 10*3/mm3 Final   • Eosinophils Absolute 03/30/2018 0.25  0.00 - 0.70 10*3/mm3 Final   • Basophils Absolute 03/30/2018 0.04  0.00 - 0.20 10*3/mm3 Final   • Immature Granulocyte Rel % 03/30/2018 0.3  0.0 - 0.5 % Final   • Immature Grans Absolute 03/30/2018 0.02  0.00 - 0.03 10*3/mm3 Final   • TSH 03/30/2018 4.180  0.450 - 4.500 uIU/mL Final   • T4, Total 03/30/2018 7.8  4.5 - 12.0 ug/dL Final   • T3 Uptake 03/30/2018 27  24 - 39 % Final   • Free Thyroxine Index 03/30/2018 2.1  1.2 - 4.9 Final       No images are attached to the encounter.    Sonal was seen today for diabetes.    Diagnoses and all orders for this visit:    Severe diabetes mellitus  -     C-Peptide  -     Glutamic Acid Decarboxylase    Encounter for long-term (current) use of insulin    Hyperinsulinism    Noncompliance with medication regimen        Uncontrolled diabetes mellitus  Noncompliance with management of diabetes  Diabetic ketoacidosis severe  Metabolic acidosis  AK I  Severe dehydration     Patient's glucose log reviewed  Most of the blood sugars appear to be 150-280 range  Patient reports that her fasting blood sugars are usually in the 100-140 range and she does not believe the glucometer download  I recommend that patient try Free[employer] CGM for more accurate data for the next 2 weeks    I advised her to take 3 units of Humalog before each meal along  with a sliding scale given  She will continue the Levemir 15 units in the morning and 8 units at bedtime  She has an ophthalmology appointment with Dr. Martínez Mckeon in June 2018  Patient will get lab testing to verify if she has C-peptide     Patient's recent hemoglobin A1c is 10.8% improving    Patient is given a new glucometer  She will also get C-peptide and ALEXANDRE antibody testing done today  Patient will return to follow-up in 3 months.    The total time spent for old record and lab review and face- to- face was more than 25 min of which greater than 15 min of time ( greater than 50% of the total time )  was spent face to face with the patient counseling and coordination of care on recommended evaluation and treatment options, instructions for management/treatment and /or follow up  and importance of compliance with chosen management or treatment options

## 2018-04-12 LAB
C PEPTIDE SERPL-MCNC: 0.1 NG/ML (ref 1.1–4.4)
GAD65 AB SER IA-ACNC: <5 U/ML (ref 0–5)

## 2018-04-23 RX ORDER — ROPINIROLE 0.25 MG/1
TABLET, FILM COATED ORAL
Qty: 60 TABLET | Refills: 3 | Status: SHIPPED | OUTPATIENT
Start: 2018-04-23 | End: 2019-05-15

## 2018-05-23 RX ORDER — INSULIN DETEMIR 100 [IU]/ML
INJECTION, SOLUTION SUBCUTANEOUS
Qty: 15 ML | Refills: 0 | Status: SHIPPED | OUTPATIENT
Start: 2018-05-23 | End: 2018-06-01 | Stop reason: SDUPTHER

## 2018-06-01 ENCOUNTER — OFFICE VISIT (OUTPATIENT)
Dept: ENDOCRINOLOGY | Age: 53
End: 2018-06-01

## 2018-06-01 VITALS
SYSTOLIC BLOOD PRESSURE: 146 MMHG | HEART RATE: 104 BPM | DIASTOLIC BLOOD PRESSURE: 76 MMHG | BODY MASS INDEX: 27.25 KG/M2 | WEIGHT: 159.6 LBS | HEIGHT: 64 IN

## 2018-06-01 DIAGNOSIS — E16.1 HYPERINSULINISM: ICD-10-CM

## 2018-06-01 DIAGNOSIS — IMO0002 UNCONTROLLED TYPE 1 DIABETES WITH DIABETIC NEUROPATHY: ICD-10-CM

## 2018-06-01 DIAGNOSIS — Z79.4 ENCOUNTER FOR LONG-TERM (CURRENT) USE OF INSULIN (HCC): ICD-10-CM

## 2018-06-01 DIAGNOSIS — IMO0002 DIABETES MELLITUS TYPE 1, UNCONTROLLED, WITH COMPLICATIONS: Primary | ICD-10-CM

## 2018-06-01 PROCEDURE — 99214 OFFICE O/P EST MOD 30 MIN: CPT | Performed by: INTERNAL MEDICINE

## 2018-06-01 PROCEDURE — 95251 CONT GLUC MNTR ANALYSIS I&R: CPT | Performed by: INTERNAL MEDICINE

## 2018-06-01 RX ORDER — GABAPENTIN 100 MG/1
100 CAPSULE ORAL 3 TIMES DAILY
Qty: 90 CAPSULE | Refills: 3 | Status: SHIPPED | OUTPATIENT
Start: 2018-06-01 | End: 2018-06-19 | Stop reason: DRUGHIGH

## 2018-06-01 NOTE — PROGRESS NOTES
52 y.o.    Patient Care Team:  No Known Provider as PCP - General    Chief Complaint:      F/U FOR TYPE 2 DIABETES, UNCONTROLLED.  LAST LABS DONE 3/29 AND 4/10/18    Subjective     HPI   Patient is a 52-year-old white female with a past history of diabetes came for follow-up  Patient was recently admitted in the hospital and was in DKA and subsequently discharged home on basal and bolus insulin regimen  Patient's C-peptide level was less than 0.1  She's basically manifesting type 1 diabetes  Patient has history of gestational diabetes approximately 20 years ago and was on insulin at that time during pregnancy and a few years after pregnancy.  She was eventually able to discontinue insulin after 3-4 years of usage    Uncontrolled type 1 diabetes mellitus with neuropathy  Patient reports symptoms suggestive of peripheral neuropathy such as tingling  Patient denied any knowledge of diabetic retinopathy or nephropathy  Hypoglycemia  Patient is experiencing intermittent hypoglycemia  Patient had an episode of DKA and was admitted in the hospital  Patient is gaining weight  She gain nearly 5 pounds in the past 6 weeks      Interval History summary of hospitalization     patient is a 52-year-old white female admitted to the hospital with severe C hypotension hyperglycemia and was evaluated in the emergency room and was noted to be severely acidotic with a pH of 6.7  She was placed on insulin therapy and IV fluids as well as bicarbonate intravenously  Patient is admitted to intensive care unit  Overnight patient's blood sugars have improved significantly but is bicarbonate still low  Patient's  is at the bedside and is often the history  Patient is very drowsy and not able to offer meaningful history   also reported that the entire family was sick recently and patient has not been feeling well for the past 3-4 days prior to admission to the emergency room     According to the  patient was diagnosed  with gestational diabetes more than 20 years ago  Apparently she used to be on insulin during pregnancy and a few years after pregnancies  At some point she discontinued taking insulin  He also reports that she used to see Dr. Davey in our practice which was at least 4-5 years ago  He has noticed that she has taken insulin periodically intermittently  Patient is briefly awake and reported that she has not taken any insulin in the past 3-4 years     Patient denied any knowledge or symptoms of diabetic retinopathy nephropathy or neuropathy  Patient also denied any previous history of diabetic ketoacidosis     Patient reported that she has been symptomatic for the past 3-4 months with severe thirst and polyuria  Her blood sugars were apparently not too high to seek medical attention  There is no obvious history of weight loss     The following portions of the patient's history were reviewed and updated as appropriate: allergies, current medications, past family history, past medical history, past social history, past surgical history and problem list.    Past Medical History:   Diagnosis Date   • Diabetes mellitus    • Gestational diabetes    • Pneumonia      Family History   Problem Relation Age of Onset   • Diabetes Mother      Social History     Social History   • Marital status:      Spouse name: N/A   • Number of children: N/A   • Years of education: N/A     Occupational History   • Not on file.     Social History Main Topics   • Smoking status: Never Smoker   • Smokeless tobacco: Never Used   • Alcohol use No   • Drug use: No   • Sexual activity: Not on file     Other Topics Concern   • Not on file     Social History Narrative    , working full time, 2 children     Allergies   Allergen Reactions   • Codeine GI Intolerance       Current Outpatient Prescriptions:   •  glucose blood test strip, Check 5 times daily, Disp: 100 each, Rfl: 12  •  Insulin Lispro (HUMALOG KWIKPEN) 100 UNIT/ML solution  "pen-injector, Glucose below 150 no extra insulin 150-200 give 2 units 201-250 every 3 units 251-300 give 4 units Greater than 300 give 5 units, Disp: 3 pen, Rfl: 0  •  Insulin Pen Needle 29G X 12.7MM misc, As directed, Disp: 100 each, Rfl: 11  •  LEVEMIR FLEXTOUCH 100 UNIT/ML injection, USE 15 UNITS UNDER THE SKIN EVERY MORNING, Disp: 15 mL, Rfl: 0  •  rOPINIRole (REQUIP) 0.25 MG tablet, TAKE 1 TABLET BY MOUTH EVERY NIGHT 1 HOUR BEFORE BEDTIME FOR 7 DAYS. INCREASE TO 2 TABLETS, Disp: 60 tablet, Rfl: 3        Review of Systems   Constitutional: Negative for chills, fatigue and fever.   Cardiovascular: Negative for chest pain and palpitations.   Gastrointestinal: Negative for abdominal pain, constipation, diarrhea, nausea and vomiting.   Endocrine: Negative for cold intolerance and heat intolerance.   All other systems reviewed and are negative.      Objective       Vitals:    06/01/18 1405   BP: 146/76   Pulse: 104   Weight: 72.4 kg (159 lb 9.6 oz)   Height: 162.6 cm (64\")     Body mass index is 27.4 kg/m².      Physical Exam   Constitutional: She is oriented to person, place, and time.   Eyes: EOM are normal. Pupils are equal, round, and reactive to light.   Neck: Normal range of motion. Neck supple. No thyromegaly present.   Cardiovascular: Normal rate, regular rhythm, normal heart sounds and intact distal pulses.    Pulmonary/Chest: Effort normal and breath sounds normal.   Abdominal: Soft. Bowel sounds are normal.   Musculoskeletal: Normal range of motion. She exhibits no edema.   Neurological: She is alert and oriented to person, place, and time.   Skin: Skin is warm and dry.   Psychiatric: She has a normal mood and affect. Her behavior is normal.   Nursing note and vitals reviewed.    Results Review:     I reviewed the patient's new clinical results.    Medical records reviewed  Summary:      Office Visit on 04/10/2018   Component Date Value Ref Range Status   • C-Peptide 04/10/2018 0.1* 1.1 - 4.4 ng/mL Final "    C-Peptide reference interval is for fasting patients.   • ALEXANDRE-65 04/10/2018 <5.0  0.0 - 5.0 U/mL Final     Lab Results   Component Value Date    HGBA1C 10.98 (H) 03/29/2018    HGBA1C 13.20 (H) 03/06/2018     Lab Results   Component Value Date    CREATININE 0.69 03/29/2018     Imaging Results (most recent)     None          Assessment and Plan:    Sonal was seen today for diabetes.    Diagnoses and all orders for this visit:    Diabetes mellitus type 1, uncontrolled, with complications    Encounter for long-term (current) use of insulin    Hyperinsulinism    Uncontrolled type 1 diabetes with diabetic neuropathy    Other orders  -     Discontinue: gabapentin (NEURONTIN) 100 MG capsule; Take 1 capsule by mouth 3 (Three) Times a Day.  -     Insulin Lispro (HUMALOG KWIKPEN) 100 UNIT/ML solution pen-injector; 4 UNITS BREAKFAST AND LUNCH AND 6 UNITS AT SUPPER DAILY SC  -     insulin detemir (LEVEMIR FLEXTOUCH) 100 UNIT/ML injection; 15 UNITS IN AM AND 10 UNITS AT HS     patient's glucose log reviewed  Majority of the blood sugars are between 107-260  Hypoglycemia  Patient has occasional hypoglycemia with a blood sugar dropping into 60 range and patient becoming symptomatic.  Patient is currently taking 15 units of Levemir in the morning and 8 units at bedtime  She takes 3 units of Humalog before each meal along with a sliding scale    Patient's last hemoglobin A1c is 10.9%  Patient C-peptide is extremely low <0.1 confirming type 1 diabetes mellitus  Will treat her as type I diabetic from now    FreeStyle CGM download reviewed  #1 patient is evidence for nocturnal hypoglycemia  Her daytime blood sugars appear very stable  Post dinner blood sugars appear to be elevated  Estimated hemoglobin A1c was 7.3% on the sample    I advised the patient to increase the Humalog to 4 units before breakfast and lunch and 6 units before supper along with a sliding scale  I also advised her to increase the Levemir to 15 units in the  "morning and 10 units at night  Patient will get lab testing done today  She will return to follow-up in 3 months.    The total time spent  was more than 25 min of which greater than 15 min of time ( greater than 50% of the total time )  was spent face to face with the patient counseling and coordination of care on recommended evaluation and treatment options, instructions for management/treatment and /or follow up  and importance of compliance with chosen management or treatment options    Obed Conway MD. FACE    06/01/18      EMR Dragon / transcription disclaimer:     \"Dictated utilizing Dragon dictation\".         "

## 2018-06-06 DIAGNOSIS — E11.9 TYPE 2 DIABETES MELLITUS WITHOUT COMPLICATION, WITHOUT LONG-TERM CURRENT USE OF INSULIN (HCC): ICD-10-CM

## 2018-06-06 RX ORDER — INSULIN LISPRO 100 [IU]/ML
INJECTION, SOLUTION INTRAVENOUS; SUBCUTANEOUS
Qty: 15 ML | Refills: 0 | Status: SHIPPED | OUTPATIENT
Start: 2018-06-06 | End: 2019-01-22 | Stop reason: SDUPTHER

## 2018-06-19 ENCOUNTER — APPOINTMENT (OUTPATIENT)
Dept: MAMMOGRAPHY | Facility: HOSPITAL | Age: 53
End: 2018-06-19
Attending: OBSTETRICS & GYNECOLOGY

## 2018-06-19 NOTE — TELEPHONE ENCOUNTER
----- Message from Cipriano Santiago sent at 6/19/2018  9:22 AM EDT -----  Contact: PATIENT  PATIENT IS NEEDING SCRIPT OF gabapentin (NEURONTIN) 100 MG capsule,     PATIENT RAN OUT ABOUT A WEEK AGO, SHE HAS BEEN TAKING 9 PILLS A DAY, SHE IS REQUESTING A REFILL OF  MG TABLETS.  PATIENT STATED SHE IS OUT OF MEDICATION AND IN PAIN.     [facility] [address] [place] [address] E AT [place] [address] [address] [place] [address] - [phone]  - [phone] (Phone)  [phone] (Fax)      BEST # PATIENT [phone]    DR. CABALLERO OK'D INCREASE. SCRIPT WAS CALLED TO THE PHARMACY    SCRIPT CALLED TO PHARMACY

## 2018-06-22 RX ORDER — GABAPENTIN 300 MG/1
300 CAPSULE ORAL 3 TIMES DAILY
Qty: 90 CAPSULE | Refills: 2 | Status: SHIPPED | OUTPATIENT
Start: 2018-06-22 | End: 2018-11-06 | Stop reason: SDUPTHER

## 2018-06-27 PROBLEM — IMO0002 UNCONTROLLED TYPE 2 DIABETES MELLITUS WITH DIABETIC NEUROPATHY, WITH LONG-TERM CURRENT USE OF INSULIN: Status: ACTIVE | Noted: 2018-04-10

## 2018-06-27 PROBLEM — IMO0002 DIABETES MELLITUS TYPE 1, UNCONTROLLED, WITH COMPLICATIONS: Status: ACTIVE | Noted: 2018-06-27

## 2018-11-19 RX ORDER — GABAPENTIN 300 MG/1
CAPSULE ORAL
Qty: 90 CAPSULE | Refills: 0 | Status: SHIPPED | OUTPATIENT
Start: 2018-11-19 | End: 2019-05-15

## 2019-01-22 DIAGNOSIS — E11.9 TYPE 2 DIABETES MELLITUS WITHOUT COMPLICATION, WITHOUT LONG-TERM CURRENT USE OF INSULIN (HCC): ICD-10-CM

## 2019-02-05 ENCOUNTER — OFFICE VISIT (OUTPATIENT)
Dept: FAMILY MEDICINE CLINIC | Facility: CLINIC | Age: 54
End: 2019-02-05

## 2019-02-05 VITALS
HEIGHT: 64 IN | HEART RATE: 98 BPM | TEMPERATURE: 98 F | WEIGHT: 164.1 LBS | SYSTOLIC BLOOD PRESSURE: 120 MMHG | BODY MASS INDEX: 28.01 KG/M2 | DIASTOLIC BLOOD PRESSURE: 66 MMHG | OXYGEN SATURATION: 98 %

## 2019-02-05 DIAGNOSIS — E11.65 UNCONTROLLED TYPE 2 DIABETES MELLITUS WITH HYPERGLYCEMIA (HCC): ICD-10-CM

## 2019-02-05 DIAGNOSIS — Z13.220 SCREENING FOR LIPID DISORDERS: Primary | ICD-10-CM

## 2019-02-05 DIAGNOSIS — Z79.899 MEDICATION MANAGEMENT: ICD-10-CM

## 2019-02-05 PROCEDURE — 90732 PPSV23 VACC 2 YRS+ SUBQ/IM: CPT | Performed by: NURSE PRACTITIONER

## 2019-02-05 PROCEDURE — 90471 IMMUNIZATION ADMIN: CPT | Performed by: NURSE PRACTITIONER

## 2019-02-05 PROCEDURE — 99214 OFFICE O/P EST MOD 30 MIN: CPT | Performed by: NURSE PRACTITIONER

## 2019-02-05 NOTE — PROGRESS NOTES
Subjective   Sonal De Dios is a 53 y.o. female presents for routine follow up for diabetes. [id number] in am, blood sugar running high, has been off levemir for the past several months as she had no refills    Levemir, 20 units at bedtime, 15 units in the morning, has not been taking, increasing her SSI due to being out of medication. Reports increased neuropathy, was well controlled on gabapentin, but recently worsened, she attributes this to possible high blood sugars.     Had a fall approximately one week ago, injured left foot. Swelling and tenderness has improved, however she still has a blood blister on the outside of her left great toe and underneath her toe. Concerned with her diabetes.       Diabetes   She presents for her follow-up diabetic visit. She has type 2 diabetes mellitus. Her disease course has been worsening. There are no hypoglycemic associated symptoms. Associated symptoms include foot paresthesias. There are no hypoglycemic complications. Symptoms are worsening. Diabetic complications include peripheral neuropathy. Risk factors for coronary artery disease include diabetes mellitus. Current diabetic treatment includes insulin injections. She is compliant with treatment some of the time (out of levemir x 2 months). Her weight is increasing steadily (started a workout program). She is following a diabetic and generally healthy diet. She participates in exercise three times a week. Her breakfast blood glucose range is generally 180-200 mg/dl. An ACE inhibitor/angiotensin II receptor blocker is not being taken. She does not see a podiatrist.Eye exam is not current.        The following portions of the patient's history were reviewed and updated as appropriate: allergies, current medications, past family history, past medical history, past social history, past surgical history and problem list.    Review of Systems   Constitutional: Negative.    HENT: Negative.    Eyes: Negative.     Respiratory: Negative.    Cardiovascular: Negative.    Gastrointestinal: Negative.    Endocrine: Negative.    Genitourinary: Negative.    Musculoskeletal: Negative.    Skin: Positive for wound (left outer toe and beneath, had a fall, with blood blister, not healed).   Allergic/Immunologic: Negative.    Neurological: Negative.    Hematological: Negative.    Psychiatric/Behavioral: Negative.        Objective   Physical Exam   Constitutional: She is oriented to person, place, and time. She appears well-developed and well-nourished.   HENT:   Head: Normocephalic and atraumatic.   Right Ear: External ear normal.   Left Ear: External ear normal.   Nose: Nose normal.   Mouth/Throat: Oropharynx is clear and moist. No oropharyngeal exudate.   Eyes: Conjunctivae are normal. Pupils are equal, round, and reactive to light.   Neck: Neck supple.   Cardiovascular: Normal rate, regular rhythm and normal heart sounds. Exam reveals no gallop and no friction rub.   No murmur heard.  Pulmonary/Chest: Effort normal and breath sounds normal. No stridor. No respiratory distress. She has no wheezes.   Abdominal: Soft. Bowel sounds are normal. She exhibits no distension. There is no tenderness.   Neurological: She is alert and oriented to person, place, and time.   Skin: Skin is warm and dry.   Left great outer toe minimal erythema, blister, bruise and discoloration approximately 1 cm, tender to palpate, below toe small area 5 mm bruising/blister   Psychiatric: She has a normal mood and affect.   Vitals reviewed.      Assessment/Plan   Sonal was seen today for follow-up.    Diagnoses and all orders for this visit:    Screening for lipid disorders  -     Lipid Panel With LDL / HDL Ratio    Uncontrolled type 2 diabetes mellitus with hyperglycemia (CMS/[facility])  -     insulin detemir (LEVEMIR FLEXTOUCH) 100 UNIT/ML injection; 15 UNITS IN AM AND 10 UNITS AT HS  -     Hemoglobin A1c  -     Comprehensive metabolic panel    Medication  management  -     CBC & Differential    Other orders  -     Pneumococcal Polysaccharide Vaccine 23-Valent (PPSV23) Greater Than or Equal To 3yo Subcutaneous / IM      Repeat labs today  Will resume levemir  Pt to continue to monitor blood glucose  Eye exam discussed and recommended  Recent foot injury with blister, pt needs to monitor closely, for no improvement or worsening symptoms, will need podiatry referral  Continue gabapentin   SSI as needed

## 2019-02-06 LAB
ALBUMIN SERPL-MCNC: 5 G/DL (ref 3.5–5.2)
ALBUMIN/GLOB SERPL: 2 G/DL
ALP SERPL-CCNC: 81 U/L (ref 39–117)
ALT SERPL-CCNC: 10 U/L (ref 1–33)
AST SERPL-CCNC: 8 U/L (ref 1–32)
BASOPHILS # BLD AUTO: 0.02 10*3/MM3 (ref 0–0.2)
BASOPHILS NFR BLD AUTO: 0.2 % (ref 0–1.5)
BILIRUB SERPL-MCNC: 0.2 MG/DL (ref 0.1–1.2)
BUN SERPL-MCNC: 13 MG/DL (ref 6–20)
BUN/CREAT SERPL: 20.6 (ref 7–25)
CALCIUM SERPL-MCNC: 9.7 MG/DL (ref 8.6–10.5)
CHLORIDE SERPL-SCNC: 97 MMOL/L (ref 98–107)
CHOLEST SERPL-MCNC: 212 MG/DL (ref 0–200)
CO2 SERPL-SCNC: 23.4 MMOL/L (ref 22–29)
CREAT SERPL-MCNC: 0.63 MG/DL (ref 0.57–1)
EOSINOPHIL # BLD AUTO: 0.04 10*3/MM3 (ref 0–0.7)
EOSINOPHIL NFR BLD AUTO: 0.4 % (ref 0.3–6.2)
ERYTHROCYTE [DISTWIDTH] IN BLOOD BY AUTOMATED COUNT: 13.6 % (ref 11.7–13)
GLOBULIN SER CALC-MCNC: 2.5 GM/DL
GLUCOSE SERPL-MCNC: 162 MG/DL (ref 65–99)
HBA1C MFR BLD: 13.01 % (ref 4.8–5.6)
HCT VFR BLD AUTO: 45.4 % (ref 35.6–45.5)
HDLC SERPL-MCNC: 62 MG/DL (ref 40–60)
HGB BLD-MCNC: 14.8 G/DL (ref 11.9–15.5)
IMM GRANULOCYTES # BLD AUTO: 0.03 10*3/MM3 (ref 0–0.03)
IMM GRANULOCYTES NFR BLD AUTO: 0.3 % (ref 0–0.5)
LDLC SERPL CALC-MCNC: 121 MG/DL (ref 0–100)
LDLC/HDLC SERPL: 1.96 {RATIO}
LYMPHOCYTES # BLD AUTO: 2.47 10*3/MM3 (ref 0.9–4.8)
LYMPHOCYTES NFR BLD AUTO: 25.1 % (ref 19.6–45.3)
MCH RBC QN AUTO: 30.1 PG (ref 26.9–32)
MCHC RBC AUTO-ENTMCNC: 32.6 G/DL (ref 32.4–36.3)
MCV RBC AUTO: 92.5 FL (ref 80.5–98.2)
MONOCYTES # BLD AUTO: 0.34 10*3/MM3 (ref 0.2–1.2)
MONOCYTES NFR BLD AUTO: 3.5 % (ref 5–12)
NEUTROPHILS # BLD AUTO: 6.96 10*3/MM3 (ref 1.9–8.1)
NEUTROPHILS NFR BLD AUTO: 70.8 % (ref 42.7–76)
PLATELET # BLD AUTO: 289 10*3/MM3 (ref 140–500)
POTASSIUM SERPL-SCNC: 4.4 MMOL/L (ref 3.5–5.2)
PROT SERPL-MCNC: 7.5 G/DL (ref 6–8.5)
RBC # BLD AUTO: 4.91 10*6/MM3 (ref 3.9–5.2)
SODIUM SERPL-SCNC: 136 MMOL/L (ref 136–145)
TRIGL SERPL-MCNC: 143 MG/DL (ref 0–150)
VLDLC SERPL CALC-MCNC: 28.6 MG/DL (ref 5–40)
WBC # BLD AUTO: 9.83 10*3/MM3 (ref 4.5–10.7)

## 2019-02-06 RX ORDER — ATORVASTATIN CALCIUM 10 MG/1
10 TABLET, FILM COATED ORAL DAILY
Qty: 30 TABLET | Refills: 3 | Status: SHIPPED | OUTPATIENT
Start: 2019-02-06 | End: 2019-06-13 | Stop reason: SDUPTHER

## 2019-02-18 ENCOUNTER — TELEPHONE (OUTPATIENT)
Dept: ENDOCRINOLOGY | Age: 54
End: 2019-02-18

## 2019-02-18 NOTE — TELEPHONE ENCOUNTER
----- Message from Camille Mendoza sent at 1/24/2019 12:47 PM EST -----   with her insurance company called    Lynne burt [phone]  Ext [phone]    Looking for clinical update    Notes  Medication  Labs    PATIENT NOT SEEN SINCE 6/1/18. SHE WILL NEED TO COME INTO THE OFFICE AND FILL OUT RELEASE OF RECORDS.

## 2019-04-26 ENCOUNTER — TELEPHONE (OUTPATIENT)
Dept: ENDOCRINOLOGY | Age: 54
End: 2019-04-26

## 2019-04-26 NOTE — TELEPHONE ENCOUNTER
----- Message from Jody Agrawal MA sent at 4/23/2019 12:20 PM EDT -----  Contact: ARI Davis from [employer] called requesting pt last office notes,labs and updated medication list.   Please fax to [phone]    PAPERWORK FAXED

## 2019-05-10 RX ORDER — GABAPENTIN 300 MG/1
CAPSULE ORAL
Qty: 90 CAPSULE | Refills: 0 | OUTPATIENT
Start: 2019-05-10

## 2019-05-13 RX ORDER — GABAPENTIN 300 MG/1
CAPSULE ORAL
Qty: 90 CAPSULE | Refills: 0 | OUTPATIENT
Start: 2019-05-13

## 2019-05-15 ENCOUNTER — OFFICE VISIT (OUTPATIENT)
Dept: FAMILY MEDICINE CLINIC | Facility: CLINIC | Age: 54
End: 2019-05-15

## 2019-05-15 VITALS
OXYGEN SATURATION: 99 % | TEMPERATURE: 98.5 F | SYSTOLIC BLOOD PRESSURE: 140 MMHG | WEIGHT: 160.1 LBS | BODY MASS INDEX: 27.48 KG/M2 | HEART RATE: 111 BPM | DIASTOLIC BLOOD PRESSURE: 80 MMHG

## 2019-05-15 DIAGNOSIS — Z79.4 TYPE 2 DIABETES MELLITUS WITH HYPERGLYCEMIA, WITH LONG-TERM CURRENT USE OF INSULIN (HCC): Primary | ICD-10-CM

## 2019-05-15 DIAGNOSIS — E11.65 TYPE 2 DIABETES MELLITUS WITH HYPERGLYCEMIA, WITH LONG-TERM CURRENT USE OF INSULIN (HCC): Primary | ICD-10-CM

## 2019-05-15 DIAGNOSIS — IMO0002 UNCONTROLLED TYPE 2 DIABETES MELLITUS WITH DIABETIC NEUROPATHY, WITH LONG-TERM CURRENT USE OF INSULIN: ICD-10-CM

## 2019-05-15 PROCEDURE — 99213 OFFICE O/P EST LOW 20 MIN: CPT | Performed by: NURSE PRACTITIONER

## 2019-05-15 RX ORDER — GABAPENTIN 400 MG/1
400 CAPSULE ORAL 3 TIMES DAILY
Qty: 90 CAPSULE | Refills: 3 | Status: SHIPPED | OUTPATIENT
Start: 2019-05-15 | End: 2019-09-03 | Stop reason: SDUPTHER

## 2019-05-15 RX ORDER — ESCITALOPRAM OXALATE 5 MG/1
5 TABLET ORAL DAILY
Qty: 30 TABLET | Refills: 3 | Status: SHIPPED | OUTPATIENT
Start: 2019-05-15 | End: 2019-09-12 | Stop reason: SDUPTHER

## 2019-05-15 NOTE — PROGRESS NOTES
Subjective   Sonal De Dios is a 53 y.o. female presents for follow up diabetes. Has not returned to see Dr. Morrow. Last A1C > 13. Has been working out frequently and really watching her diet. She is taking medication as prescribed. Increased neuropathy, states it is worse with working out, previously prescribed gabapentin, interested in increasing to four times daily if possible. Has been out for the past few days and has noticed a difference.    Diabetes   She presents for her follow-up diabetic visit. She has type 2 diabetes mellitus. Her disease course has been worsening. There are no hypoglycemic associated symptoms. Associated symptoms include foot paresthesias. There are no hypoglycemic complications. Symptoms are worsening. Diabetic complications include peripheral neuropathy. Risk factors for coronary artery disease include diabetes mellitus. Current diabetic treatment includes insulin injections. She is compliant with treatment most of the time. Her weight is stable. She is following a generally healthy diet. Her home blood glucose trend is decreasing steadily. An ACE inhibitor/angiotensin II receptor blocker is not being taken. Eye exam is not current.        The following portions of the patient's history were reviewed and updated as appropriate: allergies, current medications, past family history, past medical history, past social history, past surgical history and problem list.    Review of Systems   Constitutional: Negative.    HENT: Negative.    Eyes: Negative.    Respiratory: Negative.    Cardiovascular: Negative.    Gastrointestinal: Negative.    Endocrine: Negative.    Genitourinary: Negative.    Musculoskeletal: Negative.    Skin: Negative.    Allergic/Immunologic: Negative.    Neurological: Positive for numbness (bilateral feet).   Hematological: Negative.    Psychiatric/Behavioral: Negative.        Objective   Physical Exam   Constitutional: She is oriented to person, place, and  time. She appears well-developed and well-nourished. No distress.   Neck: Neck supple.   Cardiovascular: Normal rate, regular rhythm and normal heart sounds. Exam reveals no gallop and no friction rub.   No murmur heard.  Pulmonary/Chest: Effort normal and breath sounds normal. No respiratory distress. She has no wheezes. She has no rales.   Abdominal: Soft. Bowel sounds are normal. She exhibits no distension. There is no tenderness.   Neurological: She is alert and oriented to person, place, and time.   Skin: Skin is warm and dry. She is not diaphoretic.   Psychiatric: She has a normal mood and affect.       Assessment/Plan   Sonal was seen today for diabetes.    Diagnoses and all orders for this visit:    Type 2 diabetes mellitus with hyperglycemia, with long-term current use of insulin (CMS/[facility])  -     Ambulatory Referral to Endocrinology  -     Hemoglobin A1c; Future  -     Comprehensive metabolic panel; Future  -     Microalbumin / Creatinine Urine Ratio - Urine, Clean Catch; Future    Uncontrolled type 2 diabetes mellitus with diabetic neuropathy, with long-term current use of insulin (CMS/[facility])    Other orders  -     gabapentin (NEURONTIN) 400 MG capsule; Take 1 capsule by mouth 3 (Three) Times a Day.  -     escitalopram (LEXAPRO) 5 MG tablet; Take 1 tablet by mouth Daily.      Will increase gabapentin to 400 mg TID, discussed dosing is recommended at TID  Labs in future, has family emergency and needs to leave today, labs ordered, pt to return in next week to monitor progress of diabetes  BP slightly increased, could be related to family emergency, would likely benefit from low dose ACE/ARB for adalberto-protection, will discuss at next visit

## 2019-05-16 ENCOUNTER — RESULTS ENCOUNTER (OUTPATIENT)
Dept: FAMILY MEDICINE CLINIC | Facility: CLINIC | Age: 54
End: 2019-05-16

## 2019-05-16 DIAGNOSIS — E11.65 TYPE 2 DIABETES MELLITUS WITH HYPERGLYCEMIA, WITH LONG-TERM CURRENT USE OF INSULIN (HCC): ICD-10-CM

## 2019-05-16 DIAGNOSIS — Z79.4 TYPE 2 DIABETES MELLITUS WITH HYPERGLYCEMIA, WITH LONG-TERM CURRENT USE OF INSULIN (HCC): ICD-10-CM

## 2019-06-13 RX ORDER — ATORVASTATIN CALCIUM 10 MG/1
10 TABLET, FILM COATED ORAL DAILY
Qty: 30 TABLET | Refills: 0 | Status: SHIPPED | OUTPATIENT
Start: 2019-06-13 | End: 2019-07-17 | Stop reason: SDUPTHER

## 2019-07-17 RX ORDER — ATORVASTATIN CALCIUM 10 MG/1
10 TABLET, FILM COATED ORAL DAILY
Qty: 30 TABLET | Refills: 0 | Status: SHIPPED | OUTPATIENT
Start: 2019-07-17 | End: 2019-08-14 | Stop reason: SDUPTHER

## 2019-07-30 DIAGNOSIS — E11.65 UNCONTROLLED TYPE 2 DIABETES MELLITUS WITH HYPERGLYCEMIA (HCC): ICD-10-CM

## 2019-08-14 RX ORDER — ATORVASTATIN CALCIUM 10 MG/1
10 TABLET, FILM COATED ORAL DAILY
Qty: 30 TABLET | Refills: 0 | Status: SHIPPED | OUTPATIENT
Start: 2019-08-14 | End: 2019-09-12 | Stop reason: SDUPTHER

## 2019-09-03 NOTE — TELEPHONE ENCOUNTER
Looks as though patient is attempting to schedule a physical through my chart.  That appointment is pending. Otherwise, patient was last seen in May.

## 2019-09-04 RX ORDER — GABAPENTIN 400 MG/1
400 CAPSULE ORAL 3 TIMES DAILY
Qty: 90 CAPSULE | Refills: 0 | Status: SHIPPED | OUTPATIENT
Start: 2019-09-04 | End: 2019-10-04 | Stop reason: SDUPTHER

## 2019-09-12 RX ORDER — ATORVASTATIN CALCIUM 10 MG/1
10 TABLET, FILM COATED ORAL DAILY
Qty: 30 TABLET | Refills: 5 | Status: SHIPPED | OUTPATIENT
Start: 2019-09-12 | End: 2020-03-27 | Stop reason: SDUPTHER

## 2019-09-12 RX ORDER — ESCITALOPRAM OXALATE 5 MG/1
5 TABLET ORAL DAILY
Qty: 30 TABLET | Refills: 5 | Status: SHIPPED | OUTPATIENT
Start: 2019-09-12

## 2019-10-04 RX ORDER — GABAPENTIN 400 MG/1
400 CAPSULE ORAL 3 TIMES DAILY
Qty: 90 CAPSULE | Refills: 0 | Status: SHIPPED | OUTPATIENT
Start: 2019-10-04 | End: 2019-10-29 | Stop reason: SDUPTHER

## 2019-10-30 RX ORDER — GABAPENTIN 400 MG/1
400 CAPSULE ORAL 3 TIMES DAILY
Qty: 270 CAPSULE | Refills: 0 | Status: SHIPPED | OUTPATIENT
Start: 2019-10-30 | End: 2020-01-28 | Stop reason: SDUPTHER

## 2019-12-26 RX ORDER — PEN NEEDLE, DIABETIC 32GX 5/32"
NEEDLE, DISPOSABLE MISCELLANEOUS
Qty: 100 EACH | Refills: 2 | Status: SHIPPED | OUTPATIENT
Start: 2019-12-26

## 2020-01-28 ENCOUNTER — OFFICE VISIT (OUTPATIENT)
Dept: FAMILY MEDICINE CLINIC | Facility: CLINIC | Age: 55
End: 2020-01-28

## 2020-01-28 VITALS
BODY MASS INDEX: 28.7 KG/M2 | TEMPERATURE: 98.3 F | WEIGHT: 168.1 LBS | HEART RATE: 92 BPM | DIASTOLIC BLOOD PRESSURE: 74 MMHG | OXYGEN SATURATION: 98 % | SYSTOLIC BLOOD PRESSURE: 140 MMHG | HEIGHT: 64 IN

## 2020-01-28 DIAGNOSIS — Z13.29 SCREENING FOR THYROID DISORDER: ICD-10-CM

## 2020-01-28 DIAGNOSIS — E11.59 TYPE 2 DIABETES MELLITUS WITH OTHER CIRCULATORY COMPLICATION, WITH LONG-TERM CURRENT USE OF INSULIN (HCC): Primary | ICD-10-CM

## 2020-01-28 DIAGNOSIS — E78.01 FAMILIAL HYPERCHOLESTEROLEMIA: ICD-10-CM

## 2020-01-28 DIAGNOSIS — R03.0 ELEVATED BLOOD-PRESSURE READING WITHOUT DIAGNOSIS OF HYPERTENSION: ICD-10-CM

## 2020-01-28 DIAGNOSIS — Z79.4 TYPE 2 DIABETES MELLITUS WITH OTHER CIRCULATORY COMPLICATION, WITH LONG-TERM CURRENT USE OF INSULIN (HCC): Primary | ICD-10-CM

## 2020-01-28 PROCEDURE — 99214 OFFICE O/P EST MOD 30 MIN: CPT | Performed by: NURSE PRACTITIONER

## 2020-01-28 RX ORDER — LISINOPRIL 5 MG/1
5 TABLET ORAL DAILY
Qty: 90 TABLET | Refills: 1 | Status: SHIPPED | OUTPATIENT
Start: 2020-01-28 | End: 2020-09-22 | Stop reason: SDUPTHER

## 2020-01-28 RX ORDER — GABAPENTIN 400 MG/1
400 CAPSULE ORAL 3 TIMES DAILY
Qty: 270 CAPSULE | Refills: 1 | Status: SHIPPED | OUTPATIENT
Start: 2020-01-28 | End: 2020-04-19 | Stop reason: SDUPTHER

## 2020-01-28 RX ORDER — INSULIN LISPRO 100 [IU]/ML
INJECTION, SOLUTION INTRAVENOUS; SUBCUTANEOUS
Qty: 3 PEN | Refills: 3 | Status: SHIPPED | OUTPATIENT
Start: 2020-01-28 | End: 2022-08-01 | Stop reason: HOSPADM

## 2020-01-28 RX ORDER — FLASH GLUCOSE SCANNING READER
1 EACH MISCELLANEOUS 2 TIMES DAILY
Qty: 1 DEVICE | Refills: 0 | Status: SHIPPED | OUTPATIENT
Start: 2020-01-28

## 2020-01-28 RX ORDER — FLASH GLUCOSE SENSOR
1 KIT MISCELLANEOUS 2 TIMES DAILY
Qty: 6 EACH | Refills: 3 | Status: SHIPPED | OUTPATIENT
Start: 2020-01-28

## 2020-01-28 NOTE — PROGRESS NOTES
"Subjective   Sonal De Dios is a 54 y.o. female   who presents for   Chief Complaint   Patient presents with   • Diabetes     f/u     Started arbonne shakes a few weeks ago  Increased bowel movements  Minimal blood when wiping, none in stool  Started preparation H, feels itchy and raw    levemir 20  Units twice daily  Glucose was 173 fasting this morning, last night 300   /74   Pulse 92   Temp 98.3 °F (36.8 °C)   Ht 162.6 cm (64\")   Wt 76.2 kg (168 lb 1.6 oz)   SpO2 98%   BMI 28.85 kg/m²       History of Present Illness   Diabetes   She presents for her follow-up diabetic visit. She has type 2 diabetes mellitus. There are no hypoglycemic associated symptoms. Pertinent negatives for hypoglycemia include no headaches or sweats. Associated symptoms include foot paresthesias. Pertinent negatives for diabetes include no blurred vision and no chest pain. There are no hypoglycemic complications. Diabetic complications include peripheral neuropathy. Risk factors for coronary artery disease include diabetes mellitus, dyslipidemia and hypertension. Current diabetic treatment includes insulin injections. She is compliant with treatment most of the time. Her weight is stable. She is following a generally healthy diet. Her breakfast blood glucose range is generally 140-180 mg/dl. Her dinner blood glucose range is generally >200 mg/dl. An ACE inhibitor/angiotensin II receptor blocker is not being taken.   Hyperlipidemia   This is a chronic problem. The current episode started more than 1 year ago. The problem is controlled. Recent lipid tests were reviewed and are normal. She has no history of chronic renal disease, diabetes, hypothyroidism, liver disease, obesity or nephrotic syndrome. There are no known factors aggravating her hyperlipidemia. Pertinent negatives include no chest pain, focal sensory loss, focal weakness, leg pain, myalgias or shortness of breath. Current antihyperlipidemic treatment includes " statins. The current treatment provides moderate improvement of lipids. There are no compliance problems.  Risk factors for coronary artery disease include diabetes mellitus, dyslipidemia and hypertension.   Hypertension   This is a new problem. The current episode started more than 1 month ago. The problem is unchanged. The problem is uncontrolled. Pertinent negatives include no anxiety, blurred vision, chest pain, headaches, malaise/fatigue, neck pain, orthopnea, palpitations, peripheral edema, PND, shortness of breath or sweats. There are no associated agents to hypertension. Risk factors for coronary artery disease include diabetes mellitus and dyslipidemia. Past treatments include nothing. Current antihypertension treatment includes nothing. The current treatment provides no improvement. There are no compliance problems.  There is no history of chronic renal disease.       The following portions of the patient's history were reviewed and updated as appropriate: allergies, current medications, past family history, past medical history, past social history, past surgical history and problem list.    Review of Systems  Review of Systems   Constitutional: Negative.  Negative for malaise/fatigue.   HENT: Negative.    Eyes: Negative.  Negative for blurred vision.   Respiratory: Negative.  Negative for shortness of breath.    Cardiovascular: Negative.  Negative for chest pain, palpitations, orthopnea and PND.   Gastrointestinal: Positive for anal bleeding (minimal) and rectal pain (recent change in diet, increased bowel movements, hemorrhoid flare up). Negative for blood in stool.   Endocrine: Negative.    Genitourinary: Negative.    Musculoskeletal: Negative.  Negative for myalgias and neck pain.   Skin: Negative.    Allergic/Immunologic: Negative.    Neurological: Negative.  Negative for focal weakness and headaches.   Hematological: Negative.    Psychiatric/Behavioral: Negative.        Objective   Physical  Exam  Physical Exam   Constitutional: She is oriented to person, place, and time. She appears well-developed and well-nourished. No distress.   HENT:   Mouth/Throat: Oropharynx is clear and moist.   Neck: Neck supple.   Cardiovascular: Normal rate, regular rhythm and normal heart sounds. Exam reveals no gallop and no friction rub.   No murmur heard.  Pulmonary/Chest: Effort normal and breath sounds normal. No respiratory distress. She has no wheezes. She has no rales.   Abdominal: Soft. Bowel sounds are normal. She exhibits no distension. There is no tenderness.   Neurological: She is alert and oriented to person, place, and time.   Skin: Skin is warm and dry. She is not diaphoretic.   Psychiatric: She has a normal mood and affect.   Vitals reviewed.        Assessment/Plan   Sonal was seen today for diabetes.    Diagnoses and all orders for this visit:    Type 2 diabetes mellitus with other circulatory complication, with long-term current use of insulin (CMS/[facility])  -     gabapentin (NEURONTIN) 400 MG capsule; Take 1 capsule by mouth 3 (Three) Times a Day.  -     Comprehensive metabolic panel  -     Hemoglobin A1c    Elevated blood-pressure reading without diagnosis of hypertension  -     Comprehensive metabolic panel    Familial hypercholesterolemia  -     CBC & Differential  -     Lipid Panel With LDL / HDL Ratio    Screening for thyroid disorder  -     TSH Rfx On Abnormal To Free T4    Other orders  -     Continuous Blood Gluc  (FREESTYLE JUAN 14 DAY READER) device; 1 each 2 (Two) Times a Day.  -     Continuous Blood Gluc Sensor (FREESTYLE JUAN 14 DAY SENSOR) misc; 1 each 2 (Two) Times a Day.  -     Insulin Lispro, 1 Unit Dial, (HUMALOG KWIKPEN) 100 UNIT/ML solution pen-injector; Inject 10 units before meals  -     lisinopril (PRINIVIL,ZESTRIL) 5 MG tablet; Take 1 tablet by mouth Daily.  -     hydrocortisone (ANUSOL-HC) 2.5 % rectal cream; Insert  into the rectum 2 (Two) Times a Day.    refills  given  Will prescribe freestyle jacey, insulin dependent  Start lisinopril 5 mg, monitor bp daily  Has been increased, discussed ADA recommendations to start low dose ACE/ARB without hypertension, patient in agreement to start  Labs today  Refill gabapentin  Discussed hemorrhoids, will start anusol hc, instructed for no improvement after 7-10 days, will refer to colorectal specialist as needed  Up to date on colonoscopy, no abnormal findings

## 2020-01-29 DIAGNOSIS — E11.65 UNCONTROLLED TYPE 2 DIABETES MELLITUS WITH HYPERGLYCEMIA (HCC): ICD-10-CM

## 2020-01-29 LAB
ALBUMIN SERPL-MCNC: 4.7 G/DL (ref 3.5–5.2)
ALBUMIN/GLOB SERPL: 2 G/DL
ALP SERPL-CCNC: 102 U/L (ref 39–117)
ALT SERPL-CCNC: 21 U/L (ref 1–33)
AST SERPL-CCNC: 17 U/L (ref 1–32)
BASOPHILS # BLD AUTO: 0.04 10*3/MM3 (ref 0–0.2)
BASOPHILS NFR BLD AUTO: 0.6 % (ref 0–1.5)
BILIRUB SERPL-MCNC: 0.2 MG/DL (ref 0.2–1.2)
BUN SERPL-MCNC: 20 MG/DL (ref 6–20)
BUN/CREAT SERPL: 29 (ref 7–25)
CALCIUM SERPL-MCNC: 9.9 MG/DL (ref 8.6–10.5)
CHLORIDE SERPL-SCNC: 99 MMOL/L (ref 98–107)
CHOLEST SERPL-MCNC: 135 MG/DL (ref 0–200)
CO2 SERPL-SCNC: 28.6 MMOL/L (ref 22–29)
CREAT SERPL-MCNC: 0.69 MG/DL (ref 0.57–1)
EOSINOPHIL # BLD AUTO: 0.22 10*3/MM3 (ref 0–0.4)
EOSINOPHIL NFR BLD AUTO: 3.1 % (ref 0.3–6.2)
ERYTHROCYTE [DISTWIDTH] IN BLOOD BY AUTOMATED COUNT: 12.3 % (ref 12.3–15.4)
GLOBULIN SER CALC-MCNC: 2.4 GM/DL
GLUCOSE SERPL-MCNC: 109 MG/DL (ref 65–99)
HBA1C MFR BLD: 13.1 % (ref 4.8–5.6)
HCT VFR BLD AUTO: 42.7 % (ref 34–46.6)
HDLC SERPL-MCNC: 68 MG/DL (ref 40–60)
HGB BLD-MCNC: 13.9 G/DL (ref 12–15.9)
IMM GRANULOCYTES # BLD AUTO: 0.01 10*3/MM3 (ref 0–0.05)
IMM GRANULOCYTES NFR BLD AUTO: 0.1 % (ref 0–0.5)
LDLC SERPL CALC-MCNC: 56 MG/DL (ref 0–100)
LDLC/HDLC SERPL: 0.82 {RATIO}
LYMPHOCYTES # BLD AUTO: 2.64 10*3/MM3 (ref 0.7–3.1)
LYMPHOCYTES NFR BLD AUTO: 37.7 % (ref 19.6–45.3)
MCH RBC QN AUTO: 28.8 PG (ref 26.6–33)
MCHC RBC AUTO-ENTMCNC: 32.6 G/DL (ref 31.5–35.7)
MCV RBC AUTO: 88.4 FL (ref 79–97)
MONOCYTES # BLD AUTO: 0.43 10*3/MM3 (ref 0.1–0.9)
MONOCYTES NFR BLD AUTO: 6.1 % (ref 5–12)
NEUTROPHILS # BLD AUTO: 3.66 10*3/MM3 (ref 1.7–7)
NEUTROPHILS NFR BLD AUTO: 52.4 % (ref 42.7–76)
NRBC BLD AUTO-RTO: 0 /100 WBC (ref 0–0.2)
PLATELET # BLD AUTO: 276 10*3/MM3 (ref 140–450)
POTASSIUM SERPL-SCNC: 4.5 MMOL/L (ref 3.5–5.2)
PROT SERPL-MCNC: 7.1 G/DL (ref 6–8.5)
RBC # BLD AUTO: 4.83 10*6/MM3 (ref 3.77–5.28)
SODIUM SERPL-SCNC: 141 MMOL/L (ref 136–145)
TRIGL SERPL-MCNC: 56 MG/DL (ref 0–150)
TSH SERPL DL<=0.005 MIU/L-ACNC: 3.42 UIU/ML (ref 0.27–4.2)
VLDLC SERPL CALC-MCNC: 11.2 MG/DL
WBC # BLD AUTO: 7 10*3/MM3 (ref 3.4–10.8)

## 2020-03-06 NOTE — TELEPHONE ENCOUNTER
Pharmacy called to follow up stating they sent several electronic requests for this refill on behalf of the patient but according to the patients chart no refill has been requested please advise   Patient is wanting refill of her hydrocortisone (ANUSOL-HC) 2.5 % rectal cream   Call [facility] pharmacy with any questions or concerns

## 2020-03-27 RX ORDER — ATORVASTATIN CALCIUM 10 MG/1
10 TABLET, FILM COATED ORAL DAILY
Qty: 30 TABLET | Refills: 5 | Status: SHIPPED | OUTPATIENT
Start: 2020-03-27 | End: 2021-07-22 | Stop reason: SDUPTHER

## 2020-04-19 DIAGNOSIS — Z79.4 TYPE 2 DIABETES MELLITUS WITH OTHER CIRCULATORY COMPLICATION, WITH LONG-TERM CURRENT USE OF INSULIN (HCC): ICD-10-CM

## 2020-04-19 DIAGNOSIS — E11.59 TYPE 2 DIABETES MELLITUS WITH OTHER CIRCULATORY COMPLICATION, WITH LONG-TERM CURRENT USE OF INSULIN (HCC): ICD-10-CM

## 2020-04-20 RX ORDER — GABAPENTIN 400 MG/1
400 CAPSULE ORAL 3 TIMES DAILY
Qty: 270 CAPSULE | Refills: 1 | Status: SHIPPED | OUTPATIENT
Start: 2020-04-20 | End: 2020-04-23 | Stop reason: SDUPTHER

## 2020-04-23 ENCOUNTER — OFFICE VISIT (OUTPATIENT)
Dept: FAMILY MEDICINE CLINIC | Facility: CLINIC | Age: 55
End: 2020-04-23

## 2020-04-23 DIAGNOSIS — Z79.4 TYPE 2 DIABETES MELLITUS WITH OTHER CIRCULATORY COMPLICATION, WITH LONG-TERM CURRENT USE OF INSULIN (HCC): ICD-10-CM

## 2020-04-23 DIAGNOSIS — E11.59 TYPE 2 DIABETES MELLITUS WITH OTHER CIRCULATORY COMPLICATION, WITH LONG-TERM CURRENT USE OF INSULIN (HCC): ICD-10-CM

## 2020-04-23 DIAGNOSIS — E11.49 OTHER DIABETIC NEUROLOGICAL COMPLICATION ASSOCIATED WITH TYPE 2 DIABETES MELLITUS (HCC): Primary | ICD-10-CM

## 2020-04-23 PROCEDURE — 99442 PR PHYS/QHP TELEPHONE EVALUATION 11-20 MIN: CPT | Performed by: NURSE PRACTITIONER

## 2020-04-23 RX ORDER — GABAPENTIN 400 MG/1
400 CAPSULE ORAL 2 TIMES DAILY
Qty: 180 CAPSULE | Refills: 1 | Status: SHIPPED | OUTPATIENT
Start: 2020-04-23 | End: 2020-10-15 | Stop reason: SDUPTHER

## 2020-04-23 RX ORDER — GABAPENTIN 600 MG/1
600 TABLET ORAL NIGHTLY
Qty: 90 TABLET | Refills: 1 | Status: SHIPPED | OUTPATIENT
Start: 2020-04-23 | End: 2020-10-15 | Stop reason: SDUPTHER

## 2020-04-23 NOTE — PROGRESS NOTES
Subjective   Sonal De Dios is a 54 y.o. female   who presents for No chief complaint on file.    Taking gabapentin 400 mg tid  Uncontrolled diabetes  Eating poorly while working   Walking 6-7 miles      There were no vitals taken for this visit.      History of Present Illness   Leg Pain    The incident occurred more than 1 week ago. There was no injury mechanism. The pain is present in the right foot and left foot. The pain is at a severity of 6/10. The pain is moderate. The pain has been intermittent since onset. Associated symptoms include a loss of sensation, numbness and tingling. Pertinent negatives include no inability to bear weight, loss of motion or muscle weakness. She reports no foreign bodies present. The symptoms are aggravated by movement and weight bearing. Treatments tried: gabapentin. The treatment provided moderate relief.       The following portions of the patient's history were reviewed and updated as appropriate: allergies, current medications, past family history, past medical history, past social history, past surgical history and problem list.    Review of Systems  Review of Systems   Musculoskeletal: Positive for arthralgias. Back pain: bilateral feet.   Neurological: Positive for tingling and numbness.       Objective   Physical Exam  Physical Exam   Constitutional: She is oriented to person, place, and time.   Neurological: She is alert and oriented to person, place, and time.         Assessment/Plan   Diagnoses and all orders for this visit:    Other diabetic neurological complication associated with type 2 diabetes mellitus (CMS/[facility])    Type 2 diabetes mellitus with other circulatory complication, with long-term current use of insulin (CMS/[facility])  -     gabapentin (NEURONTIN) 600 MG tablet; Take 1 tablet by mouth Every Night.  -     gabapentin (NEURONTIN) 400 MG capsule; Take 1 capsule by mouth 2 (Two) Times a Day. At 8 am and 2 pm    will d/c gabapentin 400 TID  Start  gabapentin 400 at 8 am and 2 pm, 600 mg at bedtime  Instructions given to patient and verbalized understanding  Will send in topical compound to alternative rx for prn use    Follow up labs needed, diabetes has not been controlled over past year  States her she is eating worse now at home, however exercising more frequently     You have chosen to receive care through a telephone visit. Do you consent to use a telephone visit for your medical care today? Yes  Time spent 11 minutes, no other concerns

## 2020-06-08 ENCOUNTER — TELEPHONE (OUTPATIENT)
Dept: FAMILY MEDICINE CLINIC | Facility: CLINIC | Age: 55
End: 2020-06-08

## 2020-06-08 DIAGNOSIS — E11.65 UNCONTROLLED TYPE 2 DIABETES MELLITUS WITH HYPERGLYCEMIA (HCC): ICD-10-CM

## 2020-09-01 RX ORDER — HYDROCORTISONE 25 MG/G
CREAM TOPICAL 2 TIMES DAILY
Qty: 28 G | Refills: 0 | Status: SHIPPED | OUTPATIENT
Start: 2020-09-01 | End: 2021-07-22 | Stop reason: SDUPTHER

## 2020-09-22 RX ORDER — LISINOPRIL 5 MG/1
5 TABLET ORAL DAILY
Qty: 90 TABLET | Refills: 1 | Status: SHIPPED | OUTPATIENT
Start: 2020-09-22 | End: 2021-07-22 | Stop reason: SDUPTHER

## 2020-10-15 DIAGNOSIS — Z79.4 TYPE 2 DIABETES MELLITUS WITH OTHER CIRCULATORY COMPLICATION, WITH LONG-TERM CURRENT USE OF INSULIN (HCC): ICD-10-CM

## 2020-10-15 DIAGNOSIS — E11.59 TYPE 2 DIABETES MELLITUS WITH OTHER CIRCULATORY COMPLICATION, WITH LONG-TERM CURRENT USE OF INSULIN (HCC): ICD-10-CM

## 2020-10-15 RX ORDER — GABAPENTIN 400 MG/1
400 CAPSULE ORAL 2 TIMES DAILY
Qty: 180 CAPSULE | Refills: 0 | Status: SHIPPED | OUTPATIENT
Start: 2020-10-15 | End: 2020-11-03 | Stop reason: SDUPTHER

## 2020-10-15 RX ORDER — GABAPENTIN 600 MG/1
600 TABLET ORAL NIGHTLY
Qty: 90 TABLET | Refills: 0 | Status: SHIPPED | OUTPATIENT
Start: 2020-10-15 | End: 2020-11-03 | Stop reason: SDUPTHER

## 2020-11-03 ENCOUNTER — TELEMEDICINE (OUTPATIENT)
Dept: FAMILY MEDICINE CLINIC | Facility: CLINIC | Age: 55
End: 2020-11-03

## 2020-11-03 ENCOUNTER — RESULTS ENCOUNTER (OUTPATIENT)
Dept: FAMILY MEDICINE CLINIC | Facility: CLINIC | Age: 55
End: 2020-11-03

## 2020-11-03 DIAGNOSIS — E78.01 FAMILIAL HYPERCHOLESTEROLEMIA: ICD-10-CM

## 2020-11-03 DIAGNOSIS — R03.0 ELEVATED BLOOD-PRESSURE READING WITHOUT DIAGNOSIS OF HYPERTENSION: ICD-10-CM

## 2020-11-03 DIAGNOSIS — Z79.4 TYPE 2 DIABETES MELLITUS WITH OTHER CIRCULATORY COMPLICATION, WITH LONG-TERM CURRENT USE OF INSULIN (HCC): Primary | ICD-10-CM

## 2020-11-03 DIAGNOSIS — E11.59 TYPE 2 DIABETES MELLITUS WITH OTHER CIRCULATORY COMPLICATION, WITH LONG-TERM CURRENT USE OF INSULIN (HCC): Primary | ICD-10-CM

## 2020-11-03 DIAGNOSIS — Z13.29 SCREENING FOR THYROID DISORDER: ICD-10-CM

## 2020-11-03 DIAGNOSIS — Z79.4 TYPE 2 DIABETES MELLITUS WITH OTHER CIRCULATORY COMPLICATION, WITH LONG-TERM CURRENT USE OF INSULIN (HCC): ICD-10-CM

## 2020-11-03 DIAGNOSIS — E11.59 TYPE 2 DIABETES MELLITUS WITH OTHER CIRCULATORY COMPLICATION, WITH LONG-TERM CURRENT USE OF INSULIN (HCC): ICD-10-CM

## 2020-11-03 PROCEDURE — 99213 OFFICE O/P EST LOW 20 MIN: CPT | Performed by: NURSE PRACTITIONER

## 2020-11-03 RX ORDER — GABAPENTIN 400 MG/1
400 CAPSULE ORAL 2 TIMES DAILY
Qty: 180 CAPSULE | Refills: 1 | Status: SHIPPED | OUTPATIENT
Start: 2020-11-03 | End: 2021-03-02 | Stop reason: SDUPTHER

## 2020-11-03 RX ORDER — GABAPENTIN 600 MG/1
600 TABLET ORAL NIGHTLY
Qty: 90 TABLET | Refills: 1 | Status: SHIPPED | OUTPATIENT
Start: 2020-11-03 | End: 2021-03-02 | Stop reason: SDUPTHER

## 2020-11-03 NOTE — PROGRESS NOTES
Subjective   Sonal De Dios is a 54 y.o. female   who presents for   Chief Complaint   Patient presents with   • Diabetes     Blood sugar is improved  Using freestyle jacey, states feels sugars are better controlled as she is able to check it frequently.      There were no vitals taken for this visit.      History of Present Illness   Diabetes  She presents for her follow-up diabetic visit. She has type 2 diabetes mellitus. There are no hypoglycemic associated symptoms. Associated symptoms include foot paresthesias. There are no hypoglycemic complications. Symptoms are stable. Diabetic complications include peripheral neuropathy. Risk factors for coronary artery disease include dyslipidemia and hypertension. Current diabetic treatment includes insulin injections. She is compliant with treatment some of the time.       The following portions of the patient's history were reviewed and updated as appropriate: allergies, current medications, past family history, past medical history, past social history, past surgical history and problem list.    Review of Systems  Review of Systems   Constitutional: Negative.    HENT: Negative.    Eyes: Negative.    Respiratory: Negative.    Cardiovascular: Negative.    Gastrointestinal: Negative.    Endocrine: Negative.    Genitourinary: Negative.    Musculoskeletal: Negative.    Skin: Negative.    Allergic/Immunologic: Negative.    Neurological: Negative.    Hematological: Negative.    Psychiatric/Behavioral: Negative.        Objective   Physical Exam  Physical Exam  Vitals signs reviewed.   Pulmonary:      Effort: Pulmonary effort is normal.   Skin:     General: Skin is warm and dry.   Neurological:      Mental Status: She is oriented to person, place, and time.   Psychiatric:         Mood and Affect: Mood normal.           Assessment/Plan   Diagnoses and all orders for this visit:    1. Type 2 diabetes mellitus with other circulatory complication, with long-term current use  "of insulin (CMS/[facility]) (Primary)  -     Hemoglobin A1c; Future  -     CBC & Differential; Future  -     Comprehensive Metabolic Panel; Future  -     gabapentin (NEURONTIN) 400 MG capsule; Take 1 capsule by mouth 2 (Two) Times a Day. At 8 am and 2 pm  Dispense: 180 capsule; Refill: 1  -     gabapentin (NEURONTIN) 600 MG tablet; Take 1 tablet by mouth Every Night.  Dispense: 90 tablet; Refill: 1    2. Elevated blood-pressure reading without diagnosis of hypertension  -     CBC & Differential; Future  -     Comprehensive Metabolic Panel; Future    3. Familial hypercholesterolemia  -     Lipid Panel With LDL / HDL Ratio; Future    4. Screening for thyroid disorder  -     TSH Rfx On Abnormal To Free T4; Future       Continue current medication, neuropathy has improved with new dosing of gabapentin   No recent labs, last A1C > 13  Patient does report \"improved\" glucose, but has not come in due to pandemic  Agreeable to return to office for labs in next few months  Will evaluate medication at that time and make adjustments as needed  You have chosen to receive care through a telehealth visit.  Do you consent to use a video/audio connection for your medical care today? Yes  Time spent 15 minutes including 10 minutes face to face time  "

## 2021-03-02 DIAGNOSIS — E11.59 TYPE 2 DIABETES MELLITUS WITH OTHER CIRCULATORY COMPLICATION, WITH LONG-TERM CURRENT USE OF INSULIN (HCC): ICD-10-CM

## 2021-03-02 DIAGNOSIS — Z79.4 TYPE 2 DIABETES MELLITUS WITH OTHER CIRCULATORY COMPLICATION, WITH LONG-TERM CURRENT USE OF INSULIN (HCC): ICD-10-CM

## 2021-03-02 RX ORDER — GABAPENTIN 400 MG/1
400 CAPSULE ORAL 2 TIMES DAILY
Qty: 180 CAPSULE | Refills: 1 | Status: SHIPPED | OUTPATIENT
Start: 2021-03-02 | End: 2021-07-22 | Stop reason: SDUPTHER

## 2021-03-02 RX ORDER — GABAPENTIN 600 MG/1
600 TABLET ORAL NIGHTLY
Qty: 90 TABLET | Refills: 1 | Status: SHIPPED | OUTPATIENT
Start: 2021-03-02 | End: 2021-07-22 | Stop reason: SDUPTHER

## 2021-03-26 ENCOUNTER — BULK ORDERING (OUTPATIENT)
Dept: CASE MANAGEMENT | Facility: OTHER | Age: 56
End: 2021-03-26

## 2021-03-26 DIAGNOSIS — Z23 IMMUNIZATION DUE: ICD-10-CM

## 2021-07-01 DIAGNOSIS — E11.59 TYPE 2 DIABETES MELLITUS WITH OTHER CIRCULATORY COMPLICATION, WITH LONG-TERM CURRENT USE OF INSULIN (HCC): ICD-10-CM

## 2021-07-01 DIAGNOSIS — Z79.4 TYPE 2 DIABETES MELLITUS WITH OTHER CIRCULATORY COMPLICATION, WITH LONG-TERM CURRENT USE OF INSULIN (HCC): ICD-10-CM

## 2021-07-01 RX ORDER — GABAPENTIN 400 MG/1
400 CAPSULE ORAL 2 TIMES DAILY
Qty: 180 CAPSULE | Refills: 1 | OUTPATIENT
Start: 2021-07-01

## 2021-07-01 RX ORDER — GABAPENTIN 600 MG/1
600 TABLET ORAL NIGHTLY
Qty: 90 TABLET | Refills: 1 | OUTPATIENT
Start: 2021-07-01

## 2021-07-01 NOTE — TELEPHONE ENCOUNTER
LM to have the pt give the office a call to set up an appointment.     HUB please read:    If patient calls please set up an appointment for a 6 month follow up.

## 2021-07-22 ENCOUNTER — OFFICE VISIT (OUTPATIENT)
Dept: FAMILY MEDICINE CLINIC | Facility: CLINIC | Age: 56
End: 2021-07-22

## 2021-07-22 VITALS
WEIGHT: 171.8 LBS | DIASTOLIC BLOOD PRESSURE: 92 MMHG | HEART RATE: 97 BPM | HEIGHT: 64 IN | BODY MASS INDEX: 29.33 KG/M2 | OXYGEN SATURATION: 100 % | RESPIRATION RATE: 16 BRPM | TEMPERATURE: 96 F | SYSTOLIC BLOOD PRESSURE: 146 MMHG

## 2021-07-22 DIAGNOSIS — E11.59 TYPE 2 DIABETES MELLITUS WITH OTHER CIRCULATORY COMPLICATION, WITH LONG-TERM CURRENT USE OF INSULIN (HCC): Primary | ICD-10-CM

## 2021-07-22 DIAGNOSIS — L30.4 INTERTRIGO: ICD-10-CM

## 2021-07-22 DIAGNOSIS — E11.65 UNCONTROLLED TYPE 2 DIABETES MELLITUS WITH HYPERGLYCEMIA (HCC): ICD-10-CM

## 2021-07-22 DIAGNOSIS — Z79.4 TYPE 2 DIABETES MELLITUS WITH OTHER CIRCULATORY COMPLICATION, WITH LONG-TERM CURRENT USE OF INSULIN (HCC): Primary | ICD-10-CM

## 2021-07-22 PROCEDURE — 99214 OFFICE O/P EST MOD 30 MIN: CPT | Performed by: NURSE PRACTITIONER

## 2021-07-22 RX ORDER — FLUCONAZOLE 150 MG/1
150 TABLET ORAL WEEKLY
Qty: 4 TABLET | Refills: 0 | Status: SHIPPED | OUTPATIENT
Start: 2021-07-22 | End: 2022-07-12

## 2021-07-22 RX ORDER — LISINOPRIL 5 MG/1
5 TABLET ORAL DAILY
Qty: 90 TABLET | Refills: 1 | Status: SHIPPED | OUTPATIENT
Start: 2021-07-22 | End: 2021-12-07 | Stop reason: DRUGHIGH

## 2021-07-22 RX ORDER — HYDROCORTISONE 25 MG/G
CREAM TOPICAL 2 TIMES DAILY
Qty: 28 G | Refills: 1 | Status: SHIPPED | OUTPATIENT
Start: 2021-07-22 | End: 2022-09-27

## 2021-07-22 RX ORDER — ATORVASTATIN CALCIUM 10 MG/1
10 TABLET, FILM COATED ORAL DAILY
Qty: 90 TABLET | Refills: 1 | Status: SHIPPED | OUTPATIENT
Start: 2021-07-22 | End: 2022-02-07 | Stop reason: SDUPTHER

## 2021-07-22 RX ORDER — INSULIN DETEMIR 100 [IU]/ML
30 INJECTION, SOLUTION SUBCUTANEOUS 2 TIMES DAILY
Qty: 4 PEN | Refills: 3 | Status: SHIPPED | OUTPATIENT
Start: 2021-07-22 | End: 2021-12-07 | Stop reason: SDUPTHER

## 2021-07-22 RX ORDER — GABAPENTIN 400 MG/1
400 CAPSULE ORAL 2 TIMES DAILY
Qty: 180 CAPSULE | Refills: 1 | Status: SHIPPED | OUTPATIENT
Start: 2021-07-22 | End: 2021-11-19 | Stop reason: SDUPTHER

## 2021-07-22 RX ORDER — NYSTATIN AND TRIAMCINOLONE ACETONIDE 100000; 1 [USP'U]/G; MG/G
OINTMENT TOPICAL 2 TIMES DAILY
Qty: 60 G | Refills: 0 | Status: SHIPPED | OUTPATIENT
Start: 2021-07-22 | End: 2022-07-12

## 2021-07-22 RX ORDER — GABAPENTIN 600 MG/1
600 TABLET ORAL NIGHTLY
Qty: 90 TABLET | Refills: 1 | Status: SHIPPED | OUTPATIENT
Start: 2021-07-22 | End: 2021-11-19 | Stop reason: SDUPTHER

## 2021-07-22 NOTE — PROGRESS NOTES
Chief Complaint  Rash (in genital area) and Hemorrhoids (OTC not working for her)    Subjective          Sonal De Dios presents to [facility]  Sonal presents for a rash. She has treated with over the counter medications with no improvement. She has a history of diabetes, uncontrolled. She established with Dr. Arndt who is no longer in the practice. Her current insulin regimen is below.  Levemir: 30 units twice daily  Humalog: before meals, around 8-10 units  Sugar averages between 180s and as high as 500    Rash, under abdomen, presents for several weeks, also with possible yeast infection.    Hemorrhoid, comes and goes, no issues in a while, any constipation triggers her symptoms. She has previously used rx hemorrhoid cream with good results.      Rash  This is a new problem. The current episode started 1 to 4 weeks ago. The problem is unchanged. The affected locations include the abdomen, groin and genitalia. The rash is characterized by redness and itchiness. She was exposed to nothing. Pertinent negatives include no anorexia, congestion, cough, diarrhea, eye pain, facial edema, fatigue, fever, joint pain, nail changes, rhinorrhea, shortness of breath, sore throat or vomiting. Past treatments include anti-itch cream and topical steroids. The treatment provided no relief.   Hemorrhoids  This is a chronic problem. The current episode started more than 1 year ago. The problem occurs intermittently. The problem has been waxing and waning. Associated symptoms include a rash. Pertinent negatives include no abdominal pain, anorexia, arthralgias, change in bowel habit, chest pain, chills, congestion, coughing, diaphoresis, fatigue, fever, headaches, joint swelling, myalgias, nausea, neck pain, numbness, sore throat, swollen glands, urinary symptoms, vertigo, visual change, vomiting or weakness. Exacerbated by: constipation. Treatments tried: hydrocortison cream. The  treatment provided significant relief.   Diabetes  She presents for her follow-up diabetic visit. She has type 1 diabetes mellitus. No MedicAlert identification noted. The initial diagnosis of diabetes was made 26 years ago. Pertinent negatives for hypoglycemia include no confusion, dizziness, headaches, hunger, mood changes, nervousness/anxiousness, pallor, seizures, sleepiness, speech difficulty, sweats or tremors. Associated symptoms include polydipsia and polyuria. Pertinent negatives for diabetes include no blurred vision, no chest pain, no fatigue, no foot paresthesias, no foot ulcerations, no polyphagia, no visual change, no weakness and no weight loss. Pertinent negatives for hypoglycemia complications include no blackouts, no hospitalization, no nocturnal hypoglycemia, no required assistance and no required glucagon injection. Symptoms are stable. Diabetic complications include peripheral neuropathy. Pertinent negatives for diabetic complications include no CVA, heart disease, impotence, nephropathy, PVD or retinopathy. Risk factors for coronary artery disease include dyslipidemia, family history, hypertension, obesity and diabetes mellitus. Current diabetic treatment includes insulin injections. She is compliant with treatment some of the time. She is currently taking insulin pre-breakfast, pre-lunch, pre-dinner and at bedtime. Insulin injections are given by patient. Rotation sites for injection include the abdominal wall. Her weight is fluctuating minimally. She is following a generally healthy diet. Meal planning includes avoidance of concentrated sweets. She has not had a previous visit with a dietitian. She participates in exercise intermittently. She monitors blood glucose at home 5+ x per day. She monitors urine at home <1 x per month. Blood glucose monitoring compliance is fair. Her home blood glucose trend is fluctuating dramatically. Her breakfast blood glucose is taken between 5-6 am. Her  "breakfast blood glucose range is generally 110-130 mg/dl. Her lunch blood glucose is taken between 11-12 pm. Her lunch blood glucose range is generally 130-140 mg/dl. Her dinner blood glucose is taken between 5-6 pm. Her dinner blood glucose range is generally 140-180 mg/dl. Her highest blood glucose is >200 mg/dl. Her overall blood glucose range is 180-200 mg/dl. An ACE inhibitor/angiotensin II receptor blocker is not being taken. She does not see a podiatrist.Eye exam is not current.       Objective   Vital Signs:   /92   Pulse 97   Temp 96 °F (35.6 °C) (Temporal)   Resp 16   Ht 162.6 cm (64\")   Wt 77.9 kg (171 lb 12.8 oz)   SpO2 100%   BMI 29.49 kg/m²     Physical Exam  Vitals reviewed.   Constitutional:       Appearance: Normal appearance.   Cardiovascular:      Rate and Rhythm: Normal rate and regular rhythm.      Heart sounds: No murmur heard.   No friction rub. No gallop.    Pulmonary:      Effort: Pulmonary effort is normal. No respiratory distress.      Breath sounds: No wheezing, rhonchi or rales.   Abdominal:      General: Bowel sounds are normal. There is no distension.      Palpations: Abdomen is soft. There is no mass.      Tenderness: There is no abdominal tenderness.      Hernia: No hernia is present.   Skin:     General: Skin is warm and dry.      Findings: Rash present.      Comments: Abdominal fold satellite lesions with erythema, skin is moist, pruritic   Neurological:      Mental Status: She is alert and oriented to person, place, and time.   Psychiatric:         Mood and Affect: Mood normal.        Result Review :                 Assessment and Plan    Diagnoses and all orders for this visit:    1. Type 2 diabetes mellitus with other circulatory complication, with long-term current use of insulin (CMS/[facility]) (Primary)  -     Ambulatory Referral to Endocrinology  -     gabapentin (NEURONTIN) 400 MG capsule; Take 1 capsule by mouth 2 (Two) Times a Day. At 8 am and 2 pm  Dispense: 180 " capsule; Refill: 1  -     gabapentin (NEURONTIN) 600 MG tablet; Take 1 tablet by mouth Every Night.  Dispense: 90 tablet; Refill: 1    2. Uncontrolled type 2 diabetes mellitus with hyperglycemia (CMS/[facility])  -     insulin detemir (Levemir FlexTouch) 100 UNIT/ML injection; Inject 30 Units under the skin into the appropriate area as directed 2 (Two) Times a Day.  Dispense: 4 pen; Refill: 3    3. Intertrigo    Other orders  -     lisinopril (PRINIVIL,ZESTRIL) 5 MG tablet; Take 1 tablet by mouth Daily.  Dispense: 90 tablet; Refill: 1  -     atorvastatin (LIPITOR) 10 MG tablet; Take 1 tablet by mouth Daily.  Dispense: 90 tablet; Refill: 1  -     Hydrocortisone, Perianal, (Anusol-HC) 2.5 % rectal cream; Insert  into the rectum 2 (Two) Times a Day.  Dispense: 28 g; Refill: 1  -     nystatin-triamcinolone (MYCOLOG) 572664-4.1 UNIT/GM-% ointment; Apply  topically to the appropriate area as directed 2 (Two) Times a Day.  Dispense: 60 g; Refill: 0  -     fluconazole (Diflucan) 150 MG tablet; Take 1 tablet by mouth 1 (One) Time Per Week.  Dispense: 4 tablet; Refill: 0        Follow Up   No follow-ups on file.  Patient was given instructions and counseling regarding her condition or for health maintenance advice. Please see specific information pulled into the AVS if appropriate.     Discussed importance of routine blood tests to monitor glucose  She has been referred to endocrinology previously, however patient did not follow up. Her endocrinologist is no longer practicing, will place another referral and obtain labs today, last A1C was > 10    Rash: discussed uncontrolled diabetes can also increase risk of fungal infections. Will treat with diflucan weekly x 4, topical cream as prescribed  Hemorrhoids: chronic recurrent, continue hydrocortisone cream as prescribed    She has been off BP medications and statin. Discussed importance of BP control with diabetes to lower cardiovascular risk and reduce risk of kidney injury. She  agrees to resume. Also discussed statin in regards to cardiovascular risk reduction. She will resume medication.  Gabapentin prescribed for neuropathy, contract updated  TRUE query complete. Treatment plan to include limited course of prescribed  controlled substance. Risks including addiction, benefits, and alternatives presented to patient.     Follow up in 3 months, sooner as needed  Answers for HPI/ROS submitted by the patient on 7/20/2021  What is the primary reason for your visit?: Diabetes

## 2021-07-23 LAB
ALBUMIN SERPL-MCNC: 4.4 G/DL (ref 3.5–5.2)
ALBUMIN/GLOB SERPL: 1.8 G/DL
ALP SERPL-CCNC: 106 U/L (ref 39–117)
ALT SERPL-CCNC: 19 U/L (ref 1–33)
AST SERPL-CCNC: 16 U/L (ref 1–32)
BASOPHILS # BLD AUTO: 0.04 10*3/MM3 (ref 0–0.2)
BASOPHILS NFR BLD AUTO: 0.7 % (ref 0–1.5)
BILIRUB SERPL-MCNC: 0.2 MG/DL (ref 0–1.2)
BUN SERPL-MCNC: 20 MG/DL (ref 6–20)
BUN/CREAT SERPL: 35.1 (ref 7–25)
CALCIUM SERPL-MCNC: 9.8 MG/DL (ref 8.6–10.5)
CHLORIDE SERPL-SCNC: 105 MMOL/L (ref 98–107)
CHOLEST SERPL-MCNC: 231 MG/DL (ref 0–200)
CO2 SERPL-SCNC: 27.7 MMOL/L (ref 22–29)
CREAT SERPL-MCNC: 0.57 MG/DL (ref 0.57–1)
EOSINOPHIL # BLD AUTO: 0.14 10*3/MM3 (ref 0–0.4)
EOSINOPHIL NFR BLD AUTO: 2.4 % (ref 0.3–6.2)
ERYTHROCYTE [DISTWIDTH] IN BLOOD BY AUTOMATED COUNT: 12.4 % (ref 12.3–15.4)
GLOBULIN SER CALC-MCNC: 2.5 GM/DL
GLUCOSE SERPL-MCNC: 88 MG/DL (ref 65–99)
HBA1C MFR BLD: 12.8 % (ref 4.8–5.6)
HCT VFR BLD AUTO: 43.4 % (ref 34–46.6)
HDLC SERPL-MCNC: 55 MG/DL (ref 40–60)
HGB BLD-MCNC: 14.2 G/DL (ref 12–15.9)
IMM GRANULOCYTES # BLD AUTO: 0.03 10*3/MM3 (ref 0–0.05)
IMM GRANULOCYTES NFR BLD AUTO: 0.5 % (ref 0–0.5)
LDLC SERPL CALC-MCNC: 155 MG/DL (ref 0–100)
LDLC/HDLC SERPL: 2.77 {RATIO}
LYMPHOCYTES # BLD AUTO: 1.97 10*3/MM3 (ref 0.7–3.1)
LYMPHOCYTES NFR BLD AUTO: 34 % (ref 19.6–45.3)
MCH RBC QN AUTO: 29.9 PG (ref 26.6–33)
MCHC RBC AUTO-ENTMCNC: 32.7 G/DL (ref 31.5–35.7)
MCV RBC AUTO: 91.4 FL (ref 79–97)
MONOCYTES # BLD AUTO: 0.39 10*3/MM3 (ref 0.1–0.9)
MONOCYTES NFR BLD AUTO: 6.7 % (ref 5–12)
NEUTROPHILS # BLD AUTO: 3.22 10*3/MM3 (ref 1.7–7)
NEUTROPHILS NFR BLD AUTO: 55.7 % (ref 42.7–76)
NRBC BLD AUTO-RTO: 0 /100 WBC (ref 0–0.2)
PLATELET # BLD AUTO: 276 10*3/MM3 (ref 140–450)
POTASSIUM SERPL-SCNC: 4.8 MMOL/L (ref 3.5–5.2)
PROT SERPL-MCNC: 6.9 G/DL (ref 6–8.5)
RBC # BLD AUTO: 4.75 10*6/MM3 (ref 3.77–5.28)
SODIUM SERPL-SCNC: 139 MMOL/L (ref 136–145)
TRIGL SERPL-MCNC: 119 MG/DL (ref 0–150)
TSH SERPL DL<=0.005 MIU/L-ACNC: 4.08 UIU/ML (ref 0.27–4.2)
VLDLC SERPL CALC-MCNC: 21 MG/DL (ref 5–40)
WBC # BLD AUTO: 5.79 10*3/MM3 (ref 3.4–10.8)

## 2021-07-27 DIAGNOSIS — E11.59 TYPE 2 DIABETES MELLITUS WITH OTHER CIRCULATORY COMPLICATION, WITH LONG-TERM CURRENT USE OF INSULIN (HCC): Primary | ICD-10-CM

## 2021-07-27 DIAGNOSIS — Z79.4 TYPE 2 DIABETES MELLITUS WITH OTHER CIRCULATORY COMPLICATION, WITH LONG-TERM CURRENT USE OF INSULIN (HCC): Primary | ICD-10-CM

## 2021-07-27 RX ORDER — PROCHLORPERAZINE 25 MG/1
1 SUPPOSITORY RECTAL TAKE AS DIRECTED
Qty: 1 EACH | Refills: 0 | Status: SHIPPED | OUTPATIENT
Start: 2021-07-27 | End: 2021-10-28 | Stop reason: SDUPTHER

## 2021-07-27 RX ORDER — PROCHLORPERAZINE 25 MG/1
SUPPOSITORY RECTAL
Qty: 3 EACH | Refills: 6 | Status: SHIPPED | OUTPATIENT
Start: 2021-07-27 | End: 2022-05-03

## 2021-07-27 RX ORDER — PROCHLORPERAZINE 25 MG/1
1 SUPPOSITORY RECTAL TAKE AS DIRECTED
Qty: 1 EACH | Refills: 0 | Status: SHIPPED | OUTPATIENT
Start: 2021-07-27

## 2021-08-02 ENCOUNTER — TELEPHONE (OUTPATIENT)
Dept: FAMILY MEDICINE CLINIC | Facility: CLINIC | Age: 56
End: 2021-08-02

## 2021-08-02 NOTE — TELEPHONE ENCOUNTER
Dr. Cao called. States they are not taking this pt dues to non compliance at other doctor offices.

## 2021-08-03 NOTE — TELEPHONE ENCOUNTER
Please let patient know Dr. Cao is not accepting her as a new patient, does she have other providers she has in mind to see for diabetes?

## 2021-10-28 DIAGNOSIS — Z79.4 TYPE 2 DIABETES MELLITUS WITH OTHER CIRCULATORY COMPLICATION, WITH LONG-TERM CURRENT USE OF INSULIN (HCC): ICD-10-CM

## 2021-10-28 DIAGNOSIS — E11.59 TYPE 2 DIABETES MELLITUS WITH OTHER CIRCULATORY COMPLICATION, WITH LONG-TERM CURRENT USE OF INSULIN (HCC): ICD-10-CM

## 2021-10-28 RX ORDER — PROCHLORPERAZINE 25 MG/1
1 SUPPOSITORY RECTAL TAKE AS DIRECTED
Qty: 1 EACH | Refills: 0 | Status: SHIPPED | OUTPATIENT
Start: 2021-10-28 | End: 2022-01-21 | Stop reason: SDUPTHER

## 2021-11-19 DIAGNOSIS — Z79.4 TYPE 2 DIABETES MELLITUS WITH OTHER CIRCULATORY COMPLICATION, WITH LONG-TERM CURRENT USE OF INSULIN (HCC): ICD-10-CM

## 2021-11-19 DIAGNOSIS — E11.59 TYPE 2 DIABETES MELLITUS WITH OTHER CIRCULATORY COMPLICATION, WITH LONG-TERM CURRENT USE OF INSULIN (HCC): ICD-10-CM

## 2021-11-19 RX ORDER — GABAPENTIN 600 MG/1
600 TABLET ORAL NIGHTLY
Qty: 90 TABLET | Refills: 0 | Status: SHIPPED | OUTPATIENT
Start: 2021-11-19 | End: 2021-12-14 | Stop reason: SDUPTHER

## 2021-11-19 RX ORDER — GABAPENTIN 400 MG/1
400 CAPSULE ORAL 2 TIMES DAILY
Qty: 180 CAPSULE | Refills: 0 | Status: SHIPPED | OUTPATIENT
Start: 2021-11-19 | End: 2021-12-14 | Stop reason: SDUPTHER

## 2021-11-19 NOTE — TELEPHONE ENCOUNTER
Rx Refill Note  Requested Prescriptions     Pending Prescriptions Disp Refills   • gabapentin (NEURONTIN) 400 MG capsule 180 capsule 1     Sig: Take 1 capsule by mouth 2 (Two) Times a Day. At 8 am and 2 pm   • gabapentin (NEURONTIN) 600 MG tablet 90 tablet 1     Sig: Take 1 tablet by mouth Every Night.      Last office visit with prescribing clinician: 7/22/2021      Next office visit with prescribing clinician: Visit date not found            Michael Garcia MA  11/19/21, 16:22 EST

## 2021-12-02 LAB
ALBUMIN SERPL-MCNC: 4.7 G/DL (ref 3.5–5.2)
ALBUMIN/GLOB SERPL: 1.7 G/DL
ALP SERPL-CCNC: 104 U/L (ref 39–117)
ALT SERPL W P-5'-P-CCNC: 9 U/L (ref 1–33)
ANION GAP SERPL CALCULATED.3IONS-SCNC: 16.5 MMOL/L (ref 5–15)
AST SERPL-CCNC: 11 U/L (ref 1–32)
BACTERIA UR QL AUTO: ABNORMAL /HPF
BASOPHILS # BLD AUTO: 0.04 10*3/MM3 (ref 0–0.2)
BASOPHILS NFR BLD AUTO: 0.5 % (ref 0–1.5)
BILIRUB SERPL-MCNC: 0.3 MG/DL (ref 0–1.2)
BILIRUB UR QL STRIP: NEGATIVE
BUN SERPL-MCNC: 13 MG/DL (ref 6–20)
BUN/CREAT SERPL: 16.7 (ref 7–25)
CALCIUM SPEC-SCNC: 9.5 MG/DL (ref 8.6–10.5)
CHLORIDE SERPL-SCNC: 95 MMOL/L (ref 98–107)
CLARITY UR: CLEAR
CO2 SERPL-SCNC: 22.5 MMOL/L (ref 22–29)
COLOR UR: YELLOW
CREAT SERPL-MCNC: 0.78 MG/DL (ref 0.57–1)
DEPRECATED RDW RBC AUTO: 38.6 FL (ref 37–54)
EOSINOPHIL # BLD AUTO: 0.01 10*3/MM3 (ref 0–0.4)
EOSINOPHIL NFR BLD AUTO: 0.1 % (ref 0.3–6.2)
ERYTHROCYTE [DISTWIDTH] IN BLOOD BY AUTOMATED COUNT: 12 % (ref 12.3–15.4)
GFR SERPL CREATININE-BSD FRML MDRD: 76 ML/MIN/1.73
GLOBULIN UR ELPH-MCNC: 2.7 GM/DL
GLUCOSE SERPL-MCNC: 269 MG/DL (ref 65–99)
GLUCOSE UR STRIP-MCNC: ABNORMAL MG/DL
HCT VFR BLD AUTO: 42 % (ref 34–46.6)
HGB BLD-MCNC: 14.4 G/DL (ref 12–15.9)
HGB UR QL STRIP.AUTO: NEGATIVE
HOLD SPECIMEN: NORMAL
HOLD SPECIMEN: NORMAL
HYALINE CASTS UR QL AUTO: ABNORMAL /LPF
IMM GRANULOCYTES # BLD AUTO: 0.01 10*3/MM3 (ref 0–0.05)
IMM GRANULOCYTES NFR BLD AUTO: 0.1 % (ref 0–0.5)
KETONES UR QL STRIP: ABNORMAL
LEUKOCYTE ESTERASE UR QL STRIP.AUTO: ABNORMAL
LIPASE SERPL-CCNC: 9 U/L (ref 13–60)
LYMPHOCYTES # BLD AUTO: 1.82 10*3/MM3 (ref 0.7–3.1)
LYMPHOCYTES NFR BLD AUTO: 22.4 % (ref 19.6–45.3)
MCH RBC QN AUTO: 30.3 PG (ref 26.6–33)
MCHC RBC AUTO-ENTMCNC: 34.3 G/DL (ref 31.5–35.7)
MCV RBC AUTO: 88.4 FL (ref 79–97)
MONOCYTES # BLD AUTO: 0.56 10*3/MM3 (ref 0.1–0.9)
MONOCYTES NFR BLD AUTO: 6.9 % (ref 5–12)
NEUTROPHILS NFR BLD AUTO: 5.69 10*3/MM3 (ref 1.7–7)
NEUTROPHILS NFR BLD AUTO: 70 % (ref 42.7–76)
NITRITE UR QL STRIP: NEGATIVE
NRBC BLD AUTO-RTO: 0 /100 WBC (ref 0–0.2)
PH UR STRIP.AUTO: 5.5 [PH] (ref 5–8)
PLATELET # BLD AUTO: 309 10*3/MM3 (ref 140–450)
PMV BLD AUTO: 9.5 FL (ref 6–12)
POTASSIUM SERPL-SCNC: 3.9 MMOL/L (ref 3.5–5.2)
PROT SERPL-MCNC: 7.4 G/DL (ref 6–8.5)
PROT UR QL STRIP: NEGATIVE
RBC # BLD AUTO: 4.75 10*6/MM3 (ref 3.77–5.28)
RBC # UR STRIP: ABNORMAL /HPF
REF LAB TEST METHOD: ABNORMAL
SODIUM SERPL-SCNC: 134 MMOL/L (ref 136–145)
SP GR UR STRIP: 1.02 (ref 1–1.03)
SQUAMOUS #/AREA URNS HPF: ABNORMAL /HPF
UROBILINOGEN UR QL STRIP: ABNORMAL
WBC # UR STRIP: ABNORMAL /HPF
WBC NRBC COR # BLD: 8.13 10*3/MM3 (ref 3.4–10.8)
WHOLE BLOOD HOLD SPECIMEN: NORMAL
WHOLE BLOOD HOLD SPECIMEN: NORMAL

## 2021-12-02 PROCEDURE — 81001 URINALYSIS AUTO W/SCOPE: CPT

## 2021-12-02 PROCEDURE — 85025 COMPLETE CBC W/AUTO DIFF WBC: CPT

## 2021-12-02 PROCEDURE — 83690 ASSAY OF LIPASE: CPT

## 2021-12-02 PROCEDURE — 99283 EMERGENCY DEPT VISIT LOW MDM: CPT

## 2021-12-02 PROCEDURE — 80053 COMPREHEN METABOLIC PANEL: CPT

## 2021-12-02 PROCEDURE — 36415 COLL VENOUS BLD VENIPUNCTURE: CPT

## 2021-12-02 RX ORDER — SODIUM CHLORIDE 0.9 % (FLUSH) 0.9 %
10 SYRINGE (ML) INJECTION AS NEEDED
Status: DISCONTINUED | OUTPATIENT
Start: 2021-12-02 | End: 2021-12-03 | Stop reason: HOSPADM

## 2021-12-02 NOTE — ED TRIAGE NOTES
States has had a UTI X 1 month, has had 4 rounds of antibiotics without relief. Now having chills, shaking and Nausea. Denies any urinary SX at the time    Mask placed on patient in triage. Triage staff wore appropriate PPE during interaction with patient.

## 2021-12-03 ENCOUNTER — HOSPITAL ENCOUNTER (EMERGENCY)
Facility: HOSPITAL | Age: 56
Discharge: HOME | End: 2021-12-03
Attending: EMERGENCY MEDICINE | Admitting: EMERGENCY MEDICINE

## 2021-12-03 VITALS
WEIGHT: 155 LBS | RESPIRATION RATE: 16 BRPM | DIASTOLIC BLOOD PRESSURE: 80 MMHG | HEIGHT: 64 IN | OXYGEN SATURATION: 100 % | BODY MASS INDEX: 26.46 KG/M2 | SYSTOLIC BLOOD PRESSURE: 148 MMHG | HEART RATE: 95 BPM | TEMPERATURE: 98.3 F

## 2021-12-03 DIAGNOSIS — R11.2 NAUSEA AND VOMITING, INTRACTABILITY OF VOMITING NOT SPECIFIED, UNSPECIFIED VOMITING TYPE: ICD-10-CM

## 2021-12-03 DIAGNOSIS — R73.9 HYPERGLYCEMIA: ICD-10-CM

## 2021-12-03 DIAGNOSIS — Z86.39 HISTORY OF DIABETES MELLITUS: ICD-10-CM

## 2021-12-03 DIAGNOSIS — E86.0 DEHYDRATION: Primary | ICD-10-CM

## 2021-12-03 RX ORDER — ONDANSETRON 4 MG/1
4 TABLET, ORALLY DISINTEGRATING ORAL EVERY 8 HOURS PRN
Qty: 10 TABLET | Refills: 0 | Status: SHIPPED | OUTPATIENT
Start: 2021-12-03 | End: 2021-12-06

## 2021-12-03 RX ADMIN — SODIUM CHLORIDE, POTASSIUM CHLORIDE, SODIUM LACTATE AND CALCIUM CHLORIDE 1000 ML: 600; 310; 30; 20 INJECTION, SOLUTION INTRAVENOUS at 01:50

## 2021-12-03 RX ADMIN — SODIUM CHLORIDE 1000 ML: 9 INJECTION, SOLUTION INTRAVENOUS at 00:47

## 2021-12-03 NOTE — DISCHARGE INSTRUCTIONS
Take nausea medications as prescribed, increase fluid intake, gradual increase solid food intake as tolerated, complete course of ciprofloxacin as previously prescribed, PCP follow-up for recheck as needed, ED return for worsening symptoms as needed.

## 2021-12-03 NOTE — ED PROVIDER NOTES
EMERGENCY DEPARTMENT ENCOUNTER    Room Number:  19/19  Date of encounter:  12/3/2021  PCP: Ammy Holman APRN  Historian: Patient,       HPI:  Chief Complaint: Nausea, vomiting  A complete HPI/ROS/PMH/PSH/SH/FH are unobtainable due to: None    Context: Sonal De Dios is a 55 y.o. female who presents to the ED via private vehicle for evaluation for 4 days of nausea and vomiting.  Has been treated with several rounds of antibiotics recently for UTI, most recently with ciprofloxacin.  States that symptoms of urinary tract infection are improving but the nausea and vomiting has caused it to be difficult to take liquids or eat anything.  Denies any fevers, chills, diarrhea.  Denies any abdominal pains.      MEDICAL RECORD REVIEW    Chart review in epic shows allergies to codeine    PAST MEDICAL HISTORY  Active Ambulatory Problems     Diagnosis Date Noted   • Uncontrolled type 2 diabetes mellitus with diabetic neuropathy, with long-term current use of insulin ([facility]) 04/10/2018   • Encounter for long-term (current) use of insulin ([facility]) 04/10/2018   • Hyperinsulinism 04/10/2018   • Noncompliance with medication regimen 04/10/2018   • Uncontrolled type 1 diabetes with diabetic neuropathy ([facility]) 06/27/2018     Resolved Ambulatory Problems     Diagnosis Date Noted   • Diabetic ketoacidosis with coma associated with type 2 diabetes mellitus ([facility]) 03/06/2018   • Edema 03/10/2018     Past Medical History:   Diagnosis Date   • Diabetes mellitus (CMS/[facility])    • Gestational diabetes    • Pneumonia          PAST SURGICAL HISTORY  Past Surgical History:   Procedure Laterality Date   • ENDOMETRIAL ABLATION           FAMILY HISTORY  Family History   Problem Relation Age of Onset   • Diabetes Mother          SOCIAL HISTORY  Social History     Socioeconomic History   • Marital status:    Tobacco Use   • Smoking status: Never Smoker   • Smokeless tobacco: Never Used   Substance and Sexual Activity   •  Alcohol use: No   • Drug use: No         ALLERGIES  Codeine        REVIEW OF SYSTEMS  Review of Systems     All systems reviewed and negative except for those discussed in HPI.       PHYSICAL EXAM    I have reviewed the triage vital signs and nursing notes.    ED Triage Vitals   Temp Heart Rate Resp BP SpO2   12/02/21 1642 12/02/21 1642 12/02/21 1642 12/02/21 1858 12/02/21 1642   97.3 °F (36.3 °C) 120 17 (!) 161/101 100 %      Temp src Heart Rate Source Patient Position BP Location FiO2 (%)   12/02/21 1642 12/02/21 1858 12/02/21 1858 12/02/21 1858 --   Temporal Monitor Sitting Right arm        Physical Exam  General: No acute distress, nontoxic  HEENT: Mucous membranes tacky, atraumatic, EOMI  Neck: Full ROM  Pulm: Symmetric chest rise, nonlabored, lungs CTAB  Cardiovascular: Regular rate and rhythm, intact distal pulses  GI: Soft, nontender, nondistended, no rebound, no guarding, bowel sounds present  MSK: Full ROM, no deformity  Skin: Warm, dry  Neuro: Awake, alert, oriented x 4, GCS 15, moving all extremities, no focal deficits  Psych: Calm, cooperative      N95, protective eye goggles, and gloves used during this encounter. Patient in surgical mask.      LAB RESULTS  Recent Results (from the past 24 hour(s))   Comprehensive Metabolic Panel    Collection Time: 12/02/21  7:22 PM    Specimen: Blood   Result Value Ref Range    Glucose 269 (H) 65 - 99 mg/dL    BUN 13 6 - 20 mg/dL    Creatinine 0.78 0.57 - 1.00 mg/dL    Sodium 134 (L) 136 - 145 mmol/L    Potassium 3.9 3.5 - 5.2 mmol/L    Chloride 95 (L) 98 - 107 mmol/L    CO2 22.5 22.0 - 29.0 mmol/L    Calcium 9.5 8.6 - 10.5 mg/dL    Total Protein 7.4 6.0 - 8.5 g/dL    Albumin 4.70 3.50 - 5.20 g/dL    ALT (SGPT) 9 1 - 33 U/L    AST (SGOT) 11 1 - 32 U/L    Alkaline Phosphatase 104 39 - 117 U/L    Total Bilirubin 0.3 0.0 - 1.2 mg/dL    eGFR Non African Amer 76 >60 mL/min/1.73    Globulin 2.7 gm/dL    A/G Ratio 1.7 g/dL    BUN/Creatinine Ratio 16.7 7.0 - 25.0    Anion  Gap 16.5 (H) 5.0 - 15.0 mmol/L   Lipase    Collection Time: 12/02/21  7:22 PM    Specimen: Blood   Result Value Ref Range    Lipase 9 (L) 13 - 60 U/L   Green Top (Gel)    Collection Time: 12/02/21  7:22 PM   Result Value Ref Range    Extra Tube Hold for add-ons.    Lavender Top    Collection Time: 12/02/21  7:22 PM   Result Value Ref Range    Extra Tube hold for add-on    Gold Top - SST    Collection Time: 12/02/21  7:22 PM   Result Value Ref Range    Extra Tube Hold for add-ons.    Light Blue Top    Collection Time: 12/02/21  7:22 PM   Result Value Ref Range    Extra Tube hold for add-on    CBC Auto Differential    Collection Time: 12/02/21  7:22 PM    Specimen: Blood   Result Value Ref Range    WBC 8.13 3.40 - 10.80 10*3/mm3    RBC 4.75 3.77 - 5.28 10*6/mm3    Hemoglobin 14.4 12.0 - 15.9 g/dL    Hematocrit 42.0 34.0 - 46.6 %    MCV 88.4 79.0 - 97.0 fL    MCH 30.3 26.6 - 33.0 pg    MCHC 34.3 31.5 - 35.7 g/dL    RDW 12.0 (L) 12.3 - 15.4 %    RDW-SD 38.6 37.0 - 54.0 fl    MPV 9.5 6.0 - 12.0 fL    Platelets 309 140 - 450 10*3/mm3    Neutrophil % 70.0 42.7 - 76.0 %    Lymphocyte % 22.4 19.6 - 45.3 %    Monocyte % 6.9 5.0 - 12.0 %    Eosinophil % 0.1 (L) 0.3 - 6.2 %    Basophil % 0.5 0.0 - 1.5 %    Immature Grans % 0.1 0.0 - 0.5 %    Neutrophils, Absolute 5.69 1.70 - 7.00 10*3/mm3    Lymphocytes, Absolute 1.82 0.70 - 3.10 10*3/mm3    Monocytes, Absolute 0.56 0.10 - 0.90 10*3/mm3    Eosinophils, Absolute 0.01 0.00 - 0.40 10*3/mm3    Basophils, Absolute 0.04 0.00 - 0.20 10*3/mm3    Immature Grans, Absolute 0.01 0.00 - 0.05 10*3/mm3    nRBC 0.0 0.0 - 0.2 /100 WBC   Urinalysis With Microscopic If Indicated (No Culture) - Urine, Clean Catch    Collection Time: 12/02/21  7:23 PM    Specimen: Urine, Clean Catch   Result Value Ref Range    Color, UA Yellow Yellow, Straw    Appearance, UA Clear Clear    pH, UA 5.5 5.0 - 8.0    Specific Gravity, UA 1.018 1.005 - 1.030    Glucose, UA >=1000 mg/dL (3+) (A) Negative    Ketones, UA  80 mg/dL (3+) (A) Negative    Bilirubin, UA Negative Negative    Blood, UA Negative Negative    Protein, UA Negative Negative    Leuk Esterase, UA Trace (A) Negative    Nitrite, UA Negative Negative    Urobilinogen, UA 0.2 E.U./dL 0.2 - 1.0 E.U./dL   Urinalysis, Microscopic Only - Urine, Clean Catch    Collection Time: 12/02/21  7:23 PM    Specimen: Urine, Clean Catch   Result Value Ref Range    RBC, UA 0-2 None Seen, 0-2 /HPF    WBC, UA 6-12 (A) None Seen, 0-2 /HPF    Bacteria, UA None Seen None Seen /HPF    Squamous Epithelial Cells, UA 0-2 None Seen, 0-2 /HPF    Hyaline Casts, UA 0-2 None Seen /LPF    Methodology Automated Microscopy        Ordered the above labs and independently reviewed the results.        RADIOLOGY  No Radiology Exams Resulted Within Past 24 Hours        PROCEDURES    Procedures      MEDICATIONS GIVEN IN ER    Medications   sodium chloride 0.9 % flush 10 mL (has no administration in time range)   sodium chloride 0.9 % bolus 1,000 mL (0 mL Intravenous Stopped 12/3/21 0150)   lactated ringers bolus 1,000 mL (1,000 mL Intravenous New Bag 12/3/21 0150)         PROGRESS, DATA ANALYSIS, CONSULTS, AND MEDICAL DECISION MAKING    All labs have been independently reviewed by me.  All radiology studies have been reviewed by me and discussed with radiologist dictating the report.   EKG's independently viewed and interpreted by me.  Discussion below represents my analysis of pertinent findings related to patient's condition, differential diagnosis, treatment plan and final disposition.    Initial concern for possible residual or refractory UTI, pyelonephritis, renal failure, electrolyte abnormalities, dehydration, medication side effect, gastroenteritis, among others.    ED Course as of 12/03/21 0240   Fri Dec 03, 2021   0238 WBC: 8.13 [DC]   0238 Hemoglobin: 14.4 [DC]   0238 Bacteria, UA: None Seen [DC]   0238 WBC, UA(!): 6-12 [DC]   0238 Nitrite, UA: Negative [DC]   0238 Leukocytes, UA(!): Trace [DC]    0238 Ketones, UA(!): 80 mg/dL (3+) [DC]   0238 Lipase(!): 9 [DC]   0238 Glucose(!): 269 [DC]   0238 Creatinine: 0.78 [DC]   0238 Sodium(!): 134 [DC]   0238 Potassium: 3.9 [DC]   0238 CO2: 22.5 [DC]   0238 Anion Gap(!): 16.5  I think at this point mostly dehydration related issues, nausea vomiting likely secondary to the ciprofloxacin, do not see any evidence of residual refractory infection in the urine. No leukocytosis, no renal failure, do not suspect pyelonephritis at this time. Afebrile. No significant electrolyte abnormalities. Plan for IV rehydration, prescription for Zofran, recommendations for continuing course of ciprofloxacin at this time. Mild hyperglycemia without DKA, expect improvement with improved hydration. PCP follow-up for recheck next week, ED return for worsening symptoms as needed. [DC]      ED Course User Index  [DC] Edy Us MD       AS OF 02:40 EST VITALS:    BP - 150/83  HR - 97  TEMP - 98.3 °F (36.8 °C) (Oral)  02 SATS - 100%        DIAGNOSIS  Final diagnoses:   Dehydration   Nausea and vomiting, intractability of vomiting not specified, unspecified vomiting type   Hyperglycemia   History of diabetes mellitus         DISPOSITION  DISCHARGE    Patient discharged in stable condition.    Reviewed implications of results, diagnosis, meds, responsibility to follow up, warning signs and symptoms of possible worsening, potential complications and reasons to return to ER.    Patient/Family voiced understanding of above instructions.    Discussed plan for discharge, as there is no emergent indication for admission. Patient referred to primary care provider for BP management due to today's BP. Pt/family is agreeable and understands need for follow up and repeat testing.  Pt is aware that discharge does not mean that nothing is wrong but it indicates no emergency is present that requires admission and they must continue care with follow-up as given below or physician of their choice.      FOLLOW-UP  [facility] Emergency Department  [address]  [facility] [address] [phone]    As needed, If symptoms worsen    Ammy Holman, APRN  [address]  [address]  [facility] [id number] [phone]    Schedule an appointment as soon as possible for a visit   As needed         Medication List      New Prescriptions    ondansetron ODT 4 MG disintegrating tablet  Commonly known as: ZOFRAN-ODT  Place 1 tablet on the tongue Every 8 (Eight) Hours As Needed for Nausea or Vomiting for up to 3 days.           Where to Get Your Medications      These medications were sent to [id number] - [facility] [address] [address] [address]  & [facility] [address] - [phone]  - [phone] [address] [address] [address], [facility] [address]    Phone: [phone]   · ondansetron ODT 4 MG disintegrating tablet                    Edy Us MD  12/03/21 4737

## 2021-12-07 ENCOUNTER — OFFICE VISIT (OUTPATIENT)
Dept: FAMILY MEDICINE CLINIC | Facility: CLINIC | Age: 56
End: 2021-12-07

## 2021-12-07 VITALS
HEART RATE: 103 BPM | OXYGEN SATURATION: 100 % | HEIGHT: 64 IN | WEIGHT: 170.4 LBS | DIASTOLIC BLOOD PRESSURE: 96 MMHG | RESPIRATION RATE: 16 BRPM | TEMPERATURE: 95.1 F | SYSTOLIC BLOOD PRESSURE: 140 MMHG | BODY MASS INDEX: 29.09 KG/M2

## 2021-12-07 DIAGNOSIS — N39.0 RECURRENT UTI: ICD-10-CM

## 2021-12-07 DIAGNOSIS — E11.65 UNCONTROLLED TYPE 2 DIABETES MELLITUS WITH HYPERGLYCEMIA (HCC): ICD-10-CM

## 2021-12-07 DIAGNOSIS — Z79.4 TYPE 2 DIABETES MELLITUS WITH OTHER CIRCULATORY COMPLICATION, WITH LONG-TERM CURRENT USE OF INSULIN (HCC): Primary | ICD-10-CM

## 2021-12-07 DIAGNOSIS — E11.59 TYPE 2 DIABETES MELLITUS WITH OTHER CIRCULATORY COMPLICATION, WITH LONG-TERM CURRENT USE OF INSULIN (HCC): Primary | ICD-10-CM

## 2021-12-07 LAB
BILIRUB BLD-MCNC: NEGATIVE MG/DL
CLARITY, POC: CLEAR
COLOR UR: YELLOW
EXPIRATION DATE: NORMAL
GLUCOSE UR STRIP-MCNC: NEGATIVE MG/DL
KETONES UR QL: NEGATIVE
LEUKOCYTE EST, POC: NEGATIVE
Lab: NORMAL
NITRITE UR-MCNC: NEGATIVE MG/ML
PH UR: 7 [PH] (ref 5–8)
PROT UR STRIP-MCNC: NEGATIVE MG/DL
RBC # UR STRIP: NEGATIVE /UL
SP GR UR: 1.02 (ref 1–1.03)
UROBILINOGEN UR QL: NORMAL

## 2021-12-07 PROCEDURE — 99214 OFFICE O/P EST MOD 30 MIN: CPT | Performed by: NURSE PRACTITIONER

## 2021-12-07 PROCEDURE — 81003 URINALYSIS AUTO W/O SCOPE: CPT | Performed by: NURSE PRACTITIONER

## 2021-12-07 RX ORDER — LISINOPRIL 10 MG/1
10 TABLET ORAL DAILY
Qty: 90 TABLET | Refills: 1 | Status: SHIPPED | OUTPATIENT
Start: 2021-12-07 | End: 2022-07-12 | Stop reason: SDUPTHER

## 2021-12-07 RX ORDER — INSULIN DETEMIR 100 [IU]/ML
40 INJECTION, SOLUTION SUBCUTANEOUS DAILY
Qty: 6 PEN | Refills: 3 | Status: SHIPPED | OUTPATIENT
Start: 2021-12-07 | End: 2022-09-01

## 2021-12-07 NOTE — PROGRESS NOTES
"Chief Complaint  Urinary Tract Infection (possible x1mo) and Mass (bottom of left foot couple of months)    Subjective          Sonal De Dios presents to [facility]  Sonal presents for recent UTI, treated with antibiotics x 3 at [facility]. Started on a different antibiotic last Monday. Develoepd chills, nausea/vomiting, went to ED, she was dehydrated, given IV fluids, UA was negative in ER. She was treated last with Cipro and thought symptoms may have been contributing. Her antibiotic is now completed. Symptoms have resolved. Doing much better.     Using dexcom. Currently glucose 125. Currently taking 24 units, using humalog prn, states not using frequently    Urinary Tract Infection         Objective   Vital Signs:   /96   Pulse 103   Temp 95.1 °F (35.1 °C) (Infrared)   Resp 16   Ht 162.6 cm (64\")   Wt 77.3 kg (170 lb 6.4 oz)   SpO2 100%   BMI 29.25 kg/m²     Physical Exam  Constitutional:       General: She is not in acute distress.     Appearance: She is well-developed. She is not diaphoretic.   Cardiovascular:      Rate and Rhythm: Normal rate and regular rhythm.      Heart sounds: Normal heart sounds. No murmur heard.  No friction rub. No gallop.    Pulmonary:      Effort: Pulmonary effort is normal. No respiratory distress.      Breath sounds: Normal breath sounds. No wheezing or rales.   Abdominal:      General: Bowel sounds are normal. There is no distension.      Palpations: Abdomen is soft.      Tenderness: There is no abdominal tenderness.   Musculoskeletal:      Cervical back: Neck supple.   Skin:     General: Skin is warm and dry.   Neurological:      Mental Status: She is alert and oriented to person, place, and time.        Result Review :                 Assessment and Plan    Diagnoses and all orders for this visit:    1. Type 2 diabetes mellitus with other circulatory complication, with long-term current use of insulin (HCC) (Primary)  -     " Hemoglobin A1c; Future  -     Lipid Panel With LDL / HDL Ratio; Future    2. Uncontrolled type 2 diabetes mellitus with hyperglycemia (HCC)  -     insulin detemir (Levemir FlexTouch) 100 UNIT/ML injection; Inject 40 Units under the skin into the appropriate area as directed Daily.  Dispense: 6 pen; Refill: 3    3. Recurrent UTI  -     POC Urinalysis Dipstick, Automated    Other orders  -     lisinopril (PRINIVIL,ZESTRIL) 10 MG tablet; Take 1 tablet by mouth Daily.  Dispense: 90 tablet; Refill: 1        Follow Up   No follow-ups on file.  Patient was given instructions and counseling regarding her condition or for health maintenance advice. Please see specific information pulled into the AVS if appropriate.     Diabetes: recommend fasting labs tomorrow to monitor levels, glucose does seem to be improved, she is not using the sliding scale because she is having lows at night, she is taking her levemir twice daily and has been doing that long term, recommend start once daily, may help alleviate hypoglycemic episodes, may need to resume short acting insulin, will monitor and adjust insulin as needed    Recurrent UTI: ua is negative, did have increased n/v and chills, concern for pyelonephritis, no CT completed, however urine was normal, question antibiotic side effects but she has since finished her antibiotics and symptoms have resolved.     Hypertension: bp is not well controlled, heart rate is increased, will increase lisinopril to 10 mg daily, discussed this is a low dose and may need adjustment moving forward.    Neuropathy, no refill needed, 90 days sent in November  [facility] gerard

## 2021-12-14 DIAGNOSIS — E11.59 TYPE 2 DIABETES MELLITUS WITH OTHER CIRCULATORY COMPLICATION, WITH LONG-TERM CURRENT USE OF INSULIN (HCC): ICD-10-CM

## 2021-12-14 DIAGNOSIS — Z79.4 TYPE 2 DIABETES MELLITUS WITH OTHER CIRCULATORY COMPLICATION, WITH LONG-TERM CURRENT USE OF INSULIN (HCC): ICD-10-CM

## 2021-12-14 RX ORDER — GABAPENTIN 600 MG/1
600 TABLET ORAL NIGHTLY
Qty: 30 TABLET | Refills: 0 | Status: SHIPPED | OUTPATIENT
Start: 2021-12-14 | End: 2022-01-05 | Stop reason: SDUPTHER

## 2021-12-14 RX ORDER — GABAPENTIN 400 MG/1
400 CAPSULE ORAL 2 TIMES DAILY
Qty: 60 CAPSULE | Refills: 0 | Status: SHIPPED | OUTPATIENT
Start: 2021-12-14 | End: 2022-01-05 | Stop reason: SDUPTHER

## 2021-12-23 DIAGNOSIS — R30.0 DYSURIA: Primary | ICD-10-CM

## 2021-12-23 RX ORDER — INSULIN LISPRO 100 [IU]/ML
INJECTION, SOLUTION INTRAVENOUS; SUBCUTANEOUS
Qty: 6 PEN | Refills: 5 | Status: SHIPPED | OUTPATIENT
Start: 2021-12-23 | End: 2023-01-04

## 2022-01-05 ENCOUNTER — OFFICE VISIT (OUTPATIENT)
Dept: FAMILY MEDICINE CLINIC | Facility: CLINIC | Age: 57
End: 2022-01-05

## 2022-01-05 VITALS
BODY MASS INDEX: 30.39 KG/M2 | RESPIRATION RATE: 20 BRPM | SYSTOLIC BLOOD PRESSURE: 124 MMHG | HEIGHT: 64 IN | DIASTOLIC BLOOD PRESSURE: 80 MMHG | WEIGHT: 178 LBS | OXYGEN SATURATION: 98 % | HEART RATE: 85 BPM

## 2022-01-05 DIAGNOSIS — E11.59 TYPE 2 DIABETES MELLITUS WITH OTHER CIRCULATORY COMPLICATION, WITH LONG-TERM CURRENT USE OF INSULIN: ICD-10-CM

## 2022-01-05 DIAGNOSIS — R31.9 URINARY TRACT INFECTION WITH HEMATURIA, SITE UNSPECIFIED: Primary | ICD-10-CM

## 2022-01-05 DIAGNOSIS — Z79.4 TYPE 2 DIABETES MELLITUS WITH OTHER CIRCULATORY COMPLICATION, WITH LONG-TERM CURRENT USE OF INSULIN: ICD-10-CM

## 2022-01-05 DIAGNOSIS — N39.0 URINARY TRACT INFECTION WITH HEMATURIA, SITE UNSPECIFIED: Primary | ICD-10-CM

## 2022-01-05 LAB
BILIRUB BLD-MCNC: NEGATIVE MG/DL
CLARITY, POC: ABNORMAL
COLOR UR: YELLOW
EXPIRATION DATE: ABNORMAL
GLUCOSE UR STRIP-MCNC: NEGATIVE MG/DL
KETONES UR QL: NEGATIVE
LEUKOCYTE EST, POC: ABNORMAL
Lab: ABNORMAL
NITRITE UR-MCNC: POSITIVE MG/ML
PH UR: 6 [PH] (ref 5–8)
PROT UR STRIP-MCNC: NEGATIVE MG/DL
RBC # UR STRIP: ABNORMAL /UL
SP GR UR: 1.02 (ref 1–1.03)
UROBILINOGEN UR QL: NORMAL

## 2022-01-05 PROCEDURE — 81003 URINALYSIS AUTO W/O SCOPE: CPT | Performed by: NURSE PRACTITIONER

## 2022-01-05 PROCEDURE — 99213 OFFICE O/P EST LOW 20 MIN: CPT | Performed by: NURSE PRACTITIONER

## 2022-01-05 RX ORDER — GABAPENTIN 400 MG/1
400 CAPSULE ORAL 2 TIMES DAILY
Qty: 60 CAPSULE | Refills: 5 | Status: SHIPPED | OUTPATIENT
Start: 2022-01-05 | End: 2022-07-08

## 2022-01-05 RX ORDER — GABAPENTIN 600 MG/1
600 TABLET ORAL NIGHTLY
Qty: 30 TABLET | Refills: 5 | Status: SHIPPED | OUTPATIENT
Start: 2022-01-05 | End: 2022-06-30

## 2022-01-05 RX ORDER — SULFAMETHOXAZOLE AND TRIMETHOPRIM 800; 160 MG/1; MG/1
1 TABLET ORAL 2 TIMES DAILY
Qty: 14 TABLET | Refills: 0 | Status: SHIPPED | OUTPATIENT
Start: 2022-01-05 | End: 2022-01-12 | Stop reason: SDUPTHER

## 2022-01-05 RX ORDER — PHENAZOPYRIDINE HYDROCHLORIDE 200 MG/1
200 TABLET, FILM COATED ORAL 3 TIMES DAILY PRN
Qty: 6 TABLET | Refills: 0 | Status: SHIPPED | OUTPATIENT
Start: 2022-01-05 | End: 2022-07-12

## 2022-01-05 NOTE — PROGRESS NOTES
"Chief Complaint  Urinary Tract Infection (c/o uti x1 month urinary freq )    Subjective          Sonal De Dios presents to [facility]  Patient presents with recurretn urinary tract symptoms including discomfort/burning, malodorous and frequency. She denies fever or chills. Treated recently with an antibiotic in the ER, but states she did not complete the antibiotic secondary to side effects.     Urinary Tract Infection   This is a new problem. The current episode started 1 to 4 weeks ago. The problem has been unchanged. The quality of the pain is described as burning. The pain is mild. There has been no fever. Associated symptoms include frequency and urgency. Pertinent negatives include no chills, discharge, flank pain, hematuria, hesitancy, nausea, sweats or vomiting. She has tried antibiotics for the symptoms. The treatment provided mild relief.       Objective   Vital Signs:   /80 (BP Location: Left arm, Patient Position: Sitting, Cuff Size: Adult)   Pulse 85   Resp 20   Ht 162.6 cm (64\")   Wt 80.7 kg (178 lb)   SpO2 98%   BMI 30.55 kg/m²     Physical Exam  Constitutional:       Appearance: Normal appearance.   Cardiovascular:      Rate and Rhythm: Normal rate and regular rhythm.      Pulses: Normal pulses.      Heart sounds: Normal heart sounds. No murmur heard.  No friction rub. No gallop.    Pulmonary:      Effort: Pulmonary effort is normal. No respiratory distress.      Breath sounds: Normal breath sounds. No wheezing, rhonchi or rales.   Abdominal:      General: Bowel sounds are normal. There is no distension.      Palpations: Abdomen is soft. There is no mass.      Tenderness: There is no abdominal tenderness. There is no right CVA tenderness or left CVA tenderness.      Hernia: No hernia is present.   Skin:     General: Skin is warm and dry.   Neurological:      Mental Status: She is alert and oriented to person, place, and time.   Psychiatric:         " Mood and Affect: Mood normal.        Result Review :                 Assessment and Plan    Diagnoses and all orders for this visit:    1. Urinary tract infection with hematuria, site unspecified (Primary)  -     Urine Culture - Urine, Urine, Clean Catch; Future  -     Cancel: Urine Culture - Urine, Urine, Clean Catch  -     POCT urinalysis dipstick, automated  -     Urine Culture - Urine, Urine, Clean Catch    2. Type 2 diabetes mellitus with other circulatory complication, with long-term current use of insulin (HCC)  -     gabapentin (NEURONTIN) 600 MG tablet; Take 1 tablet by mouth Every Night.  Dispense: 30 tablet; Refill: 5  -     gabapentin (NEURONTIN) 400 MG capsule; Take 1 capsule by mouth 2 (Two) Times a Day. At 8 am and 2 pm  Dispense: 60 capsule; Refill: 5    Other orders  -     sulfamethoxazole-trimethoprim (Bactrim DS) 800-160 MG per tablet; Take 1 tablet by mouth 2 (Two) Times a Day.  Dispense: 14 tablet; Refill: 0  -     phenazopyridine (Pyridium) 200 MG tablet; Take 1 tablet by mouth 3 (Three) Times a Day As Needed for Bladder Spasms.  Dispense: 6 tablet; Refill: 0        Follow Up   No follow-ups on file.  Patient was given instructions and counseling regarding her condition or for health maintenance advice. Please see specific information pulled into the AVS if appropriate.     UTI: no culture from ER, started on cipro but states did not complete antibiotic secondary to side effects  Will start bactrim DS, encouraged fluids, discussed elevated blood sugars can contribute to UTIs if glucose is spilling into urine, patient verbalized understanding and is agreeable to monitor A1C today. She has been given pyridium to help with discomfort. Await culture results and adjust antibiotic as needed.

## 2022-01-12 RX ORDER — SULFAMETHOXAZOLE AND TRIMETHOPRIM 800; 160 MG/1; MG/1
1 TABLET ORAL 2 TIMES DAILY
Qty: 6 TABLET | Refills: 0 | Status: SHIPPED | OUTPATIENT
Start: 2022-01-12 | End: 2022-07-12

## 2022-01-19 RX ORDER — PEN NEEDLE, DIABETIC 32GX 5/32"
NEEDLE, DISPOSABLE MISCELLANEOUS
Qty: 200 EACH | Refills: 12 | Status: SHIPPED | OUTPATIENT
Start: 2022-01-19 | End: 2022-12-28 | Stop reason: SDUPTHER

## 2022-01-21 DIAGNOSIS — E11.59 TYPE 2 DIABETES MELLITUS WITH OTHER CIRCULATORY COMPLICATION, WITH LONG-TERM CURRENT USE OF INSULIN: ICD-10-CM

## 2022-01-21 DIAGNOSIS — Z79.4 TYPE 2 DIABETES MELLITUS WITH OTHER CIRCULATORY COMPLICATION, WITH LONG-TERM CURRENT USE OF INSULIN: ICD-10-CM

## 2022-01-21 RX ORDER — PROCHLORPERAZINE 25 MG/1
1 SUPPOSITORY RECTAL TAKE AS DIRECTED
Qty: 1 EACH | Refills: 0 | Status: SHIPPED | OUTPATIENT
Start: 2022-01-21 | End: 2022-05-03

## 2022-01-21 NOTE — TELEPHONE ENCOUNTER
Rx Refill Note  Requested Prescriptions     Pending Prescriptions Disp Refills   • Continuous Blood Gluc Transmit (Dexcom G6 Transmitter) misc 1 each 0     Si each Take As Directed.      Last office visit with prescribing clinician: 2022      Next office visit with prescribing clinician: Visit date not found            Matilde Wang LPN  22, 17:16 EST

## 2022-02-07 NOTE — TELEPHONE ENCOUNTER
Rx Refill Note  Requested Prescriptions     Pending Prescriptions Disp Refills   • atorvastatin (LIPITOR) 10 MG tablet 90 tablet 1     Sig: Take 1 tablet by mouth Daily.      Last office visit with prescribing clinician: 1/5/2022      Next office visit with prescribing clinician: 2/8/2022       {TIP  Please add Last Relevant Lab Date if appropriate: 01/05/22    Marifer Salgado MA  02/07/22, 11:29 EST

## 2022-02-08 ENCOUNTER — OFFICE VISIT (OUTPATIENT)
Dept: FAMILY MEDICINE CLINIC | Facility: CLINIC | Age: 57
End: 2022-02-08

## 2022-02-08 VITALS
OXYGEN SATURATION: 99 % | RESPIRATION RATE: 18 BRPM | DIASTOLIC BLOOD PRESSURE: 78 MMHG | HEIGHT: 64 IN | BODY MASS INDEX: 30.44 KG/M2 | TEMPERATURE: 97.2 F | HEART RATE: 108 BPM | WEIGHT: 178.3 LBS | SYSTOLIC BLOOD PRESSURE: 120 MMHG

## 2022-02-08 DIAGNOSIS — R05.8 POST-VIRAL COUGH SYNDROME: ICD-10-CM

## 2022-02-08 DIAGNOSIS — N39.0 URINARY TRACT INFECTION WITHOUT HEMATURIA, SITE UNSPECIFIED: Primary | ICD-10-CM

## 2022-02-08 DIAGNOSIS — R35.0 FREQUENCY OF URINATION: ICD-10-CM

## 2022-02-08 DIAGNOSIS — R82.90 ABNORMAL URINE ODOR: ICD-10-CM

## 2022-02-08 LAB
BILIRUB BLD-MCNC: NEGATIVE MG/DL
CLARITY, POC: CLEAR
COLOR UR: YELLOW
EXPIRATION DATE: ABNORMAL
GLUCOSE UR STRIP-MCNC: ABNORMAL MG/DL
KETONES UR QL: NEGATIVE
LEUKOCYTE EST, POC: ABNORMAL
Lab: ABNORMAL
NITRITE UR-MCNC: POSITIVE MG/ML
PH UR: 5.5 [PH] (ref 5–8)
PROT UR STRIP-MCNC: NEGATIVE MG/DL
RBC # UR STRIP: ABNORMAL /UL
SP GR UR: 1.02 (ref 1–1.03)
UROBILINOGEN UR QL: NORMAL

## 2022-02-08 PROCEDURE — 81003 URINALYSIS AUTO W/O SCOPE: CPT | Performed by: NURSE PRACTITIONER

## 2022-02-08 PROCEDURE — 99213 OFFICE O/P EST LOW 20 MIN: CPT | Performed by: NURSE PRACTITIONER

## 2022-02-08 RX ORDER — AMOXICILLIN AND CLAVULANATE POTASSIUM 875; 125 MG/1; MG/1
1 TABLET, FILM COATED ORAL EVERY 12 HOURS SCHEDULED
Qty: 28 TABLET | Refills: 0 | Status: SHIPPED | OUTPATIENT
Start: 2022-02-08 | End: 2022-07-12

## 2022-02-08 RX ORDER — ATORVASTATIN CALCIUM 10 MG/1
10 TABLET, FILM COATED ORAL DAILY
Qty: 90 TABLET | Refills: 1 | Status: SHIPPED | OUTPATIENT
Start: 2022-02-08 | End: 2022-07-12 | Stop reason: SDUPTHER

## 2022-02-08 RX ORDER — BENZONATATE 100 MG/1
200 CAPSULE ORAL 3 TIMES DAILY PRN
Qty: 60 CAPSULE | Refills: 0 | Status: SHIPPED | OUTPATIENT
Start: 2022-02-08 | End: 2022-07-12

## 2022-02-08 NOTE — PROGRESS NOTES
"Chief Complaint  Urinary Tract Infection and Cough (2wks)    Subjective          Sonal De Dios presents to [facility]  Cough, x 2 weeks, negative for covid.   Worse at night  Dry, nonproductive,     Recurrent UTI, reports discoloration, cloudy, concentrated and malodorous, some discomfort post void    Urinary Tract Infection   This is a recurrent problem. The current episode started in the past 7 days. The problem occurs every urination. The problem has been unchanged. The pain is mild. There has been no fever. Associated symptoms include frequency and urgency. Pertinent negatives include no chills, discharge, flank pain, hematuria, hesitancy, nausea, sweats or vomiting. She has tried antibiotics for the symptoms. The treatment provided moderate relief.   Cough  This is a new problem. The current episode started 1 to 4 weeks ago. The problem has been gradually improving. The cough is non-productive. Pertinent negatives include no chest pain, chills, ear congestion, ear pain, fever, headaches, heartburn, hemoptysis, myalgias, nasal congestion, postnasal drip, rash, rhinorrhea, sore throat, shortness of breath, sweats, weight loss or wheezing. Nothing aggravates the symptoms.       Objective   Vital Signs:   /78 (BP Location: Right arm, Patient Position: Sitting, Cuff Size: Small Adult)   Pulse 108   Temp 97.2 °F (36.2 °C) (Temporal)   Resp 18   Ht 162.6 cm (64.02\")   Wt 80.9 kg (178 lb 4.8 oz)   SpO2 99%   BMI 30.59 kg/m²     Physical Exam  Constitutional:       General: She is not in acute distress.     Appearance: She is well-developed. She is not diaphoretic.   Cardiovascular:      Rate and Rhythm: Normal rate and regular rhythm.      Heart sounds: Normal heart sounds. No murmur heard.  No friction rub. No gallop.    Pulmonary:      Effort: Pulmonary effort is normal. No respiratory distress.      Breath sounds: Normal breath sounds. No wheezing or rales. "   Abdominal:      General: Bowel sounds are normal. There is no distension.      Palpations: Abdomen is soft.      Tenderness: There is no abdominal tenderness.   Musculoskeletal:      Cervical back: Neck supple.   Skin:     General: Skin is warm and dry.   Neurological:      Mental Status: She is alert and oriented to person, place, and time.        Result Review :                 Assessment and Plan    Diagnoses and all orders for this visit:    1. Urinary tract infection without hematuria, site unspecified (Primary)  -     POCT urinalysis dipstick, automated  -     amoxicillin-clavulanate (Augmentin) 875-125 MG per tablet; Take 1 tablet by mouth Every 12 (Twelve) Hours.  Dispense: 28 tablet; Refill: 0  -     Urine Culture - Urine, Urine, Clean Catch    2. Frequency of urination  -     POCT urinalysis dipstick, automated    3. Abnormal urine odor  -     POCT urinalysis dipstick, automated    4. Post-viral cough syndrome    Other orders  -     benzonatate (Tessalon Perles) 100 MG capsule; Take 2 capsules by mouth 3 (Three) Times a Day As Needed for Cough.  Dispense: 60 capsule; Refill: 0        Follow Up   No follow-ups on file.  Patient was given instructions and counseling regarding her condition or for health maintenance advice. Please see specific information pulled into the AVS if appropriate.     Recurrent UTI: suspect uncontrolled diabetes is contributing  Will treat with augmentin x 14 days, based on last culture  Consider ID referral for continued infection  Will need to gain better control over glucose, she does report good control, however A1C does not reflect good control, would like endocrinology to better manage diabetes, would likely benefit from insulin pump  Does have continued cough, likely post viral, if bacterial component, augmentin would benefit as well  Concern regarding recent frequent antibiotic use

## 2022-04-12 ENCOUNTER — TELEPHONE (OUTPATIENT)
Dept: FAMILY MEDICINE CLINIC | Facility: CLINIC | Age: 57
End: 2022-04-12

## 2022-04-12 NOTE — TELEPHONE ENCOUNTER
Pharmacy Name:  [facility]    Pharmacy representative name: KG    Pharmacy representative phone number: [phone]    What medication are you calling in regards to: GABAPENTIN    What question does the pharmacy have: PHARMACY WANTS TO MAKE SURE THE PATIENT IS SUPPOSE TO BE ON DIFFERENT STRENGTHS. PHARMACY STATES THAT PATIENT IS TAKING 600MG AND 400MG.     Who is the provider that prescribed the medication: ALYSSA ORTEGA    Additional notes: N/A

## 2022-04-13 DIAGNOSIS — E11.59 TYPE 2 DIABETES MELLITUS WITH OTHER CIRCULATORY COMPLICATION, WITH LONG-TERM CURRENT USE OF INSULIN: ICD-10-CM

## 2022-04-13 DIAGNOSIS — Z79.4 TYPE 2 DIABETES MELLITUS WITH OTHER CIRCULATORY COMPLICATION, WITH LONG-TERM CURRENT USE OF INSULIN: ICD-10-CM

## 2022-04-13 RX ORDER — GABAPENTIN 400 MG/1
400 CAPSULE ORAL 2 TIMES DAILY
Qty: 60 CAPSULE | Refills: 5 | OUTPATIENT
Start: 2022-04-13

## 2022-04-13 NOTE — TELEPHONE ENCOUNTER
Rx Refill Note  Requested Prescriptions     Pending Prescriptions Disp Refills   • gabapentin (NEURONTIN) 400 MG capsule 60 capsule 5     Sig: Take 1 capsule by mouth 2 (Two) Times a Day. At 8 am and 2 pm      Last office visit with prescribing clinician: 2/8/2022      Next office visit with prescribing clinician: Visit date not found       {TIP  Please add Last Relevant Lab Date if appropriate: 01/05/22    Marifer Salgado MA  04/13/22, 08:39 EDT

## 2022-05-01 DIAGNOSIS — E11.59 TYPE 2 DIABETES MELLITUS WITH OTHER CIRCULATORY COMPLICATION, WITH LONG-TERM CURRENT USE OF INSULIN: ICD-10-CM

## 2022-05-01 DIAGNOSIS — Z79.4 TYPE 2 DIABETES MELLITUS WITH OTHER CIRCULATORY COMPLICATION, WITH LONG-TERM CURRENT USE OF INSULIN: ICD-10-CM

## 2022-05-02 NOTE — TELEPHONE ENCOUNTER
Rx Refill Note  Requested Prescriptions     Pending Prescriptions Disp Refills   • Continuous Blood Gluc Sensor (Dexcom G6 Sensor) [Pharmacy Med Name: DEXCOM G6 SENSOR (3 PACK)] 3 each 6     Sig: USE EVERY 10 DAYS   • Continuous Blood Gluc Transmit (Dexcom G6 Transmitter) misc [Pharmacy Med Name: DEXCOM G6 TRANSMITTER] 1 each 0     Sig: TAKE AS DIRECTED      Last office visit with prescribing clinician: 2/8/2022      Next office visit with prescribing clinician: Visit date not found            Verito Sanders MA  05/02/22, 12:14 EDT

## 2022-05-03 RX ORDER — PROCHLORPERAZINE 25 MG/1
SUPPOSITORY RECTAL
Qty: 1 EACH | Refills: 0 | Status: SHIPPED | OUTPATIENT
Start: 2022-05-03 | End: 2022-08-04

## 2022-05-03 RX ORDER — PROCHLORPERAZINE 25 MG/1
SUPPOSITORY RECTAL
Qty: 3 EACH | Refills: 6 | Status: SHIPPED | OUTPATIENT
Start: 2022-05-03 | End: 2022-12-27

## 2022-06-28 DIAGNOSIS — Z79.4 TYPE 2 DIABETES MELLITUS WITH OTHER CIRCULATORY COMPLICATION, WITH LONG-TERM CURRENT USE OF INSULIN: ICD-10-CM

## 2022-06-28 DIAGNOSIS — E11.59 TYPE 2 DIABETES MELLITUS WITH OTHER CIRCULATORY COMPLICATION, WITH LONG-TERM CURRENT USE OF INSULIN: ICD-10-CM

## 2022-06-30 RX ORDER — GABAPENTIN 600 MG/1
600 TABLET ORAL NIGHTLY
Qty: 30 TABLET | Refills: 0 | Status: SHIPPED | OUTPATIENT
Start: 2022-06-30 | End: 2022-07-12

## 2022-07-08 DIAGNOSIS — Z79.4 TYPE 2 DIABETES MELLITUS WITH OTHER CIRCULATORY COMPLICATION, WITH LONG-TERM CURRENT USE OF INSULIN: ICD-10-CM

## 2022-07-08 DIAGNOSIS — E11.59 TYPE 2 DIABETES MELLITUS WITH OTHER CIRCULATORY COMPLICATION, WITH LONG-TERM CURRENT USE OF INSULIN: ICD-10-CM

## 2022-07-08 RX ORDER — GABAPENTIN 400 MG/1
CAPSULE ORAL
Qty: 60 CAPSULE | Refills: 0 | Status: SHIPPED | OUTPATIENT
Start: 2022-07-08 | End: 2022-07-12 | Stop reason: SDUPTHER

## 2022-07-12 ENCOUNTER — OFFICE VISIT (OUTPATIENT)
Dept: FAMILY MEDICINE CLINIC | Facility: CLINIC | Age: 57
End: 2022-07-12

## 2022-07-12 VITALS
BODY MASS INDEX: 30.23 KG/M2 | RESPIRATION RATE: 18 BRPM | OXYGEN SATURATION: 99 % | DIASTOLIC BLOOD PRESSURE: 80 MMHG | SYSTOLIC BLOOD PRESSURE: 140 MMHG | HEART RATE: 92 BPM | WEIGHT: 177.1 LBS | HEIGHT: 64 IN | TEMPERATURE: 97.3 F

## 2022-07-12 DIAGNOSIS — Z13.29 SCREENING FOR THYROID DISORDER: ICD-10-CM

## 2022-07-12 DIAGNOSIS — Z12.31 ENCOUNTER FOR SCREENING MAMMOGRAM FOR MALIGNANT NEOPLASM OF BREAST: ICD-10-CM

## 2022-07-12 DIAGNOSIS — Z79.4 TYPE 2 DIABETES MELLITUS WITH OTHER CIRCULATORY COMPLICATION, WITH LONG-TERM CURRENT USE OF INSULIN: Primary | ICD-10-CM

## 2022-07-12 DIAGNOSIS — I10 ESSENTIAL HYPERTENSION: ICD-10-CM

## 2022-07-12 DIAGNOSIS — Z13.220 SCREENING FOR LIPOID DISORDERS: ICD-10-CM

## 2022-07-12 DIAGNOSIS — E11.59 TYPE 2 DIABETES MELLITUS WITH OTHER CIRCULATORY COMPLICATION, WITH LONG-TERM CURRENT USE OF INSULIN: Primary | ICD-10-CM

## 2022-07-12 DIAGNOSIS — Z79.899 MEDICATION MANAGEMENT: ICD-10-CM

## 2022-07-12 PROCEDURE — 99214 OFFICE O/P EST MOD 30 MIN: CPT | Performed by: NURSE PRACTITIONER

## 2022-07-12 RX ORDER — LISINOPRIL 10 MG/1
10 TABLET ORAL DAILY
Qty: 90 TABLET | Refills: 1 | Status: SHIPPED | OUTPATIENT
Start: 2022-07-12 | End: 2022-12-28 | Stop reason: ALTCHOICE

## 2022-07-12 RX ORDER — GABAPENTIN 400 MG/1
CAPSULE ORAL
Qty: 180 CAPSULE | Refills: 1 | Status: SHIPPED | OUTPATIENT
Start: 2022-07-12 | End: 2022-08-10 | Stop reason: SDUPTHER

## 2022-07-12 RX ORDER — GABAPENTIN 600 MG/1
600 TABLET ORAL NIGHTLY
Qty: 90 TABLET | Refills: 1 | Status: SHIPPED | OUTPATIENT
Start: 2022-07-12 | End: 2022-11-16 | Stop reason: SDUPTHER

## 2022-07-12 RX ORDER — ATORVASTATIN CALCIUM 10 MG/1
10 TABLET, FILM COATED ORAL DAILY
Qty: 90 TABLET | Refills: 1 | Status: SHIPPED | OUTPATIENT
Start: 2022-07-12 | End: 2023-04-03

## 2022-07-12 NOTE — PROGRESS NOTES
Chief Complaint  Diabetes (Follow up)    Subjective        Sonal De Dios presents to [facility]  Sonal presents for follow up diabetes and hypertension      Diabetes  She presents for her follow-up diabetic visit. She has type 2 diabetes mellitus. No MedicAlert identification noted. The initial diagnosis of diabetes was made 27 years ago. Hypoglycemia symptoms include headaches, hunger, mood changes, nervousness/anxiousness and sleepiness. Pertinent negatives for hypoglycemia include no confusion, dizziness, pallor, seizures, speech difficulty, sweats or tremors. Associated symptoms include polyphagia. Pertinent negatives for diabetes include no blurred vision, no chest pain, no fatigue, no foot paresthesias, no foot ulcerations, no polydipsia, no polyuria, no visual change, no weakness and no weight loss. Pertinent negatives for hypoglycemia complications include no blackouts, no hospitalization, no nocturnal hypoglycemia, no required assistance and no required glucagon injection. Diabetic complications include peripheral neuropathy. Pertinent negatives for diabetic complications include no CVA, heart disease, impotence, nephropathy, PVD or retinopathy. Current diabetic treatment includes insulin injections. She is compliant with treatment all of the time. She is currently taking insulin pre-breakfast, pre-lunch, pre-dinner and at bedtime. Insulin injections are given by patient. Rotation sites for injection include the abdominal wall. Her weight is increasing rapidly. She is following a generally healthy diet. Meal planning includes carbohydrate counting. She has not had a previous visit with a dietitian. She participates in exercise intermittently. She monitors blood glucose at home 5+ x per day. She monitors urine at home <1 x per month. Blood glucose monitoring compliance is inadequate. Her home blood glucose trend is fluctuating minimally. Her breakfast blood glucose  "is taken between 6-7 am. Her breakfast blood glucose range is generally 70-90 mg/dl. Her lunch blood glucose is taken between 12-1 pm. Her lunch blood glucose range is generally 140-180 mg/dl. Her dinner blood glucose is taken between 6-7 pm. Her dinner blood glucose range is generally 140-180 mg/dl. Her highest blood glucose is >200 mg/dl. Her overall blood glucose range is 180-200 mg/dl. She does not see a podiatrist.Eye exam is not current.   Hypertension  This is a chronic problem. The current episode started more than 1 year ago. The problem is unchanged. The problem is uncontrolled. Associated symptoms include headaches. Pertinent negatives include no anxiety, blurred vision, chest pain, malaise/fatigue, neck pain, orthopnea, palpitations, peripheral edema, PND, shortness of breath or sweats. There are no associated agents to hypertension. Past treatments include ACE inhibitors. Current antihypertension treatment includes ACE inhibitors. The current treatment provides mild improvement. There are no compliance problems.  There is no history of CVA, PVD or retinopathy.       Objective   Vital Signs:  /80 (BP Location: Right arm, Patient Position: Sitting, Cuff Size: Adult)   Pulse 92   Temp 97.3 °F (36.3 °C) (Temporal)   Resp 18   Ht 162.6 cm (64.02\")   Wt 80.3 kg (177 lb 1.6 oz)   SpO2 99%   BMI 30.38 kg/m²   Estimated body mass index is 30.38 kg/m² as calculated from the following:    Height as of this encounter: 162.6 cm (64.02\").    Weight as of this encounter: 80.3 kg (177 lb 1.6 oz).          Physical Exam  Vitals reviewed.   Constitutional:       General: She is not in acute distress.     Appearance: She is well-developed. She is not diaphoretic.   HENT:      Head: Normocephalic and atraumatic.      Right Ear: Tympanic membrane, ear canal and external ear normal.      Left Ear: Tympanic membrane, ear canal and external ear normal.      Nose: Nose normal.      Mouth/Throat:      Pharynx: Uvula " midline. No oropharyngeal exudate.   Cardiovascular:      Rate and Rhythm: Normal rate and regular rhythm.      Heart sounds: Normal heart sounds. No murmur heard.    No friction rub. No gallop.   Pulmonary:      Effort: Pulmonary effort is normal. No respiratory distress.      Breath sounds: Normal breath sounds. No wheezing or rales.   Abdominal:      General: Bowel sounds are normal. There is no distension.      Palpations: Abdomen is soft.      Tenderness: There is no abdominal tenderness.   Musculoskeletal:      Cervical back: Neck supple.   Skin:     General: Skin is warm and dry.   Neurological:      Mental Status: She is alert and oriented to person, place, and time.        Result Review :                Assessment and Plan   Diagnoses and all orders for this visit:    1. Type 2 diabetes mellitus with other circulatory complication, with long-term current use of insulin (HCC) (Primary)  -     Microalbumin / Creatinine Urine Ratio - Urine, Clean Catch  -     Hemoglobin A1c  -     Comprehensive metabolic panel  -     gabapentin (NEURONTIN) 600 MG tablet; Take 1 tablet by mouth Every Night.  Dispense: 90 tablet; Refill: 1  -     gabapentin (NEURONTIN) 400 MG capsule; Take 1 cap po twice daily at 8 am and 2 pm  Dispense: 180 capsule; Refill: 1    2. Screening for thyroid disorder  -     TSH Rfx On Abnormal To Free T4    3. Screening for lipoid disorders  -     Lipid Panel With LDL / HDL Ratio    4. Encounter for screening mammogram for malignant neoplasm of breast  -     Mammo screening digital tomosynthesis bilateral w CAD; Future    5. Medication management  -     ToxASSURE Select 13 (MW) - Urine, Clean Catch    6. Essential hypertension    Other orders  -     lisinopril (PRINIVIL,ZESTRIL) 10 MG tablet; Take 1 tablet by mouth Daily.  Dispense: 90 tablet; Refill: 1  -     atorvastatin (LIPITOR) 10 MG tablet; Take 1 tablet by mouth Daily.  Dispense: 90 tablet; Refill: 1             Follow Up   No follow-ups on  file.  Patient was given instructions and counseling regarding her condition or for health maintenance advice. Please see specific information pulled into the AVS if appropriate.       Answers for HPI/ROS submitted by the patient on 7/7/2022  What is the primary reason for your visit?: Diabetes    TRUE query complete. Treatment plan to include limited course of prescribed  controlled substance. Risks including addiction, benefits, and alternatives presented to patient.     Contract updated    Dm: not controlled, will monitor A1C, consider insulin pump for uncontrolled sugars  HTN: not controlled, recommend increasing lisinopril, will hold at current time  No recent mammogram, ordered today  Diabetic peripheral neuropathy: on gabapentin, refills given, urine drug screen ordered

## 2022-07-16 LAB
ALBUMIN SERPL-MCNC: 4.4 G/DL (ref 3.8–4.9)
ALBUMIN/GLOB SERPL: 1.8 {RATIO} (ref 1.2–2.2)
ALP SERPL-CCNC: 88 IU/L (ref 44–121)
ALT SERPL-CCNC: 29 IU/L (ref 0–32)
AST SERPL-CCNC: 24 IU/L (ref 0–40)
BILIRUB SERPL-MCNC: 0.2 MG/DL (ref 0–1.2)
BUN SERPL-MCNC: 14 MG/DL (ref 6–24)
BUN/CREAT SERPL: 23 (ref 9–23)
CALCIUM SERPL-MCNC: 9.7 MG/DL (ref 8.7–10.2)
CHLORIDE SERPL-SCNC: 103 MMOL/L (ref 96–106)
CHOLEST SERPL-MCNC: 194 MG/DL (ref 100–199)
CO2 SERPL-SCNC: 24 MMOL/L (ref 20–29)
CREAT SERPL-MCNC: 0.61 MG/DL (ref 0.57–1)
DRUGS UR: NORMAL
EGFRCR SERPLBLD CKD-EPI 2021: 104 ML/MIN/1.73
GLOBULIN SER CALC-MCNC: 2.4 G/DL (ref 1.5–4.5)
GLUCOSE SERPL-MCNC: 78 MG/DL (ref 65–99)
HBA1C MFR BLD: 11.9 % (ref 4.8–5.6)
HDLC SERPL-MCNC: 61 MG/DL
LDLC SERPL CALC-MCNC: 115 MG/DL (ref 0–99)
LDLC/HDLC SERPL: 1.9 RATIO (ref 0–3.2)
POTASSIUM SERPL-SCNC: 4.6 MMOL/L (ref 3.5–5.2)
PROT SERPL-MCNC: 6.8 G/DL (ref 6–8.5)
SODIUM SERPL-SCNC: 141 MMOL/L (ref 134–144)
TRIGL SERPL-MCNC: 99 MG/DL (ref 0–149)
TSH SERPL DL<=0.005 MIU/L-ACNC: 3.02 UIU/ML (ref 0.45–4.5)
VLDLC SERPL CALC-MCNC: 18 MG/DL (ref 5–40)

## 2022-07-29 ENCOUNTER — HOSPITAL ENCOUNTER (OUTPATIENT)
Facility: HOSPITAL | Age: 57
Setting detail: OBSERVATION
Discharge: HOME | End: 2022-08-01
Attending: EMERGENCY MEDICINE | Admitting: INTERNAL MEDICINE

## 2022-07-29 ENCOUNTER — APPOINTMENT (OUTPATIENT)
Dept: GENERAL RADIOLOGY | Facility: HOSPITAL | Age: 57
End: 2022-07-29

## 2022-07-29 DIAGNOSIS — E10.10 DIABETIC KETOACIDOSIS WITHOUT COMA ASSOCIATED WITH TYPE 1 DIABETES MELLITUS: Primary | ICD-10-CM

## 2022-07-29 DIAGNOSIS — E87.6 HYPOKALEMIA: ICD-10-CM

## 2022-07-29 LAB
ALBUMIN SERPL-MCNC: 5.1 G/DL (ref 3.5–5.2)
ALBUMIN/GLOB SERPL: 1.9 G/DL
ALP SERPL-CCNC: 110 U/L (ref 39–117)
ALT SERPL W P-5'-P-CCNC: 17 U/L (ref 1–33)
ANION GAP SERPL CALCULATED.3IONS-SCNC: 11 MMOL/L (ref 5–15)
ANION GAP SERPL CALCULATED.3IONS-SCNC: 14 MMOL/L (ref 5–15)
ANION GAP SERPL CALCULATED.3IONS-SCNC: 16.8 MMOL/L (ref 5–15)
ANION GAP SERPL CALCULATED.3IONS-SCNC: 27 MMOL/L (ref 5–15)
AST SERPL-CCNC: 9 U/L (ref 1–32)
ATMOSPHERIC PRESS: 751.7 MMHG
B-OH-BUTYR SERPL-SCNC: 5.4 MMOL/L (ref 0.02–0.27)
BASE EXCESS BLDV CALC-SCNC: -9.8 MMOL/L (ref -2–2)
BASOPHILS # BLD AUTO: 0.02 10*3/MM3 (ref 0–0.2)
BASOPHILS # BLD AUTO: 0.03 10*3/MM3 (ref 0–0.2)
BASOPHILS NFR BLD AUTO: 0.1 % (ref 0–1.5)
BASOPHILS NFR BLD AUTO: 0.2 % (ref 0–1.5)
BDY SITE: ABNORMAL
BILIRUB SERPL-MCNC: 0.4 MG/DL (ref 0–1.2)
BUN SERPL-MCNC: 26 MG/DL (ref 6–20)
BUN SERPL-MCNC: 29 MG/DL (ref 6–20)
BUN SERPL-MCNC: 31 MG/DL (ref 6–20)
BUN SERPL-MCNC: 37 MG/DL (ref 6–20)
BUN/CREAT SERPL: 31.6 (ref 7–25)
BUN/CREAT SERPL: 38.3 (ref 7–25)
BUN/CREAT SERPL: 44.6 (ref 7–25)
BUN/CREAT SERPL: 49.1 (ref 7–25)
CALCIUM SPEC-SCNC: 10.7 MG/DL (ref 8.6–10.5)
CALCIUM SPEC-SCNC: 8.9 MG/DL (ref 8.6–10.5)
CALCIUM SPEC-SCNC: 9.2 MG/DL (ref 8.6–10.5)
CALCIUM SPEC-SCNC: 9.4 MG/DL (ref 8.6–10.5)
CHLORIDE SERPL-SCNC: 103 MMOL/L (ref 98–107)
CHLORIDE SERPL-SCNC: 104 MMOL/L (ref 98–107)
CHLORIDE SERPL-SCNC: 107 MMOL/L (ref 98–107)
CHLORIDE SERPL-SCNC: 93 MMOL/L (ref 98–107)
CO2 SERPL-SCNC: 14 MMOL/L (ref 22–29)
CO2 SERPL-SCNC: 15 MMOL/L (ref 22–29)
CO2 SERPL-SCNC: 16.2 MMOL/L (ref 22–29)
CO2 SERPL-SCNC: 17 MMOL/L (ref 22–29)
CREAT SERPL-MCNC: 0.53 MG/DL (ref 0.57–1)
CREAT SERPL-MCNC: 0.65 MG/DL (ref 0.57–1)
CREAT SERPL-MCNC: 0.81 MG/DL (ref 0.57–1)
CREAT SERPL-MCNC: 1.17 MG/DL (ref 0.57–1)
DEPRECATED RDW RBC AUTO: 37.8 FL (ref 37–54)
DEPRECATED RDW RBC AUTO: 38.2 FL (ref 37–54)
EGFRCR SERPLBLD CKD-EPI 2021: 102.8 ML/MIN/1.73
EGFRCR SERPLBLD CKD-EPI 2021: 108 ML/MIN/1.73
EGFRCR SERPLBLD CKD-EPI 2021: 54.5 ML/MIN/1.73
EGFRCR SERPLBLD CKD-EPI 2021: 84.8 ML/MIN/1.73
EOSINOPHIL # BLD AUTO: 0 10*3/MM3 (ref 0–0.4)
EOSINOPHIL # BLD AUTO: 0 10*3/MM3 (ref 0–0.4)
EOSINOPHIL NFR BLD AUTO: 0 % (ref 0.3–6.2)
EOSINOPHIL NFR BLD AUTO: 0 % (ref 0.3–6.2)
ERYTHROCYTE [DISTWIDTH] IN BLOOD BY AUTOMATED COUNT: 11.8 % (ref 12.3–15.4)
ERYTHROCYTE [DISTWIDTH] IN BLOOD BY AUTOMATED COUNT: 12.2 % (ref 12.3–15.4)
GLOBULIN UR ELPH-MCNC: 2.7 GM/DL
GLUCOSE BLDC GLUCOMTR-MCNC: 134 MG/DL (ref 70–130)
GLUCOSE BLDC GLUCOMTR-MCNC: 134 MG/DL (ref 70–130)
GLUCOSE BLDC GLUCOMTR-MCNC: 138 MG/DL (ref 70–130)
GLUCOSE BLDC GLUCOMTR-MCNC: 142 MG/DL (ref 70–130)
GLUCOSE BLDC GLUCOMTR-MCNC: 151 MG/DL (ref 70–130)
GLUCOSE BLDC GLUCOMTR-MCNC: 152 MG/DL (ref 70–130)
GLUCOSE BLDC GLUCOMTR-MCNC: 165 MG/DL (ref 70–130)
GLUCOSE BLDC GLUCOMTR-MCNC: 169 MG/DL (ref 70–130)
GLUCOSE BLDC GLUCOMTR-MCNC: 177 MG/DL (ref 70–130)
GLUCOSE BLDC GLUCOMTR-MCNC: 181 MG/DL (ref 70–130)
GLUCOSE BLDC GLUCOMTR-MCNC: 255 MG/DL (ref 70–130)
GLUCOSE BLDC GLUCOMTR-MCNC: 273 MG/DL (ref 70–130)
GLUCOSE BLDC GLUCOMTR-MCNC: 302 MG/DL (ref 70–130)
GLUCOSE SERPL-MCNC: 160 MG/DL (ref 65–99)
GLUCOSE SERPL-MCNC: 167 MG/DL (ref 65–99)
GLUCOSE SERPL-MCNC: 191 MG/DL (ref 65–99)
GLUCOSE SERPL-MCNC: 333 MG/DL (ref 65–99)
HBA1C MFR BLD: 12 % (ref 4.8–5.6)
HCO3 BLDV-SCNC: 14.2 MMOL/L (ref 22–28)
HCT VFR BLD AUTO: 37.4 % (ref 34–46.6)
HCT VFR BLD AUTO: 43 % (ref 34–46.6)
HGB BLD-MCNC: 12.4 G/DL (ref 12–15.9)
HGB BLD-MCNC: 14.2 G/DL (ref 12–15.9)
IMM GRANULOCYTES # BLD AUTO: 0.13 10*3/MM3 (ref 0–0.05)
IMM GRANULOCYTES # BLD AUTO: 0.23 10*3/MM3 (ref 0–0.05)
IMM GRANULOCYTES NFR BLD AUTO: 0.7 % (ref 0–0.5)
IMM GRANULOCYTES NFR BLD AUTO: 1.3 % (ref 0–0.5)
LIPASE SERPL-CCNC: 7 U/L (ref 13–60)
LYMPHOCYTES # BLD AUTO: 1.47 10*3/MM3 (ref 0.7–3.1)
LYMPHOCYTES # BLD AUTO: 1.87 10*3/MM3 (ref 0.7–3.1)
LYMPHOCYTES NFR BLD AUTO: 10.2 % (ref 19.6–45.3)
LYMPHOCYTES NFR BLD AUTO: 8.1 % (ref 19.6–45.3)
MAGNESIUM SERPL-MCNC: 2 MG/DL (ref 1.6–2.6)
MAGNESIUM SERPL-MCNC: 2.2 MG/DL (ref 1.6–2.6)
MCH RBC QN AUTO: 29.1 PG (ref 26.6–33)
MCH RBC QN AUTO: 29.4 PG (ref 26.6–33)
MCHC RBC AUTO-ENTMCNC: 33 G/DL (ref 31.5–35.7)
MCHC RBC AUTO-ENTMCNC: 33.2 G/DL (ref 31.5–35.7)
MCV RBC AUTO: 87.8 FL (ref 79–97)
MCV RBC AUTO: 89 FL (ref 79–97)
MODALITY: ABNORMAL
MONOCYTES # BLD AUTO: 0.87 10*3/MM3 (ref 0.1–0.9)
MONOCYTES # BLD AUTO: 0.99 10*3/MM3 (ref 0.1–0.9)
MONOCYTES NFR BLD AUTO: 4.8 % (ref 5–12)
MONOCYTES NFR BLD AUTO: 5.4 % (ref 5–12)
NEUTROPHILS NFR BLD AUTO: 15.15 10*3/MM3 (ref 1.7–7)
NEUTROPHILS NFR BLD AUTO: 15.68 10*3/MM3 (ref 1.7–7)
NEUTROPHILS NFR BLD AUTO: 82.9 % (ref 42.7–76)
NEUTROPHILS NFR BLD AUTO: 86.3 % (ref 42.7–76)
NRBC BLD AUTO-RTO: 0 /100 WBC (ref 0–0.2)
NRBC BLD AUTO-RTO: 0 /100 WBC (ref 0–0.2)
OSMOLALITY SERPL: 301 MOSM/KG (ref 275–300)
PCO2 BLDV: 26.2 MM HG (ref 41–51)
PH BLDV: 7.34 PH UNITS (ref 7.31–7.41)
PHOSPHATE SERPL-MCNC: 1.8 MG/DL (ref 2.5–4.5)
PHOSPHATE SERPL-MCNC: 2.2 MG/DL (ref 2.5–4.5)
PHOSPHATE SERPL-MCNC: 2.6 MG/DL (ref 2.5–4.5)
PHOSPHATE SERPL-MCNC: 4 MG/DL (ref 2.5–4.5)
PLATELET # BLD AUTO: 307 10*3/MM3 (ref 140–450)
PLATELET # BLD AUTO: 374 10*3/MM3 (ref 140–450)
PMV BLD AUTO: 10.2 FL (ref 6–12)
PMV BLD AUTO: 10.7 FL (ref 6–12)
PO2 BLDV: 49.2 MM HG (ref 35–45)
POTASSIUM SERPL-SCNC: 3.4 MMOL/L (ref 3.5–5.2)
POTASSIUM SERPL-SCNC: 3.6 MMOL/L (ref 3.5–5.2)
PROT SERPL-MCNC: 7.8 G/DL (ref 6–8.5)
RBC # BLD AUTO: 4.26 10*6/MM3 (ref 3.77–5.28)
RBC # BLD AUTO: 4.83 10*6/MM3 (ref 3.77–5.28)
SAO2 % BLDCOA: 83.2 % (ref 92–99)
SARS-COV-2 RNA PNL SPEC NAA+PROBE: NOT DETECTED
SET MECH RESP RATE: 20
SODIUM SERPL-SCNC: 132 MMOL/L (ref 136–145)
SODIUM SERPL-SCNC: 134 MMOL/L (ref 136–145)
SODIUM SERPL-SCNC: 135 MMOL/L (ref 136–145)
SODIUM SERPL-SCNC: 137 MMOL/L (ref 136–145)
TOTAL RATE: 20 BREATHS/MINUTE
TROPONIN T SERPL-MCNC: <0.01 NG/ML (ref 0–0.03)
TROPONIN T SERPL-MCNC: <0.01 NG/ML (ref 0–0.03)
WBC NRBC COR # BLD: 18.17 10*3/MM3 (ref 3.4–10.8)
WBC NRBC COR # BLD: 18.27 10*3/MM3 (ref 3.4–10.8)

## 2022-07-29 PROCEDURE — 83690 ASSAY OF LIPASE: CPT | Performed by: EMERGENCY MEDICINE

## 2022-07-29 PROCEDURE — 87635 SARS-COV-2 COVID-19 AMP PRB: CPT | Performed by: EMERGENCY MEDICINE

## 2022-07-29 PROCEDURE — 82803 BLOOD GASES ANY COMBINATION: CPT

## 2022-07-29 PROCEDURE — G0378 HOSPITAL OBSERVATION PER HR: HCPCS

## 2022-07-29 PROCEDURE — 93010 ELECTROCARDIOGRAM REPORT: CPT | Performed by: INTERNAL MEDICINE

## 2022-07-29 PROCEDURE — 0 POTASSIUM CHLORIDE PER 2 MEQ: Performed by: EMERGENCY MEDICINE

## 2022-07-29 PROCEDURE — 96375 TX/PRO/DX INJ NEW DRUG ADDON: CPT

## 2022-07-29 PROCEDURE — 82010 KETONE BODYS QUAN: CPT | Performed by: EMERGENCY MEDICINE

## 2022-07-29 PROCEDURE — 96366 THER/PROPH/DIAG IV INF ADDON: CPT

## 2022-07-29 PROCEDURE — 96365 THER/PROPH/DIAG IV INF INIT: CPT

## 2022-07-29 PROCEDURE — 83930 ASSAY OF BLOOD OSMOLALITY: CPT | Performed by: EMERGENCY MEDICINE

## 2022-07-29 PROCEDURE — 25010000002 ONDANSETRON PER 1 MG: Performed by: EMERGENCY MEDICINE

## 2022-07-29 PROCEDURE — 96376 TX/PRO/DX INJ SAME DRUG ADON: CPT

## 2022-07-29 PROCEDURE — 25010000002 ENOXAPARIN PER 10 MG: Performed by: INTERNAL MEDICINE

## 2022-07-29 PROCEDURE — C9803 HOPD COVID-19 SPEC COLLECT: HCPCS

## 2022-07-29 PROCEDURE — 84484 ASSAY OF TROPONIN QUANT: CPT | Performed by: EMERGENCY MEDICINE

## 2022-07-29 PROCEDURE — 84100 ASSAY OF PHOSPHORUS: CPT | Performed by: EMERGENCY MEDICINE

## 2022-07-29 PROCEDURE — 85025 COMPLETE CBC W/AUTO DIFF WBC: CPT | Performed by: EMERGENCY MEDICINE

## 2022-07-29 PROCEDURE — 96368 THER/DIAG CONCURRENT INF: CPT

## 2022-07-29 PROCEDURE — 25010000002 ONDANSETRON PER 1 MG: Performed by: INTERNAL MEDICINE

## 2022-07-29 PROCEDURE — 82962 GLUCOSE BLOOD TEST: CPT

## 2022-07-29 PROCEDURE — 71046 X-RAY EXAM CHEST 2 VIEWS: CPT

## 2022-07-29 PROCEDURE — 96372 THER/PROPH/DIAG INJ SC/IM: CPT

## 2022-07-29 PROCEDURE — 93005 ELECTROCARDIOGRAM TRACING: CPT | Performed by: EMERGENCY MEDICINE

## 2022-07-29 PROCEDURE — 99284 EMERGENCY DEPT VISIT MOD MDM: CPT

## 2022-07-29 PROCEDURE — 80053 COMPREHEN METABOLIC PANEL: CPT | Performed by: EMERGENCY MEDICINE

## 2022-07-29 PROCEDURE — 83735 ASSAY OF MAGNESIUM: CPT | Performed by: EMERGENCY MEDICINE

## 2022-07-29 PROCEDURE — 0 POTASSIUM CHLORIDE 10 MEQ/100ML SOLUTION: Performed by: EMERGENCY MEDICINE

## 2022-07-29 PROCEDURE — 83036 HEMOGLOBIN GLYCOSYLATED A1C: CPT | Performed by: EMERGENCY MEDICINE

## 2022-07-29 RX ORDER — ONDANSETRON 2 MG/ML
4 INJECTION INTRAMUSCULAR; INTRAVENOUS EVERY 6 HOURS PRN
Status: DISCONTINUED | OUTPATIENT
Start: 2022-07-29 | End: 2022-08-01 | Stop reason: HOSPADM

## 2022-07-29 RX ORDER — DEXTROSE AND SODIUM CHLORIDE 5; .45 G/100ML; G/100ML
150 INJECTION, SOLUTION INTRAVENOUS CONTINUOUS PRN
Status: DISCONTINUED | OUTPATIENT
Start: 2022-07-29 | End: 2022-07-30

## 2022-07-29 RX ORDER — DEXTROSE AND SODIUM CHLORIDE 5; .9 G/100ML; G/100ML
150 INJECTION, SOLUTION INTRAVENOUS CONTINUOUS PRN
Status: DISCONTINUED | OUTPATIENT
Start: 2022-07-29 | End: 2022-07-30

## 2022-07-29 RX ORDER — POTASSIUM CHLORIDE 7.45 MG/ML
10 INJECTION INTRAVENOUS ONCE
Status: COMPLETED | OUTPATIENT
Start: 2022-07-29 | End: 2022-07-29

## 2022-07-29 RX ORDER — ENOXAPARIN SODIUM 100 MG/ML
40 INJECTION SUBCUTANEOUS NIGHTLY
Status: DISCONTINUED | OUTPATIENT
Start: 2022-07-29 | End: 2022-08-01 | Stop reason: HOSPADM

## 2022-07-29 RX ORDER — SODIUM CHLORIDE AND POTASSIUM CHLORIDE 150; 900 MG/100ML; MG/100ML
250 INJECTION, SOLUTION INTRAVENOUS CONTINUOUS PRN
Status: DISCONTINUED | OUTPATIENT
Start: 2022-07-29 | End: 2022-07-30

## 2022-07-29 RX ORDER — DEXTROSE, SODIUM CHLORIDE, AND POTASSIUM CHLORIDE 5; .9; .15 G/100ML; G/100ML; G/100ML
150 INJECTION INTRAVENOUS CONTINUOUS PRN
Status: DISCONTINUED | OUTPATIENT
Start: 2022-07-29 | End: 2022-07-30

## 2022-07-29 RX ORDER — SODIUM CHLORIDE 0.9 % (FLUSH) 0.9 %
10 SYRINGE (ML) INJECTION AS NEEDED
Status: DISCONTINUED | OUTPATIENT
Start: 2022-07-29 | End: 2022-08-01 | Stop reason: HOSPADM

## 2022-07-29 RX ORDER — SODIUM CHLORIDE 9 MG/ML
250 INJECTION, SOLUTION INTRAVENOUS CONTINUOUS PRN
Status: DISCONTINUED | OUTPATIENT
Start: 2022-07-29 | End: 2022-07-30

## 2022-07-29 RX ORDER — NICOTINE POLACRILEX 4 MG
15 LOZENGE BUCCAL
Status: DISCONTINUED | OUTPATIENT
Start: 2022-07-29 | End: 2022-07-30

## 2022-07-29 RX ORDER — ONDANSETRON 2 MG/ML
4 INJECTION INTRAMUSCULAR; INTRAVENOUS ONCE
Status: COMPLETED | OUTPATIENT
Start: 2022-07-29 | End: 2022-07-29

## 2022-07-29 RX ORDER — DEXTROSE, SODIUM CHLORIDE, AND POTASSIUM CHLORIDE 5; .45; .3 G/100ML; G/100ML; G/100ML
150 INJECTION INTRAVENOUS CONTINUOUS PRN
Status: DISCONTINUED | OUTPATIENT
Start: 2022-07-29 | End: 2022-07-30

## 2022-07-29 RX ORDER — SODIUM CHLORIDE AND POTASSIUM CHLORIDE 150; 450 MG/100ML; MG/100ML
250 INJECTION, SOLUTION INTRAVENOUS CONTINUOUS PRN
Status: DISCONTINUED | OUTPATIENT
Start: 2022-07-29 | End: 2022-07-30

## 2022-07-29 RX ORDER — POTASSIUM CHLORIDE, DEXTROSE MONOHYDRATE AND SODIUM CHLORIDE 300; 5; 900 MG/100ML; G/100ML; MG/100ML
150 INJECTION, SOLUTION INTRAVENOUS CONTINUOUS PRN
Status: DISCONTINUED | OUTPATIENT
Start: 2022-07-29 | End: 2022-07-30

## 2022-07-29 RX ORDER — DEXTROSE, SODIUM CHLORIDE, AND POTASSIUM CHLORIDE 5; .45; .15 G/100ML; G/100ML; G/100ML
150 INJECTION INTRAVENOUS CONTINUOUS PRN
Status: DISCONTINUED | OUTPATIENT
Start: 2022-07-29 | End: 2022-07-30

## 2022-07-29 RX ORDER — DEXTROSE MONOHYDRATE 25 G/50ML
10-50 INJECTION, SOLUTION INTRAVENOUS
Status: DISCONTINUED | OUTPATIENT
Start: 2022-07-29 | End: 2022-07-30

## 2022-07-29 RX ORDER — SODIUM CHLORIDE AND POTASSIUM CHLORIDE 300; 900 MG/100ML; MG/100ML
250 INJECTION, SOLUTION INTRAVENOUS CONTINUOUS PRN
Status: DISCONTINUED | OUTPATIENT
Start: 2022-07-29 | End: 2022-07-30

## 2022-07-29 RX ORDER — SODIUM CHLORIDE 0.9 % (FLUSH) 0.9 %
10 SYRINGE (ML) INJECTION EVERY 12 HOURS SCHEDULED
Status: DISCONTINUED | OUTPATIENT
Start: 2022-07-29 | End: 2022-08-01 | Stop reason: HOSPADM

## 2022-07-29 RX ORDER — SODIUM CHLORIDE 450 MG/100ML
250 INJECTION, SOLUTION INTRAVENOUS CONTINUOUS PRN
Status: DISCONTINUED | OUTPATIENT
Start: 2022-07-29 | End: 2022-07-30

## 2022-07-29 RX ADMIN — ENOXAPARIN SODIUM 40 MG: 100 INJECTION SUBCUTANEOUS at 20:39

## 2022-07-29 RX ADMIN — Medication 10 ML: at 10:01

## 2022-07-29 RX ADMIN — POTASSIUM CHLORIDE, DEXTROSE MONOHYDRATE AND SODIUM CHLORIDE 150 ML/HR: 150; 5; 450 INJECTION, SOLUTION INTRAVENOUS at 14:01

## 2022-07-29 RX ADMIN — SODIUM CHLORIDE 1000 ML/HR: 9 INJECTION, SOLUTION INTRAVENOUS at 10:00

## 2022-07-29 RX ADMIN — ONDANSETRON 4 MG: 2 INJECTION INTRAMUSCULAR; INTRAVENOUS at 07:32

## 2022-07-29 RX ADMIN — POTASSIUM CHLORIDE, DEXTROSE MONOHYDRATE AND SODIUM CHLORIDE 150 ML/HR: 150; 5; 900 INJECTION, SOLUTION INTRAVENOUS at 18:57

## 2022-07-29 RX ADMIN — INSULIN HUMAN 2.1 UNITS/HR: 1 INJECTION, SOLUTION INTRAVENOUS at 09:58

## 2022-07-29 RX ADMIN — ONDANSETRON 4 MG: 2 INJECTION INTRAMUSCULAR; INTRAVENOUS at 12:42

## 2022-07-29 RX ADMIN — Medication 10 ML: at 20:39

## 2022-07-29 RX ADMIN — POTASSIUM CHLORIDE 10 MEQ: 7.46 INJECTION, SOLUTION INTRAVENOUS at 10:01

## 2022-07-29 RX ADMIN — ONDANSETRON 4 MG: 2 INJECTION INTRAMUSCULAR; INTRAVENOUS at 18:55

## 2022-07-29 RX ADMIN — SODIUM CHLORIDE 1000 ML: 9 INJECTION, SOLUTION INTRAVENOUS at 07:32

## 2022-07-29 RX ADMIN — POTASSIUM CHLORIDE AND SODIUM CHLORIDE 250 ML/HR: 450; 150 INJECTION, SOLUTION INTRAVENOUS at 13:23

## 2022-07-30 LAB
ANION GAP SERPL CALCULATED.3IONS-SCNC: 11 MMOL/L (ref 5–15)
ANION GAP SERPL CALCULATED.3IONS-SCNC: 12.4 MMOL/L (ref 5–15)
ANION GAP SERPL CALCULATED.3IONS-SCNC: 12.7 MMOL/L (ref 5–15)
BACTERIA UR QL AUTO: ABNORMAL /HPF
BASOPHILS # BLD AUTO: 0.02 10*3/MM3 (ref 0–0.2)
BASOPHILS NFR BLD AUTO: 0.1 % (ref 0–1.5)
BILIRUB UR QL STRIP: NEGATIVE
BUN SERPL-MCNC: 12 MG/DL (ref 6–20)
BUN SERPL-MCNC: 16 MG/DL (ref 6–20)
BUN SERPL-MCNC: 22 MG/DL (ref 6–20)
BUN/CREAT SERPL: 21.1 (ref 7–25)
BUN/CREAT SERPL: 33.3 (ref 7–25)
BUN/CREAT SERPL: 37.9 (ref 7–25)
CALCIUM SPEC-SCNC: 8.3 MG/DL (ref 8.6–10.5)
CALCIUM SPEC-SCNC: 8.6 MG/DL (ref 8.6–10.5)
CALCIUM SPEC-SCNC: 9.1 MG/DL (ref 8.6–10.5)
CHLORIDE SERPL-SCNC: 105 MMOL/L (ref 98–107)
CHLORIDE SERPL-SCNC: 105 MMOL/L (ref 98–107)
CHLORIDE SERPL-SCNC: 107 MMOL/L (ref 98–107)
CLARITY UR: CLEAR
CO2 SERPL-SCNC: 19.6 MMOL/L (ref 22–29)
CO2 SERPL-SCNC: 20 MMOL/L (ref 22–29)
CO2 SERPL-SCNC: 20.3 MMOL/L (ref 22–29)
COLOR UR: YELLOW
CREAT SERPL-MCNC: 0.48 MG/DL (ref 0.57–1)
CREAT SERPL-MCNC: 0.57 MG/DL (ref 0.57–1)
CREAT SERPL-MCNC: 0.58 MG/DL (ref 0.57–1)
DEPRECATED RDW RBC AUTO: 38 FL (ref 37–54)
EGFRCR SERPLBLD CKD-EPI 2021: 105.7 ML/MIN/1.73
EGFRCR SERPLBLD CKD-EPI 2021: 106.1 ML/MIN/1.73
EGFRCR SERPLBLD CKD-EPI 2021: 110.6 ML/MIN/1.73
EOSINOPHIL # BLD AUTO: 0 10*3/MM3 (ref 0–0.4)
EOSINOPHIL NFR BLD AUTO: 0 % (ref 0.3–6.2)
ERYTHROCYTE [DISTWIDTH] IN BLOOD BY AUTOMATED COUNT: 12.1 % (ref 12.3–15.4)
GLUCOSE BLDC GLUCOMTR-MCNC: 105 MG/DL (ref 70–130)
GLUCOSE BLDC GLUCOMTR-MCNC: 117 MG/DL (ref 70–130)
GLUCOSE BLDC GLUCOMTR-MCNC: 121 MG/DL (ref 70–130)
GLUCOSE BLDC GLUCOMTR-MCNC: 123 MG/DL (ref 70–130)
GLUCOSE BLDC GLUCOMTR-MCNC: 127 MG/DL (ref 70–130)
GLUCOSE BLDC GLUCOMTR-MCNC: 144 MG/DL (ref 70–130)
GLUCOSE BLDC GLUCOMTR-MCNC: 149 MG/DL (ref 70–130)
GLUCOSE BLDC GLUCOMTR-MCNC: 150 MG/DL (ref 70–130)
GLUCOSE BLDC GLUCOMTR-MCNC: 153 MG/DL (ref 70–130)
GLUCOSE BLDC GLUCOMTR-MCNC: 154 MG/DL (ref 70–130)
GLUCOSE BLDC GLUCOMTR-MCNC: 156 MG/DL (ref 70–130)
GLUCOSE BLDC GLUCOMTR-MCNC: 164 MG/DL (ref 70–130)
GLUCOSE BLDC GLUCOMTR-MCNC: 164 MG/DL (ref 70–130)
GLUCOSE BLDC GLUCOMTR-MCNC: 89 MG/DL (ref 70–130)
GLUCOSE SERPL-MCNC: 132 MG/DL (ref 65–99)
GLUCOSE SERPL-MCNC: 168 MG/DL (ref 65–99)
GLUCOSE SERPL-MCNC: 180 MG/DL (ref 65–99)
GLUCOSE UR STRIP-MCNC: ABNORMAL MG/DL
HCT VFR BLD AUTO: 34.7 % (ref 34–46.6)
HGB BLD-MCNC: 12 G/DL (ref 12–15.9)
HGB UR QL STRIP.AUTO: NEGATIVE
HYALINE CASTS UR QL AUTO: ABNORMAL /LPF
IMM GRANULOCYTES # BLD AUTO: 0.1 10*3/MM3 (ref 0–0.05)
IMM GRANULOCYTES NFR BLD AUTO: 0.6 % (ref 0–0.5)
KETONES UR QL STRIP: ABNORMAL
LEUKOCYTE ESTERASE UR QL STRIP.AUTO: ABNORMAL
LYMPHOCYTES # BLD AUTO: 1.52 10*3/MM3 (ref 0.7–3.1)
LYMPHOCYTES NFR BLD AUTO: 9.5 % (ref 19.6–45.3)
MAGNESIUM SERPL-MCNC: 1.5 MG/DL (ref 1.6–2.6)
MAGNESIUM SERPL-MCNC: 1.6 MG/DL (ref 1.6–2.6)
MAGNESIUM SERPL-MCNC: 1.7 MG/DL (ref 1.6–2.6)
MAGNESIUM SERPL-MCNC: 1.9 MG/DL (ref 1.6–2.6)
MCH RBC QN AUTO: 30.2 PG (ref 26.6–33)
MCHC RBC AUTO-ENTMCNC: 34.6 G/DL (ref 31.5–35.7)
MCV RBC AUTO: 87.4 FL (ref 79–97)
MONOCYTES # BLD AUTO: 1.02 10*3/MM3 (ref 0.1–0.9)
MONOCYTES NFR BLD AUTO: 6.4 % (ref 5–12)
NEUTROPHILS NFR BLD AUTO: 13.29 10*3/MM3 (ref 1.7–7)
NEUTROPHILS NFR BLD AUTO: 83.4 % (ref 42.7–76)
NITRITE UR QL STRIP: NEGATIVE
NRBC BLD AUTO-RTO: 0 /100 WBC (ref 0–0.2)
PH UR STRIP.AUTO: 5.5 [PH] (ref 5–8)
PHOSPHATE SERPL-MCNC: 1.7 MG/DL (ref 2.5–4.5)
PHOSPHATE SERPL-MCNC: 2.1 MG/DL (ref 2.5–4.5)
PHOSPHATE SERPL-MCNC: 2.2 MG/DL (ref 2.5–4.5)
PHOSPHATE SERPL-MCNC: 2.6 MG/DL (ref 2.5–4.5)
PLATELET # BLD AUTO: 292 10*3/MM3 (ref 140–450)
PMV BLD AUTO: 10.1 FL (ref 6–12)
POTASSIUM SERPL-SCNC: 3.1 MMOL/L (ref 3.5–5.2)
POTASSIUM SERPL-SCNC: 3.2 MMOL/L (ref 3.5–5.2)
POTASSIUM SERPL-SCNC: 3.3 MMOL/L (ref 3.5–5.2)
PROT UR QL STRIP: NEGATIVE
RBC # BLD AUTO: 3.97 10*6/MM3 (ref 3.77–5.28)
RBC # UR STRIP: ABNORMAL /HPF
REF LAB TEST METHOD: ABNORMAL
SODIUM SERPL-SCNC: 136 MMOL/L (ref 136–145)
SODIUM SERPL-SCNC: 138 MMOL/L (ref 136–145)
SODIUM SERPL-SCNC: 139 MMOL/L (ref 136–145)
SP GR UR STRIP: 1.02 (ref 1–1.03)
SQUAMOUS #/AREA URNS HPF: ABNORMAL /HPF
UROBILINOGEN UR QL STRIP: ABNORMAL
WBC # UR STRIP: ABNORMAL /HPF
WBC NRBC COR # BLD: 15.95 10*3/MM3 (ref 3.4–10.8)

## 2022-07-30 PROCEDURE — 96372 THER/PROPH/DIAG INJ SC/IM: CPT

## 2022-07-30 PROCEDURE — 96368 THER/DIAG CONCURRENT INF: CPT

## 2022-07-30 PROCEDURE — 81001 URINALYSIS AUTO W/SCOPE: CPT | Performed by: EMERGENCY MEDICINE

## 2022-07-30 PROCEDURE — 82962 GLUCOSE BLOOD TEST: CPT

## 2022-07-30 PROCEDURE — 83735 ASSAY OF MAGNESIUM: CPT | Performed by: EMERGENCY MEDICINE

## 2022-07-30 PROCEDURE — 84100 ASSAY OF PHOSPHORUS: CPT | Performed by: EMERGENCY MEDICINE

## 2022-07-30 PROCEDURE — 25010000002 ENOXAPARIN PER 10 MG: Performed by: INTERNAL MEDICINE

## 2022-07-30 PROCEDURE — 80048 BASIC METABOLIC PNL TOTAL CA: CPT | Performed by: EMERGENCY MEDICINE

## 2022-07-30 PROCEDURE — 96376 TX/PRO/DX INJ SAME DRUG ADON: CPT

## 2022-07-30 PROCEDURE — 25010000002 PROCHLORPERAZINE 10 MG/2ML SOLUTION: Performed by: INTERNAL MEDICINE

## 2022-07-30 PROCEDURE — 25010000002 ONDANSETRON PER 1 MG: Performed by: INTERNAL MEDICINE

## 2022-07-30 PROCEDURE — G0378 HOSPITAL OBSERVATION PER HR: HCPCS

## 2022-07-30 PROCEDURE — 96366 THER/PROPH/DIAG IV INF ADDON: CPT

## 2022-07-30 PROCEDURE — 85025 COMPLETE CBC W/AUTO DIFF WBC: CPT | Performed by: INTERNAL MEDICINE

## 2022-07-30 RX ORDER — GABAPENTIN 400 MG/1
400 CAPSULE ORAL EVERY 12 HOURS SCHEDULED
Status: DISCONTINUED | OUTPATIENT
Start: 2022-07-30 | End: 2022-08-01 | Stop reason: HOSPADM

## 2022-07-30 RX ORDER — ESCITALOPRAM OXALATE 5 MG/1
5 TABLET ORAL DAILY
Status: DISCONTINUED | OUTPATIENT
Start: 2022-07-30 | End: 2022-08-01 | Stop reason: HOSPADM

## 2022-07-30 RX ORDER — INSULIN LISPRO 100 [IU]/ML
5 INJECTION, SOLUTION INTRAVENOUS; SUBCUTANEOUS
Status: DISCONTINUED | OUTPATIENT
Start: 2022-07-30 | End: 2022-08-01 | Stop reason: HOSPADM

## 2022-07-30 RX ORDER — METOCLOPRAMIDE 5 MG/1
5 TABLET ORAL
Status: DISCONTINUED | OUTPATIENT
Start: 2022-07-30 | End: 2022-08-01 | Stop reason: HOSPADM

## 2022-07-30 RX ORDER — PROCHLORPERAZINE EDISYLATE 5 MG/ML
5 INJECTION INTRAMUSCULAR; INTRAVENOUS EVERY 6 HOURS PRN
Status: DISCONTINUED | OUTPATIENT
Start: 2022-07-30 | End: 2022-08-01 | Stop reason: HOSPADM

## 2022-07-30 RX ADMIN — GABAPENTIN 400 MG: 400 CAPSULE ORAL at 20:28

## 2022-07-30 RX ADMIN — ENOXAPARIN SODIUM 40 MG: 100 INJECTION SUBCUTANEOUS at 20:28

## 2022-07-30 RX ADMIN — POTASSIUM CHLORIDE, DEXTROSE MONOHYDRATE AND SODIUM CHLORIDE 150 ML/HR: 150; 5; 450 INJECTION, SOLUTION INTRAVENOUS at 04:22

## 2022-07-30 RX ADMIN — Medication 10 ML: at 20:28

## 2022-07-30 RX ADMIN — ESCITALOPRAM 5 MG: 5 TABLET, FILM COATED ORAL at 10:35

## 2022-07-30 RX ADMIN — Medication 10 ML: at 08:06

## 2022-07-30 RX ADMIN — ONDANSETRON 4 MG: 2 INJECTION INTRAMUSCULAR; INTRAVENOUS at 07:06

## 2022-07-30 RX ADMIN — PROCHLORPERAZINE EDISYLATE 5 MG: 5 INJECTION INTRAMUSCULAR; INTRAVENOUS at 20:28

## 2022-07-30 RX ADMIN — INSULIN GLARGINE-YFGN 40 UNITS: 100 INJECTION, SOLUTION SUBCUTANEOUS at 17:45

## 2022-07-30 RX ADMIN — POTASSIUM CHLORIDE, DEXTROSE MONOHYDRATE AND SODIUM CHLORIDE 150 ML/HR: 300; 5; 450 INJECTION, SOLUTION INTRAVENOUS at 09:56

## 2022-07-30 RX ADMIN — POTASSIUM PHOSPHATE, MONOBASIC AND POTASSIUM PHOSPHATE, DIBASIC 20 MMOL: 224; 236 INJECTION, SOLUTION, CONCENTRATE INTRAVENOUS at 00:15

## 2022-07-30 RX ADMIN — METOCLOPRAMIDE 5 MG: 5 TABLET ORAL at 10:35

## 2022-07-30 RX ADMIN — ONDANSETRON 4 MG: 2 INJECTION INTRAMUSCULAR; INTRAVENOUS at 00:44

## 2022-07-30 RX ADMIN — METOCLOPRAMIDE 5 MG: 5 TABLET ORAL at 17:45

## 2022-07-30 RX ADMIN — GABAPENTIN 400 MG: 400 CAPSULE ORAL at 10:35

## 2022-07-31 LAB
ALBUMIN SERPL-MCNC: 3.6 G/DL (ref 3.5–5.2)
ALBUMIN/GLOB SERPL: 1.8 G/DL
ALP SERPL-CCNC: 79 U/L (ref 39–117)
ALT SERPL W P-5'-P-CCNC: 13 U/L (ref 1–33)
ANION GAP SERPL CALCULATED.3IONS-SCNC: 13.4 MMOL/L (ref 5–15)
AST SERPL-CCNC: 18 U/L (ref 1–32)
BASOPHILS # BLD AUTO: 0.02 10*3/MM3 (ref 0–0.2)
BASOPHILS NFR BLD AUTO: 0.2 % (ref 0–1.5)
BILIRUB SERPL-MCNC: 0.6 MG/DL (ref 0–1.2)
BUN SERPL-MCNC: 7 MG/DL (ref 6–20)
BUN/CREAT SERPL: 16.3 (ref 7–25)
CALCIUM SPEC-SCNC: 8.5 MG/DL (ref 8.6–10.5)
CHLORIDE SERPL-SCNC: 100 MMOL/L (ref 98–107)
CO2 SERPL-SCNC: 24.6 MMOL/L (ref 22–29)
CREAT SERPL-MCNC: 0.43 MG/DL (ref 0.57–1)
DEPRECATED RDW RBC AUTO: 38.8 FL (ref 37–54)
EGFRCR SERPLBLD CKD-EPI 2021: 113.6 ML/MIN/1.73
EOSINOPHIL # BLD AUTO: 0 10*3/MM3 (ref 0–0.4)
EOSINOPHIL NFR BLD AUTO: 0 % (ref 0.3–6.2)
ERYTHROCYTE [DISTWIDTH] IN BLOOD BY AUTOMATED COUNT: 12.1 % (ref 12.3–15.4)
GLOBULIN UR ELPH-MCNC: 2 GM/DL
GLUCOSE BLDC GLUCOMTR-MCNC: 105 MG/DL (ref 70–130)
GLUCOSE BLDC GLUCOMTR-MCNC: 107 MG/DL (ref 70–130)
GLUCOSE BLDC GLUCOMTR-MCNC: 114 MG/DL (ref 70–130)
GLUCOSE BLDC GLUCOMTR-MCNC: 95 MG/DL (ref 70–130)
GLUCOSE SERPL-MCNC: 107 MG/DL (ref 65–99)
HCT VFR BLD AUTO: 34.8 % (ref 34–46.6)
HGB BLD-MCNC: 11.8 G/DL (ref 12–15.9)
IMM GRANULOCYTES # BLD AUTO: 0.02 10*3/MM3 (ref 0–0.05)
IMM GRANULOCYTES NFR BLD AUTO: 0.2 % (ref 0–0.5)
LYMPHOCYTES # BLD AUTO: 1.59 10*3/MM3 (ref 0.7–3.1)
LYMPHOCYTES NFR BLD AUTO: 17 % (ref 19.6–45.3)
MAGNESIUM SERPL-MCNC: 1.7 MG/DL (ref 1.6–2.6)
MCH RBC QN AUTO: 29.5 PG (ref 26.6–33)
MCHC RBC AUTO-ENTMCNC: 33.9 G/DL (ref 31.5–35.7)
MCV RBC AUTO: 87 FL (ref 79–97)
MONOCYTES # BLD AUTO: 0.81 10*3/MM3 (ref 0.1–0.9)
MONOCYTES NFR BLD AUTO: 8.7 % (ref 5–12)
NEUTROPHILS NFR BLD AUTO: 6.92 10*3/MM3 (ref 1.7–7)
NEUTROPHILS NFR BLD AUTO: 73.9 % (ref 42.7–76)
NRBC BLD AUTO-RTO: 0 /100 WBC (ref 0–0.2)
PHOSPHATE SERPL-MCNC: 2.6 MG/DL (ref 2.5–4.5)
PLATELET # BLD AUTO: 244 10*3/MM3 (ref 140–450)
PMV BLD AUTO: 10.8 FL (ref 6–12)
POTASSIUM SERPL-SCNC: 2.7 MMOL/L (ref 3.5–5.2)
POTASSIUM SERPL-SCNC: 3.4 MMOL/L (ref 3.5–5.2)
PROT SERPL-MCNC: 5.6 G/DL (ref 6–8.5)
QT INTERVAL: 284 MS
QT INTERVAL: 320 MS
RBC # BLD AUTO: 4 10*6/MM3 (ref 3.77–5.28)
SODIUM SERPL-SCNC: 138 MMOL/L (ref 136–145)
WBC NRBC COR # BLD: 9.36 10*3/MM3 (ref 3.4–10.8)

## 2022-07-31 PROCEDURE — 83735 ASSAY OF MAGNESIUM: CPT | Performed by: INTERNAL MEDICINE

## 2022-07-31 PROCEDURE — 85025 COMPLETE CBC W/AUTO DIFF WBC: CPT | Performed by: INTERNAL MEDICINE

## 2022-07-31 PROCEDURE — 63710000001 INSULIN LISPRO (HUMAN) PER 5 UNITS: Performed by: INTERNAL MEDICINE

## 2022-07-31 PROCEDURE — 84132 ASSAY OF SERUM POTASSIUM: CPT | Performed by: INTERNAL MEDICINE

## 2022-07-31 PROCEDURE — 96376 TX/PRO/DX INJ SAME DRUG ADON: CPT

## 2022-07-31 PROCEDURE — 25010000002 ONDANSETRON PER 1 MG: Performed by: INTERNAL MEDICINE

## 2022-07-31 PROCEDURE — 63710000001 PROMETHAZINE PER 25 MG: Performed by: INTERNAL MEDICINE

## 2022-07-31 PROCEDURE — 0 MAGNESIUM SULFATE 4 GM/100ML SOLUTION: Performed by: INTERNAL MEDICINE

## 2022-07-31 PROCEDURE — G0378 HOSPITAL OBSERVATION PER HR: HCPCS

## 2022-07-31 PROCEDURE — 84100 ASSAY OF PHOSPHORUS: CPT | Performed by: INTERNAL MEDICINE

## 2022-07-31 PROCEDURE — 80053 COMPREHEN METABOLIC PANEL: CPT | Performed by: INTERNAL MEDICINE

## 2022-07-31 PROCEDURE — 25010000002 ENOXAPARIN PER 10 MG: Performed by: INTERNAL MEDICINE

## 2022-07-31 PROCEDURE — 25010000002 PROCHLORPERAZINE 10 MG/2ML SOLUTION: Performed by: INTERNAL MEDICINE

## 2022-07-31 PROCEDURE — 82962 GLUCOSE BLOOD TEST: CPT

## 2022-07-31 PROCEDURE — 96372 THER/PROPH/DIAG INJ SC/IM: CPT

## 2022-07-31 RX ORDER — PROMETHAZINE HYDROCHLORIDE 12.5 MG/1
12.5 SUPPOSITORY RECTAL EVERY 6 HOURS PRN
Status: DISCONTINUED | OUTPATIENT
Start: 2022-07-31 | End: 2022-08-01 | Stop reason: HOSPADM

## 2022-07-31 RX ORDER — CALCIUM GLUCONATE 20 MG/ML
1 INJECTION, SOLUTION INTRAVENOUS AS NEEDED
Status: DISCONTINUED | OUTPATIENT
Start: 2022-07-31 | End: 2022-08-01 | Stop reason: HOSPADM

## 2022-07-31 RX ORDER — MAGNESIUM SULFATE HEPTAHYDRATE 40 MG/ML
4 INJECTION, SOLUTION INTRAVENOUS AS NEEDED
Status: DISCONTINUED | OUTPATIENT
Start: 2022-07-31 | End: 2022-08-01 | Stop reason: HOSPADM

## 2022-07-31 RX ORDER — MAGNESIUM SULFATE HEPTAHYDRATE 40 MG/ML
2 INJECTION, SOLUTION INTRAVENOUS AS NEEDED
Status: DISCONTINUED | OUTPATIENT
Start: 2022-07-31 | End: 2022-08-01 | Stop reason: HOSPADM

## 2022-07-31 RX ORDER — PROMETHAZINE HYDROCHLORIDE 25 MG/1
25 TABLET ORAL EVERY 6 HOURS PRN
Status: DISCONTINUED | OUTPATIENT
Start: 2022-07-31 | End: 2022-08-01 | Stop reason: HOSPADM

## 2022-07-31 RX ORDER — POTASSIUM CHLORIDE 1.5 G/1.77G
40 POWDER, FOR SOLUTION ORAL AS NEEDED
Status: DISCONTINUED | OUTPATIENT
Start: 2022-07-31 | End: 2022-08-01 | Stop reason: HOSPADM

## 2022-07-31 RX ORDER — POTASSIUM CHLORIDE 750 MG/1
40 TABLET, FILM COATED, EXTENDED RELEASE ORAL AS NEEDED
Status: DISCONTINUED | OUTPATIENT
Start: 2022-07-31 | End: 2022-08-01 | Stop reason: HOSPADM

## 2022-07-31 RX ORDER — POTASSIUM CHLORIDE 7.45 MG/ML
10 INJECTION INTRAVENOUS
Status: DISCONTINUED | OUTPATIENT
Start: 2022-07-31 | End: 2022-08-01 | Stop reason: HOSPADM

## 2022-07-31 RX ADMIN — POTASSIUM CHLORIDE 40 MEQ: 10 TABLET, EXTENDED RELEASE ORAL at 09:10

## 2022-07-31 RX ADMIN — POTASSIUM CHLORIDE 40 MEQ: 10 TABLET, EXTENDED RELEASE ORAL at 13:38

## 2022-07-31 RX ADMIN — POTASSIUM CHLORIDE 40 MEQ: 10 TABLET, EXTENDED RELEASE ORAL at 17:28

## 2022-07-31 RX ADMIN — METOCLOPRAMIDE 5 MG: 5 TABLET ORAL at 12:18

## 2022-07-31 RX ADMIN — PROMETHAZINE HYDROCHLORIDE 25 MG: 25 TABLET ORAL at 17:28

## 2022-07-31 RX ADMIN — Medication 10 ML: at 21:36

## 2022-07-31 RX ADMIN — GABAPENTIN 400 MG: 400 CAPSULE ORAL at 21:35

## 2022-07-31 RX ADMIN — INSULIN LISPRO 5 UNITS: 100 INJECTION, SOLUTION INTRAVENOUS; SUBCUTANEOUS at 17:34

## 2022-07-31 RX ADMIN — INSULIN LISPRO 5 UNITS: 100 INJECTION, SOLUTION INTRAVENOUS; SUBCUTANEOUS at 08:35

## 2022-07-31 RX ADMIN — GABAPENTIN 400 MG: 400 CAPSULE ORAL at 08:35

## 2022-07-31 RX ADMIN — POTASSIUM CHLORIDE 40 MEQ: 10 TABLET, EXTENDED RELEASE ORAL at 22:25

## 2022-07-31 RX ADMIN — MAGNESIUM SULFATE HEPTAHYDRATE 4 G: 4 INJECTION, SOLUTION INTRAVENOUS at 21:40

## 2022-07-31 RX ADMIN — ESCITALOPRAM 5 MG: 5 TABLET, FILM COATED ORAL at 08:34

## 2022-07-31 RX ADMIN — PROCHLORPERAZINE EDISYLATE 5 MG: 5 INJECTION INTRAMUSCULAR; INTRAVENOUS at 08:35

## 2022-07-31 RX ADMIN — INSULIN LISPRO 5 UNITS: 100 INJECTION, SOLUTION INTRAVENOUS; SUBCUTANEOUS at 12:18

## 2022-07-31 RX ADMIN — METOCLOPRAMIDE 5 MG: 5 TABLET ORAL at 08:34

## 2022-07-31 RX ADMIN — PROMETHAZINE HYDROCHLORIDE 25 MG: 25 TABLET ORAL at 11:33

## 2022-07-31 RX ADMIN — Medication 10 ML: at 08:35

## 2022-07-31 RX ADMIN — METOCLOPRAMIDE 5 MG: 5 TABLET ORAL at 16:51

## 2022-07-31 RX ADMIN — ONDANSETRON 4 MG: 2 INJECTION INTRAMUSCULAR; INTRAVENOUS at 01:44

## 2022-07-31 RX ADMIN — ENOXAPARIN SODIUM 40 MG: 100 INJECTION SUBCUTANEOUS at 21:35

## 2022-08-01 ENCOUNTER — READMISSION MANAGEMENT (OUTPATIENT)
Dept: CALL CENTER | Facility: HOSPITAL | Age: 57
End: 2022-08-01

## 2022-08-01 VITALS
HEIGHT: 64 IN | TEMPERATURE: 98.5 F | DIASTOLIC BLOOD PRESSURE: 94 MMHG | HEART RATE: 117 BPM | WEIGHT: 158.95 LBS | OXYGEN SATURATION: 99 % | RESPIRATION RATE: 16 BRPM | BODY MASS INDEX: 27.14 KG/M2 | SYSTOLIC BLOOD PRESSURE: 167 MMHG

## 2022-08-01 PROBLEM — E10.10 DIABETIC KETOACIDOSIS WITHOUT COMA ASSOCIATED WITH TYPE 1 DIABETES MELLITUS: Status: RESOLVED | Noted: 2022-07-29 | Resolved: 2022-08-01

## 2022-08-01 PROBLEM — E10.10 DIABETIC KETOACIDOSIS WITHOUT COMA ASSOCIATED WITH TYPE 1 DIABETES MELLITUS: Status: ACTIVE | Noted: 2022-08-01

## 2022-08-01 LAB
ALBUMIN SERPL-MCNC: 3.9 G/DL (ref 3.5–5.2)
ANION GAP SERPL CALCULATED.3IONS-SCNC: 16.1 MMOL/L (ref 5–15)
ANION GAP SERPL CALCULATED.3IONS-SCNC: 16.5 MMOL/L (ref 5–15)
BUN SERPL-MCNC: 13 MG/DL (ref 6–20)
BUN SERPL-MCNC: 15 MG/DL (ref 6–20)
BUN/CREAT SERPL: 23.2 (ref 7–25)
BUN/CREAT SERPL: 25.4 (ref 7–25)
CALCIUM SPEC-SCNC: 8.9 MG/DL (ref 8.6–10.5)
CALCIUM SPEC-SCNC: 9.1 MG/DL (ref 8.6–10.5)
CHLORIDE SERPL-SCNC: 94 MMOL/L (ref 98–107)
CHLORIDE SERPL-SCNC: 96 MMOL/L (ref 98–107)
CO2 SERPL-SCNC: 19.9 MMOL/L (ref 22–29)
CO2 SERPL-SCNC: 20.5 MMOL/L (ref 22–29)
CREAT SERPL-MCNC: 0.56 MG/DL (ref 0.57–1)
CREAT SERPL-MCNC: 0.59 MG/DL (ref 0.57–1)
DEPRECATED RDW RBC AUTO: 38.1 FL (ref 37–54)
EGFRCR SERPLBLD CKD-EPI 2021: 105.3 ML/MIN/1.73
EGFRCR SERPLBLD CKD-EPI 2021: 106.6 ML/MIN/1.73
ERYTHROCYTE [DISTWIDTH] IN BLOOD BY AUTOMATED COUNT: 11.9 % (ref 12.3–15.4)
GLUCOSE BLDC GLUCOMTR-MCNC: 154 MG/DL (ref 70–130)
GLUCOSE BLDC GLUCOMTR-MCNC: 177 MG/DL (ref 70–130)
GLUCOSE BLDC GLUCOMTR-MCNC: 245 MG/DL (ref 70–130)
GLUCOSE SERPL-MCNC: 184 MG/DL (ref 65–99)
GLUCOSE SERPL-MCNC: 257 MG/DL (ref 65–99)
HCT VFR BLD AUTO: 41.5 % (ref 34–46.6)
HGB BLD-MCNC: 14 G/DL (ref 12–15.9)
MAGNESIUM SERPL-MCNC: 2.6 MG/DL (ref 1.6–2.6)
MCH RBC QN AUTO: 29.7 PG (ref 26.6–33)
MCHC RBC AUTO-ENTMCNC: 33.7 G/DL (ref 31.5–35.7)
MCV RBC AUTO: 87.9 FL (ref 79–97)
PHOSPHATE SERPL-MCNC: 2.8 MG/DL (ref 2.5–4.5)
PLATELET # BLD AUTO: 283 10*3/MM3 (ref 140–450)
PMV BLD AUTO: 10.8 FL (ref 6–12)
POTASSIUM SERPL-SCNC: 4 MMOL/L (ref 3.5–5.2)
POTASSIUM SERPL-SCNC: 4.1 MMOL/L (ref 3.5–5.2)
RBC # BLD AUTO: 4.72 10*6/MM3 (ref 3.77–5.28)
SODIUM SERPL-SCNC: 130 MMOL/L (ref 136–145)
SODIUM SERPL-SCNC: 133 MMOL/L (ref 136–145)
WBC NRBC COR # BLD: 10.19 10*3/MM3 (ref 3.4–10.8)

## 2022-08-01 PROCEDURE — 85027 COMPLETE CBC AUTOMATED: CPT | Performed by: INTERNAL MEDICINE

## 2022-08-01 PROCEDURE — G0378 HOSPITAL OBSERVATION PER HR: HCPCS

## 2022-08-01 PROCEDURE — 82962 GLUCOSE BLOOD TEST: CPT

## 2022-08-01 PROCEDURE — 80048 BASIC METABOLIC PNL TOTAL CA: CPT | Performed by: INTERNAL MEDICINE

## 2022-08-01 PROCEDURE — 83735 ASSAY OF MAGNESIUM: CPT | Performed by: INTERNAL MEDICINE

## 2022-08-01 PROCEDURE — 63710000001 INSULIN LISPRO (HUMAN) PER 5 UNITS: Performed by: INTERNAL MEDICINE

## 2022-08-01 PROCEDURE — 80069 RENAL FUNCTION PANEL: CPT | Performed by: INTERNAL MEDICINE

## 2022-08-01 PROCEDURE — 63710000001 PROMETHAZINE PER 25 MG: Performed by: INTERNAL MEDICINE

## 2022-08-01 RX ORDER — METOCLOPRAMIDE 5 MG/1
5 TABLET ORAL 3 TIMES DAILY PRN
Qty: 30 TABLET | Refills: 0 | Status: SHIPPED | OUTPATIENT
Start: 2022-08-01 | End: 2022-08-12

## 2022-08-01 RX ADMIN — Medication 10 ML: at 10:02

## 2022-08-01 RX ADMIN — GABAPENTIN 400 MG: 400 CAPSULE ORAL at 09:05

## 2022-08-01 RX ADMIN — INSULIN LISPRO 5 UNITS: 100 INJECTION, SOLUTION INTRAVENOUS; SUBCUTANEOUS at 09:04

## 2022-08-01 RX ADMIN — PROMETHAZINE HYDROCHLORIDE 25 MG: 25 TABLET ORAL at 03:26

## 2022-08-01 RX ADMIN — INSULIN LISPRO 5 UNITS: 100 INJECTION, SOLUTION INTRAVENOUS; SUBCUTANEOUS at 17:08

## 2022-08-01 RX ADMIN — INSULIN LISPRO 5 UNITS: 100 INJECTION, SOLUTION INTRAVENOUS; SUBCUTANEOUS at 12:04

## 2022-08-01 RX ADMIN — METOCLOPRAMIDE 5 MG: 5 TABLET ORAL at 09:05

## 2022-08-01 RX ADMIN — METOCLOPRAMIDE 5 MG: 5 TABLET ORAL at 17:03

## 2022-08-01 RX ADMIN — ESCITALOPRAM 5 MG: 5 TABLET, FILM COATED ORAL at 09:05

## 2022-08-01 RX ADMIN — METOCLOPRAMIDE 5 MG: 5 TABLET ORAL at 12:04

## 2022-08-01 NOTE — CASE MANAGEMENT/SOCIAL WORK
Discharge Planning Assessment  [facility]     Patient Name: Sonal De Dios  MRN: [id number]  Today's Date: 8/1/2022    Admit Date: 7/29/2022     Discharge Needs Assessment     Row Name 08/01/22 1157       Living Environment    People in Home spouse    Name(s) of People in Home Troy    Current Living Arrangements home    Primary Care Provided by self    Provides Primary Care For no one    Family Caregiver if Needed spouse    Family Caregiver Names Troy    Quality of Family Relationships helpful;involved;supportive    Able to Return to Prior Arrangements yes       Resource/Environmental Concerns    Resource/Environmental Concerns none    Transportation Concerns none       Transition Planning    Patient/Family Anticipates Transition to home with family    Patient/Family Anticipated Services at Transition none    Transportation Anticipated family or friend will provide       Discharge Needs Assessment    Readmission Within the Last 30 Days no previous admission in last 30 days    Equipment Currently Used at Home none    Concerns to be Addressed no discharge needs identified;denies needs/concerns at this time    Anticipated Changes Related to Illness none    Equipment Needed After Discharge none    Provided Post Acute Provider List? N/A    N/A Provider List Comment Patient plans to discharge home    Provided Post Acute Provider Quality & Resource List? N/A    N/A Quality & Resource List Comment Patient plans to discharge home    Patient's Choice of Community Agency(s) Patient plans to discharge home               Discharge Plan     Row Name 08/01/22 1158       Plan    Plan Home with     Patient/Family in Agreement with Plan yes    Plan Comments CCP met with patient and her  Troy at bedside, introduced self and explained role. Patient confirmed the demographic information on her face sheet is accurate. Patient states that she lives at home with her  Troy. Patient confirmed her PCP is  Ammy Holman. 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 Patient denies a history of HH or SNF. Patient denies using any DME. Patient denies any stairs to enter/exit her home but has a full flight of stairs to go upstairs and downstairs with handrails on both sets of stairs. Patient is enrolled in the [facility] program. Patient denies needs and states her  can transport her home. CCP to follow for discharge needs.              Continued Care and Services - Admitted Since 7/29/2022    Coordination has not been started for this encounter.          Demographic Summary     Row Name 08/01/22 1154       General Information    Admission Type inpatient    Arrived From emergency department    Reason for Consult discharge planning    Preferred Language English               Functional Status     Row Name 08/01/22 1157       Functional Status    Usual Activity Tolerance good    Current Activity Tolerance moderate       Functional Status, IADL    Medications independent    Meal Preparation independent    Housekeeping independent    Laundry independent    Shopping independent       Mental Status    General Appearance WDL WDL       Mental Status Summary    Recent Changes in Mental Status/Cognitive Functioning no changes       Employment/    Employment Status employed full-time               Psychosocial    No documentation.                Abuse/Neglect    No documentation.                Legal    No documentation.                Substance Abuse    No documentation.                Patient Forms    No documentation.

## 2022-08-01 NOTE — PLAN OF CARE
Goal Outcome Evaluation:  Plan of Care Reviewed With: patient        Progress: no change  Outcome Evaluation: iv mg, phenergan for nausea, kcl tabs, accucheck 105-=hs lantus held per md, am olivierucheck 257, am labs, sl tachy, hx neuropathy, no c/o hypo/hyper glycemia

## 2022-08-01 NOTE — PAYOR COMM NOTE
"Va Sonal Barragan (56 y.o. Female)     INPATIENT REQUEST FOR [id number]    CONTACT JL FARMER  P# [phone]  F# [phone]              Date of Birth   1965    Social Security Number       Address   [address] [place] [address]    Home Phone   [phone]    MRN   [id number]       Zoroastrian   Taoism    Marital Status                               Admission Date   7/29/22    Admission Type   Emergency    Admitting Provider   Niko Armas MD    Attending Provider   Nikita Barajas MD    Department, Room/Bed   [address], P694/1       Discharge Date       Discharge Disposition       Discharge Destination                               Attending Provider: Nikita Barajas MD    Allergies: Codeine    Isolation: None   Infection: None   Code Status: CPR   Advance Care Planning Activity    Ht: 162.6 cm (64\")   Wt: 72.1 kg (158 lb 15.2 oz)    Admission Cmt: None   Principal Problem: None                Active Insurance as of 7/29/2022     Primary Coverage     Payor Plan Insurance Group Employer/Plan Group    CIGNA CIGNA [id number]     Payor Plan Address Payor Plan Phone Number Payor Plan Fax Number Effective Dates    [address] [phone]  1/1/2014 - None Entered    [facility] [id number]       Subscriber Name Subscriber Birth Date Member ID       VAWIL 8/18/1960 [id number]                 Emergency Contacts      (Rel.) Home Phone Work Phone Mobile Phone    Juan RsanjuanavivianeWil (Spouse) [phone] -- [phone]               History & Physical      Nikita Barajas MD at 07/29/22 [facility]              [facility]  [phone]  Dr. Nikita Barajas      Subjective   LOS: 0 days     56-year-old female who is developed nausea and vomiting 2 days ago.  She denies any fever chills or rigors prior to onset of symptoms.  Since Wednesday has been having nausea vomiting without diarrhea or abdominal pain.  No fever but currently with " chills.  States she gets UTIs frequently.  Denies current dysuria or burning micturition that would be suggestive of UTI.  Has peripheral neuropathy from her diabetes.  Does not drink or smoke.  Denies any dietary indiscretions.  Denies heart disease or kidney disease.    Sonal De Dios  reports no history of alcohol use.,  reports that she has never smoked. She has never used smokeless tobacco.     Past Hx:  has a past medical history of Diabetes mellitus (HCC), Gestational diabetes, and Pneumonia.  Surg Hx:  has a past surgical history that includes Endometrial ablation.  FH: family history includes Diabetes in her mother.  SH:  reports that she has never smoked. She has never used smokeless tobacco. She reports that she does not drink alcohol and does not use drugs.    Medications Prior to Admission   Medication Sig Dispense Refill Last Dose   • atorvastatin (LIPITOR) 10 MG tablet Take 1 tablet by mouth Daily. 90 tablet 1    • BD PEN NEEDLE ROSITA U/F 32G X 4 MM misc USE AS DIRECTED FOUR TIMES DAILY 100 each 2    • Continuous Blood Gluc  (Dexcom G6 ) device 1 each Take As Directed. 1 each 0    • Continuous Blood Gluc  (FREESTYLE JUAN 14 DAY READER) device 1 each 2 (Two) Times a Day. 1 Device 0    • Continuous Blood Gluc Sensor (Dexcom G6 Sensor) USE EVERY 10 DAYS 3 each 6    • Continuous Blood Gluc Sensor (FREESTYLE JUAN 14 DAY SENSOR) misc 1 each 2 (Two) Times a Day. 6 each 3    • Continuous Blood Gluc Transmit (Dexcom G6 Transmitter) misc TAKE AS DIRECTED 1 each 0    • escitalopram (LEXAPRO) 5 MG tablet Take 1 tablet by mouth Daily. 30 tablet 5    • gabapentin (NEURONTIN) 400 MG capsule Take 1 cap po twice daily at 8 am and 2 pm 180 capsule 1    • gabapentin (NEURONTIN) 600 MG tablet Take 1 tablet by mouth Every Night. 90 tablet 1    • glucose blood (ONETOUCH VERIO) test strip 1 each by Other route 5 (Five) Times a Day. Use as instructed 150 each 3    • glucose blood test strip  Check 5 times daily 100 each 12    • hydrocortisone (ANUSOL-HC) 2.5 % rectal cream Insert  into the rectum 2 (Two) Times a Day. 28.35 g 0    • Hydrocortisone, Perianal, (Anusol-HC) 2.5 % rectal cream Insert  into the rectum 2 (Two) Times a Day. 28 g 1    • insulin detemir (Levemir FlexTouch) 100 UNIT/ML injection Inject 40 Units under the skin into the appropriate area as directed Daily. 6 pen 3    • Insulin Lispro (HUMALOG KWIKPEN) 100 UNIT/ML solution pen-injector Glucose below 150 no extra insulin, 150-200 give 2 units, 201-250 give 3 units, 251-300 give 4 units, >300 give 5 units 15 mL 0    • Insulin Lispro (HUMALOG KWIKPEN) 100 UNIT/ML solution pen-injector 4 UNITS BREAKFAST AND LUNCH AND 6 UNITS AT SUPPER DAILY SC 15 mL 5    • Insulin Lispro, 1 Unit Dial, (HUMALOG KWIKPEN) 100 UNIT/ML solution pen-injector Inject 10 units before meals 3 pen 3    • Insulin Lispro, 1 Unit Dial, (HumaLOG KwikPen) 100 UNIT/ML solution pen-injector Inject 5 units SQ before breakfast, 6 units before lunch and 8 units before dinner 6 pen 5    • Insulin Pen Needle (BD Pen Needle Alysha U/F) 32G X 4 MM misc Use as directed 200 each 12    • Insulin Pen Needle 29G X 12.7MM misc As directed 100 each 11    • lisinopril (PRINIVIL,ZESTRIL) 10 MG tablet Take 1 tablet by mouth Daily. 90 tablet 1      Allergies   Allergen Reactions   • Codeine GI Intolerance       Review of Systems   Constitutional: Positive for chills and fatigue. Negative for fever.   HENT: Negative for congestion and sore throat.    Respiratory: Negative for cough, shortness of breath and wheezing.    Cardiovascular: Negative for chest pain and leg swelling.   Gastrointestinal: Positive for nausea and vomiting. Negative for abdominal pain and diarrhea.   Genitourinary: Negative for dysuria and hematuria.   Musculoskeletal: Negative for arthralgias and back pain.   Skin: Negative for pallor and rash.   Neurological: Negative for dizziness and light-headedness.    Psychiatric/Behavioral: Negative for agitation and confusion.     Vital Signs past 24hrs  BP range: BP: (111-144)/(52-87) 111/52  Pulse range: Heart Rate:  [120-126] 120  Resp rate range: Resp:  [17-18] 18  Temp range: Temp (24hrs), Av.8 °F (36 °C), Min:96.8 °F (36 °C), Max:96.8 °F (36 °C)    Oxygen range: SpO2:  [95 %-100 %] 100 %;  ;      71.2 kg (156 lb 15.5 oz); Body mass index is 26.94 kg/m².  I/O this shift:  In: 1000 [IV Piggyback:1000]  Out: -     Adult female who is laying in bed.  She appears somewhat tired.  She is hoarse because of all the vomiting.  Pupils equal and reactive light.  Oropharynx dry.  Class II Mallampati airway.  JVP not elevated trachea midline thyroid not enlarged.  Lungs reveal bilateral air entry.  Clear to auscultation with trace rales in the bases posteriorly.  No rhonchi.  Percussion note resonant chest expansion equal no chest wall deformity or tenderness.  Heart examination S1-S2 present rhythm regular and tachycardic.  No murmurs.  No edema lower extremities.  Abdomen is soft nontender bowel sounds present no liver spleen enlargement.  No peripheral cyanosis clubbing.  Moves all 4 extremities sensorimotor intact though patient does have some numbness on her feet from neuropathy.  No cervical, axillary, inguinal adenopathy.    Results Review:    I have reviewed the laboratory and imaging data from current admission. My annotations are as noted in assessment and plan.    Medication Review:  I have reviewed the current MAR. My annotations are as noted in assessment and plan.    Plan   PCCM Problems  DKA  Acute kidney injury  Diabetes type 2 insulin-dependent  Peripheral neuropathy  History UTI    Plan of Treatment  DKA treat per protocol.  Reason for precipitation of DKA unclear.  Patient reports history of UTI though no symptoms currently.  Note that urine analysis is pending.  Holding on antibiotics.  Has been very compliant with her treatment with insulin.    Acute kidney  injury with dehydration and tachycardia.  Continue fluids per protocol.    History of UTI and pending urinalysis.    Keep n.p.o. except sips of water.    Spoke to spouse at bedside.    I spent 35 mins critical care time in care of this patient outside of any procedures.     Electronically signed by Nikita Barajas MD, 07/29/22, 11:49 AM EDT.      Part of this note may be an electronic transcription/translation of spoken language to printed text using the Dragon Dictation System.      Electronically signed by Nikita Barajas MD at 07/29/22 1155          Emergency Department Notes      Tatyana Martinez RN at 07/29/22 0648        Pt presents to ER from home via pvt car c/o n/v x 4 days, not able to keep anything down.  DM2 (blood sugar checked in triage 302).  Pt denies any abdominal pain, states this happened before in December and she had a UTI at that time, but has no symptoms at this time.     Electronically signed by Tatyana Martinez RN at 07/29/22 0650     Zachary Perry MD at 07/29/22 0701      Procedure Orders    1. Critical Care [[id number]] ordered by Zachary Perry MD               EMERGENCY DEPARTMENT ENCOUNTER    Room Number:  ANGELINA/ANGELINA  PCP: Ammy Holman APRN  Historian: Patient  History Limited By: Nothing      HPI  Chief Complaint: Nausea vomiting  Context: Sonal De Dios is a 56 y.o. female who presents to the ED c/o nausea and vomiting.  Patient with history of diabetes.  States for the last 3 to 4 days she has had nausea and vomiting.  Has not been able to keep down fluids.  Patient has had no abdominal pain.  Has had no diarrhea.  Has had no headache.  Has had 1 episode of DKA in the recent past.  Patient states she did have similar episode of nausea and vomiting December when she had a urinary tract infection.  Has had no urinary symptoms this time.  No fevers.      Location: Nausea and vomiting  Radiation: None  Character: Persistent  Duration: 3 to 4 days  Severity:  Severe  Progression: Not improving  Aggravating Factors: Nothing  Alleviating Factors: Nothing        MEDICAL RECORD REVIEW    Diabetes          PAST MEDICAL HISTORY  Active Ambulatory Problems     Diagnosis Date Noted   • Uncontrolled type 2 diabetes mellitus with diabetic neuropathy, with long-term current use of insulin 04/10/2018   • Encounter for long-term (current) use of insulin (HCC) 04/10/2018   • Hyperinsulinism 04/10/2018   • Noncompliance with medication regimen 04/10/2018   • Uncontrolled type 1 diabetes with diabetic neuropathy 06/27/2018     Resolved Ambulatory Problems     Diagnosis Date Noted   • Diabetic ketoacidosis with coma associated with type 2 diabetes mellitus (HCC) 03/06/2018   • Edema 03/10/2018     Past Medical History:   Diagnosis Date   • Diabetes mellitus (HCC)    • Gestational diabetes    • Pneumonia          PAST SURGICAL HISTORY  Past Surgical History:   Procedure Laterality Date   • ENDOMETRIAL ABLATION           FAMILY HISTORY  Family History   Problem Relation Age of Onset   • Diabetes Mother          SOCIAL HISTORY  Social History     Socioeconomic History   • Marital status:    Tobacco Use   • Smoking status: Never Smoker   • Smokeless tobacco: Never Used   Vaping Use   • Vaping Use: Never used   Substance and Sexual Activity   • Alcohol use: No   • Drug use: No   • Sexual activity: Yes     Partners: Male     Birth control/protection: Surgical         ALLERGIES  Codeine        REVIEW OF SYSTEMS  Review of Systems   Constitutional: Negative for fever.   HENT: Negative for sore throat.    Eyes: Negative.    Respiratory: Negative for cough and shortness of breath.    Cardiovascular: Negative for chest pain.   Gastrointestinal: Positive for nausea and vomiting. Negative for abdominal pain and diarrhea.   Genitourinary: Negative for dysuria.   Musculoskeletal: Negative for neck pain.   Skin: Negative for rash.   Allergic/Immunologic: Negative.    Neurological: Negative for  weakness, numbness and headaches.   Hematological: Negative.    Psychiatric/Behavioral: Negative.    All other systems reviewed and are negative.           PHYSICAL EXAM  ED Triage Vitals [07/29/22 0646]   Temp Heart Rate Resp BP SpO2   96.8 °F (36 °C) (!) 126 18 130/87 95 %      Temp src Heart Rate Source Patient Position BP Location FiO2 (%)   Oral Monitor Sitting Right arm --       Physical Exam  Vitals and nursing note reviewed.   Constitutional:       General: She is not in acute distress.  HENT:      Head: Normocephalic and atraumatic.      Mouth/Throat:      Mouth: Mucous membranes are dry.   Eyes:      Pupils: Pupils are equal, round, and reactive to light.   Cardiovascular:      Rate and Rhythm: Regular rhythm. Tachycardia present.      Heart sounds: Normal heart sounds.   Pulmonary:      Effort: Pulmonary effort is normal. No respiratory distress.      Breath sounds: Normal breath sounds.   Abdominal:      Palpations: Abdomen is soft.      Tenderness: There is no abdominal tenderness. There is no guarding or rebound.   Musculoskeletal:         General: Normal range of motion.      Cervical back: Normal range of motion and neck supple.   Skin:     General: Skin is warm and dry.      Findings: No rash.   Neurological:      Mental Status: She is alert and oriented to person, place, and time.      Sensory: Sensation is intact. No sensory deficit.      Motor: No weakness.   Psychiatric:         Mood and Affect: Mood and affect normal.       Patient was wearing a face mask when I entered the room and they continued to wear a mask throughout their stay in the ED.  I wore PPE, including  gloves, face mask with shield or face mask with goggles whenever I was in the room with patient.       LAB RESULTS  Recent Results (from the past 24 hour(s))   POC Glucose Once    Collection Time: 07/29/22  6:48 AM    Specimen: Blood   Result Value Ref Range    Glucose 302 (H) 70 - 130 mg/dL   ECG 12 Lead    Collection Time:  07/29/22  7:24 AM   Result Value Ref Range    QT Interval 320 ms   Comprehensive Metabolic Panel    Collection Time: 07/29/22  7:32 AM    Specimen: Blood   Result Value Ref Range    Glucose 333 (H) 65 - 99 mg/dL    BUN 37 (H) 6 - 20 mg/dL    Creatinine 1.17 (H) 0.57 - 1.00 mg/dL    Sodium 134 (L) 136 - 145 mmol/L    Potassium 3.4 (L) 3.5 - 5.2 mmol/L    Chloride 93 (L) 98 - 107 mmol/L    CO2 14.0 (L) 22.0 - 29.0 mmol/L    Calcium 10.7 (H) 8.6 - 10.5 mg/dL    Total Protein 7.8 6.0 - 8.5 g/dL    Albumin 5.10 3.50 - 5.20 g/dL    ALT (SGPT) 17 1 - 33 U/L    AST (SGOT) 9 1 - 32 U/L    Alkaline Phosphatase 110 39 - 117 U/L    Total Bilirubin 0.4 0.0 - 1.2 mg/dL    Globulin 2.7 gm/dL    A/G Ratio 1.9 g/dL    BUN/Creatinine Ratio 31.6 (H) 7.0 - 25.0    Anion Gap 27.0 (H) 5.0 - 15.0 mmol/L    eGFR 54.5 (L) >60.0 mL/min/1.73   Lipase    Collection Time: 07/29/22  7:32 AM    Specimen: Blood   Result Value Ref Range    Lipase 7 (L) 13 - 60 U/L   Troponin    Collection Time: 07/29/22  7:32 AM    Specimen: Blood   Result Value Ref Range    Troponin T <0.010 0.000 - 0.030 ng/mL   CBC Auto Differential    Collection Time: 07/29/22  7:32 AM    Specimen: Blood   Result Value Ref Range    WBC 18.27 (H) 3.40 - 10.80 10*3/mm3    RBC 4.83 3.77 - 5.28 10*6/mm3    Hemoglobin 14.2 12.0 - 15.9 g/dL    Hematocrit 43.0 34.0 - 46.6 %    MCV 89.0 79.0 - 97.0 fL    MCH 29.4 26.6 - 33.0 pg    MCHC 33.0 31.5 - 35.7 g/dL    RDW 11.8 (L) 12.3 - 15.4 %    RDW-SD 37.8 37.0 - 54.0 fl    MPV 10.7 6.0 - 12.0 fL    Platelets 374 140 - 450 10*3/mm3    Neutrophil % 82.9 (H) 42.7 - 76.0 %    Lymphocyte % 10.2 (L) 19.6 - 45.3 %    Monocyte % 5.4 5.0 - 12.0 %    Eosinophil % 0.0 (L) 0.3 - 6.2 %    Basophil % 0.2 0.0 - 1.5 %    Immature Grans % 1.3 (H) 0.0 - 0.5 %    Neutrophils, Absolute 15.15 (H) 1.70 - 7.00 10*3/mm3    Lymphocytes, Absolute 1.87 0.70 - 3.10 10*3/mm3    Monocytes, Absolute 0.99 (H) 0.10 - 0.90 10*3/mm3    Eosinophils, Absolute 0.00 0.00 - 0.40  10*3/mm3    Basophils, Absolute 0.03 0.00 - 0.20 10*3/mm3    Immature Grans, Absolute 0.23 (H) 0.00 - 0.05 10*3/mm3    nRBC 0.0 0.0 - 0.2 /100 WBC   Beta Hydroxybutyrate Quantitative    Collection Time: 07/29/22  7:32 AM    Specimen: Blood   Result Value Ref Range    Beta-Hydroxybutyrate Quant 5.399 (H) 0.020 - 0.270 mmol/L   Phosphorus    Collection Time: 07/29/22  7:32 AM    Specimen: Blood   Result Value Ref Range    Phosphorus 4.0 2.5 - 4.5 mg/dL   Magnesium    Collection Time: 07/29/22  7:32 AM    Specimen: Blood   Result Value Ref Range    Magnesium 2.2 1.6 - 2.6 mg/dL   Blood Gas, Venous -    Collection Time: 07/29/22  9:12 AM    Specimen: Venous Blood   Result Value Ref Range    Site Arterial: right radial     pH, Venous 7.342 7.310 - 7.410 pH Units    pCO2, Venous 26.2 (L) 41.0 - 51.0 mm Hg    pO2, Venous 49.2 (H) 35.0 - 45.0 mm Hg    HCO3, Venous 14.2 (L) 22.0 - 28.0 mmol/L    Base Excess, Venous -9.8 (L) -2.0 - 2.0 mmol/L    O2 Saturation Calculated 83.2 (L) 92.0 - 99.0 %    Barometric Pressure for Blood Gas 751.7 mmHg    Modality Room Air     Set Mech Resp Rate 20     Rate 20 Breaths/minute   COVID-19,[facility] IN-HOUSE CEPHEID/RAY NP SWAB IN TRANSPORT MEDIA 8-12 HR TAT - Swab, Nasopharynx    Collection Time: 07/29/22  9:44 AM    Specimen: Nasopharynx; Swab   Result Value Ref Range    COVID19 Not Detected Not Detected - Ref. Range   POC Glucose Once    Collection Time: 07/29/22  9:53 AM    Specimen: Blood   Result Value Ref Range    Glucose 273 (H) 70 - 130 mg/dL       Ordered the above labs and reviewed the results.        RADIOLOGY  XR Chest 2 View   Final Result           Ordered the above noted radiological studies. Reviewed by me in PACS.          PROCEDURES  Critical Care  Performed by: Zachary Perry MD  Authorized by: Niko Armas MD     Critical care provider statement:     Critical care time (minutes):  30    Critical care time was exclusive of:  Separately billable procedures and treating  other patients    Critical care was necessary to treat or prevent imminent or life-threatening deterioration of the following conditions:  Endocrine crisis    Critical care was time spent personally by me on the following activities:  Ordering and performing treatments and interventions, ordering and review of laboratory studies, ordering and review of radiographic studies and discussions with primary provider          EKG:          EKG time: 724  Rhythm/Rate: Sinus tachycardia 122  P waves and NH: Normal P waves multiple PVCs  QRS, axis: Normal QRS  ST and T waves: Nonspecific ST-T waves    Interpreted Contemporaneously by me, independently viewed  Changed compared to prior 3/6/2018.  PVCs are new        MEDICATIONS GIVEN IN ER  Medications   sodium chloride 0.9 % flush 10 mL (10 mL Intravenous Given 7/29/22 1001)   sodium chloride 0.9 % flush 10 mL (has no administration in time range)   dextrose (GLUTOSE) oral gel 15 g (has no administration in time range)   dextrose (D50W) (25 g/50 mL) IV injection 10-50 mL (has no administration in time range)   glucagon (human recombinant) (GLUCAGEN DIAGNOSTIC) injection 1 mg (has no administration in time range)   sodium chloride 0.9 % bolus (1,000 mL/hr Intravenous New Bag 7/29/22 1000)   sodium chloride 0.9 % infusion (has no administration in time range)   sodium chloride 0.9 % with KCl 20 mEq/L infusion (has no administration in time range)   sodium chloride 0.9 % with KCl 40 mEq/L infusion (has no administration in time range)   dextrose 5 % and sodium chloride 0.9 % infusion (has no administration in time range)   dextrose 5 % and sodium chloride 0.9 % with KCl 20 mEq/L infusion (has no administration in time range)   dextrose 5 % and sodium chloride 0.9 % with KCl 40 mEq/L infusion (has no administration in time range)   sodium chloride 0.45 % infusion (has no administration in time range)   sodium chloride 0.45 % with KCl 20 mEq/L infusion (has no administration in  time range)   sodium chloride 0.45 % 1,000 mL with potassium chloride 40 mEq infusion (has no administration in time range)   dextrose 5 % and sodium chloride 0.45 % infusion (has no administration in time range)   dextrose 5 % and sodium chloride 0.45 % with KCl 20 mEq/L infusion (has no administration in time range)   dextrose 5 % and sodium chloride 0.45 % with KCl 40 mEq/L infusion (has no administration in time range)   insulin regular 1 unit/mL in 0.9% sodium chloride (2.1 Units/hr Intravenous New Bag 7/29/22 0958)   potassium chloride 10 mEq in 100 mL IVPB (10 mEq Intravenous New Bag 7/29/22 1001)   sodium chloride 0.9 % bolus 1,000 mL (0 mL Intravenous Stopped 7/29/22 0944)   ondansetron (ZOFRAN) injection 4 mg (4 mg Intravenous Given 7/29/22 0732)             PROGRESS AND CONSULTS  ED Course as of 07/29/22 1047   Fri Jul 29, 2022   0924 09:24 EDT  Patient is diabetic and continues to have vomiting despite antiemetics.  Patient is tachycardic.  Patient's blood gas appears to be mixed.  Patient's bicarb however is 14.  Patient's anion gap is 27.  Beta hydroxybutyrate is elevated.  Despite being acidotic patient's potassium is already low.  I think she would do poorly on the floor.  Patient has been discussed with Dr. Armas who will admit.  DKA protocol. [SL]      ED Course User Index  [SL] Zachary Perry MD           MEDICAL DECISION MAKING      MDM  Number of Diagnoses or Management Options     Amount and/or Complexity of Data Reviewed  Clinical lab tests: reviewed and ordered (Bicarb 14, anion gap 27)  Decide to obtain previous medical records or to obtain history from someone other than the patient: yes  Discuss the patient with other providers: yes (Discussed with Dr. Armas who will admit.)               DIAGNOSIS  Final diagnoses:   Diabetic ketoacidosis without coma associated with type 1 diabetes mellitus (HCC)   Hypokalemia           DISPOSITION  admit        Latest Documented Vital Signs:  As  of 10:47 EDT  BP- 111/52 HR- 120 Temp- 96.8 °F (36 °C) (Oral) O2 sat- 100%                           Zachary Perry MD  07/29/22 1048      Electronically signed by Zachary Perry MD at 07/29/22 1048     Marci Escalera RN at 07/29/22 0752        Pt aware of need for urine specimen.    Electronically signed by Marci Escalera RN at 07/29/22 0752     Marci Escalera RN at 07/29/22 1003        Attempted to call report. Nurse unavailable for report.     Electronically signed by Marci Escalera RN at 07/29/22 1013       Operative/Procedure Notes (last 72 hours)  Notes from 07/29/22 1402 through 08/01/22 1402   No notes of this type exist for this encounter.            Physician Progress Notes (last 72 hours)      Apolinar Pennington MD at 07/31/22 0805          [facility]      Mar/chart reviewed  Follow up DKA  No abdominal pain, less nausea    Vital Sign Min/Max for last 24 hours  Temp  Min: 98.1 °F (36.7 °C)  Max: 98.4 °F (36.9 °C)   BP  Min: 117/71  Max: 174/98   Pulse  Min: 84  Max: 114   No data recorded   SpO2  Min: 93 %  Max: 97 %   No data recorded   Weight  Min: 76.6 kg (168 lb 14 oz)  Max: 76.6 kg (168 lb 14 oz)     Appears ill, axox3,   perrl, eomi, normal sclera,  mmm, no jvd, trachea midline, neck supple,  chest cta bilaterally, no crackles, no wheezes,   Tachy, regular  soft, nt, nd +bs,  no c/c/ e  Skin warm, dry no rashes    Labs: 7/31: reviewed:  Mg 1.7  Glucose 107  Bun 7  Cr 0.43  Na 138  k 2.7  Bicarb 24  Wbc 9.3  hgb 11.8  plts 244    A/P:  1. DKA --resolved  2. HIRA -- improved  3. DmII   4. Peripheral neuropathy  5. Hyperlipidemia -- resume statin when able  6. htn -- resume lisinopril when taking po better  7. Hypokalemia -- replace  8. Nausea -- still has some trouble    She can move out of unit; likely home tomorrow.     Electronically signed by Apolinar Pennington MD at 07/31/22 0807     Apolinar Pennington MD at 07/30/22 0838          [facility]     Mar/chart  reviewed  Follow up DKA  No abdominal pain, less nausea    Vital Sign Min/Max for last 24 hours  Temp  Min: 98.1 °F (36.7 °C)  Max: 98.8 °F (37.1 °C)   BP  Min: 111/52  Max: 166/94   Pulse  Min: 92  Max: 129   Resp  Min: 17  Max: 20   SpO2  Min: 87 %  Max: 100 %   Flow (L/min)  Min: 2  Max: 2   Weight  Min: 71.2 kg (156 lb 15.5 oz)  Max: 71.2 kg (156 lb 15.5 oz)     Appears ill, axox3,   perrl, eomi, normal sclera,  mmm, no jvd, trachea midline, neck supple,  chest cta bilaterally, no crackles, no wheezes,   Tachy, regular  soft, nt, nd +bs,  no c/c/ e  Skin warm, dry no rashes    Labs: 7/30: reviewed:  Glucose 180  Bun 16  Cr 0.48  Na 138  k 3.1  Bicarb 20     A/P:  1. DKA -- gap closed, bicarb still a little low, will see what next bmp looks like, can likely convert to long acting and ssi insulin later today  2. HIRA -- improved  3. DmII   4. Peripheral neuropathy  5. Hyperlipidemia -- resume statin when able  6. htn -- resume lisinopril when taking po better    Urinalysis still not done    Patient is new to me today         Electronically signed by Apolinar Pennington MD at 07/30/22 0902       Consult Notes (last 72 hours)  Notes from 07/29/22 1402 through 08/01/22 1402   No notes of this type exist for this encounter.         All medication doses during the admission are shown, including meds that are no longer on order.    Scheduled Meds Sorted by Name  for Sonal De Dios as of 7/30/22 through 8/1/22    1 Day 3 Days 7 Days 10 Days < Today >   Legend:                          Inactive     Active     Other Encounter     Linked                 Medications 07/30/22 07/31/22 08/01/22   Enoxaparin Sodium (LOVENOX) syringe 40 mg  Dose: 40 mg  Freq: Nightly Route: SC  Indications of Use: PROPHYLAXIS OF VENOUS THROMBOEMBOLISM  Start: 07/29/22 2100   Admin Instructions:   Give subcutaneous in abdomen only. Do not massage site after injection.    2028 2135 2100          escitalopram (LEXAPRO) tablet 5  "mg  Dose: 5 mg  Freq: Daily Route: PO  Start: 07/30/22 1000   Admin Instructions:   Caution: Look alike/sound alike drug alert.    1035        0834        0905          gabapentin (NEURONTIN) capsule 400 mg  Dose: 400 mg  Freq: Every 12 Hours Scheduled Route: PO  Start: 07/30/22 1000   Admin Instructions:       1039   2027       0835   2135       0905   2100         insulin glargine (LANTUS, SEMGLEE) injection 40 Units  Dose: 40 Units  Freq: Nightly Route: SC  Start: 07/30/22 1815   Admin Instructions:       1745        (6323) [C]        2100          insulin lispro (ADMELOG) injection 5 Units  Dose: 5 Units  Freq: 3 Times Daily With Meals Route: SC  Start: 07/30/22 1815   Admin Instructions:   \"Solution should be clear. If cloudy, contact pharmacy for a new vial.\"   Caution: Look alike/sound alike drug alert    (2899)        0868   1215   1734      0904   1204   1800        metoclopramide (REGLAN) tablet 5 mg  Dose: 5 mg  Freq: 3 Times Daily Before Meals Route: PO  Start: 07/30/22 1130   Admin Instructions:       103   1742       0834   1218   1651      0905   1204   1730        ondansetron (ZOFRAN) injection 4 mg  Dose: 4 mg  Freq: Once Route: IV  Start: 07/29/22 0703 End: 07/29/22 0732         potassium chloride 10 mEq in 100 mL IVPB  Dose: 10 mEq  Freq: Once Route: IV  Last Dose: 10 mEq (07/29/22 1001)  Start: 07/29/22 0932 End: 07/29/22 1101   Admin Instructions:   OUTPATIENT/NON-MONITORED UNITS: Potassium Chloride standard bolus infusion rate is a maximum of 10 mEq/hr on unmonitored patients    MONITORED UNITS: Potassium Chloride standard bolus infusion rate is a maximum of 20 mEq/hr on ECG monitored patients ONLY         potassium phosphate 20 mmol in sodium chloride 0.9 % 500 mL infusion  Dose: 20 mmol  Freq: Once Route: IV  Start: 07/29/22 2230 End: 07/30/22 0015   Admin Instructions:   Refrigerate.      Doses listed as mmol of phosphate.   4.4 mEq of Potassium = 3 mmol of Phosphate    0015          "   sodium chloride 0.9 % bolus 1,000 mL  Dose: 1,000 mL  Freq: Once Route: IV  Last Dose: Stopped (07/29/22 0944)  Start: 07/29/22 0703 End: 07/29/22 0944         sodium chloride 0.9 % flush 10 mL  Dose: 10 mL  Freq: Every 12 Hours Scheduled Route: IV  Start: 07/29/22 0922    0806   2028       0835   2136       1002   2100         Medications 07/30/22 07/31/22 08/01/22           Continuous Meds Sorted by Name  for Sonal De Dios as of 7/30/22 through 8/1/22  Legend:                          Inactive     Active     Other Encounter     Linked                 Medications 07/30/22 07/31/22 08/01/22   dextrose 5 % and sodium chloride 0.45 % infusion  Rate: 150 mL/hr Dose: 150 mL/hr  Freq: Continuous PRN Route: IV  PRN Comment: For Corrected Sodium >/= 135 and K > 5.3 and Glucose </= 250  Start: 07/29/22 0919 End: 07/30/22 1717    1717-D/C'd          dextrose 5 % and sodium chloride 0.45 % with KCl 20 mEq/L infusion  Rate: 150 mL/hr Dose: 150 mL/hr  Freq: Continuous PRN Route: IV  PRN Comment: For Corrected Sodium >/= 135 and K 3.3 - 5.3 and Glucose </= 250  Last Dose: 150 mL/hr (07/30/22 0422)  Start: 07/29/22 0919 End: 07/30/22 1717    0422   1717-D/C'd         dextrose 5 % and sodium chloride 0.45 % with KCl 40 mEq/L infusion  Rate: 150 mL/hr Dose: 150 mL/hr  Freq: Continuous PRN Route: IV  PRN Comment: For Corrected Sodium >/= 135 and K < 3.3 and Glucose </= 250  Last Dose: Stopped (07/30/22 1736)  Start: 07/29/22 0919 End: 07/30/22 1717    0956   1717-D/C'd  1736           dextrose 5 % and sodium chloride 0.9 % infusion  Rate: 150 mL/hr Dose: 150 mL/hr  Freq: Continuous PRN Route: IV  PRN Comment: For Corrected Sodium < 135 and K > 5.3 and Glucose </= 250  Start: 07/29/22 0919 End: 07/30/22 1717    1717-D/C'd          dextrose 5 % and sodium chloride 0.9 % with KCl 20 mEq/L infusion  Rate: 150 mL/hr Dose: 150 mL/hr  Freq: Continuous PRN Route: IV  PRN Comment: For Corrected Sodium < 135 and K 3.3 - 5.3  and  Glucose </= 250  Last Dose: 150 mL/hr (07/29/22 1857)  Start: 07/29/22 0919 End: 07/30/22 1717 1717-D/C'd          dextrose 5 % and sodium chloride 0.9 % with KCl 40 mEq/L infusion  Rate: 150 mL/hr Dose: 150 mL/hr  Freq: Continuous PRN Route: IV  PRN Comment: For Corrected Sodium < 135 and K < 3.3 and Glucose </= 250  Start: 07/29/22 0919 End: 07/30/22 1717 1717-D/C'd          insulin regular 1 unit/mL in 0.9% sodium chloride  Rate: 0-100 mL/hr Dose: 0-100 Units/hr  Freq: Titrated Route: IV  Indications of Use: DIABETIC KETOACIDOSIS  Last Dose: Stopped (07/30/22 1845)  Start: 07/29/22 0922 End: 07/30/22 1717   Admin Instructions:   Initiate insulin drip on infusion pump at rate as determined by the Glucommander.  Prime the line with the drip then waste 20 mL prior to beginning infusion   Order specific questions:   Initial starting multiplier: 0.01  Target Blood Glucose Range: 120 - 160 mg/dL    0049   0304   0706     0807   0858   1105     1200   1321   1354     1517   1636 [C]   1717-D/C'd  1845          sodium chloride 0.45 % 1,000 mL with potassium chloride 40 mEq infusion  Rate: 250 mL/hr Dose: 250 mL/hr  Freq: Continuous PRN Route: IV  PRN Comment: For Corrected Sodium >/= 135 and K < 3.3 and Glucose > 250  Start: 07/29/22 0919 End: 07/30/22 1717 1717-D/C'd          sodium chloride 0.45 % infusion  Rate: 250 mL/hr Dose: 250 mL/hr  Freq: Continuous PRN Route: IV  PRN Comment: For Corrected Sodium >/= 135 and K > 5.3 and Glucose > 250  Start: 07/29/22 0919 End: 07/30/22 1717 1717-D/C'd          sodium chloride 0.45 % with KCl 20 mEq/L infusion  Rate: 250 mL/hr Dose: 250 mL/hr  Freq: Continuous PRN Route: IV  PRN Comment: For Corrected Sodium >/= 135 and K 3.3 - 5.3 and Glucose > 250  Last Dose: Stopped (07/29/22 1401)  Start: 07/29/22 0919 End: 07/30/22 1717    1717-D/C'd          sodium chloride 0.9 % bolus  Rate: 1,000 mL/hr Dose: 1000 mL/hr  Freq: Continuous Route: IV  Last Dose: 1,000 mL/hr  (07/29/22 1000)  Start: 07/29/22 0922 End: 07/29/22 1159         sodium chloride 0.9 % infusion  Rate: 250 mL/hr Dose: 250 mL/hr  Freq: Continuous PRN Route: IV  PRN Comment: For Corrected Sodium < 135 and K > 5.3 and Glucose > 250  Start: 07/29/22 0919 End: 07/30/22 1717    1717-D/C'd          sodium chloride 0.9 % with KCl 20 mEq/L infusion  Rate: 250 mL/hr Dose: 250 mL/hr  Freq: Continuous PRN Route: IV  PRN Comment: For Corrected Sodium < 135 and K 3.3 - 5.3 and Glucose > 250  Start: 07/29/22 0919 End: 07/30/22 1717    1717-D/C'd          sodium chloride 0.9 % with KCl 40 mEq/L infusion  Rate: 250 mL/hr Dose: 250 mL/hr  Freq: Continuous PRN Route: IV  PRN Comment: For Corrected Sodium < 135 and K < 3.3 and Glucose > 250  Start: 07/29/22 0919 End: 07/30/22 1717    1717-D/C'd          Medications 07/30/22 07/31/22 08/01/22           PRN Meds Sorted by Name  for JessicafawnSonal Laurel as of 7/30/22 through 8/1/22  Legend:                          Inactive     Active     Other Encounter     Linked                 Medications 07/30/22 07/31/22 08/01/22    calcium gluconate 1g/50ml 0.675% NaCl IV SOLN  Dose: 1 g  Freq: As Needed Route: IV  PRN Comment: per protocol in admin instructions  Start: 07/31/22 0804   Admin Instructions:   Calcium replacement: If corrected Ca < 7.5 mg/dL: Give Calcium Gluconate 1gm/100mL NS IV over 1 hour Then Calcium Gluconate 6gm/500mL NS IV over 6 hours Corrected calcium = serum calcium + 0.8 (4 - serum albumin) Recheck Ca 6 hours after infusion complete. If corrected Ca < 7.5 mg/dL, contact physician.         And  calcium gluconate 6 g in sodium chloride 0.9 % 500 mL IVPB  Dose: 6 g  Freq: As Needed Route: IV  PRN Comment: per protocol in admin instructions  Start: 07/31/22 0804   Admin Instructions:   Calcium replacement: If corrected Ca < 7.5 mg/dL: Give Calcium Gluconate 1gm/100mL NS IV over 1 hour Then Calcium Gluconate 6gm/500mL NS IV over 6 hours Corrected calcium = serum calcium +  0.8 (4 - serum albumin) Recheck Ca 6 hours after infusion complete. If corrected Ca < 7.5 mg/dL, contact physician.         dextrose (D50W) (25 g/50 mL) IV injection 10-50 mL  Dose: 10-50 mL  Freq: Every 15 Minutes PRN Route: IV  PRN Reason: Low Blood Sugar  PRN Comment: per Glucommander  Start: 07/29/22 0919 End: 07/30/22 1717   Admin Instructions:   Blood glucose less than 70, patient has IV access, is unresponsive, NPO, or unable to safely swallow. Recheck blood glucose per Glucommander.    1717-D/C'd          dextrose (GLUTOSE) oral gel 15 g  Dose: 15 g  Freq: Every 15 Minutes PRN Route: PO  PRN Reason: Low Blood Sugar  PRN Comment: per Glucommander  Start: 07/29/22 0919 End: 07/30/22 1717   Admin Instructions:   Blood glucose less than 70, patient alert, is not NPO, and can safely swallow. Recheck blood glucose per Glucommander.    1717-D/C'd          dextrose 5 % and sodium chloride 0.45 % infusion  Rate: 150 mL/hr Dose: 150 mL/hr  Freq: Continuous PRN Route: IV  PRN Comment: For Corrected Sodium >/= 135 and K > 5.3 and Glucose </= 250  Start: 07/29/22 0919 End: 07/30/22 1717 1717-D/C'd          dextrose 5 % and sodium chloride 0.45 % with KCl 20 mEq/L infusion  Rate: 150 mL/hr Dose: 150 mL/hr  Freq: Continuous PRN Route: IV  PRN Comment: For Corrected Sodium >/= 135 and K 3.3 - 5.3 and Glucose </= 250  Start: 07/29/22 0919 End: 07/30/22 1717    0422   1717-D/C'd         dextrose 5 % and sodium chloride 0.45 % with KCl 40 mEq/L infusion  Rate: 150 mL/hr Dose: 150 mL/hr  Freq: Continuous PRN Route: IV  PRN Comment: For Corrected Sodium >/= 135 and K < 3.3 and Glucose </= 250  Start: 07/29/22 0919 End: 07/30/22 1717    0956   1717-D/C'd  1736           dextrose 5 % and sodium chloride 0.9 % infusion  Rate: 150 mL/hr Dose: 150 mL/hr  Freq: Continuous PRN Route: IV  PRN Comment: For Corrected Sodium < 135 and K > 5.3 and Glucose </= 250  Start: 07/29/22 0919 End: 07/30/22 1717    1717-D/C'd          dextrose 5  % and sodium chloride 0.9 % with KCl 20 mEq/L infusion  Rate: 150 mL/hr Dose: 150 mL/hr  Freq: Continuous PRN Route: IV  PRN Comment: For Corrected Sodium < 135 and K 3.3 - 5.3  and Glucose </= 250  Start: 07/29/22 0919 End: 07/30/22 1717 1717-D/C'd          dextrose 5 % and sodium chloride 0.9 % with KCl 40 mEq/L infusion  Rate: 150 mL/hr Dose: 150 mL/hr  Freq: Continuous PRN Route: IV  PRN Comment: For Corrected Sodium < 135 and K < 3.3 and Glucose </= 250  Start: 07/29/22 0919 End: 07/30/22 1717 1717-D/C'd          glucagon (human recombinant) (GLUCAGEN DIAGNOSTIC) injection 1 mg  Dose: 1 mg  Freq: Every 15 Minutes PRN Route: IM  PRN Reason: Low Blood Sugar  PRN Comment: per Glucommander  Start: 07/29/22 0919   Admin Instructions:   Blood glucose less than 70, patient without IV access, is unresponsive, NPO, or unable to safely swallow. Recheck blood glucose per Glucommander.          Magnesium Sulfate 2 gram Bolus, followed by 8 gram infusion (total Mg dose 10 grams)- Mg less than or equal to 1mg/dL  Dose: 2 g  Freq: As Needed Route: IV  PRN Comment: See Administration Instructions  Start: 07/31/22 0804   Admin Instructions:   Mg less than or equal to 1mg/dL. Give 2 gm over 30 minutes as bolus, then infuse 2 gm over 2 hours for 4 doses (8 grams) for total dose of 10 grams.  Recheck Mg levels in the AM.                Or  Magnesium Sulfate 2 gram / 50mL Infusion (GIVE X 3 BAGS TO EQUAL 6GM TOTAL DOSE) - Mg 1.1 - 1.5 mg/dl  Dose: 2 g  Freq: As Needed Route: IV  PRN Comment: See Administration Instructions  Start: 07/31/22 0804   Admin Instructions:   Mg 1.1 -1.5 mg/dL. Infuse 2 grams over 2 hours for 3 doses (for a total Mg dose of 6 grams).  Recheck Mg level in the AM.                Or  Magnesium Sulfate 4 gram infusion- Mg 1.6-1.9 mg/dL  Dose: 4 g  Freq: As Needed Route: IV  PRN Comment: See Administration Instructions  Start: 07/31/22 0804   Admin Instructions:   Mg 1.6-1.9 mg/dL. Recheck Mg level in the  AM.     2140           ondansetron (ZOFRAN) injection 4 mg  Dose: 4 mg  Freq: Every 6 Hours PRN Route: IV  PRN Reasons: Nausea,Vomiting  Start: 07/29/22 1219    0044   0706       0144            potassium & sodium phosphates (PHOS-NAK) 280-160-250 MG packet - for Phosphorus less than 1.25 mg/dL  Dose: 2 packet  Freq: Every 6 Hours PRN Route: PO  PRN Comment: Phosphorus less than 1.25 mg/dL  Start: 07/31/22 0804   Admin Instructions:   Phosphorus replacement:     If Phos    < 1.25 mg/dL   : Give Neutraphos 2 packets by mouth every 6 hours x 2 doses. Recheck Phosphorus level 6 hours after replacement complete.         Or  potassium & sodium phosphates (PHOS-NAK) 280-160-250 MG packet - for Phosphorus 1.25 - 2.5 mg/dL  Dose: 2 packet  Freq: Every 6 Hours PRN Route: PO  PRN Comment: Phosphorus 1.25 - 2.5 mg/dL  Start: 07/31/22 0804   Admin Instructions:   Phosphorus replacement:       If Phos 1.25 - 2.5  mg/dL: Give Neutraphos 2 packets by mouth every 6 hours x 1 dose. Recheck Phosphorus level 6 hours after replacement complete.          potassium chloride (K-DUR,KLOR-CON) ER tablet 40 mEq  Dose: 40 mEq  Freq: As Needed Route: PO  PRN Comment: Potassium Replacement.  See Admin Instructions  Start: 07/31/22 0804   Admin Instructions:   Potassium 3.1 or Less Give KCl 40 mEq q4h x3 Doses   Potassium 3.2 - 3.6 Give KCl 40 mEq q4h x2 Doses     Check Potassium 4 Hours After Last Dose Given   Check Magnesium if Potassium Level Remains Low After Replacement   DO NOT GIVE if CrCl is Less Than 30 mL/minute or Urine Output Less Than 30 mL/hr  Swallow whole; do not crush, split, or chew.     0910   0593 [id number]           Or  potassium chloride (KLOR-CON) packet 40 mEq  Dose: 40 mEq  Freq: As Needed Route: PO  PRN Comment: potassium replacement, see admin instructions  Start: 07/31/22 0804   Admin Instructions:   Potassium 3.1 or Less Give KCl 40 mEq q4h x3 Doses   Potassium 3.2 - 3.6 Give KCl 40 mEq q4h x2 Doses     Check  Potassium 4 Hours After Last Dose Given   Check Magnesium if Potassium Level Remains Low After Replacement   DO NOT GIVE if CrCl is Less Than 30 mL/minute or Urine Output Less Than 30 mL/hr                           Or  potassium chloride 10 mEq in 100 mL IVPB  Dose: 10 mEq  Freq: Every 1 Hour PRN Route: IV  PRN Comment: Potassium Replacement - See Admin Instructions  Start: 07/31/22 0804   Admin Instructions:   Peripheral or Central IV  Potassium 3.1 or Less Give KCl 10 mEq/100 mL NS IV q1h x6 Doses  Potassium 3.2 - 3.6 Give KCl 10 mEq/100 mL NS q1h x4 Doses    Check Potassium 4 Hours After Last Dose Given  Check Magnesium if Potassium Remains Low After Replacement  DO NOT GIVE if CrCl is Less Than 30 mL/minute or Urine Output Less Than 30 mL/hr.     Rates Greater Than 10 mEq/hr Require ECG Monitoring.  OUTPATIENT/NON-MONITORED UNITS: Potassium Chloride standard bolus infusion rate is a maximum of 10 mEq/hr on unmonitored patients    MONITORED UNITS: Potassium Chloride standard bolus infusion rate is a maximum of 20 mEq/hr on ECG monitored patients ONLY                           prochlorperazine (COMPAZINE) injection 5 mg  Dose: 5 mg  Freq: Every 6 Hours PRN Route: IV  PRN Reasons: Nausea,Vomiting  Start: 07/30/22 2009 2028 0835            promethazine (PHENERGAN) tablet 25 mg  Dose: 25 mg  Freq: Every 6 Hours PRN Route: PO  PRN Reasons: Nausea,Vomiting  Start: 07/31/22 1101   Admin Instructions:        1133   1728       0326          Or  promethazine (PHENERGAN) suppository 12.5 mg  Dose: 12.5 mg  Freq: Every 6 Hours PRN Route: RE  PRN Reasons: Nausea,Vomiting  Start: 07/31/22 1101                         sodium chloride 0.45 % 1,000 mL with potassium chloride 40 mEq infusion  Rate: 250 mL/hr Dose: 250 mL/hr  Freq: Continuous PRN Route: IV  PRN Comment: For Corrected Sodium >/= 135 and K < 3.3 and Glucose > 250  Start: 07/29/22 0919 End: 07/30/22 1717    1717-D/C'd          sodium chloride 0.45 %  infusion  Rate: 250 mL/hr Dose: 250 mL/hr  Freq: Continuous PRN Route: IV  PRN Comment: For Corrected Sodium >/= 135 and K > 5.3 and Glucose > 250  Start: 07/29/22 0919 End: 07/30/22 1717    1717-D/C'd          sodium chloride 0.45 % with KCl 20 mEq/L infusion  Rate: 250 mL/hr Dose: 250 mL/hr  Freq: Continuous PRN Route: IV  PRN Comment: For Corrected Sodium >/= 135 and K 3.3 - 5.3 and Glucose > 250  Start: 07/29/22 0919 End: 07/30/22 1717    1717-D/C'd          sodium chloride 0.9 % flush 10 mL  Dose: 10 mL  Freq: As Needed Route: IV  PRN Reason: Line Care  Start: 07/29/22 0919         sodium chloride 0.9 % infusion  Rate: 250 mL/hr Dose: 250 mL/hr  Freq: Continuous PRN Route: IV  PRN Comment: For Corrected Sodium < 135 and K > 5.3 and Glucose > 250  Start: 07/29/22 0919 End: 07/30/22 1717    1717-D/C'd          sodium chloride 0.9 % with KCl 20 mEq/L infusion  Rate: 250 mL/hr Dose: 250 mL/hr  Freq: Continuous PRN Route: IV  PRN Comment: For Corrected Sodium < 135 and K 3.3 - 5.3 and Glucose > 250  Start: 07/29/22 0919 End: 07/30/22 1717    1717-D/C'd          sodium chloride 0.9 % with KCl 40 mEq/L infusion  Rate: 250 mL/hr Dose: 250 mL/hr  Freq: Continuous PRN Route: IV  PRN Comment: For Corrected Sodium < 135 and K < 3.3 and Glucose > 250  Start: 07/29/22 0919 End: 07/30/22 1717    1717-D/C'd          Medications 07/30/22 07/31/22 08/01/22

## 2022-08-01 NOTE — NURSING NOTE
Pt given discharge instructions both verbal and written. Dr Lucas consulted about insulin lispro orders for discharge. Dr Lucas stated to have to pt go back to the dosage she was taking at home before this hospital stay. Orders noted on discharge instructions. Pt educated on medications, follow ups with PCP and endocinologist ASAP. Reglan brought up via meds to beds.  Pt wheeled to car by RN with all belongings.

## 2022-08-01 NOTE — DISCHARGE SUMMARY
[facility]    Admit date: 7/29/2022  Discharge date: 8/1/2022    Admission/discharge diagnosis:  1. DKA  2. HIRA -- resolved  3. DMII  4. Peripheral neuropathy  5. HLD  6. HTN  7. Nausea    HPI: as per Dr Barajas:  56-year-old female who is developed nausea and vomiting 2 days ago.  She denies any fever chills or rigors prior to onset of symptoms.  Since Wednesday has been having nausea vomiting without diarrhea or abdominal pain.  No fever but currently with chills.  States she gets UTIs frequently.  Denies current dysuria or burning micturition that would be suggestive of UTI.  Has peripheral neuropathy from her diabetes.  Does not drink or smoke.  Denies any dietary indiscretions.  Denies heart disease or kidney disease.     Sonal De Dios  reports no history of alcohol use.,  reports that she has never smoked. She has never used smokeless tobacco.    Hospital course;  Mrs. De Dios did improve, she had some continued nausea and will be sent home with some reglan as needed.  She will need follow up with endorinology. She was able to meet with diabetic nurse educator.    Dispo: home    Activity: pre admission    Diet: consistent carbs    Follow up: PCP 2 weeks    Greater than 30 mins spent in discharging patient home       Discharge Medications      New Medications      Instructions Start Date   metoclopramide 5 MG tablet  Commonly known as: REGLAN   5 mg, Oral, 3 Times Daily PRN         Continue These Medications      Instructions Start Date   atorvastatin 10 MG tablet  Commonly known as: LIPITOR   10 mg, Oral, Daily      Dexcom G6 Transmitter misc   TAKE AS DIRECTED      escitalopram 5 MG tablet  Commonly known as: Lexapro   5 mg, Oral, Daily      FreeStyle Bruce 14 Day [place] device   1 each, Does not apply, 2 Times Daily      Dexcom G6  device   1 each, Does not apply, Take As Directed      FreeStyle Bruce 14 Day Sensor misc   1 each, Does not apply, 2 Times Daily      Dexcom G6  Sensor   USE EVERY 10 DAYS      gabapentin 600 MG tablet  Commonly known as: NEURONTIN   600 mg, Oral, Nightly      gabapentin 400 MG capsule  Commonly known as: NEURONTIN   Take 1 cap po twice daily at 8 am and 2 pm      glucose blood test strip   Check 5 times daily      glucose blood test strip  Commonly known as: OneTouch Verio   1 each, Other, 5 Times Daily, Use as instructed      hydrocortisone 2.5 % rectal cream  Commonly known as: Anusol-HC   Rectal, 2 Times Daily      Hydrocortisone (Perianal) 2.5 % rectal cream  Commonly known as: Anusol-HC   Rectal, 2 Times Daily      Insulin Lispro 100 UNIT/ML solution pen-injector  Commonly known as: HumaLOG KwikPen   Glucose below 150 no extra insulin, 150-200 give 2 units, 201-250 give 3 units, 251-300 give 4 units, >300 give 5 units      Insulin Lispro 100 UNIT/ML solution pen-injector  Commonly known as: HumaLOG KwikPen   4 UNITS BREAKFAST AND LUNCH AND 6 UNITS AT SUPPER DAILY SC      Insulin Lispro (1 Unit Dial) 100 UNIT/ML solution pen-injector  Commonly known as: HumaLOG KwikPen   Inject 10 units before meals      Insulin Lispro (1 Unit Dial) 100 UNIT/ML solution pen-injector  Commonly known as: HumaLOG KwikPen   Inject 5 units SQ before breakfast, 6 units before lunch and 8 units before dinner      Insulin Pen Needle 29G X 12.7MM misc   As directed      BD Pen Needle Alysha U/F 32G X 4 MM misc  Generic drug: Insulin Pen Needle   USE AS DIRECTED FOUR TIMES DAILY      BD Pen Needle Alysha U/F 32G X 4 MM misc  Generic drug: Insulin Pen Needle   Use as directed      Levemir FlexTouch 100 UNIT/ML injection  Generic drug: insulin detemir   40 Units, Subcutaneous, Daily      lisinopril 10 MG tablet  Commonly known as: PRINIVIL,ZESTRIL   10 mg, Oral, Daily

## 2022-08-01 NOTE — PAYOR COMM NOTE
"Va Sonal Barragan (56 y.o. Female)       PATIENT WITH AUTH # [id number] IS BEING CHANGED TO OBS AND DC'D HOME 8/1.    WILL NEED TO CHANGE TO OBS ADMISSION                    Date of Birth   1965    Social Security Number       Address   [address] [place] [address]    Home Phone   [phone]    MRN   [id number]       [employer]    Marital Status                               Admission Date   7/29/22    Admission Type   Emergency    Admitting Provider   Niko Armas MD    Attending Provider   Nikita Barajas MD    Department, Room/Bed   [address], 94/1       Discharge Date       Discharge Disposition   Home or Self Care    Discharge Destination                               Attending Provider: Nikita Barajas MD    Allergies: Codeine    Isolation: None   Infection: None   Code Status: CPR   Advance Care Planning Activity    Ht: 162.6 cm (64\")   Wt: 72.1 kg (158 lb 15.2 oz)    Admission Cmt: None   Principal Problem: None                Active Insurance as of 7/29/2022     Primary Coverage     Payor Plan Insurance Group Employer/Plan Group    [employer] [id number]     Payor Plan Address Payor Plan Phone Number Payor Plan Fax Number Effective Dates    [address] [phone]  1/1/2014 - None Entered    [facility] [id number]       Subscriber Name Subscriber Birth Date Member ID       WIL DE DIOS [birth year]/18/1960 [id number]                 Emergency Contacts      (Rel.) Home Phone Work Phone Mobile Phone    Wil De Dios (Spouse) [phone] -- [phone]               Discharge Summary      Apolinar Pennington MD at 08/01/22 [address] [place] [address] Pulmonary Care    Admit date: 7/29/2022  Discharge date: 8/1/2022    Admission/discharge diagnosis:  1. DKA  2. HIRA -- resolved  3. DMII  4. Peripheral neuropathy  5. HLD  6. HTN  7. Nausea    HPI: as per Dr Barajas:  56-year-old female who is developed nausea and vomiting 2 days ago.  " She denies any fever chills or rigors prior to onset of symptoms.  Since Wednesday has been having nausea vomiting without diarrhea or abdominal pain.  No fever but currently with chills.  States she gets UTIs frequently.  Denies current dysuria or burning micturition that would be suggestive of UTI.  Has peripheral neuropathy from her diabetes.  Does not drink or smoke.  Denies any dietary indiscretions.  Denies heart disease or kidney disease.     Sonal De Dios  reports no history of alcohol use.,  reports that she has never smoked. She has never used smokeless tobacco.    Hospital course;  Mrs. De Dios did improve, she had some continued nausea and will be sent home with some reglan as needed.  She will need follow up with endorinology. She was able to meet with diabetic nurse educator.    Dispo: home    Activity: pre admission    Diet: consistent carbs    Follow up: PCP 2 weeks    Greater than 30 mins spent in discharging patient home       Discharge Medications      New Medications      Instructions Start Date   metoclopramide 5 MG tablet  Commonly known as: REGLAN   5 mg, Oral, 3 Times Daily PRN         Continue These Medications      Instructions Start Date   atorvastatin 10 MG tablet  Commonly known as: LIPITOR   10 mg, Oral, Daily      Dexcom G6 Transmitter misc   TAKE AS DIRECTED      escitalopram 5 MG tablet  Commonly known as: Lexapro   5 mg, Oral, Daily      FreeStyle Bruce 14 Day [place] device   1 each, Does not apply, 2 Times Daily      Dexcom G6  device   1 each, Does not apply, Take As Directed      FreeStyle Bruce 14 Day Sensor misc   1 each, Does not apply, 2 Times Daily      Dexcom G6 Sensor   USE EVERY 10 DAYS      gabapentin 600 MG tablet  Commonly known as: NEURONTIN   600 mg, Oral, Nightly      gabapentin 400 MG capsule  Commonly known as: NEURONTIN   Take 1 cap po twice daily at 8 am and 2 pm      glucose blood test strip   Check 5 times daily      glucose blood test  strip  Commonly known as: OneTouch Verio   1 each, Other, 5 Times Daily, Use as instructed      hydrocortisone 2.5 % rectal cream  Commonly known as: Anusol-HC   Rectal, 2 Times Daily      Hydrocortisone (Perianal) 2.5 % rectal cream  Commonly known as: Anusol-HC   Rectal, 2 Times Daily      Insulin Lispro 100 UNIT/ML solution pen-injector  Commonly known as: HumaLOG KwikPen   Glucose below 150 no extra insulin, 150-200 give 2 units, 201-250 give 3 units, 251-300 give 4 units, >300 give 5 units      Insulin Lispro 100 UNIT/ML solution pen-injector  Commonly known as: HumaLOG KwikPen   4 UNITS BREAKFAST AND LUNCH AND 6 UNITS AT SUPPER DAILY SC      Insulin Lispro (1 Unit Dial) 100 UNIT/ML solution pen-injector  Commonly known as: HumaLOG KwikPen   Inject 10 units before meals      Insulin Lispro (1 Unit Dial) 100 UNIT/ML solution pen-injector  Commonly known as: HumaLOG KwikPen   Inject 5 units SQ before breakfast, 6 units before lunch and 8 units before dinner      Insulin Pen Needle 29G X 12.7MM misc   As directed      BD Pen Needle Alysha U/F 32G X 4 MM misc  Generic drug: Insulin Pen Needle   USE AS DIRECTED FOUR TIMES DAILY      BD Pen Needle Alysha U/F 32G X 4 MM misc  Generic drug: Insulin Pen Needle   Use as directed      Levemir FlexTouch 100 UNIT/ML injection  Generic drug: insulin detemir   40 Units, Subcutaneous, Daily      lisinopril 10 MG tablet  Commonly known as: PRINIVIL,ZESTRIL   10 mg, Oral, Daily               Electronically signed by Apolinar Pennington MD at 08/01/22 2361

## 2022-08-01 NOTE — NURSING NOTE
"Diabetes Education  Assessment/Teaching    Patient Name:  Sonal De Dios  YOB: 1965  MRN: [id number]  Admit Date:  7/29/2022      Assessment Date:  8/1/2022  Flowsheet Row Most Recent Value   General Information     Referral From: MD order/dka order set. Meet with 55 y/o at bedside to assess needs for DM ed.    Height 162.6 cm (64\")   Height Method Stated   Weight 72.1 kg (158 lb 15.2 oz)   Weight Method Standing scale   Diabetes History    What type of diabetes do you have? Type 1 -likely T1. pt seems unsure or has not been told.   Length of Diabetes Diagnosis -- 30+ yr hx. (since an episode of GDM per pt)    Do you test your blood sugar at home? yes -pt reports she has a Dexcom/CGM.   Have you had high blood sugar? (>140mg/dl) yes -current a1c 12.   Do you have any diabetes complications? neuropathy   Education Preferences    Barriers to Learning -- no learning barriers evident at this time.   Nutrition Information    Assessment Topics    Taking Medication - Assessment Needs education -re importance of not omitting (bhargavi Lantus or long acting) rx'd insulin.   Problem Solving - Assessment Needs education   Healthy Coping - Assessment Competent   Monitoring - Assessment Competent   DM Goals    Contact Plan Follow-up medical care          Flowsheet Row Most Recent Value   DM Education Needs    Meter Has own   Meter Type -- Dexcom/CGM.   Medication Insulin -d/w pt the importance of not omitting doses of insulin bhargavi Levemir or long-acting insulin- even when ill.   Problem Solving -- Explain dka dz process to pt.    Reducing Risks Neuropathy   Healthy Coping Appropriate   Discharge Plan Home, Follow-up with PCP. Pt needs to find a new endo. Her previous endo (at [facility]) has since left the office. Discuss.   Motivation Engaged   Teaching Method Explanation, Discussion   Patient Response Verbalized understanding      Other Comments:   Electronically signed by:  Sugey Peraza, RN, BSN, " CDCES  08/01/22 17:37 EDT

## 2022-08-01 NOTE — OUTREACH NOTE
Prep Survey    Flowsheet Row Responses   [facility] facility patient discharged from? [facility]   Is LACE score < 7 ? No   Emergency Room discharge w/ pulse ox? No   Eligibility [facility]   Date of Admission 07/29/22   Date of Discharge 08/01/22   Discharge Disposition Home or Self Care   Discharge diagnosis DKA, HIRA, T2DM, N/V/D   Does the patient have one of the following disease processes/diagnoses(primary or secondary)? Other   Does the patient have Home health ordered? No   Is there a DME ordered? No   Prep survey completed? Yes          JOHNY CARTER - Registered Nurse

## 2022-08-02 ENCOUNTER — TRANSITIONAL CARE MANAGEMENT TELEPHONE ENCOUNTER (OUTPATIENT)
Dept: CALL CENTER | Facility: HOSPITAL | Age: 57
End: 2022-08-02

## 2022-08-02 NOTE — OUTREACH NOTE
Call Center TCM Note    Flowsheet Row Responses   [facility] patient discharged from? [place]   Does the patient have one of the following disease processes/diagnoses(primary or secondary)? Other   TCM attempt successful? No   Unsuccessful attempts Attempt 1          Kierra Wilson MA    8/2/2022, 15:58 EDT

## 2022-08-02 NOTE — CASE MANAGEMENT/SOCIAL WORK
Case Management Discharge Note      Final Note: Home    Provided Post Acute Provider List?: N/A  N/A Provider List Comment: Patient plans to discharge home  Provided Post Acute Provider Quality & Resource List?: N/A  N/A Quality & Resource List Comment: Patient plans to discharge home    Selected Continued Care - Discharged on 8/1/2022 Admission date: 7/29/2022 - Discharge disposition: Home or Self Care    Destination    No services have been selected for the patient.              Durable Medical Equipment    No services have been selected for the patient.              Dialysis/Infusion    No services have been selected for the patient.              Home Medical Care    No services have been selected for the patient.              Therapy    No services have been selected for the patient.              Community Resources    No services have been selected for the patient.              Community & DME    No services have been selected for the patient.                       Final Discharge Disposition Code: 01 - home or self-care

## 2022-08-02 NOTE — OUTREACH NOTE
Call Center TCM Note    Flowsheet Row Responses   [facility] patient discharged from? [place]   Does the patient have one of the following disease processes/diagnoses(primary or secondary)? Other   TCM attempt successful? No   Unsuccessful attempts Attempt 2          Lisa Cifuentes LPN    8/2/2022, 16:03 EDT

## 2022-08-02 NOTE — PAYOR COMM NOTE
"JoshtamvivianeSonal (56 y.o. Female)     ATTN:  PENDING REFERENCE # [id number]  FOR 72 HOUR OBS    CONTACT JL FARMER  P# [phone]  F# [phone]              Date of Birth   1965    Social Security Number       Address   [address] [place] [address]    Home Phone   [phone]    MRN   [id number]       Sikh   Mormonism    Marital Status                               Admission Date   7/29/22    Admission Type   Emergency    Admitting Provider   Niko Armas MD    Attending Provider       Department, Room/Bed   [address], P694/1       Discharge Date   8/1/2022    Discharge Disposition   Home or Self Care    Discharge Destination                               Attending Provider: (none)   Allergies: Codeine    Isolation: None   Infection: None   Code Status: Prior   Advance Care Planning Activity    Ht: 162.6 cm (64\")   Wt: 72.1 kg (158 lb 15.2 oz)    Admission Cmt: None   Principal Problem: None                Active Insurance as of 7/29/2022     Primary Coverage     Payor Plan Insurance Group Employer/Plan Group    CIGNA CIGNA [id number]     Payor Plan Address Payor Plan Phone Number Payor Plan Fax Number Effective Dates    [address] [phone]  1/1/2014 - None Entered    [facility] [id number]       Subscriber Name Subscriber Birth Date Member ID       JOSHTAMARMANDOWIL EASLEY 8/18/1960 [id number]                 Emergency Contacts      (Rel.) Home Phone Work Phone Mobile Phone    VaWil (Spouse) [phone] -- [phone]               History & Physical      Nikita Barajas MD at 07/29/22 [facility]              [facility]  [phone]  Dr. Nikita Barajas      Subjective   LOS: 0 days     56-year-old female who is developed nausea and vomiting 2 days ago.  She denies any fever chills or rigors prior to onset of symptoms.  Since Wednesday has been having nausea vomiting without diarrhea or abdominal pain.  No fever " but currently with chills.  States she gets UTIs frequently.  Denies current dysuria or burning micturition that would be suggestive of UTI.  Has peripheral neuropathy from her diabetes.  Does not drink or smoke.  Denies any dietary indiscretions.  Denies heart disease or kidney disease.    Sonal De Dios  reports no history of alcohol use.,  reports that she has never smoked. She has never used smokeless tobacco.     Past Hx:  has a past medical history of Diabetes mellitus (HCC), Gestational diabetes, and Pneumonia.  Surg Hx:  has a past surgical history that includes Endometrial ablation.  FH: family history includes Diabetes in her mother.  SH:  reports that she has never smoked. She has never used smokeless tobacco. She reports that she does not drink alcohol and does not use drugs.    Medications Prior to Admission   Medication Sig Dispense Refill Last Dose   • atorvastatin (LIPITOR) 10 MG tablet Take 1 tablet by mouth Daily. 90 tablet 1    • BD PEN NEEDLE ROSITA U/F 32G X 4 MM misc USE AS DIRECTED FOUR TIMES DAILY 100 each 2    • Continuous Blood Gluc  (Dexcom G6 ) device 1 each Take As Directed. 1 each 0    • Continuous Blood Gluc  (FREESTYLE JUAN 14 DAY READER) device 1 each 2 (Two) Times a Day. 1 Device 0    • Continuous Blood Gluc Sensor (Dexcom G6 Sensor) USE EVERY 10 DAYS 3 each 6    • Continuous Blood Gluc Sensor (FREESTYLE JUAN 14 DAY SENSOR) misc 1 each 2 (Two) Times a Day. 6 each 3    • Continuous Blood Gluc Transmit (Dexcom G6 Transmitter) misc TAKE AS DIRECTED 1 each 0    • escitalopram (LEXAPRO) 5 MG tablet Take 1 tablet by mouth Daily. 30 tablet 5    • gabapentin (NEURONTIN) 400 MG capsule Take 1 cap po twice daily at 8 am and 2 pm 180 capsule 1    • gabapentin (NEURONTIN) 600 MG tablet Take 1 tablet by mouth Every Night. 90 tablet 1    • glucose blood (ONETOUCH VERIO) test strip 1 each by Other route 5 (Five) Times a Day. Use as instructed 150 each 3    • glucose  blood test strip Check 5 times daily 100 each 12    • hydrocortisone (ANUSOL-HC) 2.5 % rectal cream Insert  into the rectum 2 (Two) Times a Day. 28.35 g 0    • Hydrocortisone, Perianal, (Anusol-HC) 2.5 % rectal cream Insert  into the rectum 2 (Two) Times a Day. 28 g 1    • insulin detemir (Levemir FlexTouch) 100 UNIT/ML injection Inject 40 Units under the skin into the appropriate area as directed Daily. 6 pen 3    • Insulin Lispro (HUMALOG KWIKPEN) 100 UNIT/ML solution pen-injector Glucose below 150 no extra insulin, 150-200 give 2 units, 201-250 give 3 units, 251-300 give 4 units, >300 give 5 units 15 mL 0    • Insulin Lispro (HUMALOG KWIKPEN) 100 UNIT/ML solution pen-injector 4 UNITS BREAKFAST AND LUNCH AND 6 UNITS AT SUPPER DAILY SC 15 mL 5    • Insulin Lispro, 1 Unit Dial, (HUMALOG KWIKPEN) 100 UNIT/ML solution pen-injector Inject 10 units before meals 3 pen 3    • Insulin Lispro, 1 Unit Dial, (HumaLOG KwikPen) 100 UNIT/ML solution pen-injector Inject 5 units SQ before breakfast, 6 units before lunch and 8 units before dinner 6 pen 5    • Insulin Pen Needle (BD Pen Needle Alysha U/F) 32G X 4 MM misc Use as directed 200 each 12    • Insulin Pen Needle 29G X 12.7MM misc As directed 100 each 11    • lisinopril (PRINIVIL,ZESTRIL) 10 MG tablet Take 1 tablet by mouth Daily. 90 tablet 1      Allergies   Allergen Reactions   • Codeine GI Intolerance       Review of Systems   Constitutional: Positive for chills and fatigue. Negative for fever.   HENT: Negative for congestion and sore throat.    Respiratory: Negative for cough, shortness of breath and wheezing.    Cardiovascular: Negative for chest pain and leg swelling.   Gastrointestinal: Positive for nausea and vomiting. Negative for abdominal pain and diarrhea.   Genitourinary: Negative for dysuria and hematuria.   Musculoskeletal: Negative for arthralgias and back pain.   Skin: Negative for pallor and rash.   Neurological: Negative for dizziness and light-headedness.    Psychiatric/Behavioral: Negative for agitation and confusion.     Vital Signs past 24hrs  BP range: BP: (111-144)/(52-87) 111/52  Pulse range: Heart Rate:  [120-126] 120  Resp rate range: Resp:  [17-18] 18  Temp range: Temp (24hrs), Av.8 °F (36 °C), Min:96.8 °F (36 °C), Max:96.8 °F (36 °C)    Oxygen range: SpO2:  [95 %-100 %] 100 %;  ;      71.2 kg (156 lb 15.5 oz); Body mass index is 26.94 kg/m².  I/O this shift:  In: 1000 [IV Piggyback:1000]  Out: -     Adult female who is laying in bed.  She appears somewhat tired.  She is hoarse because of all the vomiting.  Pupils equal and reactive light.  Oropharynx dry.  Class II Mallampati airway.  JVP not elevated trachea midline thyroid not enlarged.  Lungs reveal bilateral air entry.  Clear to auscultation with trace rales in the bases posteriorly.  No rhonchi.  Percussion note resonant chest expansion equal no chest wall deformity or tenderness.  Heart examination S1-S2 present rhythm regular and tachycardic.  No murmurs.  No edema lower extremities.  Abdomen is soft nontender bowel sounds present no liver spleen enlargement.  No peripheral cyanosis clubbing.  Moves all 4 extremities sensorimotor intact though patient does have some numbness on her feet from neuropathy.  No cervical, axillary, inguinal adenopathy.    Results Review:    I have reviewed the laboratory and imaging data from current admission. My annotations are as noted in assessment and plan.    Medication Review:  I have reviewed the current MAR. My annotations are as noted in assessment and plan.    Plan   PCCM Problems  DKA  Acute kidney injury  Diabetes type 2 insulin-dependent  Peripheral neuropathy  History UTI    Plan of Treatment  DKA treat per protocol.  Reason for precipitation of DKA unclear.  Patient reports history of UTI though no symptoms currently.  Note that urine analysis is pending.  Holding on antibiotics.  Has been very compliant with her treatment with insulin.    Acute kidney  injury with dehydration and tachycardia.  Continue fluids per protocol.    History of UTI and pending urinalysis.    Keep n.p.o. except sips of water.    Spoke to spouse at bedside.    I spent 35 mins critical care time in care of this patient outside of any procedures.     Electronically signed by Nikita Barajas MD, 07/29/22, 11:49 AM EDT.      Part of this note may be an electronic transcription/translation of spoken language to printed text using the Dragon Dictation System.      Electronically signed by Nikita Barajas MD at 07/29/22 1155          Emergency Department Notes      Tatyana Martinez RN at 07/29/22 0648        Pt presents to ER from home via pvt car c/o n/v x 4 days, not able to keep anything down.  DM2 (blood sugar checked in triage 302).  Pt denies any abdominal pain, states this happened before in December and she had a UTI at that time, but has no symptoms at this time.     Electronically signed by Tatyana Martinez RN at 07/29/22 0650     Zachary Perry MD at 07/29/22 0701      Procedure Orders    1. Critical Care [[id number]] ordered by Zachary Perry MD               EMERGENCY DEPARTMENT ENCOUNTER    Room Number:  ANGELINA/ANGELINA  PCP: Ammy Holman APRN  Historian: Patient  History Limited By: Nothing      HPI  Chief Complaint: Nausea vomiting  Context: Sonal De Dios is a 56 y.o. female who presents to the ED c/o nausea and vomiting.  Patient with history of diabetes.  States for the last 3 to 4 days she has had nausea and vomiting.  Has not been able to keep down fluids.  Patient has had no abdominal pain.  Has had no diarrhea.  Has had no headache.  Has had 1 episode of DKA in the recent past.  Patient states she did have similar episode of nausea and vomiting December when she had a urinary tract infection.  Has had no urinary symptoms this time.  No fevers.      Location: Nausea and vomiting  Radiation: None  Character: Persistent  Duration: 3 to 4 days  Severity:  Severe  Progression: Not improving  Aggravating Factors: Nothing  Alleviating Factors: Nothing        MEDICAL RECORD REVIEW    Diabetes          PAST MEDICAL HISTORY  Active Ambulatory Problems     Diagnosis Date Noted   • Uncontrolled type 2 diabetes mellitus with diabetic neuropathy, with long-term current use of insulin 04/10/2018   • Encounter for long-term (current) use of insulin (HCC) 04/10/2018   • Hyperinsulinism 04/10/2018   • Noncompliance with medication regimen 04/10/2018   • Uncontrolled type 1 diabetes with diabetic neuropathy 06/27/2018     Resolved Ambulatory Problems     Diagnosis Date Noted   • Diabetic ketoacidosis with coma associated with type 2 diabetes mellitus (HCC) 03/06/2018   • Edema 03/10/2018     Past Medical History:   Diagnosis Date   • Diabetes mellitus (HCC)    • Gestational diabetes    • Pneumonia          PAST SURGICAL HISTORY  Past Surgical History:   Procedure Laterality Date   • ENDOMETRIAL ABLATION           FAMILY HISTORY  Family History   Problem Relation Age of Onset   • Diabetes Mother          SOCIAL HISTORY  Social History     Socioeconomic History   • Marital status:    Tobacco Use   • Smoking status: Never Smoker   • Smokeless tobacco: Never Used   Vaping Use   • Vaping Use: Never used   Substance and Sexual Activity   • Alcohol use: No   • Drug use: No   • Sexual activity: Yes     Partners: Male     Birth control/protection: Surgical         ALLERGIES  Codeine        REVIEW OF SYSTEMS  Review of Systems   Constitutional: Negative for fever.   HENT: Negative for sore throat.    Eyes: Negative.    Respiratory: Negative for cough and shortness of breath.    Cardiovascular: Negative for chest pain.   Gastrointestinal: Positive for nausea and vomiting. Negative for abdominal pain and diarrhea.   Genitourinary: Negative for dysuria.   Musculoskeletal: Negative for neck pain.   Skin: Negative for rash.   Allergic/Immunologic: Negative.    Neurological: Negative for  weakness, numbness and headaches.   Hematological: Negative.    Psychiatric/Behavioral: Negative.    All other systems reviewed and are negative.           PHYSICAL EXAM  ED Triage Vitals [07/29/22 0646]   Temp Heart Rate Resp BP SpO2   96.8 °F (36 °C) (!) 126 18 130/87 95 %      Temp src Heart Rate Source Patient Position BP Location FiO2 (%)   Oral Monitor Sitting Right arm --       Physical Exam  Vitals and nursing note reviewed.   Constitutional:       General: She is not in acute distress.  HENT:      Head: Normocephalic and atraumatic.      Mouth/Throat:      Mouth: Mucous membranes are dry.   Eyes:      Pupils: Pupils are equal, round, and reactive to light.   Cardiovascular:      Rate and Rhythm: Regular rhythm. Tachycardia present.      Heart sounds: Normal heart sounds.   Pulmonary:      Effort: Pulmonary effort is normal. No respiratory distress.      Breath sounds: Normal breath sounds.   Abdominal:      Palpations: Abdomen is soft.      Tenderness: There is no abdominal tenderness. There is no guarding or rebound.   Musculoskeletal:         General: Normal range of motion.      Cervical back: Normal range of motion and neck supple.   Skin:     General: Skin is warm and dry.      Findings: No rash.   Neurological:      Mental Status: She is alert and oriented to person, place, and time.      Sensory: Sensation is intact. No sensory deficit.      Motor: No weakness.   Psychiatric:         Mood and Affect: Mood and affect normal.       Patient was wearing a face mask when I entered the room and they continued to wear a mask throughout their stay in the ED.  I wore PPE, including  gloves, face mask with shield or face mask with goggles whenever I was in the room with patient.       LAB RESULTS  Recent Results (from the past 24 hour(s))   POC Glucose Once    Collection Time: 07/29/22  6:48 AM    Specimen: Blood   Result Value Ref Range    Glucose 302 (H) 70 - 130 mg/dL   ECG 12 Lead    Collection Time:  07/29/22  7:24 AM   Result Value Ref Range    QT Interval 320 ms   Comprehensive Metabolic Panel    Collection Time: 07/29/22  7:32 AM    Specimen: Blood   Result Value Ref Range    Glucose 333 (H) 65 - 99 mg/dL    BUN 37 (H) 6 - 20 mg/dL    Creatinine 1.17 (H) 0.57 - 1.00 mg/dL    Sodium 134 (L) 136 - 145 mmol/L    Potassium 3.4 (L) 3.5 - 5.2 mmol/L    Chloride 93 (L) 98 - 107 mmol/L    CO2 14.0 (L) 22.0 - 29.0 mmol/L    Calcium 10.7 (H) 8.6 - 10.5 mg/dL    Total Protein 7.8 6.0 - 8.5 g/dL    Albumin 5.10 3.50 - 5.20 g/dL    ALT (SGPT) 17 1 - 33 U/L    AST (SGOT) 9 1 - 32 U/L    Alkaline Phosphatase 110 39 - 117 U/L    Total Bilirubin 0.4 0.0 - 1.2 mg/dL    Globulin 2.7 gm/dL    A/G Ratio 1.9 g/dL    BUN/Creatinine Ratio 31.6 (H) 7.0 - 25.0    Anion Gap 27.0 (H) 5.0 - 15.0 mmol/L    eGFR 54.5 (L) >60.0 mL/min/1.73   Lipase    Collection Time: 07/29/22  7:32 AM    Specimen: Blood   Result Value Ref Range    Lipase 7 (L) 13 - 60 U/L   Troponin    Collection Time: 07/29/22  7:32 AM    Specimen: Blood   Result Value Ref Range    Troponin T <0.010 0.000 - 0.030 ng/mL   CBC Auto Differential    Collection Time: 07/29/22  7:32 AM    Specimen: Blood   Result Value Ref Range    WBC 18.27 (H) 3.40 - 10.80 10*3/mm3    RBC 4.83 3.77 - 5.28 10*6/mm3    Hemoglobin 14.2 12.0 - 15.9 g/dL    Hematocrit 43.0 34.0 - 46.6 %    MCV 89.0 79.0 - 97.0 fL    MCH 29.4 26.6 - 33.0 pg    MCHC 33.0 31.5 - 35.7 g/dL    RDW 11.8 (L) 12.3 - 15.4 %    RDW-SD 37.8 37.0 - 54.0 fl    MPV 10.7 6.0 - 12.0 fL    Platelets 374 140 - 450 10*3/mm3    Neutrophil % 82.9 (H) 42.7 - 76.0 %    Lymphocyte % 10.2 (L) 19.6 - 45.3 %    Monocyte % 5.4 5.0 - 12.0 %    Eosinophil % 0.0 (L) 0.3 - 6.2 %    Basophil % 0.2 0.0 - 1.5 %    Immature Grans % 1.3 (H) 0.0 - 0.5 %    Neutrophils, Absolute 15.15 (H) 1.70 - 7.00 10*3/mm3    Lymphocytes, Absolute 1.87 0.70 - 3.10 10*3/mm3    Monocytes, Absolute 0.99 (H) 0.10 - 0.90 10*3/mm3    Eosinophils, Absolute 0.00 0.00 - 0.40  10*3/mm3    Basophils, Absolute 0.03 0.00 - 0.20 10*3/mm3    Immature Grans, Absolute 0.23 (H) 0.00 - 0.05 10*3/mm3    nRBC 0.0 0.0 - 0.2 /100 WBC   Beta Hydroxybutyrate Quantitative    Collection Time: 07/29/22  7:32 AM    Specimen: Blood   Result Value Ref Range    Beta-Hydroxybutyrate Quant 5.399 (H) 0.020 - 0.270 mmol/L   Phosphorus    Collection Time: 07/29/22  7:32 AM    Specimen: Blood   Result Value Ref Range    Phosphorus 4.0 2.5 - 4.5 mg/dL   Magnesium    Collection Time: 07/29/22  7:32 AM    Specimen: Blood   Result Value Ref Range    Magnesium 2.2 1.6 - 2.6 mg/dL   Blood Gas, Venous -    Collection Time: 07/29/22  9:12 AM    Specimen: Venous Blood   Result Value Ref Range    Site Arterial: right radial     pH, Venous 7.342 7.310 - 7.410 pH Units    pCO2, Venous 26.2 (L) 41.0 - 51.0 mm Hg    pO2, Venous 49.2 (H) 35.0 - 45.0 mm Hg    HCO3, Venous 14.2 (L) 22.0 - 28.0 mmol/L    Base Excess, Venous -9.8 (L) -2.0 - 2.0 mmol/L    O2 Saturation Calculated 83.2 (L) 92.0 - 99.0 %    Barometric Pressure for Blood Gas 751.7 mmHg    Modality Room Air     Set Mech Resp Rate 20     Rate 20 Breaths/minute   COVID-19,[facility] IN-HOUSE CEPHEID/RAY NP SWAB IN TRANSPORT MEDIA 8-12 HR TAT - Swab, Nasopharynx    Collection Time: 07/29/22  9:44 AM    Specimen: Nasopharynx; Swab   Result Value Ref Range    COVID19 Not Detected Not Detected - Ref. Range   POC Glucose Once    Collection Time: 07/29/22  9:53 AM    Specimen: Blood   Result Value Ref Range    Glucose 273 (H) 70 - 130 mg/dL       Ordered the above labs and reviewed the results.        RADIOLOGY  XR Chest 2 View   Final Result           Ordered the above noted radiological studies. Reviewed by me in PACS.          PROCEDURES  Critical Care  Performed by: Zachary Perry MD  Authorized by: Niko Armas MD     Critical care provider statement:     Critical care time (minutes):  30    Critical care time was exclusive of:  Separately billable procedures and treating  other patients    Critical care was necessary to treat or prevent imminent or life-threatening deterioration of the following conditions:  Endocrine crisis    Critical care was time spent personally by me on the following activities:  Ordering and performing treatments and interventions, ordering and review of laboratory studies, ordering and review of radiographic studies and discussions with primary provider          EKG:          EKG time: 724  Rhythm/Rate: Sinus tachycardia 122  P waves and SC: Normal P waves multiple PVCs  QRS, axis: Normal QRS  ST and T waves: Nonspecific ST-T waves    Interpreted Contemporaneously by me, independently viewed  Changed compared to prior 3/6/2018.  PVCs are new        MEDICATIONS GIVEN IN ER  Medications   sodium chloride 0.9 % flush 10 mL (10 mL Intravenous Given 7/29/22 1001)   sodium chloride 0.9 % flush 10 mL (has no administration in time range)   dextrose (GLUTOSE) oral gel 15 g (has no administration in time range)   dextrose (D50W) (25 g/50 mL) IV injection 10-50 mL (has no administration in time range)   glucagon (human recombinant) (GLUCAGEN DIAGNOSTIC) injection 1 mg (has no administration in time range)   sodium chloride 0.9 % bolus (1,000 mL/hr Intravenous New Bag 7/29/22 1000)   sodium chloride 0.9 % infusion (has no administration in time range)   sodium chloride 0.9 % with KCl 20 mEq/L infusion (has no administration in time range)   sodium chloride 0.9 % with KCl 40 mEq/L infusion (has no administration in time range)   dextrose 5 % and sodium chloride 0.9 % infusion (has no administration in time range)   dextrose 5 % and sodium chloride 0.9 % with KCl 20 mEq/L infusion (has no administration in time range)   dextrose 5 % and sodium chloride 0.9 % with KCl 40 mEq/L infusion (has no administration in time range)   sodium chloride 0.45 % infusion (has no administration in time range)   sodium chloride 0.45 % with KCl 20 mEq/L infusion (has no administration in  time range)   sodium chloride 0.45 % 1,000 mL with potassium chloride 40 mEq infusion (has no administration in time range)   dextrose 5 % and sodium chloride 0.45 % infusion (has no administration in time range)   dextrose 5 % and sodium chloride 0.45 % with KCl 20 mEq/L infusion (has no administration in time range)   dextrose 5 % and sodium chloride 0.45 % with KCl 40 mEq/L infusion (has no administration in time range)   insulin regular 1 unit/mL in 0.9% sodium chloride (2.1 Units/hr Intravenous New Bag 7/29/22 0958)   potassium chloride 10 mEq in 100 mL IVPB (10 mEq Intravenous New Bag 7/29/22 1001)   sodium chloride 0.9 % bolus 1,000 mL (0 mL Intravenous Stopped 7/29/22 0944)   ondansetron (ZOFRAN) injection 4 mg (4 mg Intravenous Given 7/29/22 0732)             PROGRESS AND CONSULTS  ED Course as of 07/29/22 1047   Fri Jul 29, 2022   0924 09:24 EDT  Patient is diabetic and continues to have vomiting despite antiemetics.  Patient is tachycardic.  Patient's blood gas appears to be mixed.  Patient's bicarb however is 14.  Patient's anion gap is 27.  Beta hydroxybutyrate is elevated.  Despite being acidotic patient's potassium is already low.  I think she would do poorly on the floor.  Patient has been discussed with Dr. Armas who will admit.  DKA protocol. [SL]      ED Course User Index  [SL] Zachary Perry MD           MEDICAL DECISION MAKING      MDM  Number of Diagnoses or Management Options     Amount and/or Complexity of Data Reviewed  Clinical lab tests: reviewed and ordered (Bicarb 14, anion gap 27)  Decide to obtain previous medical records or to obtain history from someone other than the patient: yes  Discuss the patient with other providers: yes (Discussed with Dr. Armas who will admit.)               DIAGNOSIS  Final diagnoses:   Diabetic ketoacidosis without coma associated with type 1 diabetes mellitus (HCC)   Hypokalemia           DISPOSITION  admit        Latest Documented Vital Signs:  As  of 10:47 EDT  BP- 111/52 HR- 120 Temp- 96.8 °F (36 °C) (Oral) O2 sat- 100%                           Zachary Perry MD  07/29/22 1048      Electronically signed by Zachary Perry MD at 07/29/22 1048     Marci Escalera RN at 07/29/22 0752        Pt aware of need for urine specimen.    Electronically signed by Marci Escalera RN at 07/29/22 0752     Marci Escalera RN at 07/29/22 1003        Attempted to call report. Nurse unavailable for report.     Electronically signed by Marci Escalera RN at 07/29/22 1013          Physician Progress Notes (last 7 days)      Apolinar Pennington MD at 07/31/22 0805          [facility]      Mar/chart reviewed  Follow up DKA  No abdominal pain, less nausea    Vital Sign Min/Max for last 24 hours  Temp  Min: 98.1 °F (36.7 °C)  Max: 98.4 °F (36.9 °C)   BP  Min: 117/71  Max: 174/98   Pulse  Min: 84  Max: 114   No data recorded   SpO2  Min: 93 %  Max: 97 %   No data recorded   Weight  Min: 76.6 kg (168 lb 14 oz)  Max: 76.6 kg (168 lb 14 oz)     Appears ill, axox3,   perrl, eomi, normal sclera,  mmm, no jvd, trachea midline, neck supple,  chest cta bilaterally, no crackles, no wheezes,   Tachy, regular  soft, nt, nd +bs,  no c/c/ e  Skin warm, dry no rashes    Labs: 7/31: reviewed:  Mg 1.7  Glucose 107  Bun 7  Cr 0.43  Na 138  k 2.7  Bicarb 24  Wbc 9.3  hgb 11.8  plts 244    A/P:  1. DKA --resolved  2. HIRA -- improved  3. DmII   4. Peripheral neuropathy  5. Hyperlipidemia -- resume statin when able  6. htn -- resume lisinopril when taking po better  7. Hypokalemia -- replace  8. Nausea -- still has some trouble    She can move out of unit; likely home tomorrow.     Electronically signed by Apolinar Pennington MD at 07/31/22 0807     Apolinar Pennington MD at 07/30/22 0838          [facility]     Mar/chart reviewed  Follow up DKA  No abdominal pain, less nausea    Vital Sign Min/Max for last 24 hours  Temp  Min: 98.1 °F (36.7 °C)  Max: 98.8 °F (37.1 °C)   BP  Min:  111/52  Max: 166/94   Pulse  Min: 92  Max: 129   Resp  Min: 17  Max: 20   SpO2  Min: 87 %  Max: 100 %   Flow (L/min)  Min: 2  Max: 2   Weight  Min: 71.2 kg (156 lb 15.5 oz)  Max: 71.2 kg (156 lb 15.5 oz)     Appears ill, axox3,   perrl, eomi, normal sclera,  mmm, no jvd, trachea midline, neck supple,  chest cta bilaterally, no crackles, no wheezes,   Tachy, regular  soft, nt, nd +bs,  no c/c/ e  Skin warm, dry no rashes    Labs: 7/30: reviewed:  Glucose 180  Bun 16  Cr 0.48  Na 138  k 3.1  Bicarb 20     A/P:  1. DKA -- gap closed, bicarb still a little low, will see what next bmp looks like, can likely convert to long acting and ssi insulin later today  2. HIRA -- improved  3. DmII   4. Peripheral neuropathy  5. Hyperlipidemia -- resume statin when able  6. htn -- resume lisinopril when taking po better    Urinalysis still not done    Patient is new to me today         Electronically signed by Apolinar Pennington MD at 07/30/22 0902       Consult Notes (last 7 days)  Notes from 07/26/22 through 08/02/22   No notes of this type exist for this encounter.            Discharge Summary      Apolinar Pennington MD at 08/01/22 1558          [facility]    Admit date: 7/29/2022  Discharge date: 8/1/2022    Admission/discharge diagnosis:  1. DKA  2. HIRA -- resolved  3. DMII  4. Peripheral neuropathy  5. HLD  6. HTN  7. Nausea    HPI: as per Dr Barajas:  56-year-old female who is developed nausea and vomiting 2 days ago.  She denies any fever chills or rigors prior to onset of symptoms.  Since Wednesday has been having nausea vomiting without diarrhea or abdominal pain.  No fever but currently with chills.  States she gets UTIs frequently.  Denies current dysuria or burning micturition that would be suggestive of UTI.  Has peripheral neuropathy from her diabetes.  Does not drink or smoke.  Denies any dietary indiscretions.  Denies heart disease or kidney disease.     Sonal De Dios  reports no history of alcohol  use.,  reports that she has never smoked. She has never used smokeless tobacco.    Hospital course;  Mrs. De Dios did improve, she had some continued nausea and will be sent home with some reglan as needed.  She will need follow up with endorinology. She was able to meet with diabetic nurse educator.    Dispo: home    Activity: pre admission    Diet: consistent carbs    Follow up: PCP 2 weeks    Greater than 30 mins spent in discharging patient home       Discharge Medications      New Medications      Instructions Start Date   metoclopramide 5 MG tablet  Commonly known as: REGLAN   5 mg, Oral, 3 Times Daily PRN         Continue These Medications      Instructions Start Date   atorvastatin 10 MG tablet  Commonly known as: LIPITOR   10 mg, Oral, Daily      Dexcom G6 Transmitter misc   TAKE AS DIRECTED      escitalopram 5 MG tablet  Commonly known as: Lexapro   5 mg, Oral, Daily      FreeStyle Bruce 14 Day [place] device   1 each, Does not apply, 2 Times Daily      Dexcom G6  device   1 each, Does not apply, Take As Directed      FreeStyle Bruce 14 Day Sensor misc   1 each, Does not apply, 2 Times Daily      Dexcom G6 Sensor   USE EVERY 10 DAYS      gabapentin 600 MG tablet  Commonly known as: NEURONTIN   600 mg, Oral, Nightly      gabapentin 400 MG capsule  Commonly known as: NEURONTIN   Take 1 cap po twice daily at 8 am and 2 pm      glucose blood test strip   Check 5 times daily      glucose blood test strip  Commonly known as: OneTouch Verio   1 each, Other, 5 Times Daily, Use as instructed      hydrocortisone 2.5 % rectal cream  Commonly known as: Anusol-HC   Rectal, 2 Times Daily      Hydrocortisone (Perianal) 2.5 % rectal cream  Commonly known as: Anusol-HC   Rectal, 2 Times Daily      Insulin Lispro 100 UNIT/ML solution pen-injector  Commonly known as: HumaLOG KwikPen   Glucose below 150 no extra insulin, 150-200 give 2 units, 201-250 give 3 units, 251-300 give 4 units, >300 give 5 units      Insulin  Lispro 100 UNIT/ML solution pen-injector  Commonly known as: HumaLOG KwikPen   4 UNITS BREAKFAST AND LUNCH AND 6 UNITS AT SUPPER DAILY SC      Insulin Lispro (1 Unit Dial) 100 UNIT/ML solution pen-injector  Commonly known as: HumaLOG KwikPen   Inject 10 units before meals      Insulin Lispro (1 Unit Dial) 100 UNIT/ML solution pen-injector  Commonly known as: HumaLOG KwikPen   Inject 5 units SQ before breakfast, 6 units before lunch and 8 units before dinner      Insulin Pen Needle 29G X 12.7MM misc   As directed      BD Pen Needle Alysha U/F 32G X 4 MM misc  Generic drug: Insulin Pen Needle   USE AS DIRECTED FOUR TIMES DAILY      BD Pen Needle Alysha U/F 32G X 4 MM misc  Generic drug: Insulin Pen Needle   Use as directed      Levemir FlexTouch 100 UNIT/ML injection  Generic drug: insulin detemir   40 Units, Subcutaneous, Daily      lisinopril 10 MG tablet  Commonly known as: PRINIVIL,ZESTRIL   10 mg, Oral, Daily               Electronically signed by Apolinar Pennington MD at 08/01/22 3072

## 2022-08-03 ENCOUNTER — TRANSITIONAL CARE MANAGEMENT TELEPHONE ENCOUNTER (OUTPATIENT)
Dept: CALL CENTER | Facility: HOSPITAL | Age: 57
End: 2022-08-03

## 2022-08-03 DIAGNOSIS — Z79.4 TYPE 2 DIABETES MELLITUS WITH OTHER CIRCULATORY COMPLICATION, WITH LONG-TERM CURRENT USE OF INSULIN: ICD-10-CM

## 2022-08-03 DIAGNOSIS — E11.59 TYPE 2 DIABETES MELLITUS WITH OTHER CIRCULATORY COMPLICATION, WITH LONG-TERM CURRENT USE OF INSULIN: ICD-10-CM

## 2022-08-03 NOTE — OUTREACH NOTE
Call Center TCM Note    Flowsheet Row Responses   [facility] patient discharged from? [place]   Does the patient have one of the following disease processes/diagnoses(primary or secondary)? Other   TCM attempt successful? No  [no verbal release]   Unsuccessful attempts Attempt 3          Marifer Hernández RN    8/3/2022, 10:58 EDT

## 2022-08-04 RX ORDER — PROCHLORPERAZINE 25 MG/1
SUPPOSITORY RECTAL
Qty: 1 EACH | Refills: 0 | Status: SHIPPED | OUTPATIENT
Start: 2022-08-04 | End: 2022-09-20

## 2022-08-04 NOTE — TELEPHONE ENCOUNTER
Rx Refill Note  Requested Prescriptions     Pending Prescriptions Disp Refills   • Continuous Blood Gluc Transmit (Dexcom G6 Transmitter) misc [Pharmacy Med Name: DEXCOM G6 TRANSMITTER] 1 each 0     Sig: TAKE AS DIRECTED      Last office visit with prescribing clinician: 7/12/2022      Next office visit with prescribing clinician: 8/19/2022       {TIP  Please add Last Relevant Lab Date if appropriate: 08/01/22    Marifer Salgado MA  08/04/22, 09:32 EDT

## 2022-08-07 DIAGNOSIS — E11.59 TYPE 2 DIABETES MELLITUS WITH OTHER CIRCULATORY COMPLICATION, WITH LONG-TERM CURRENT USE OF INSULIN: ICD-10-CM

## 2022-08-07 DIAGNOSIS — Z79.4 TYPE 2 DIABETES MELLITUS WITH OTHER CIRCULATORY COMPLICATION, WITH LONG-TERM CURRENT USE OF INSULIN: ICD-10-CM

## 2022-08-08 RX ORDER — GABAPENTIN 400 MG/1
CAPSULE ORAL
Qty: 180 CAPSULE | Refills: 1 | OUTPATIENT
Start: 2022-08-08

## 2022-08-08 NOTE — TELEPHONE ENCOUNTER
Rx Refill Note  Requested Prescriptions     Pending Prescriptions Disp Refills   • gabapentin (NEURONTIN) 400 MG capsule 180 capsule 1     Sig: Take 1 cap po twice daily at 8 am and 2 pm      Last office visit with prescribing clinician: 7/12/2022      Next office visit with prescribing clinician: 8/19/2022            Lianne Rose MA  08/08/22, 11:04 EDT

## 2022-08-10 ENCOUNTER — READMISSION MANAGEMENT (OUTPATIENT)
Dept: CALL CENTER | Facility: HOSPITAL | Age: 57
End: 2022-08-10

## 2022-08-10 RX ORDER — GABAPENTIN 400 MG/1
CAPSULE ORAL
Qty: 180 CAPSULE | Refills: 1 | Status: SHIPPED | OUTPATIENT
Start: 2022-08-10 | End: 2022-12-28 | Stop reason: SDUPTHER

## 2022-08-10 NOTE — OUTREACH NOTE
Medical Week 2 Survey    Flowsheet Row Responses   [facility] patient discharged from? [place]   Does the patient have one of the following disease processes/diagnoses(primary or secondary)? Other   Week 2 attempt successful? No   Unsuccessful attempts Attempt 1   Rescheduled Revoked          JARROD ZARCO - Registered Nurse

## 2022-08-12 ENCOUNTER — OFFICE VISIT (OUTPATIENT)
Dept: FAMILY MEDICINE CLINIC | Facility: CLINIC | Age: 57
End: 2022-08-12

## 2022-08-12 VITALS
HEIGHT: 64 IN | RESPIRATION RATE: 18 BRPM | HEART RATE: 94 BPM | BODY MASS INDEX: 29.45 KG/M2 | TEMPERATURE: 96.6 F | DIASTOLIC BLOOD PRESSURE: 80 MMHG | WEIGHT: 172.5 LBS | OXYGEN SATURATION: 99 % | SYSTOLIC BLOOD PRESSURE: 148 MMHG

## 2022-08-12 DIAGNOSIS — Z79.4 TYPE 2 DIABETES MELLITUS WITH OTHER CIRCULATORY COMPLICATION, WITH LONG-TERM CURRENT USE OF INSULIN: ICD-10-CM

## 2022-08-12 DIAGNOSIS — E11.59 TYPE 2 DIABETES MELLITUS WITH OTHER CIRCULATORY COMPLICATION, WITH LONG-TERM CURRENT USE OF INSULIN: ICD-10-CM

## 2022-08-12 DIAGNOSIS — Z09 HOSPITAL DISCHARGE FOLLOW-UP: Primary | ICD-10-CM

## 2022-08-12 PROCEDURE — 99495 TRANSJ CARE MGMT MOD F2F 14D: CPT | Performed by: NURSE PRACTITIONER

## 2022-08-12 RX ORDER — ONDANSETRON 4 MG/1
4 TABLET, FILM COATED ORAL EVERY 8 HOURS PRN
Qty: 30 TABLET | Refills: 0 | Status: SHIPPED | OUTPATIENT
Start: 2022-08-12

## 2022-08-12 NOTE — PROGRESS NOTES
"Chief Complaint  Hospital Follow Up Visit and Edema (Both feet, last two days)    Subjective        Sonal De Dios presents to [facility]  Sonal presents for hospital discharge.  Date of admission: 07/29/2022  Date of discharge: 08/01/2022    States on Wednesday morning, she was feeling bad, started vomiting. Continued for several days. Went to the ER on Friday, was admitted to CCU. She was found to have hypokalemia and vomiting was suspected to be due to diabetes. She was in DKA.     Currently taking 50 units of levemir, using 25 units in am and 25 units in pm  Sliding scale: usually averages several units depending on glucose  Interested in endocrinology\    She states her sugars are improved now with fasting in 100s, she has several episodes of lower sugars but no hypoglycemia. She denies any further nausea. She states she was feeling bad post discharge but her symptoms are improved today. She has reglan to use prn, however states she has not required this since discharge.       Objective   Vital Signs:  /80 (BP Location: Left arm, Patient Position: Sitting, Cuff Size: Adult)   Pulse 94   Temp 96.6 °F (35.9 °C) (Temporal)   Resp 18   Ht 162.6 cm (64.02\")   Wt 78.2 kg (172 lb 8 oz)   SpO2 99%   BMI 29.59 kg/m²   Estimated body mass index is 29.59 kg/m² as calculated from the following:    Height as of this encounter: 162.6 cm (64.02\").    Weight as of this encounter: 78.2 kg (172 lb 8 oz).      Current outpatient and discharge medications have been reconciled for the patient.  Reviewed by: SAMANTHA Wilson      Physical Exam  Vitals reviewed.   Constitutional:       Appearance: Normal appearance.   HENT:      Right Ear: Tympanic membrane and ear canal normal.      Left Ear: Tympanic membrane and ear canal normal.      Nose: Nose normal.      Mouth/Throat:      Mouth: Mucous membranes are moist.      Pharynx: Oropharynx is clear.   Eyes:      " Conjunctiva/sclera: Conjunctivae normal.      Pupils: Pupils are equal, round, and reactive to light.   Cardiovascular:      Rate and Rhythm: Normal rate and regular rhythm.      Heart sounds: No murmur heard.    No friction rub. No gallop.   Pulmonary:      Effort: Pulmonary effort is normal. No respiratory distress.      Breath sounds: Normal breath sounds. No wheezing, rhonchi or rales.   Abdominal:      General: Bowel sounds are normal.      Palpations: Abdomen is soft.   Skin:     General: Skin is warm and dry.   Neurological:      Mental Status: She is alert and oriented to person, place, and time.   Psychiatric:         Mood and Affect: Mood normal.        Result Review :    CMP    CMP 7/30/22 7/30/22 7/30/22 7/31/22 7/31/22 8/1/22 8/1/22    0311 0805 1200 0336 1943 0538 1510   Glucose 132 (A) 180 (A) 168 (A) 107 (A)  257 (A) 184 (A)   BUN 22 (A) 16 12 7  13 15   Creatinine 0.58 0.48 (A) 0.57 0.43 (A)  0.56 (A) 0.59   Sodium 139 138 136 138  130 (A) 133 (A)   Potassium 3.3 (A) 3.1 (A) 3.2 (A) 2.7 (A) 3.4 (A) 4.0 4.1   Chloride 107 105 105 100  94 (A) 96 (A)   Calcium 9.1 8.6 8.3 (A) 8.5 (A)  8.9 9.1   Albumin    3.60  3.90    Total Bilirubin    0.6      Alkaline Phosphatase    79      AST (SGOT)    18      ALT (SGPT)    13      (A) Abnormal value       Comments are available for some flowsheets but are not being displayed.           CBC    CBC 7/30/22 7/31/22 8/1/22   WBC 15.95 (A) 9.36 10.19   RBC 3.97 4.00 4.72   Hemoglobin 12.0 11.8 (A) 14.0   Hematocrit 34.7 34.8 41.5   MCV 87.4 87.0 87.9   MCH 30.2 29.5 29.7   MCHC 34.6 33.9 33.7   RDW 12.1 (A) 12.1 (A) 11.9 (A)   Platelets 292 244 283   (A) Abnormal value            Lipid Panel    Lipid Panel 1/5/22 7/12/22   Total Cholesterol 149 194   Triglycerides 65 99   HDL Cholesterol 71 61   VLDL Cholesterol 13 18   LDL Cholesterol  65 115 (A)   LDL/HDL Ratio 0.9 1.9   (A) Abnormal value       Comments are available for some flowsheets but are not being  displayed.           Most Recent A1C    HGBA1C Most Recent 7/29/22   Hemoglobin A1C 12.00 (A)   (A) Abnormal value                      Assessment and Plan   Diagnoses and all orders for this visit:    1. Type 2 diabetes mellitus with other circulatory complication, with long-term current use of insulin (HCC) (Primary)  -     Ambulatory Referral to Endocrinology    2. Hospital discharge follow-up    Other orders  -     ondansetron (Zofran) 4 MG tablet; Take 1 tablet by mouth Every 8 (Eight) Hours As Needed for Nausea or Vomiting.  Dispense: 30 tablet; Refill: 0    patient will continue insulin, she will increase her long acting by two units every 3 days for blood sugar > 140, referral to endocrinology placed  zofran for use as needed for nausea  All other medications will remain the same  Labs reviewed at time of admission, no need to repeat at this time, recommend close follow up with endocrinology  Follow up as scheduled in the office          Follow Up   No follow-ups on file.  Patient was given instructions and counseling regarding her condition or for health maintenance advice. Please see specific information pulled into the AVS if appropriate.

## 2022-08-15 ENCOUNTER — APPOINTMENT (OUTPATIENT)
Dept: MAMMOGRAPHY | Facility: HOSPITAL | Age: 57
End: 2022-08-15

## 2022-08-30 ENCOUNTER — HOSPITAL ENCOUNTER (OUTPATIENT)
Dept: MAMMOGRAPHY | Facility: HOSPITAL | Age: 57
Discharge: HOME | End: 2022-08-30
Admitting: NURSE PRACTITIONER

## 2022-08-30 DIAGNOSIS — Z12.31 ENCOUNTER FOR SCREENING MAMMOGRAM FOR MALIGNANT NEOPLASM OF BREAST: ICD-10-CM

## 2022-08-30 PROCEDURE — 77063 BREAST TOMOSYNTHESIS BI: CPT

## 2022-08-30 PROCEDURE — 77067 SCR MAMMO BI INCL CAD: CPT

## 2022-09-01 DIAGNOSIS — E11.65 UNCONTROLLED TYPE 2 DIABETES MELLITUS WITH HYPERGLYCEMIA: ICD-10-CM

## 2022-09-01 RX ORDER — INSULIN DETEMIR 100 [IU]/ML
INJECTION, SOLUTION SUBCUTANEOUS
Qty: 18 ML | Refills: 1 | Status: SHIPPED | OUTPATIENT
Start: 2022-09-01 | End: 2022-11-16 | Stop reason: SDUPTHER

## 2022-09-01 NOTE — TELEPHONE ENCOUNTER
Rx Refill Note  Requested Prescriptions     Pending Prescriptions Disp Refills   • Levemir FlexTouch 100 UNIT/ML injection [Pharmacy Med Name: LEVEMIR FLEX TOUCH PEN INJ 3ML] 18 mL      Sig: ADMINISTER 40 UNITS UNDER THE SKIN DAILY AS DIRECTED      Last office visit with prescribing clinician: 8/12/2022      Next office visit with prescribing clinician: Visit date not found       {TIP  Please add Last Relevant Lab Date if appropriate: 08/01/22    Marifer Salgado MA  09/01/22, 11:33 EDT

## 2022-09-20 DIAGNOSIS — Z79.4 TYPE 2 DIABETES MELLITUS WITH OTHER CIRCULATORY COMPLICATION, WITH LONG-TERM CURRENT USE OF INSULIN: ICD-10-CM

## 2022-09-20 DIAGNOSIS — E11.59 TYPE 2 DIABETES MELLITUS WITH OTHER CIRCULATORY COMPLICATION, WITH LONG-TERM CURRENT USE OF INSULIN: ICD-10-CM

## 2022-09-20 RX ORDER — PROCHLORPERAZINE 25 MG/1
SUPPOSITORY RECTAL
Qty: 1 EACH | Refills: 0 | Status: SHIPPED | OUTPATIENT
Start: 2022-09-20 | End: 2023-03-22

## 2022-09-20 NOTE — TELEPHONE ENCOUNTER
Rx Refill Note  Requested Prescriptions     Pending Prescriptions Disp Refills   • Continuous Blood Gluc Transmit (Dexcom G6 Transmitter) misc [Pharmacy Med Name: DEXCOM G6 TRANSMITTER] 1 each 0     Sig: USE AS DIRECTED      Last office visit with prescribing clinician: 8/12/2022      Next office visit with prescribing clinician: Visit date not found            Lianne Rose MA  09/20/22, 08:58 EDT

## 2022-09-27 DIAGNOSIS — E11.9 TYPE 2 DIABETES MELLITUS WITHOUT COMPLICATION, WITHOUT LONG-TERM CURRENT USE OF INSULIN: ICD-10-CM

## 2022-09-27 DIAGNOSIS — Z79.4 TYPE 2 DIABETES MELLITUS WITH OTHER CIRCULATORY COMPLICATION, WITH LONG-TERM CURRENT USE OF INSULIN: ICD-10-CM

## 2022-09-27 DIAGNOSIS — E11.59 TYPE 2 DIABETES MELLITUS WITH OTHER CIRCULATORY COMPLICATION, WITH LONG-TERM CURRENT USE OF INSULIN: ICD-10-CM

## 2022-09-27 RX ORDER — PROCHLORPERAZINE 25 MG/1
SUPPOSITORY RECTAL
Qty: 3 EACH | Refills: 6 | Status: CANCELLED | OUTPATIENT
Start: 2022-09-27

## 2022-11-16 DIAGNOSIS — E11.59 TYPE 2 DIABETES MELLITUS WITH OTHER CIRCULATORY COMPLICATION, WITH LONG-TERM CURRENT USE OF INSULIN: ICD-10-CM

## 2022-11-16 DIAGNOSIS — Z79.4 TYPE 2 DIABETES MELLITUS WITH OTHER CIRCULATORY COMPLICATION, WITH LONG-TERM CURRENT USE OF INSULIN: ICD-10-CM

## 2022-11-16 DIAGNOSIS — E11.65 UNCONTROLLED TYPE 2 DIABETES MELLITUS WITH HYPERGLYCEMIA: ICD-10-CM

## 2022-11-16 NOTE — TELEPHONE ENCOUNTER
Rx Refill Note  Requested Prescriptions     Pending Prescriptions Disp Refills   • gabapentin (NEURONTIN) 600 MG tablet 90 tablet 1     Sig: Take 1 tablet by mouth Every Night.   • insulin detemir (Levemir FlexTouch) 100 UNIT/ML injection 18 mL 1      Last office visit with prescribing clinician: 8/12/2022      Next office visit with prescribing clinician: Visit date not found       {TIP  Please add Last Relevant Lab Date if appropriate: 08/01/22    Marifer Salgado MA  11/16/22, 13:29 EST

## 2022-11-17 RX ORDER — INSULIN DETEMIR 100 [IU]/ML
40 INJECTION, SOLUTION SUBCUTANEOUS DAILY
Qty: 18 ML | Refills: 1 | Status: SHIPPED | OUTPATIENT
Start: 2022-11-17 | End: 2023-02-02

## 2022-11-17 RX ORDER — GABAPENTIN 600 MG/1
600 TABLET ORAL NIGHTLY
Qty: 90 TABLET | Refills: 0 | Status: SHIPPED | OUTPATIENT
Start: 2022-11-17 | End: 2023-02-21 | Stop reason: SDUPTHER

## 2022-12-27 DIAGNOSIS — Z79.4 TYPE 2 DIABETES MELLITUS WITH OTHER CIRCULATORY COMPLICATION, WITH LONG-TERM CURRENT USE OF INSULIN: ICD-10-CM

## 2022-12-27 DIAGNOSIS — E11.59 TYPE 2 DIABETES MELLITUS WITH OTHER CIRCULATORY COMPLICATION, WITH LONG-TERM CURRENT USE OF INSULIN: ICD-10-CM

## 2022-12-27 RX ORDER — PROCHLORPERAZINE 25 MG/1
SUPPOSITORY RECTAL
Qty: 3 EACH | Refills: 6 | Status: SHIPPED | OUTPATIENT
Start: 2022-12-27

## 2022-12-28 ENCOUNTER — OFFICE VISIT (OUTPATIENT)
Dept: FAMILY MEDICINE CLINIC | Facility: CLINIC | Age: 57
End: 2022-12-28

## 2022-12-28 VITALS
BODY MASS INDEX: 32.46 KG/M2 | HEART RATE: 78 BPM | DIASTOLIC BLOOD PRESSURE: 64 MMHG | TEMPERATURE: 96.6 F | HEIGHT: 64 IN | RESPIRATION RATE: 18 BRPM | WEIGHT: 190.1 LBS | SYSTOLIC BLOOD PRESSURE: 128 MMHG | OXYGEN SATURATION: 99 %

## 2022-12-28 DIAGNOSIS — Z79.4 TYPE 2 DIABETES MELLITUS WITH OTHER CIRCULATORY COMPLICATION, WITH LONG-TERM CURRENT USE OF INSULIN: ICD-10-CM

## 2022-12-28 DIAGNOSIS — E11.9 TYPE 2 DIABETES MELLITUS WITHOUT COMPLICATION, WITHOUT LONG-TERM CURRENT USE OF INSULIN: Primary | ICD-10-CM

## 2022-12-28 DIAGNOSIS — E11.59 TYPE 2 DIABETES MELLITUS WITH OTHER CIRCULATORY COMPLICATION, WITH LONG-TERM CURRENT USE OF INSULIN: ICD-10-CM

## 2022-12-28 DIAGNOSIS — I10 ESSENTIAL HYPERTENSION: ICD-10-CM

## 2022-12-28 PROCEDURE — 99214 OFFICE O/P EST MOD 30 MIN: CPT | Performed by: NURSE PRACTITIONER

## 2022-12-28 RX ORDER — PEN NEEDLE, DIABETIC 32GX 5/32"
NEEDLE, DISPOSABLE MISCELLANEOUS
Qty: 200 EACH | Refills: 12 | Status: SHIPPED | OUTPATIENT
Start: 2022-12-28

## 2022-12-28 RX ORDER — GABAPENTIN 400 MG/1
CAPSULE ORAL
Qty: 180 CAPSULE | Refills: 1 | Status: SHIPPED | OUTPATIENT
Start: 2022-12-28

## 2022-12-28 RX ORDER — LISINOPRIL AND HYDROCHLOROTHIAZIDE 20; 12.5 MG/1; MG/1
1 TABLET ORAL DAILY
Qty: 90 TABLET | Refills: 1 | Status: SHIPPED | OUTPATIENT
Start: 2022-12-28

## 2022-12-28 RX ORDER — SEMAGLUTIDE 1.34 MG/ML
INJECTION, SOLUTION SUBCUTANEOUS
Qty: 1.5 ML | Refills: 1 | Status: SHIPPED | OUTPATIENT
Start: 2022-12-28 | End: 2023-02-21

## 2022-12-28 NOTE — PROGRESS NOTES
"Chief Complaint  Diabetes    Subjective        Sonal De Dios presents to [facility]  History of Present Illness    Sonal De Dios is a 57-year-old female who presents for diabetic follow-up, hypertension follow-up.    The patient states her Dexcom diabetes management tool informed her that her blood glucose level dropped down to 50mg/dL. She describes she was experiencing black spots while driving to her doctor's appointment. She reports she pulled over, and sat for a minute. She adds she purchased some orange juice to drink. She notes she is feeling better.       She states her blood glucose levels have been good. She reports seeing a endocrinologist for her diabetes. Unfortunately, she states the endocrinologist office has closed before she could try Ozempic. She reports she was utilizing one injection of the Levemir. She describes experiencing elevated blood glucose readings at 3 AM. The patient reports she was instructed to utilize 34 units in the morning, and 25 units in the evening of the Levemir. She states she utilizes insulin before meals. She notes her last A1c levels were 8.6.        She reports experiencing swollen in her lower extremities. She states she still utilizes the gabapentin. She reports she obtained a bone density scan. 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 She notes her neuropathy is better.       Objective   Vital Signs:  /64 (BP Location: Right arm, Patient Position: Sitting, Cuff Size: Adult)   Pulse 78   Temp 96.6 °F (35.9 °C) (Temporal)   Resp 18   Ht 162.6 cm (64.02\")   Wt 86.2 kg (190 lb 1.6 oz)   SpO2 99%   BMI 32.61 kg/m²   Estimated body mass index is 32.61 kg/m² as calculated from the following:    Height as of this encounter: 162.6 cm (64.02\").    Weight as of this encounter: 86.2 kg (190 lb 1.6 oz).          Physical Exam  Constitutional:       General: She is " "not in acute distress.     Appearance: She is well-developed. She is not diaphoretic.   Cardiovascular:      Rate and Rhythm: Normal rate and regular rhythm.      Heart sounds: Normal heart sounds. No murmur heard.    No friction rub. No gallop.   Pulmonary:      Effort: Pulmonary effort is normal. No respiratory distress.      Breath sounds: Normal breath sounds. No wheezing or rales.   Abdominal:      General: Bowel sounds are normal. There is no distension.      Palpations: Abdomen is soft.      Tenderness: There is no abdominal tenderness.   Musculoskeletal:      Cervical back: Neck supple.   Skin:     General: Skin is warm and dry.   Neurological:      Mental Status: She is alert and oriented to person, place, and time.        Result Review :                 /64 (BP Location: Right arm, Patient Position: Sitting, Cuff Size: Adult)   Pulse 78   Temp 96.6 °F (35.9 °C) (Temporal)   Resp 18   Ht 162.6 cm (64.02\")   Wt 86.2 kg (190 lb 1.6 oz)   LMP 01/05/2019 (LMP Unknown)   SpO2 99%   BMI 32.61 kg/m²                                                                                 Assessment and Plan   Diagnoses and all orders for this visit:    1. Type 2 diabetes mellitus without complication, without long-term current use of insulin (HCC) (Primary)    2. Type 2 diabetes mellitus with other circulatory complication, with long-term current use of insulin (HCC)  -     gabapentin (NEURONTIN) 400 MG capsule; Take 1 cap po twice daily at 8 am and 2 pm  Dispense: 180 capsule; Refill: 1  -     Hemoglobin A1c    3. Essential hypertension  -     Hemoglobin A1c  -     Comprehensive metabolic panel    Other orders  -     Semaglutide,0.25 or 0.5MG/DOS, (Ozempic, 0.25 or 0.5 MG/DOSE,) 2 MG/1.5ML solution pen-injector; Inject 0.25 mg subcutaneously weekly x 4, then increase to 0.5 mg weekly  Dispense: 1.5 mL; Refill: 1  -     lisinopril-hydrochlorothiazide (PRINZIDE,ZESTORETIC) 20-12.5 MG per tablet; Take 1 tablet " by mouth Daily.  Dispense: 90 tablet; Refill: 1  -     Insulin Pen Needle (BD Pen Needle Alysha U/F) 32G X 4 MM misc; Use as directed  Dispense: 200 each; Refill: 12      Assessment & Plan     1.Type 2 Diabetes  -Treatment options were discussed in detail with the patient including the risks and benefits of Ozempic.  -The patient will start Ozempic. Will need to monitor glucose closely as this can contribute to hypoglycemic episodes will reduce long acting insulin and TID insulin as needed  -The patient will obtain laboratory studies today.       2.  Neuropathy   -Refilled the patient's gabapentin. Contract utd, urine drug screen utd  [facility] query complete. Treatment plan to include limited course of prescribed  controlled substance. Risks including addiction, benefits, and alternatives presented to patient.       3.Hypertension   -Refilled the patient's medication.         The patient's A1c is managed by endocrinology. She reports her A1c reduced to around 8.4.  We are adding Ozempic 0.25 with a titration to 0.5 after 1 month then reevaluation. She will obtain laboratory studies today including A1c, and CMP.    Discussed with the patient her hypoglycemic episode prior to coming into the office where her sugar dropped to 50. But it has rebounded. She drank some orange juice. Recommend adding a protein to keep that from dropping off again.      Her blood pressure medicine was increased to 20/12.5. Refills were provided to the patient today. She will follow-up in 2 months.          Follow Up   No follow-ups on file.  Patient was given instructions and counseling regarding her condition or for health maintenance advice. Please see specific information pulled into the AVS if appropriate.     Transcribed from ambient dictation for SAMANTHA Perdomo by Bruce Escalera.  12/28/22   10:27 EST    Patient or patient representative verbalized consent to the visit recording.  I have personally performed the services  described in this document as transcribed by the above individual, and it is both accurate and complete.

## 2022-12-29 LAB
ALBUMIN SERPL-MCNC: 4.9 G/DL (ref 3.5–5.2)
ALBUMIN/GLOB SERPL: 2.3 G/DL
ALP SERPL-CCNC: 87 U/L (ref 39–117)
ALT SERPL-CCNC: 22 U/L (ref 1–33)
AST SERPL-CCNC: 15 U/L (ref 1–32)
BILIRUB SERPL-MCNC: 0.3 MG/DL (ref 0–1.2)
BUN SERPL-MCNC: 25 MG/DL (ref 6–20)
BUN/CREAT SERPL: 26.9 (ref 7–25)
CALCIUM SERPL-MCNC: 9.9 MG/DL (ref 8.6–10.5)
CHLORIDE SERPL-SCNC: 98 MMOL/L (ref 98–107)
CO2 SERPL-SCNC: 26.3 MMOL/L (ref 22–29)
CREAT SERPL-MCNC: 0.93 MG/DL (ref 0.57–1)
EGFRCR SERPLBLD CKD-EPI 2021: 71.4 ML/MIN/1.73
GLOBULIN SER CALC-MCNC: 2.1 GM/DL
GLUCOSE SERPL-MCNC: 141 MG/DL (ref 65–99)
HBA1C MFR BLD: 7.6 % (ref 4.8–5.6)
POTASSIUM SERPL-SCNC: 5.5 MMOL/L (ref 3.5–5.2)
PROT SERPL-MCNC: 7 G/DL (ref 6–8.5)
SODIUM SERPL-SCNC: 138 MMOL/L (ref 136–145)

## 2022-12-30 ENCOUNTER — TELEPHONE (OUTPATIENT)
Dept: FAMILY MEDICINE CLINIC | Facility: CLINIC | Age: 57
End: 2022-12-30

## 2022-12-30 RX ORDER — PEN NEEDLE, DIABETIC 30 GX3/16"
1 NEEDLE, DISPOSABLE MISCELLANEOUS TAKE AS DIRECTED
Qty: 100 EACH | Refills: 0 | Status: SHIPPED | OUTPATIENT
Start: 2022-12-30

## 2023-01-04 RX ORDER — INSULIN LISPRO 100 [IU]/ML
INJECTION, SOLUTION INTRAVENOUS; SUBCUTANEOUS
Qty: 18 ML | Refills: 1 | Status: SHIPPED | OUTPATIENT
Start: 2023-01-04

## 2023-01-04 NOTE — TELEPHONE ENCOUNTER
Rx Refill Note  Requested Prescriptions     Pending Prescriptions Disp Refills   • Insulin Lispro, 1 Unit Dial, (HumaLOG KwikPen) 100 UNIT/ML solution pen-injector [Pharmacy Med Name: HUMALOG 100 U/ML KWIK PEN INJ 3ML] 18 mL      Sig: INJECT 5 UNITS UNDER THE SKIN BEFORE BREAKFAST, 6 UNITS BEFORE LUNCH AND 8 UNITS BEFORE DINNER      Last office visit with prescribing clinician: 12/28/2022   Last telemedicine visit with prescribing clinician: 2/21/2023   Next office visit with prescribing clinician: 2/21/2023                         Would you like a call back once the refill request has been completed: [] Yes [] No    If the office needs to give you a call back, can they leave a voicemail: [] Yes [] No    Matilde Wang LPN  01/04/23, 09:48 EST  
none known

## 2023-02-02 DIAGNOSIS — E11.65 UNCONTROLLED TYPE 2 DIABETES MELLITUS WITH HYPERGLYCEMIA: ICD-10-CM

## 2023-02-02 RX ORDER — INSULIN DETEMIR 100 [IU]/ML
INJECTION, SOLUTION SUBCUTANEOUS
Qty: 18 ML | Refills: 1 | Status: SHIPPED | OUTPATIENT
Start: 2023-02-02

## 2023-02-02 NOTE — TELEPHONE ENCOUNTER
Rx Refill Note  Requested Prescriptions     Pending Prescriptions Disp Refills   • Levemir FlexTouch 100 UNIT/ML injection [Pharmacy Med Name: LEVEMIR FLEX TOUCH PEN INJ 3ML] 18 mL 1     Sig: ADMINISTER 40 UNITS UNDER THE SKIN DAILY AS DIRECTED      Last office visit with prescribing clinician: Visit date not found   Last telemedicine visit with prescribing clinician: 2/21/2023   Next office visit with prescribing clinician: Visit date not found                         Would you like a call back once the refill request has been completed: [] Yes [] No    If the office needs to give you a call back, can they leave a voicemail: [] Yes [] No    Cipriano Shetty Rep  02/02/23, 16:55 EST

## 2023-02-21 ENCOUNTER — OFFICE VISIT (OUTPATIENT)
Dept: FAMILY MEDICINE CLINIC | Facility: CLINIC | Age: 58
End: 2023-02-21
Payer: COMMERCIAL

## 2023-02-21 VITALS
TEMPERATURE: 96.2 F | HEIGHT: 64 IN | RESPIRATION RATE: 18 BRPM | BODY MASS INDEX: 30.61 KG/M2 | OXYGEN SATURATION: 99 % | SYSTOLIC BLOOD PRESSURE: 122 MMHG | DIASTOLIC BLOOD PRESSURE: 78 MMHG | HEART RATE: 102 BPM | WEIGHT: 179.3 LBS

## 2023-02-21 DIAGNOSIS — Z79.4 TYPE 2 DIABETES MELLITUS WITH OTHER CIRCULATORY COMPLICATION, WITH LONG-TERM CURRENT USE OF INSULIN: ICD-10-CM

## 2023-02-21 DIAGNOSIS — E11.59 TYPE 2 DIABETES MELLITUS WITH OTHER CIRCULATORY COMPLICATION, WITH LONG-TERM CURRENT USE OF INSULIN: ICD-10-CM

## 2023-02-21 PROCEDURE — 99213 OFFICE O/P EST LOW 20 MIN: CPT | Performed by: NURSE PRACTITIONER

## 2023-02-21 RX ORDER — GABAPENTIN 600 MG/1
600 TABLET ORAL NIGHTLY
Qty: 90 TABLET | Refills: 1 | Status: SHIPPED | OUTPATIENT
Start: 2023-02-21

## 2023-02-21 NOTE — PROGRESS NOTES
"Chief Complaint  Diabetes    Subjective        Sonal De Dios presents to [facility] PRIMARY [facility]  History of Present Illness  The patient is a 57-year-old female who presents today for follow-up of diabetes. At her last visit, she was started on Ozempic. Her A1c at that point was 7.6 percent. 6 months ago, it was 12.0 percent, so she does have better control. She continues on her Humalog, Levemir for long acting, and she also monitors with the continuous reader, the Free[employer] Bruce. Since her last visit, her weight has come down 11 pounds. This is an added benefit of the Ozempic, but that was not the goal. Her blood pressure is improved at 122/78 mmHg.    The patient reports she is doing well on Ozempic 0.5 mg. She states she rarely takes her short acting insulin. The patient notes she took her second dosage on 02/16/2023. She reports she is taking 22 units of Levemir in the morning and 12 units at night. The patient adds she is getting low blood glucose levels at night. She reports her alarm goes off at 65 mg/dL. The patient is considering not taking the Levemir at night. She reports she does not use her short acting insulin very often. She notes she may take 4 units at the most. The patient had an 11 pound weight loss.     The patient reports she needs her gabapentin 600 mg refilled.    The patient reports she had her influenza vaccine at [facility] on 11/07/2022. She states she had her COVID-19 booster on 10/01/2022.    The patient is due for a colonoscopy next year or two. Her mammogram is up to date.    Objective   Vital Signs:  /78 (BP Location: Left arm, Patient Position: Sitting, Cuff Size: Adult)   Pulse 102   Temp 96.2 °F (35.7 °C) (Temporal)   Resp 18   Ht 162.6 cm (64.02\")   Wt 81.3 kg (179 lb 4.8 oz)   SpO2 99%   BMI 30.76 kg/m²   Estimated body mass index is 30.76 kg/m² as calculated from the following:    Height as of this encounter: 162.6 cm (64.02\").    Weight " as of this encounter: 81.3 kg (179 lb 4.8 oz).          Physical Exam  Constitutional:       General: She is not in acute distress.     Appearance: She is well-developed. She is not diaphoretic.   Cardiovascular:      Rate and Rhythm: Normal rate and regular rhythm.      Heart sounds: Normal heart sounds. No murmur heard.    No friction rub. No gallop.   Pulmonary:      Effort: Pulmonary effort is normal. No respiratory distress.      Breath sounds: Normal breath sounds. No wheezing or rales.   Abdominal:      General: Bowel sounds are normal. There is no distension.      Palpations: Abdomen is soft.      Tenderness: There is no abdominal tenderness.   Musculoskeletal:      Cervical back: Neck supple.   Skin:     General: Skin is warm and dry.   Neurological:      Mental Status: She is alert and oriented to person, place, and time.        Result Review :                  Assessment and Plan   Diagnoses and all orders for this visit:    1. Type 2 diabetes mellitus with other circulatory complication, with long-term current use of insulin (HCC)  -     gabapentin (NEURONTIN) 600 MG tablet; Take 1 tablet by mouth Every Night.  Dispense: 90 tablet; Refill: 1  -     Hemoglobin A1c  -     Comprehensive metabolic panel    Other orders  -     Semaglutide, 1 MG/DOSE, 4 MG/3ML solution pen-injector; Inject 1 mg under the skin into the appropriate area as directed 1 (One) Time Per Week.  Dispense: 3 mL; Refill: 2      The patient is doing well on the Ozempic. She recently increased to 0.5 mg. She is no longer using her fast acting before meals frequently. She does have low sugars at night. She is going to stop her night time insulin. She is doing 12 units. We might need to increase her insulin in the morning, but however, we are increasing her Ozempic to 1 mg as well, so she will continue to monitor this. She has a continuous reader that alerts her phone and she is going to follow up in 3 to 4 months.         Follow Up   No  follow-ups on file.  Patient was given instructions and counseling regarding her condition or for health maintenance advice. Please see specific information pulled into the AVS if appropriate.       Transcribed from ambient dictation for SAMANTHA Perdomo by Raf Cross.  02/21/23   11:31 EST    Patient or patient representative verbalized consent to the visit recording.  I have personally performed the services described in this document as transcribed by the above individual, and it is both accurate and complete.

## 2023-02-22 LAB
ALBUMIN SERPL-MCNC: 4.6 G/DL (ref 3.5–5.2)
ALBUMIN/GLOB SERPL: 2 G/DL
ALP SERPL-CCNC: 78 U/L (ref 39–117)
ALT SERPL-CCNC: 57 U/L (ref 1–33)
AST SERPL-CCNC: 25 U/L (ref 1–32)
BILIRUB SERPL-MCNC: 0.3 MG/DL (ref 0–1.2)
BUN SERPL-MCNC: 13 MG/DL (ref 6–20)
BUN/CREAT SERPL: 14 (ref 7–25)
CALCIUM SERPL-MCNC: 9.7 MG/DL (ref 8.6–10.5)
CHLORIDE SERPL-SCNC: 102 MMOL/L (ref 98–107)
CO2 SERPL-SCNC: 25.8 MMOL/L (ref 22–29)
CREAT SERPL-MCNC: 0.93 MG/DL (ref 0.57–1)
EGFRCR SERPLBLD CKD-EPI 2021: 71.4 ML/MIN/1.73
GLOBULIN SER CALC-MCNC: 2.3 GM/DL
GLUCOSE SERPL-MCNC: 95 MG/DL (ref 65–99)
HBA1C MFR BLD: 6.9 % (ref 4.8–5.6)
POTASSIUM SERPL-SCNC: 4.7 MMOL/L (ref 3.5–5.2)
PROT SERPL-MCNC: 6.9 G/DL (ref 6–8.5)
SODIUM SERPL-SCNC: 139 MMOL/L (ref 136–145)

## 2023-03-22 DIAGNOSIS — Z79.4 TYPE 2 DIABETES MELLITUS WITH OTHER CIRCULATORY COMPLICATION, WITH LONG-TERM CURRENT USE OF INSULIN: ICD-10-CM

## 2023-03-22 DIAGNOSIS — E11.59 TYPE 2 DIABETES MELLITUS WITH OTHER CIRCULATORY COMPLICATION, WITH LONG-TERM CURRENT USE OF INSULIN: ICD-10-CM

## 2023-03-22 RX ORDER — PROCHLORPERAZINE 25 MG/1
SUPPOSITORY RECTAL
Qty: 1 EACH | Refills: 0 | Status: SHIPPED | OUTPATIENT
Start: 2023-03-22

## 2023-04-03 RX ORDER — ATORVASTATIN CALCIUM 10 MG/1
10 TABLET, FILM COATED ORAL DAILY
Qty: 90 TABLET | Refills: 1 | Status: SHIPPED | OUTPATIENT
Start: 2023-04-03

## 2023-04-03 NOTE — TELEPHONE ENCOUNTER
Rx Refill Note  Requested Prescriptions     Pending Prescriptions Disp Refills   • atorvastatin (LIPITOR) 10 MG tablet [Pharmacy Med Name: ATORVASTATIN 10MG TABLETS] 90 tablet 1     Sig: TAKE 1 TABLET BY MOUTH DAILY      Last office visit with prescribing clinician: 2/21/2023   Last telemedicine visit with prescribing clinician: 6/21/2023   Next office visit with prescribing clinician: 6/21/2023       {TIP  Please add Last Relevant Lab Date if appropriate: 2/21/23                 Would you like a call back once the refill request has been completed: [] Yes [] No    If the office needs to give you a call back, can they leave a voicemail: [] Yes [] No    Verito Sanders MA  04/03/23, 13:06 EDT

## 2023-05-15 RX ORDER — SEMAGLUTIDE 1.34 MG/ML
INJECTION, SOLUTION SUBCUTANEOUS
Qty: 3 ML | Refills: 2 | Status: SHIPPED | OUTPATIENT
Start: 2023-05-15

## 2023-06-08 NOTE — TELEPHONE ENCOUNTER
Rx Refill Note  Requested Prescriptions     Pending Prescriptions Disp Refills   • Continuous Blood Gluc Transmit (Dexcom G6 Transmitter) misc 1 each 0     Si each Take As Directed.      Last office visit with prescribing clinician: 2021      Next office visit with prescribing clinician: Visit date not found       {TIP  Please add Last Relevant Lab Date if appropriate: 21     Verito Sanders MA  10/28/21, 11:31 EDT   Mirvaso Counseling: Mirvaso is a topical medication which can decrease superficial blood flow where applied. Side effects are uncommon and include stinging, redness and allergic reactions.

## 2023-07-27 DIAGNOSIS — Z79.4 TYPE 2 DIABETES MELLITUS WITH OTHER CIRCULATORY COMPLICATION, WITH LONG-TERM CURRENT USE OF INSULIN: ICD-10-CM

## 2023-07-27 DIAGNOSIS — E11.59 TYPE 2 DIABETES MELLITUS WITH OTHER CIRCULATORY COMPLICATION, WITH LONG-TERM CURRENT USE OF INSULIN: ICD-10-CM

## 2023-07-27 RX ORDER — PROCHLORPERAZINE 25 MG/1
SUPPOSITORY RECTAL
Qty: 3 EACH | Refills: 6 | Status: SHIPPED | OUTPATIENT
Start: 2023-07-27

## 2023-08-09 ENCOUNTER — APPOINTMENT (OUTPATIENT)
Dept: CT IMAGING | Facility: HOSPITAL | Age: 58
End: 2023-08-09
Payer: COMMERCIAL

## 2023-08-09 ENCOUNTER — HOSPITAL ENCOUNTER (INPATIENT)
Facility: HOSPITAL | Age: 58
LOS: 1 days | Discharge: HOME | End: 2023-08-12
Attending: EMERGENCY MEDICINE | Admitting: INTERNAL MEDICINE
Payer: COMMERCIAL

## 2023-08-09 DIAGNOSIS — E86.0 DEHYDRATION: ICD-10-CM

## 2023-08-09 DIAGNOSIS — Z86.39 HISTORY OF DIABETES MELLITUS: ICD-10-CM

## 2023-08-09 DIAGNOSIS — E87.20 METABOLIC ACIDOSIS: ICD-10-CM

## 2023-08-09 DIAGNOSIS — R11.2 INTRACTABLE NAUSEA AND VOMITING: Primary | ICD-10-CM

## 2023-08-09 PROBLEM — E10.40 TYPE 1 DIABETES MELLITUS WITH DIABETIC NEUROPATHY: Status: ACTIVE | Noted: 2018-06-27

## 2023-08-09 PROBLEM — E88.89 KETOSIS: Status: ACTIVE | Noted: 2023-08-09

## 2023-08-09 PROBLEM — E87.29 INCREASED ANION GAP METABOLIC ACIDOSIS: Status: ACTIVE | Noted: 2023-08-09

## 2023-08-09 PROBLEM — R63.8 DECREASED ORAL INTAKE: Status: ACTIVE | Noted: 2023-08-09

## 2023-08-09 LAB
ALBUMIN SERPL-MCNC: 4.6 G/DL (ref 3.5–5.2)
ALBUMIN/GLOB SERPL: 1.8 G/DL
ALP SERPL-CCNC: 81 U/L (ref 39–117)
ALT SERPL W P-5'-P-CCNC: 12 U/L (ref 1–33)
AMORPH URATE CRY URNS QL MICRO: NORMAL /HPF
ANION GAP SERPL CALCULATED.3IONS-SCNC: 15 MMOL/L (ref 5–15)
ANION GAP SERPL CALCULATED.3IONS-SCNC: 17 MMOL/L (ref 5–15)
ARTERIAL PATENCY WRIST A: ABNORMAL
AST SERPL-CCNC: 19 U/L (ref 1–32)
ATMOSPHERIC PRESS: 747.3 MMHG
B-OH-BUTYR SERPL-SCNC: 1.69 MMOL/L (ref 0.02–0.27)
BACTERIA UR QL AUTO: NORMAL /HPF
BASE EXCESS BLDA CALC-SCNC: 0.4 MMOL/L (ref 0–2)
BASOPHILS # BLD AUTO: 0.02 10*3/MM3 (ref 0–0.2)
BASOPHILS NFR BLD AUTO: 0.2 % (ref 0–1.5)
BDY SITE: ABNORMAL
BILIRUB SERPL-MCNC: 0.3 MG/DL (ref 0–1.2)
BILIRUB UR QL STRIP: NEGATIVE
BUN SERPL-MCNC: 19 MG/DL (ref 6–20)
BUN SERPL-MCNC: 22 MG/DL (ref 6–20)
BUN/CREAT SERPL: 25.6 (ref 7–25)
BUN/CREAT SERPL: 26 (ref 7–25)
CALCIUM SPEC-SCNC: 8.6 MG/DL (ref 8.6–10.5)
CALCIUM SPEC-SCNC: 9.8 MG/DL (ref 8.6–10.5)
CHLORIDE SERPL-SCNC: 95 MMOL/L (ref 98–107)
CHLORIDE SERPL-SCNC: 99 MMOL/L (ref 98–107)
CLARITY UR: ABNORMAL
CO2 SERPL-SCNC: 20 MMOL/L (ref 22–29)
CO2 SERPL-SCNC: 22 MMOL/L (ref 22–29)
COLOR UR: YELLOW
CREAT SERPL-MCNC: 0.73 MG/DL (ref 0.57–1)
CREAT SERPL-MCNC: 0.86 MG/DL (ref 0.57–1)
DEPRECATED RDW RBC AUTO: 37.7 FL (ref 37–54)
EGFRCR SERPLBLD CKD-EPI 2021: 78.4 ML/MIN/1.73
EGFRCR SERPLBLD CKD-EPI 2021: 95.5 ML/MIN/1.73
EOSINOPHIL # BLD AUTO: 0 10*3/MM3 (ref 0–0.4)
EOSINOPHIL NFR BLD AUTO: 0 % (ref 0.3–6.2)
ERYTHROCYTE [DISTWIDTH] IN BLOOD BY AUTOMATED COUNT: 12.2 % (ref 12.3–15.4)
FINE GRAN CASTS URNS QL MICRO: NORMAL /LPF
GEN 5 2HR TROPONIN T REFLEX: 13 NG/L
GLOBULIN UR ELPH-MCNC: 2.5 GM/DL
GLUCOSE BLDC GLUCOMTR-MCNC: 123 MG/DL (ref 70–130)
GLUCOSE BLDC GLUCOMTR-MCNC: 97 MG/DL (ref 70–130)
GLUCOSE SERPL-MCNC: 111 MG/DL (ref 65–99)
GLUCOSE SERPL-MCNC: 84 MG/DL (ref 65–99)
GLUCOSE UR STRIP-MCNC: NEGATIVE MG/DL
HCO3 BLDA-SCNC: 22.4 MMOL/L (ref 22–28)
HCT VFR BLD AUTO: 39.3 % (ref 34–46.6)
HGB BLD-MCNC: 13.5 G/DL (ref 12–15.9)
HGB UR QL STRIP.AUTO: NEGATIVE
HOLD SPECIMEN: NORMAL
HOLD SPECIMEN: NORMAL
HYALINE CASTS UR QL AUTO: NORMAL /LPF
IMM GRANULOCYTES # BLD AUTO: 0.04 10*3/MM3 (ref 0–0.05)
IMM GRANULOCYTES NFR BLD AUTO: 0.4 % (ref 0–0.5)
KETONES UR QL STRIP: ABNORMAL
LEUKOCYTE ESTERASE UR QL STRIP.AUTO: NEGATIVE
LIPASE SERPL-CCNC: 25 U/L (ref 13–60)
LYMPHOCYTES # BLD AUTO: 1.33 10*3/MM3 (ref 0.7–3.1)
LYMPHOCYTES NFR BLD AUTO: 11.9 % (ref 19.6–45.3)
MAGNESIUM SERPL-MCNC: 2 MG/DL (ref 1.6–2.6)
MCH RBC QN AUTO: 29.5 PG (ref 26.6–33)
MCHC RBC AUTO-ENTMCNC: 34.4 G/DL (ref 31.5–35.7)
MCV RBC AUTO: 85.8 FL (ref 79–97)
MODALITY: ABNORMAL
MONOCYTES # BLD AUTO: 0.97 10*3/MM3 (ref 0.1–0.9)
MONOCYTES NFR BLD AUTO: 8.7 % (ref 5–12)
NEUTROPHILS NFR BLD AUTO: 78.8 % (ref 42.7–76)
NEUTROPHILS NFR BLD AUTO: 8.78 10*3/MM3 (ref 1.7–7)
NITRITE UR QL STRIP: NEGATIVE
NRBC BLD AUTO-RTO: 0 /100 WBC (ref 0–0.2)
PCO2 BLDA: 28.6 MM HG (ref 35–45)
PH BLDA: 7.5 PH UNITS (ref 7.35–7.45)
PH UR STRIP.AUTO: 7 [PH] (ref 5–8)
PLATELET # BLD AUTO: 302 10*3/MM3 (ref 140–450)
PMV BLD AUTO: 10 FL (ref 6–12)
PO2 BLDA: 57.4 MM HG (ref 80–100)
POTASSIUM SERPL-SCNC: 3.1 MMOL/L (ref 3.5–5.2)
POTASSIUM SERPL-SCNC: 3.5 MMOL/L (ref 3.5–5.2)
PROT SERPL-MCNC: 7.1 G/DL (ref 6–8.5)
PROT UR QL STRIP: ABNORMAL
QT INTERVAL: 352 MS
RBC # BLD AUTO: 4.58 10*6/MM3 (ref 3.77–5.28)
RBC # UR STRIP: NORMAL /HPF
REF LAB TEST METHOD: NORMAL
SAO2 % BLDCOA: 92.3 % (ref 92–99)
SODIUM SERPL-SCNC: 134 MMOL/L (ref 136–145)
SODIUM SERPL-SCNC: 134 MMOL/L (ref 136–145)
SP GR UR STRIP: 1.02 (ref 1–1.03)
SQUAMOUS #/AREA URNS HPF: NORMAL /HPF
TOTAL RATE: 16 BREATHS/MINUTE
TROPONIN T DELTA: -1 NG/L
TROPONIN T SERPL HS-MCNC: 14 NG/L
UROBILINOGEN UR QL STRIP: ABNORMAL
WBC # UR STRIP: NORMAL /HPF
WBC NRBC COR # BLD: 11.14 10*3/MM3 (ref 3.4–10.8)
WHOLE BLOOD HOLD COAG: NORMAL
WHOLE BLOOD HOLD SPECIMEN: NORMAL

## 2023-08-09 PROCEDURE — 81001 URINALYSIS AUTO W/SCOPE: CPT | Performed by: EMERGENCY MEDICINE

## 2023-08-09 PROCEDURE — 25010000002 SODIUM CHLORIDE 0.9 % WITH KCL 20 MEQ 20-0.9 MEQ/L-% SOLUTION: Performed by: INTERNAL MEDICINE

## 2023-08-09 PROCEDURE — 96361 HYDRATE IV INFUSION ADD-ON: CPT

## 2023-08-09 PROCEDURE — 96372 THER/PROPH/DIAG INJ SC/IM: CPT

## 2023-08-09 PROCEDURE — 25010000002 ENOXAPARIN PER 10 MG: Performed by: INTERNAL MEDICINE

## 2023-08-09 PROCEDURE — G0378 HOSPITAL OBSERVATION PER HR: HCPCS

## 2023-08-09 PROCEDURE — 93005 ELECTROCARDIOGRAM TRACING: CPT | Performed by: EMERGENCY MEDICINE

## 2023-08-09 PROCEDURE — 80053 COMPREHEN METABOLIC PANEL: CPT | Performed by: EMERGENCY MEDICINE

## 2023-08-09 PROCEDURE — 36600 WITHDRAWAL OF ARTERIAL BLOOD: CPT

## 2023-08-09 PROCEDURE — 74177 CT ABD & PELVIS W/CONTRAST: CPT

## 2023-08-09 PROCEDURE — 96375 TX/PRO/DX INJ NEW DRUG ADDON: CPT

## 2023-08-09 PROCEDURE — 82948 REAGENT STRIP/BLOOD GLUCOSE: CPT

## 2023-08-09 PROCEDURE — 36415 COLL VENOUS BLD VENIPUNCTURE: CPT

## 2023-08-09 PROCEDURE — 85025 COMPLETE CBC W/AUTO DIFF WBC: CPT | Performed by: EMERGENCY MEDICINE

## 2023-08-09 PROCEDURE — 63710000001 INSULIN GLARGINE PER 5 UNITS: Performed by: INTERNAL MEDICINE

## 2023-08-09 PROCEDURE — 25510000001 IOPAMIDOL 61 % SOLUTION: Performed by: EMERGENCY MEDICINE

## 2023-08-09 PROCEDURE — 82010 KETONE BODYS QUAN: CPT | Performed by: EMERGENCY MEDICINE

## 2023-08-09 PROCEDURE — 93010 ELECTROCARDIOGRAM REPORT: CPT | Performed by: INTERNAL MEDICINE

## 2023-08-09 PROCEDURE — 96376 TX/PRO/DX INJ SAME DRUG ADON: CPT

## 2023-08-09 PROCEDURE — 99285 EMERGENCY DEPT VISIT HI MDM: CPT

## 2023-08-09 PROCEDURE — 25010000002 ONDANSETRON PER 1 MG: Performed by: INTERNAL MEDICINE

## 2023-08-09 PROCEDURE — 84484 ASSAY OF TROPONIN QUANT: CPT | Performed by: EMERGENCY MEDICINE

## 2023-08-09 PROCEDURE — 83690 ASSAY OF LIPASE: CPT | Performed by: EMERGENCY MEDICINE

## 2023-08-09 PROCEDURE — 82803 BLOOD GASES ANY COMBINATION: CPT

## 2023-08-09 PROCEDURE — 25010000002 ONDANSETRON PER 1 MG: Performed by: EMERGENCY MEDICINE

## 2023-08-09 PROCEDURE — 83735 ASSAY OF MAGNESIUM: CPT | Performed by: EMERGENCY MEDICINE

## 2023-08-09 RX ORDER — ONDANSETRON 2 MG/ML
4 INJECTION INTRAMUSCULAR; INTRAVENOUS ONCE
Status: COMPLETED | OUTPATIENT
Start: 2023-08-09 | End: 2023-08-09

## 2023-08-09 RX ORDER — INSULIN LISPRO 100 [IU]/ML
2-9 INJECTION, SOLUTION INTRAVENOUS; SUBCUTANEOUS
Status: DISCONTINUED | OUTPATIENT
Start: 2023-08-09 | End: 2023-08-12 | Stop reason: HOSPADM

## 2023-08-09 RX ORDER — GABAPENTIN 400 MG/1
400 CAPSULE ORAL 2 TIMES DAILY
Status: DISCONTINUED | OUTPATIENT
Start: 2023-08-10 | End: 2023-08-12 | Stop reason: HOSPADM

## 2023-08-09 RX ORDER — DEXTROSE MONOHYDRATE 25 G/50ML
25 INJECTION, SOLUTION INTRAVENOUS
Status: DISCONTINUED | OUTPATIENT
Start: 2023-08-09 | End: 2023-08-12 | Stop reason: HOSPADM

## 2023-08-09 RX ORDER — CALCIUM CARBONATE 500 MG/1
2 TABLET, CHEWABLE ORAL 2 TIMES DAILY PRN
Status: DISCONTINUED | OUTPATIENT
Start: 2023-08-09 | End: 2023-08-12 | Stop reason: HOSPADM

## 2023-08-09 RX ORDER — NITROGLYCERIN 0.4 MG/1
0.4 TABLET SUBLINGUAL
Status: DISCONTINUED | OUTPATIENT
Start: 2023-08-09 | End: 2023-08-12 | Stop reason: HOSPADM

## 2023-08-09 RX ORDER — PROMETHAZINE HYDROCHLORIDE 12.5 MG/1
12.5 TABLET ORAL EVERY 6 HOURS PRN
Status: DISCONTINUED | OUTPATIENT
Start: 2023-08-09 | End: 2023-08-12 | Stop reason: HOSPADM

## 2023-08-09 RX ORDER — SODIUM CHLORIDE AND POTASSIUM CHLORIDE 150; 900 MG/100ML; MG/100ML
150 INJECTION, SOLUTION INTRAVENOUS CONTINUOUS
Status: DISCONTINUED | OUTPATIENT
Start: 2023-08-09 | End: 2023-08-12 | Stop reason: HOSPADM

## 2023-08-09 RX ORDER — NALOXONE HCL 0.4 MG/ML
0.4 VIAL (ML) INJECTION
Status: DISCONTINUED | OUTPATIENT
Start: 2023-08-09 | End: 2023-08-12 | Stop reason: HOSPADM

## 2023-08-09 RX ORDER — ESCITALOPRAM OXALATE 5 MG/1
5 TABLET ORAL DAILY
Status: DISCONTINUED | OUTPATIENT
Start: 2023-08-10 | End: 2023-08-09

## 2023-08-09 RX ORDER — IBUPROFEN 600 MG/1
1 TABLET ORAL
Status: DISCONTINUED | OUTPATIENT
Start: 2023-08-09 | End: 2023-08-12 | Stop reason: HOSPADM

## 2023-08-09 RX ORDER — PROMETHAZINE HYDROCHLORIDE 12.5 MG/1
12.5 SUPPOSITORY RECTAL EVERY 6 HOURS PRN
Status: DISCONTINUED | OUTPATIENT
Start: 2023-08-09 | End: 2023-08-12 | Stop reason: HOSPADM

## 2023-08-09 RX ORDER — GABAPENTIN 300 MG/1
600 CAPSULE ORAL NIGHTLY
Status: DISCONTINUED | OUTPATIENT
Start: 2023-08-09 | End: 2023-08-12 | Stop reason: HOSPADM

## 2023-08-09 RX ORDER — LORAZEPAM 2 MG/ML
0.5 INJECTION INTRAMUSCULAR EVERY 6 HOURS PRN
Status: DISCONTINUED | OUTPATIENT
Start: 2023-08-09 | End: 2023-08-12 | Stop reason: HOSPADM

## 2023-08-09 RX ORDER — ENOXAPARIN SODIUM 100 MG/ML
40 INJECTION SUBCUTANEOUS DAILY
Status: DISCONTINUED | OUTPATIENT
Start: 2023-08-09 | End: 2023-08-12 | Stop reason: HOSPADM

## 2023-08-09 RX ORDER — SODIUM CHLORIDE 0.9 % (FLUSH) 0.9 %
10 SYRINGE (ML) INJECTION AS NEEDED
Status: DISCONTINUED | OUTPATIENT
Start: 2023-08-09 | End: 2023-08-12 | Stop reason: HOSPADM

## 2023-08-09 RX ORDER — ONDANSETRON 2 MG/ML
4 INJECTION INTRAMUSCULAR; INTRAVENOUS
Status: DISCONTINUED | OUTPATIENT
Start: 2023-08-09 | End: 2023-08-12 | Stop reason: HOSPADM

## 2023-08-09 RX ORDER — ACETAMINOPHEN 160 MG/5ML
650 SOLUTION ORAL EVERY 4 HOURS PRN
Status: DISCONTINUED | OUTPATIENT
Start: 2023-08-09 | End: 2023-08-12 | Stop reason: HOSPADM

## 2023-08-09 RX ORDER — MORPHINE SULFATE 2 MG/ML
2 INJECTION, SOLUTION INTRAMUSCULAR; INTRAVENOUS EVERY 4 HOURS PRN
Status: DISCONTINUED | OUTPATIENT
Start: 2023-08-09 | End: 2023-08-12 | Stop reason: HOSPADM

## 2023-08-09 RX ORDER — ACETAMINOPHEN 650 MG/1
650 SUPPOSITORY RECTAL EVERY 4 HOURS PRN
Status: DISCONTINUED | OUTPATIENT
Start: 2023-08-09 | End: 2023-08-12 | Stop reason: HOSPADM

## 2023-08-09 RX ORDER — SODIUM CHLORIDE 0.9 % (FLUSH) 0.9 %
10 SYRINGE (ML) INJECTION EVERY 12 HOURS SCHEDULED
Status: DISCONTINUED | OUTPATIENT
Start: 2023-08-09 | End: 2023-08-12 | Stop reason: HOSPADM

## 2023-08-09 RX ORDER — FAMOTIDINE 10 MG/ML
20 INJECTION, SOLUTION INTRAVENOUS EVERY 12 HOURS SCHEDULED
Status: DISCONTINUED | OUTPATIENT
Start: 2023-08-10 | End: 2023-08-12 | Stop reason: HOSPADM

## 2023-08-09 RX ORDER — SODIUM CHLORIDE 9 MG/ML
40 INJECTION, SOLUTION INTRAVENOUS AS NEEDED
Status: DISCONTINUED | OUTPATIENT
Start: 2023-08-09 | End: 2023-08-12 | Stop reason: HOSPADM

## 2023-08-09 RX ORDER — ACETAMINOPHEN 325 MG/1
650 TABLET ORAL EVERY 4 HOURS PRN
Status: DISCONTINUED | OUTPATIENT
Start: 2023-08-09 | End: 2023-08-12 | Stop reason: HOSPADM

## 2023-08-09 RX ORDER — LISINOPRIL 5 MG/1
5 TABLET ORAL DAILY
Status: DISCONTINUED | OUTPATIENT
Start: 2023-08-10 | End: 2023-08-12 | Stop reason: HOSPADM

## 2023-08-09 RX ORDER — POLYETHYLENE GLYCOL 3350 17 G/17G
17 POWDER, FOR SOLUTION ORAL DAILY PRN
Status: DISCONTINUED | OUTPATIENT
Start: 2023-08-09 | End: 2023-08-12 | Stop reason: HOSPADM

## 2023-08-09 RX ORDER — NICOTINE POLACRILEX 4 MG
15 LOZENGE BUCCAL
Status: DISCONTINUED | OUTPATIENT
Start: 2023-08-09 | End: 2023-08-12 | Stop reason: HOSPADM

## 2023-08-09 RX ORDER — AMOXICILLIN 250 MG
1 CAPSULE ORAL 2 TIMES DAILY
Status: DISCONTINUED | OUTPATIENT
Start: 2023-08-09 | End: 2023-08-12 | Stop reason: HOSPADM

## 2023-08-09 RX ORDER — SODIUM CHLORIDE AND POTASSIUM CHLORIDE 150; 900 MG/100ML; MG/100ML
200 INJECTION, SOLUTION INTRAVENOUS CONTINUOUS
Status: DISCONTINUED | OUTPATIENT
Start: 2023-08-09 | End: 2023-08-09

## 2023-08-09 RX ORDER — BISACODYL 10 MG
10 SUPPOSITORY, RECTAL RECTAL DAILY PRN
Status: DISCONTINUED | OUTPATIENT
Start: 2023-08-09 | End: 2023-08-12 | Stop reason: HOSPADM

## 2023-08-09 RX ORDER — UREA 10 %
1 LOTION (ML) TOPICAL NIGHTLY PRN
Status: DISCONTINUED | OUTPATIENT
Start: 2023-08-09 | End: 2023-08-12 | Stop reason: HOSPADM

## 2023-08-09 RX ORDER — FAMOTIDINE 10 MG/ML
20 INJECTION, SOLUTION INTRAVENOUS ONCE
Status: COMPLETED | OUTPATIENT
Start: 2023-08-09 | End: 2023-08-09

## 2023-08-09 RX ORDER — BISACODYL 5 MG/1
5 TABLET, DELAYED RELEASE ORAL DAILY PRN
Status: DISCONTINUED | OUTPATIENT
Start: 2023-08-09 | End: 2023-08-12 | Stop reason: HOSPADM

## 2023-08-09 RX ADMIN — SODIUM CHLORIDE 1000 ML: 9 INJECTION, SOLUTION INTRAVENOUS at 16:00

## 2023-08-09 RX ADMIN — INSULIN GLARGINE 12 UNITS: 100 INJECTION, SOLUTION SUBCUTANEOUS at 22:58

## 2023-08-09 RX ADMIN — SENNOSIDES AND DOCUSATE SODIUM 1 TABLET: 50; 8.6 TABLET ORAL at 22:57

## 2023-08-09 RX ADMIN — POTASSIUM CHLORIDE AND SODIUM CHLORIDE 150 ML/HR: 900; 150 INJECTION, SOLUTION INTRAVENOUS at 22:58

## 2023-08-09 RX ADMIN — IOPAMIDOL 100 ML: 612 INJECTION, SOLUTION INTRAVENOUS at 15:48

## 2023-08-09 RX ADMIN — SODIUM CHLORIDE 1000 ML: 9 INJECTION, SOLUTION INTRAVENOUS at 14:28

## 2023-08-09 RX ADMIN — ENOXAPARIN SODIUM 40 MG: 100 INJECTION SUBCUTANEOUS at 22:57

## 2023-08-09 RX ADMIN — FAMOTIDINE 20 MG: 10 INJECTION INTRAVENOUS at 14:28

## 2023-08-09 RX ADMIN — ONDANSETRON 4 MG: 2 INJECTION INTRAMUSCULAR; INTRAVENOUS at 16:00

## 2023-08-09 RX ADMIN — GABAPENTIN 600 MG: 300 CAPSULE ORAL at 22:56

## 2023-08-09 RX ADMIN — ONDANSETRON 4 MG: 2 INJECTION INTRAMUSCULAR; INTRAVENOUS at 22:57

## 2023-08-09 RX ADMIN — Medication 10 ML: at 22:59

## 2023-08-09 RX ADMIN — ONDANSETRON 4 MG: 2 INJECTION INTRAMUSCULAR; INTRAVENOUS at 14:28

## 2023-08-09 NOTE — ED PROVIDER NOTES
EMERGENCY DEPARTMENT ENCOUNTER    Room Number:  E653/1  Date seen:  8/9/2023  PCP: Ammy Holman APRN  Historian: Patient      HPI:  Chief Complaint: Nausea and vomiting  Context: Sonal De Dios is a 57 y.o. female who presents to the ED c/o nausea and vomiting for 5 days.  She states she took Ozempic last week just prior to beginning with her nausea and vomiting.  She states that the Ozempic has been making her nauseated from time to time but never this long. She states that her dose has been stable recently.  Patient denies abdominal pain, fevers or chills but states she has not been to keep anything down.  The patient is a diabetic and had an episode of DKA approximately 1 year ago.  The patient states she has a Dexcom and her blood sugars been running in the 300s over the past several days.  She has continued to take her insulin.  She denies a history of gastroparesis.  She states in the past she had been noncompliant with a hemoglobin A1c of 13, however since last year she has been good about treating her diabetes and her last hemoglobin A1c was 6.2.      PAST MEDICAL HISTORY  Active Ambulatory Problems     Diagnosis Date Noted    Uncontrolled type 2 diabetes mellitus with diabetic neuropathy, with long-term current use of insulin 04/10/2018    Encounter for long-term (current) use of insulin 04/10/2018    Hyperinsulinism 04/10/2018    Noncompliance with medication regimen 04/10/2018    Uncontrolled type 1 diabetes with diabetic neuropathy 06/27/2018    Diabetic ketoacidosis without coma associated with type 1 diabetes mellitus 08/01/2022     Resolved Ambulatory Problems     Diagnosis Date Noted    Diabetic ketoacidosis with coma associated with type 2 diabetes mellitus 03/06/2018    Edema 03/10/2018    Diabetic ketoacidosis without coma associated with type 1 diabetes mellitus 07/29/2022     Past Medical History:   Diagnosis Date    Diabetes mellitus     Gestational diabetes     Pneumonia           REVIEW OF SYSTEMS  All systems reviewed and negative except for those discussed in HPI.       PAST SURGICAL HISTORY  Past Surgical History:   Procedure Laterality Date    ENDOMETRIAL ABLATION           FAMILY HISTORY  Family History   Problem Relation Age of Onset    Diabetes Mother     Breast cancer Neg Hx          SOCIAL HISTORY  Social History     Socioeconomic History    Marital status:    Tobacco Use    Smoking status: Never    Smokeless tobacco: Never   Vaping Use    Vaping Use: Never used   Substance and Sexual Activity    Alcohol use: No    Drug use: No    Sexual activity: Yes     Partners: Male     Birth control/protection: Surgical         ALLERGIES  Codeine      PHYSICAL EXAM  ED Triage Vitals [08/09/23 1306]   Temp Heart Rate Resp BP SpO2   98.5 øF (36.9 øC) 112 18 -- 99 %      Temp src Heart Rate Source Patient Position BP Location FiO2 (%)   Tympanic Monitor -- -- --       Physical Exam      GENERAL: 57-year-old female in mild distress  HENT: NCAT: nares patent: Neck supple  EYES: no scleral icterus  CV: regular rhythm, normal rate  RESPIRATORY: normal effort  ABDOMEN: soft, NTND: Bowel sounds diminished  MUSCULOSKELETAL: no deformity  NEURO: alert with nonfocal neuro exam  PSYCH:  calm, cooperative  SKIN: warm, dry    Vital signs and nursing notes reviewed.      LAB RESULTS  Recent Results (from the past 24 hour(s))   ECG 12 Lead Chest Pain    Collection Time: 08/09/23  1:19 PM   Result Value Ref Range    QT Interval 352 ms   POC Glucose Once    Collection Time: 08/09/23  1:26 PM    Specimen: Blood   Result Value Ref Range    Glucose 123 70 - 130 mg/dL   Comprehensive Metabolic Panel    Collection Time: 08/09/23  2:31 PM    Specimen: Blood   Result Value Ref Range    Glucose 111 (H) 65 - 99 mg/dL    BUN 22 (H) 6 - 20 mg/dL    Creatinine 0.86 0.57 - 1.00 mg/dL    Sodium 134 (L) 136 - 145 mmol/L    Potassium 3.5 3.5 - 5.2 mmol/L    Chloride 95 (L) 98 - 107 mmol/L    CO2 22.0 22.0 - 29.0  mmol/L    Calcium 9.8 8.6 - 10.5 mg/dL    Total Protein 7.1 6.0 - 8.5 g/dL    Albumin 4.6 3.5 - 5.2 g/dL    ALT (SGPT) 12 1 - 33 U/L    AST (SGOT) 19 1 - 32 U/L    Alkaline Phosphatase 81 39 - 117 U/L    Total Bilirubin 0.3 0.0 - 1.2 mg/dL    Globulin 2.5 gm/dL    A/G Ratio 1.8 g/dL    BUN/Creatinine Ratio 25.6 (H) 7.0 - 25.0    Anion Gap 17.0 (H) 5.0 - 15.0 mmol/L    eGFR 78.4 >60.0 mL/min/1.73   Lipase    Collection Time: 08/09/23  2:31 PM    Specimen: Blood   Result Value Ref Range    Lipase 25 13 - 60 U/L   High Sensitivity Troponin T    Collection Time: 08/09/23  2:31 PM    Specimen: Blood   Result Value Ref Range    HS Troponin T 14 (H) <10 ng/L   Magnesium    Collection Time: 08/09/23  2:31 PM    Specimen: Blood   Result Value Ref Range    Magnesium 2.0 1.6 - 2.6 mg/dL   CBC Auto Differential    Collection Time: 08/09/23  2:31 PM    Specimen: Blood   Result Value Ref Range    WBC 11.14 (H) 3.40 - 10.80 10*3/mm3    RBC 4.58 3.77 - 5.28 10*6/mm3    Hemoglobin 13.5 12.0 - 15.9 g/dL    Hematocrit 39.3 34.0 - 46.6 %    MCV 85.8 79.0 - 97.0 fL    MCH 29.5 26.6 - 33.0 pg    MCHC 34.4 31.5 - 35.7 g/dL    RDW 12.2 (L) 12.3 - 15.4 %    RDW-SD 37.7 37.0 - 54.0 fl    MPV 10.0 6.0 - 12.0 fL    Platelets 302 140 - 450 10*3/mm3    Neutrophil % 78.8 (H) 42.7 - 76.0 %    Lymphocyte % 11.9 (L) 19.6 - 45.3 %    Monocyte % 8.7 5.0 - 12.0 %    Eosinophil % 0.0 (L) 0.3 - 6.2 %    Basophil % 0.2 0.0 - 1.5 %    Immature Grans % 0.4 0.0 - 0.5 %    Neutrophils, Absolute 8.78 (H) 1.70 - 7.00 10*3/mm3    Lymphocytes, Absolute 1.33 0.70 - 3.10 10*3/mm3    Monocytes, Absolute 0.97 (H) 0.10 - 0.90 10*3/mm3    Eosinophils, Absolute 0.00 0.00 - 0.40 10*3/mm3    Basophils, Absolute 0.02 0.00 - 0.20 10*3/mm3    Immature Grans, Absolute 0.04 0.00 - 0.05 10*3/mm3    nRBC 0.0 0.0 - 0.2 /100 WBC   Beta Hydroxybutyrate Quantitative    Collection Time: 08/09/23  2:31 PM    Specimen: Blood   Result Value Ref Range    Beta-Hydroxybutyrate Quant 1.690  (H) 0.020 - 0.270 mmol/L   Green Top (Gel)    Collection Time: 08/09/23  2:31 PM   Result Value Ref Range    Extra Tube Hold for add-ons.    Lavender Top    Collection Time: 08/09/23  2:31 PM   Result Value Ref Range    Extra Tube hold for add-on    Gold Top - SST    Collection Time: 08/09/23  2:31 PM   Result Value Ref Range    Extra Tube Hold for add-ons.    Light Blue Top    Collection Time: 08/09/23  2:31 PM   Result Value Ref Range    Extra Tube Hold for add-ons.    Urinalysis With Microscopic If Indicated (No Culture) - Urine, Clean Catch    Collection Time: 08/09/23  2:51 PM    Specimen: Urine, Clean Catch   Result Value Ref Range    Color, UA Yellow Yellow, Straw    Appearance, UA Cloudy (A) Clear    pH, UA 7.0 5.0 - 8.0    Specific Gravity, UA 1.023 1.005 - 1.030    Glucose, UA Negative Negative    Ketones, UA 80 mg/dL (3+) (A) Negative    Bilirubin, UA Negative Negative    Blood, UA Negative Negative    Protein, UA 30 mg/dL (1+) (A) Negative    Leuk Esterase, UA Negative Negative    Nitrite, UA Negative Negative    Urobilinogen, UA 1.0 E.U./dL 0.2 - 1.0 E.U./dL   Urinalysis, Microscopic Only - Urine, Clean Catch    Collection Time: 08/09/23  2:51 PM    Specimen: Urine, Clean Catch   Result Value Ref Range    RBC, UA None Seen None Seen, 0-2 /HPF    WBC, UA 0-2 None Seen, 0-2 /HPF    Bacteria, UA None Seen None Seen /HPF    Squamous Epithelial Cells, UA 0-2 None Seen, 0-2 /HPF    Hyaline Casts, UA 7-12 None Seen /LPF    Fine Granular Casts, UA 3-6 None Seen /LPF    Amorphous Crystals, UA Moderate/2+ None Seen /HPF    Methodology Manual Light Microscopy    Blood Gas, Arterial -    Collection Time: 08/09/23  3:17 PM    Specimen: Arterial Blood   Result Value Ref Range    Site OTHER     Morris's Test N/A     pH, Arterial 7.501 (H) 7.350 - 7.450 pH units    pCO2, Arterial 28.6 (L) 35.0 - 45.0 mm Hg    pO2, Arterial 57.4 (L) 80.0 - 100.0 mm Hg    HCO3, Arterial 22.4 22.0 - 28.0 mmol/L    Base Excess, Arterial 0.4  0.0 - 2.0 mmol/L    O2 Saturation Calculated 92.3 92.0 - 99.0 %    Barometric Pressure for Blood Gas 747.3 mmHg    Modality Room Air     Rate 16 Breaths/minute   High Sensitivity Troponin T 2Hr    Collection Time: 08/09/23  4:43 PM    Specimen: Arm, Right; Blood   Result Value Ref Range    HS Troponin T 13 (H) <10 ng/L    Troponin T Delta -1 >=-4 - <+4 ng/L   Basic Metabolic Panel    Collection Time: 08/09/23  4:43 PM    Specimen: Arm, Right; Blood   Result Value Ref Range    Glucose 84 65 - 99 mg/dL    BUN 19 6 - 20 mg/dL    Creatinine 0.73 0.57 - 1.00 mg/dL    Sodium 134 (L) 136 - 145 mmol/L    Potassium 3.1 (L) 3.5 - 5.2 mmol/L    Chloride 99 98 - 107 mmol/L    CO2 20.0 (L) 22.0 - 29.0 mmol/L    Calcium 8.6 8.6 - 10.5 mg/dL    BUN/Creatinine Ratio 26.0 (H) 7.0 - 25.0    Anion Gap 15.0 5.0 - 15.0 mmol/L    eGFR 95.5 >60.0 mL/min/1.73       Ordered the above labs and reviewed the results.        RADIOLOGY  CT Abdomen Pelvis With Contrast    Result Date: 8/9/2023  EXAMINATION: CT OF THE ABDOMEN AND PELVIS WITH CONTRAST  TECHNIQUE: Computed tomography of the abdomen and pelvis after the uneventful administration of nonionic intravenous contrast per protocol. Radiation dose reduction techniques were utilized, including automated exposure control and exposure modulation based on body size.  HISTORY: Nausea and vomiting  COMPARISON: None available  FINDINGS: Limited evaluation of the inferior thorax demonstrates atelectasis, without consolidation, pleural effusion or thorax. The heart is normal in size and configuration, without pericardial effusion.  Uterine calcifications likely represent fibroids.  The liver, gallbladder, spleen, adrenal glands, kidneys, pancreas, stomach, small bowel, large bowel, urinary bladder, and abdominal vasculature are normal. No intraperitoneal fluid collection free gas are seen. No enlarged lymph nodes are demonstrated.  Bone windows demonstrate degenerative changes, without suspicious  osseous lesion seen.      Incidental findings as above, without acute or suspicious intraperitoneal process seen.  This report was finalized on 8/9/2023 4:04 PM by Dr. Darian Son M.D.       Ordered the above noted radiological studies. Reviewed by me in PACS.            PROCEDURES  Procedures          MEDICATIONS GIVEN IN ER  Medications   sodium chloride 0.9 % with KCl 20 mEq/L infusion (has no administration in time range)   LORazepam (ATIVAN) injection 0.5 mg (has no administration in time range)   ondansetron (ZOFRAN) injection 4 mg (has no administration in time range)   sodium chloride 0.9 % bolus 1,000 mL (0 mL Intravenous Stopped 8/9/23 1532)   famotidine (PEPCID) injection 20 mg (20 mg Intravenous Given 8/9/23 1428)   ondansetron (ZOFRAN) injection 4 mg (4 mg Intravenous Given 8/9/23 1428)   ondansetron (ZOFRAN) injection 4 mg (4 mg Intravenous Given 8/9/23 1600)   iopamidol (ISOVUE-300) 61 % injection 100 mL (100 mL Intravenous Given 8/9/23 1548)   sodium chloride 0.9 % bolus 1,000 mL (1,000 mL Intravenous New Bag 8/9/23 1600)             MEDICAL DECISION MAKING, PROGRESS, and CONSULTS    All labs have been independently reviewed by me.  All radiology studies have been reviewed by me and I have also reviewed the radiology report.   EKG's independently viewed and interpreted by me.  Discussion below represents my analysis of pertinent findings related to patient's condition, differential diagnosis, treatment plan and final disposition.      Additional sources:  - Discussed/ obtained information from independent historians: Patient's  states she does normally get sick after the Ozempic but never like this    - External (non-ED) record review: I reviewed the patient's admission to the hospital from 7/29/2022 through 8/1/2022 where she had DKA.    - Chronic or social conditions impacting care: Patient lives at home with her     - Shared decision making: After shared decision-making discussion  from myself and the patient we agree she needs to be admitted to the hospital for further evaluation and care      Orders placed during this visit:  Orders Placed This Encounter   Procedures    CT Abdomen Pelvis With Contrast    Comprehensive Metabolic Panel    Lipase    Urinalysis With Microscopic If Indicated (No Culture) - Urine, Clean Catch    High Sensitivity Troponin T    Magnesium    Blood Gas, Venous -    CBC Auto Differential    Beta Hydroxybutyrate Quantitative    [place] Draw    High Sensitivity Troponin T 2Hr    Urinalysis, Microscopic Only - Urine, Clean Catch    Blood Gas, Arterial -    Basic Metabolic Panel    [facility] (on-call MD unless specified) Details    POC Glucose Once    POC Glucose Once    ECG 12 Lead Chest Pain    Initiate Observation Status    CBC & Differential    Ketone Bodies, Serum (Not performed at [facility])    Green Top (Gel)    Lavender Top    Gold Top - SST    Light Blue Top         Differential diagnosis:  My differential diagnosis includes but is not limited to gastritis, pancreatitis, cholecystitis, appendicitis, diverticulitis, urinary tract infection, kidney stone, or bowel obstruction.        Independent interpretation of labs, radiology studies, and discussions with consultants:  ED Course as of 08/09/23 1948   Wed Aug 09, 2023   1317 I will treat the patient with IV fluids, Zofran and Pepcid while placed on the monitor and obtaining labs with a VBG, serum ketones and urinalysis. [GP]   1326 EKG    EKG time: 1319  Rhythm/Rate: Sinus tachycardia at 110  No Acute Ischemia  Non-Specific ST-T changes    Similar compared to prior on 7/29/2022    Interpreted Contemporaneously by me.  Independently viewed by me     [GP]   1537 The patient is still having nausea and vomiting.  I will repeat her Zofran and obtain an abdominal CT scan.  Her chemistries are still pending. [GP]   1541 The patient has ketones in her urine and serum but her venous pH is 7.5 and her CO2 is 22 with an anion gap  of 17.  This is more consistent with starvation ketosis than it is DKA. [GP]   1555 I will give her another liter of fluid. [GP]   1646 Patient's abdominal CT scan was negative acute [GP]   1719 The patient is currently feeling better and is not vomiting but is still is somewhat nauseated and cannot take p.o.  Thus I believe she will need to be admitted to the hospital for further evaluation and care. [GP]   1749 I discussed the case with Dr. Betancourt from [facility].  She is aware of the patient's nausea and vomiting and history of diabetes.  We will admit her to a telemetry bed under observation status for further evaluation and care. [GP]      ED Course User Index  [GP] Juan Laird MD               DIAGNOSIS  Final diagnoses:   Intractable nausea and vomiting   Dehydration   Metabolic acidosis   History of diabetes mellitus         DISPOSITION  ADMISSION    Discussed treatment plan and reason for admission with pt/family and admitting physician.  Pt/family voiced understanding of the plan for admission for further testing/treatment as needed.            Latest Documented Vital Signs:  As of 19:48 EDT  BP- 148/95 HR- 94 Temp- 98.5 øF (36.9 øC) (Tympanic) O2 sat- 94%--      --------------------  Please note that portions of this were completed with a voice recognition program.       Note Disclaimer: At [facility], we believe that sharing information builds trust and better relationships. You are receiving this note because you are receiving care at [facility] or recently visited. It is possible you will see health information before a provider has talked with you about it. This kind of information can be easy to misunderstand. To help you fully understand what it means for your health, we urge you to discuss this note with your provider.             Juan Laird MD  08/09/23 8135

## 2023-08-09 NOTE — ED NOTES
"Nursing report ED to floor  Sonal De Dios  57 y.o.  female    HPI :   Chief Complaint   Patient presents with    Vomiting    Nausea       Admitting doctor:   Yamilka Tucker MD    Admitting diagnosis:   The primary encounter diagnosis was Intractable nausea and vomiting. Diagnoses of Dehydration, Metabolic acidosis, and History of diabetes mellitus were also pertinent to this visit.    Code status:   Current Code Status       Date Active Code Status Order ID Comments User Context       Prior            Allergies:   Codeine    Isolation:   No active isolations    Intake and Output    Intake/Output Summary (Last 24 hours) at 8/9/2023 1834  Last data filed at 8/9/2023 1532  Gross per 24 hour   Intake 1000 ml   Output --   Net 1000 ml       Weight:       08/09/23  1306   Weight: 72.6 kg (160 lb)       Most recent vitals:   Vitals:    08/09/23 1306 08/09/23 1311 08/09/23 1430 08/09/23 1531   BP:  142/88 156/95 142/97   BP Location:  Right arm  Left arm   Patient Position:  Lying  Lying   Pulse: 112  108 98   Resp: 18  16 16   Temp: 98.5 øF (36.9 øC)      TempSrc: Tympanic      SpO2: 99%  98% 99%   Weight: 72.6 kg (160 lb)      Height: 162.6 cm (64\")          Active LDAs/IV Access:   Lines, Drains & Airways       Active LDAs       Name Placement date Placement time Site Days    Peripheral IV 08/09/23 1428 Anterior;Proximal;Right Forearm 08/09/23  1428  Forearm  less than 1                    Labs (abnormal labs have a star):   Labs Reviewed   COMPREHENSIVE METABOLIC PANEL - Abnormal; Notable for the following components:       Result Value    Glucose 111 (*)     BUN 22 (*)     Sodium 134 (*)     Chloride 95 (*)     BUN/Creatinine Ratio 25.6 (*)     Anion Gap 17.0 (*)     All other components within normal limits    Narrative:     GFR Normal >60  Chronic Kidney Disease <60  Kidney Failure <15     URINALYSIS W/ MICROSCOPIC IF INDICATED (NO CULTURE) - Abnormal; Notable for the following components:    " Appearance, UA Cloudy (*)     Ketones, UA 80 mg/dL (3+) (*)     Protein, UA 30 mg/dL (1+) (*)     All other components within normal limits   TROPONIN - Abnormal; Notable for the following components:    HS Troponin T 14 (*)     All other components within normal limits    Narrative:     High Sensitive Troponin T Reference Range:  <10.0 ng/L- Negative Female for AMI  <15.0 ng/L- Negative Male for AMI  >=10 - Abnormal Female indicating possible myocardial injury.  >=15 - Abnormal Male indicating possible myocardial injury.   Clinicians would have to utilize clinical acumen, EKG, Troponin, and serial changes to determine if it is an Acute Myocardial Infarction or myocardial injury due to an underlying chronic condition.        CBC WITH AUTO DIFFERENTIAL - Abnormal; Notable for the following components:    WBC 11.14 (*)     RDW 12.2 (*)     Neutrophil % 78.8 (*)     Lymphocyte % 11.9 (*)     Eosinophil % 0.0 (*)     Neutrophils, Absolute 8.78 (*)     Monocytes, Absolute 0.97 (*)     All other components within normal limits   BETA HYDROXYBUTYRATE QUANTITATIVE - Abnormal; Notable for the following components:    Beta-Hydroxybutyrate Quant 1.690 (*)     All other components within normal limits    Narrative:     In the assessment of possible diabetic ketoacidosis, the test should be interpreted along with other clinical and laboratory findings.  A level greater than 1 mmol/L should require further evaluation and levels of more than 3 mmol/L require immediate medical review.   HIGH SENSITIVITIY TROPONIN T 2HR - Abnormal; Notable for the following components:    HS Troponin T 13 (*)     All other components within normal limits    Narrative:     High Sensitive Troponin T Reference Range:  <10.0 ng/L- Negative Female for AMI  <15.0 ng/L- Negative Male for AMI  >=10 - Abnormal Female indicating possible myocardial injury.  >=15 - Abnormal Male indicating possible myocardial injury.   Clinicians would have to utilize clinical  acumen, EKG, Troponin, and serial changes to determine if it is an Acute Myocardial Infarction or myocardial injury due to an underlying chronic condition.        BLOOD GAS, ARTERIAL - Abnormal; Notable for the following components:    pH, Arterial 7.501 (*)     pCO2, Arterial 28.6 (*)     pO2, Arterial 57.4 (*)     All other components within normal limits   LIPASE - Normal   MAGNESIUM - Normal   POCT GLUCOSE FINGERSTICK - Normal   BLOOD GAS, VENOUS   RAINBOW DRAW    Narrative:     The following orders were created for panel order [place] Draw.  Procedure                               Abnormality         Status                     ---------                               -----------         ------                     Green Top (Gel)[[id number]]                                  Final result               Lavender Top[[id number]]                                     Final result               Gold Top - SST[[id number]]                                   Final result               Light Blue Top[[id number]]                                   Final result                 Please view results for these tests on the individual orders.   URINALYSIS, MICROSCOPIC ONLY   BASIC METABOLIC PANEL   POCT GLUCOSE FINGERSTICK   CBC AND DIFFERENTIAL    Narrative:     The following orders were created for panel order CBC & Differential.  Procedure                               Abnormality         Status                     ---------                               -----------         ------                     CBC Auto Differential[[id number]]        Abnormal            Final result                 Please view results for these tests on the individual orders.   KETONE BODIES SERUM    Narrative:     The following orders were created for panel order Ketone Bodies, Serum (Not performed at [place]).  Procedure                               Abnormality         Status                     ---------                               -----------          ------                     Beta Hydroxybutyrate [place]...[[id number]]  Abnormal            Final result                 Please view results for these tests on the individual orders.   GREEN TOP   LAVENDER TOP   GOLD TOP - SST   LIGHT BLUE TOP       EKG:   ECG 12 Lead Chest Pain   Final Result   HEART RATE= 110  bpm   RR Interval= 545  ms   AK Interval= 167  ms   P Horizontal Axis= -6  deg   P Front Axis= 70  deg   QRSD Interval= 96  ms   QT Interval= 352  ms   QRS Axis= -5  deg   T Wave Axis= 45  deg   - ABNORMAL ECG -   Sinus tachycardia   LAE, consider biatrial enlargement   Low voltage, extremity leads   PVCs have resolved   Electronically Signed By: Sarah Sigala ([facility]) 09-Aug-2023 16:06:51   Date and Time of Study: 2023-08-09 13:19:33          Meds given in ED:   Medications   sodium chloride 0.9 % with KCl 20 mEq/L infusion (has no administration in time range)   LORazepam (ATIVAN) injection 0.5 mg (has no administration in time range)   ondansetron (ZOFRAN) injection 4 mg (has no administration in time range)   sodium chloride 0.9 % bolus 1,000 mL (0 mL Intravenous Stopped 8/9/23 1532)   famotidine (PEPCID) injection 20 mg (20 mg Intravenous Given 8/9/23 1428)   ondansetron (ZOFRAN) injection 4 mg (4 mg Intravenous Given 8/9/23 1428)   ondansetron (ZOFRAN) injection 4 mg (4 mg Intravenous Given 8/9/23 1600)   iopamidol (ISOVUE-300) 61 % injection 100 mL (100 mL Intravenous Given 8/9/23 1548)   sodium chloride 0.9 % bolus 1,000 mL (1,000 mL Intravenous New Bag 8/9/23 1600)       Imaging results:  CT Abdomen Pelvis With Contrast    Result Date: 8/9/2023  Incidental findings as above, without acute or suspicious intraperitoneal process seen.  This report was finalized on 8/9/2023 4:04 PM by Dr. Darian Son M.D.       Ambulatory status:   - ambulatory    Social issues:   Social History     Socioeconomic History    Marital status:    Tobacco Use    Smoking status: Never    Smokeless tobacco: Never   Vaping  Use    Vaping Use: Never used   Substance and Sexual Activity    Alcohol use: No    Drug use: No    Sexual activity: Yes     Partners: Male     Birth control/protection: Surgical       NIH Stroke Scale:       Sonal Crews RN  08/09/23 18:34 EDT

## 2023-08-09 NOTE — ED TRIAGE NOTES
Patient to ED via PV c/o nausea and vomiting x 5 days. Patient states she has T2DM and is taking ozempic, last took it a week ago just prior to when she began feeling bad. Patient denies abdominal pain, has been unable to eat. Patient states she has been able to keep some water down.

## 2023-08-10 ENCOUNTER — APPOINTMENT (OUTPATIENT)
Dept: GENERAL RADIOLOGY | Facility: HOSPITAL | Age: 58
End: 2023-08-10
Payer: COMMERCIAL

## 2023-08-10 PROBLEM — E78.5 HYPERLIPIDEMIA: Status: ACTIVE | Noted: 2023-08-10

## 2023-08-10 PROBLEM — U07.1 COVID-19 VIRUS INFECTION: Status: ACTIVE | Noted: 2023-08-10

## 2023-08-10 LAB
ANION GAP SERPL CALCULATED.3IONS-SCNC: 12.5 MMOL/L (ref 5–15)
B PARAPERT DNA SPEC QL NAA+PROBE: NOT DETECTED
B PERT DNA SPEC QL NAA+PROBE: NOT DETECTED
BASOPHILS # BLD AUTO: 0.02 10*3/MM3 (ref 0–0.2)
BASOPHILS NFR BLD AUTO: 0.2 % (ref 0–1.5)
BUN SERPL-MCNC: 12 MG/DL (ref 6–20)
BUN/CREAT SERPL: 16.2 (ref 7–25)
C PNEUM DNA NPH QL NAA+NON-PROBE: NOT DETECTED
CALCIUM SPEC-SCNC: 8.7 MG/DL (ref 8.6–10.5)
CHLORIDE SERPL-SCNC: 99 MMOL/L (ref 98–107)
CO2 SERPL-SCNC: 21.5 MMOL/L (ref 22–29)
CREAT SERPL-MCNC: 0.74 MG/DL (ref 0.57–1)
DEPRECATED RDW RBC AUTO: 40.7 FL (ref 37–54)
EGFRCR SERPLBLD CKD-EPI 2021: 93.9 ML/MIN/1.73
EOSINOPHIL # BLD AUTO: 0 10*3/MM3 (ref 0–0.4)
EOSINOPHIL NFR BLD AUTO: 0 % (ref 0.3–6.2)
ERYTHROCYTE [DISTWIDTH] IN BLOOD BY AUTOMATED COUNT: 12.3 % (ref 12.3–15.4)
FLUAV SUBTYP SPEC NAA+PROBE: NOT DETECTED
FLUBV RNA ISLT QL NAA+PROBE: NOT DETECTED
GLUCOSE BLDC GLUCOMTR-MCNC: 118 MG/DL (ref 70–130)
GLUCOSE BLDC GLUCOMTR-MCNC: 130 MG/DL (ref 70–130)
GLUCOSE BLDC GLUCOMTR-MCNC: 87 MG/DL (ref 70–130)
GLUCOSE BLDC GLUCOMTR-MCNC: 93 MG/DL (ref 70–130)
GLUCOSE SERPL-MCNC: 91 MG/DL (ref 65–99)
HADV DNA SPEC NAA+PROBE: NOT DETECTED
HCOV 229E RNA SPEC QL NAA+PROBE: NOT DETECTED
HCOV HKU1 RNA SPEC QL NAA+PROBE: NOT DETECTED
HCOV NL63 RNA SPEC QL NAA+PROBE: NOT DETECTED
HCOV OC43 RNA SPEC QL NAA+PROBE: NOT DETECTED
HCT VFR BLD AUTO: 39.2 % (ref 34–46.6)
HGB BLD-MCNC: 12.8 G/DL (ref 12–15.9)
HMPV RNA NPH QL NAA+NON-PROBE: NOT DETECTED
HPIV1 RNA ISLT QL NAA+PROBE: NOT DETECTED
HPIV2 RNA SPEC QL NAA+PROBE: NOT DETECTED
HPIV3 RNA NPH QL NAA+PROBE: NOT DETECTED
HPIV4 P GENE NPH QL NAA+PROBE: NOT DETECTED
IMM GRANULOCYTES # BLD AUTO: 0.02 10*3/MM3 (ref 0–0.05)
IMM GRANULOCYTES NFR BLD AUTO: 0.2 % (ref 0–0.5)
LDH SERPL-CCNC: 172 U/L (ref 135–214)
LYMPHOCYTES # BLD AUTO: 1.6 10*3/MM3 (ref 0.7–3.1)
LYMPHOCYTES NFR BLD AUTO: 19.8 % (ref 19.6–45.3)
M PNEUMO IGG SER IA-ACNC: NOT DETECTED
MAGNESIUM SERPL-MCNC: 1.9 MG/DL (ref 1.6–2.6)
MCH RBC QN AUTO: 29.5 PG (ref 26.6–33)
MCHC RBC AUTO-ENTMCNC: 32.7 G/DL (ref 31.5–35.7)
MCV RBC AUTO: 90.3 FL (ref 79–97)
MONOCYTES # BLD AUTO: 0.77 10*3/MM3 (ref 0.1–0.9)
MONOCYTES NFR BLD AUTO: 9.5 % (ref 5–12)
NEUTROPHILS NFR BLD AUTO: 5.67 10*3/MM3 (ref 1.7–7)
NEUTROPHILS NFR BLD AUTO: 70.3 % (ref 42.7–76)
NRBC BLD AUTO-RTO: 0 /100 WBC (ref 0–0.2)
PHOSPHATE SERPL-MCNC: 3 MG/DL (ref 2.5–4.5)
PLATELET # BLD AUTO: 224 10*3/MM3 (ref 140–450)
PMV BLD AUTO: 10.2 FL (ref 6–12)
POTASSIUM SERPL-SCNC: 3.5 MMOL/L (ref 3.5–5.2)
POTASSIUM SERPL-SCNC: 3.9 MMOL/L (ref 3.5–5.2)
PROCALCITONIN SERPL-MCNC: 0.08 NG/ML (ref 0–0.25)
RBC # BLD AUTO: 4.34 10*6/MM3 (ref 3.77–5.28)
RHINOVIRUS RNA SPEC NAA+PROBE: NOT DETECTED
RSV RNA NPH QL NAA+NON-PROBE: NOT DETECTED
SARS-COV-2 RNA NPH QL NAA+NON-PROBE: DETECTED
SODIUM SERPL-SCNC: 133 MMOL/L (ref 136–145)
TSH SERPL DL<=0.05 MIU/L-ACNC: 1.75 UIU/ML (ref 0.27–4.2)
WBC NRBC COR # BLD: 8.08 10*3/MM3 (ref 3.4–10.8)

## 2023-08-10 PROCEDURE — 63710000001 INSULIN GLARGINE PER 5 UNITS: Performed by: INTERNAL MEDICINE

## 2023-08-10 PROCEDURE — 25010000002 SODIUM CHLORIDE 0.9 % WITH KCL 20 MEQ 20-0.9 MEQ/L-% SOLUTION: Performed by: INTERNAL MEDICINE

## 2023-08-10 PROCEDURE — 84443 ASSAY THYROID STIM HORMONE: CPT | Performed by: INTERNAL MEDICINE

## 2023-08-10 PROCEDURE — 85025 COMPLETE CBC W/AUTO DIFF WBC: CPT | Performed by: INTERNAL MEDICINE

## 2023-08-10 PROCEDURE — 96365 THER/PROPH/DIAG IV INF INIT: CPT

## 2023-08-10 PROCEDURE — 71045 X-RAY EXAM CHEST 1 VIEW: CPT

## 2023-08-10 PROCEDURE — 96361 HYDRATE IV INFUSION ADD-ON: CPT

## 2023-08-10 PROCEDURE — 96376 TX/PRO/DX INJ SAME DRUG ADON: CPT

## 2023-08-10 PROCEDURE — 84132 ASSAY OF SERUM POTASSIUM: CPT | Performed by: STUDENT IN AN ORGANIZED HEALTH CARE EDUCATION/TRAINING PROGRAM

## 2023-08-10 PROCEDURE — 25010000002 REMDESIVIR 100 MG/20ML SOLUTION 1 EACH VIAL: Performed by: STUDENT IN AN ORGANIZED HEALTH CARE EDUCATION/TRAINING PROGRAM

## 2023-08-10 PROCEDURE — 87507 IADNA-DNA/RNA PROBE TQ 12-25: CPT | Performed by: STUDENT IN AN ORGANIZED HEALTH CARE EDUCATION/TRAINING PROGRAM

## 2023-08-10 PROCEDURE — 84100 ASSAY OF PHOSPHORUS: CPT | Performed by: INTERNAL MEDICINE

## 2023-08-10 PROCEDURE — 0202U NFCT DS 22 TRGT SARS-COV-2: CPT | Performed by: STUDENT IN AN ORGANIZED HEALTH CARE EDUCATION/TRAINING PROGRAM

## 2023-08-10 PROCEDURE — 25010000002 ONDANSETRON PER 1 MG: Performed by: INTERNAL MEDICINE

## 2023-08-10 PROCEDURE — 83735 ASSAY OF MAGNESIUM: CPT | Performed by: INTERNAL MEDICINE

## 2023-08-10 PROCEDURE — 83615 LACTATE (LD) (LDH) ENZYME: CPT | Performed by: STUDENT IN AN ORGANIZED HEALTH CARE EDUCATION/TRAINING PROGRAM

## 2023-08-10 PROCEDURE — 80048 BASIC METABOLIC PNL TOTAL CA: CPT | Performed by: INTERNAL MEDICINE

## 2023-08-10 PROCEDURE — 82948 REAGENT STRIP/BLOOD GLUCOSE: CPT

## 2023-08-10 PROCEDURE — G0378 HOSPITAL OBSERVATION PER HR: HCPCS

## 2023-08-10 PROCEDURE — 84145 PROCALCITONIN (PCT): CPT | Performed by: STUDENT IN AN ORGANIZED HEALTH CARE EDUCATION/TRAINING PROGRAM

## 2023-08-10 RX ORDER — POTASSIUM CHLORIDE 750 MG/1
40 TABLET, FILM COATED, EXTENDED RELEASE ORAL EVERY 4 HOURS
Status: COMPLETED | OUTPATIENT
Start: 2023-08-10 | End: 2023-08-10

## 2023-08-10 RX ADMIN — FAMOTIDINE 20 MG: 10 INJECTION INTRAVENOUS at 09:49

## 2023-08-10 RX ADMIN — FAMOTIDINE 20 MG: 10 INJECTION INTRAVENOUS at 20:38

## 2023-08-10 RX ADMIN — POTASSIUM CHLORIDE AND SODIUM CHLORIDE 150 ML/HR: 900; 150 INJECTION, SOLUTION INTRAVENOUS at 12:24

## 2023-08-10 RX ADMIN — Medication 10 ML: at 22:26

## 2023-08-10 RX ADMIN — ONDANSETRON 4 MG: 2 INJECTION INTRAMUSCULAR; INTRAVENOUS at 12:24

## 2023-08-10 RX ADMIN — ONDANSETRON 4 MG: 2 INJECTION INTRAMUSCULAR; INTRAVENOUS at 17:20

## 2023-08-10 RX ADMIN — ONDANSETRON 4 MG: 2 INJECTION INTRAMUSCULAR; INTRAVENOUS at 20:38

## 2023-08-10 RX ADMIN — Medication 10 ML: at 09:50

## 2023-08-10 RX ADMIN — POTASSIUM CHLORIDE 40 MEQ: 750 TABLET, EXTENDED RELEASE ORAL at 17:20

## 2023-08-10 RX ADMIN — INSULIN GLARGINE 12 UNITS: 100 INJECTION, SOLUTION SUBCUTANEOUS at 09:49

## 2023-08-10 RX ADMIN — GABAPENTIN 400 MG: 400 CAPSULE ORAL at 09:50

## 2023-08-10 RX ADMIN — INSULIN GLARGINE 12 UNITS: 100 INJECTION, SOLUTION SUBCUTANEOUS at 22:25

## 2023-08-10 RX ADMIN — ONDANSETRON 4 MG: 2 INJECTION INTRAMUSCULAR; INTRAVENOUS at 06:40

## 2023-08-10 RX ADMIN — REMDESIVIR 200 MG: 100 INJECTION, POWDER, LYOPHILIZED, FOR SOLUTION INTRAVENOUS at 15:17

## 2023-08-10 RX ADMIN — SENNOSIDES AND DOCUSATE SODIUM 1 TABLET: 50; 8.6 TABLET ORAL at 09:50

## 2023-08-10 RX ADMIN — POTASSIUM CHLORIDE AND SODIUM CHLORIDE 150 ML/HR: 900; 150 INJECTION, SOLUTION INTRAVENOUS at 06:41

## 2023-08-10 RX ADMIN — POTASSIUM CHLORIDE 40 MEQ: 750 TABLET, EXTENDED RELEASE ORAL at 12:24

## 2023-08-10 RX ADMIN — FAMOTIDINE 20 MG: 10 INJECTION INTRAVENOUS at 02:23

## 2023-08-10 RX ADMIN — GABAPENTIN 400 MG: 400 CAPSULE ORAL at 14:08

## 2023-08-10 RX ADMIN — POTASSIUM CHLORIDE AND SODIUM CHLORIDE 150 ML/HR: 900; 150 INJECTION, SOLUTION INTRAVENOUS at 20:39

## 2023-08-10 RX ADMIN — LISINOPRIL 5 MG: 5 TABLET ORAL at 09:50

## 2023-08-10 RX ADMIN — GABAPENTIN 600 MG: 300 CAPSULE ORAL at 20:38

## 2023-08-10 NOTE — PROGRESS NOTES
"Nutrition Services    Patient Name:  Sonal De Dios  YOB: 1965  MRN: [id number]  Admit Date:  8/9/2023    Assessment Date:  08/10/23    Comment: MST 2:  Pt seen for MST 2 for wt loss and JEANETH. Pt here with N/V after Ozempic dose was increased 5 days ago and hasn't been able to tolerate any po intake. Pt continues with N/V now and unable to tolerate any breakfast or lunch today. Pt also just tested positive for COVID today as well. Pt with 34 lb (18%) wt loss x 9 months but was intentional and on Ozempic for her type 1 DM. No signs of fat/muscle wasting noted on NFPE. Pt unable to discuss any ONS at this time d/t nausea.  Will continue to follow for diet tolerance and improvement of symptoms.    CLINICAL NUTRITION ASSESSMENT      Reason for Assessment MST score 2+     Diagnosis/Problem   Intractable N/V, COVID-19, HLD, ketosis, dehydration, Type 1 DM   Medical/Surgical History Past Medical History:   Diagnosis Date    Diabetes mellitus     Gestational diabetes     Pneumonia        Past Surgical History:   Procedure Laterality Date    ENDOMETRIAL ABLATION          Encounter Information        Nutrition History:  Pt seen for MST 2 for wt loss and JEANETH. Pt here with N/V after Ozempic dose was increased 5 days ago and hasn't been able to tolerate any po intake. Pt continues with N/V now and unable to tolerate any breakfast or lunch today. Pt also just tested positive for COVID today as well. Pt with 34 lb (18%) wt loss x 9 months but was intentional and on Ozempic for her type 1 DM. No signs of fat/muscle wasting noted on NFPE. Pt unable to discuss any ONS at this time d/t nausea.   Food Preferences:    Supplements:    Factors Affecting Intake: decreased appetite, nausea, vomiting     Anthropometrics        Current Height  Current Weight  BMI kg/m2 Height: 162.6 cm (64\")  Weight: 70.9 kg (156 lb 3.2 oz) (08/10/23 0647)  Body mass index is 26.81 kg/mý.   Adjusted BMI (if applicable)    BMI Category " Overweight (25 - 29.9)       Admission Weight 156 lb       Ideal Body Weight (IBW) 120 lb   Adjusted IBW (if applicable)        Usual Body Weight (UBW) 190 lb   Weight Change/Trend Loss, Amount/Timeframe: 34 lb (18%) wt loss x 9 months       Weight History Wt Readings from Last 30 Encounters:   08/10/23 0647 70.9 kg (156 lb 3.2 oz)   08/09/23 2010 70.9 kg (156 lb 3.2 oz)   08/09/23 1306 72.6 kg (160 lb)   06/21/23 0747 75.8 kg (167 lb 3.2 oz)   02/21/23 0801 81.3 kg (179 lb 4.8 oz)   12/28/22 0813 86.2 kg (190 lb 1.6 oz)   08/12/22 1106 78.2 kg (172 lb 8 oz)   08/01/22 0619 72.1 kg (158 lb 15.2 oz)   07/31/22 0500 76.6 kg (168 lb 14 oz)   07/30/22 0600 77.7 kg (171 lb 4.8 oz)   07/29/22 0921 71.2 kg (156 lb 15.5 oz)   07/29/22 0646 72.6 kg (160 lb)   07/12/22 0746 80.3 kg (177 lb 1.6 oz)   02/08/22 0933 80.9 kg (178 lb 4.8 oz)   01/05/22 1214 80.7 kg (178 lb)   12/07/21 0757 77.3 kg (170 lb 6.4 oz)   12/02/21 1858 70.3 kg (155 lb)   07/22/21 0743 77.9 kg (171 lb 12.8 oz)   01/28/20 0823 76.2 kg (168 lb 1.6 oz)   05/15/19 0813 72.6 kg (160 lb 1.6 oz)   02/05/19 0816 74.4 kg (164 lb 1.6 oz)   06/01/18 1405 72.4 kg (159 lb 9.6 oz)   04/10/18 1025 70.2 kg (154 lb 12.8 oz)   03/29/18 1324 69.9 kg (154 lb 1.6 oz)   03/07/18 0600 70.3 kg (154 lb 15.7 oz)   03/06/18 1826 65.8 kg (145 lb)           --  Tests/Procedures        Tests/Procedures CT scan, X-Ray     Labs       Pertinent Labs    Results from last 7 days   Lab Units 08/10/23  0833 08/09/23  1643 08/09/23  1431   SODIUM mmol/L 133* 134* 134*   POTASSIUM mmol/L 3.5 3.1* 3.5   CHLORIDE mmol/L 99 99 95*   CO2 mmol/L 21.5* 20.0* 22.0   BUN mg/dL 12 19 22*   CREATININE mg/dL 0.74 0.73 0.86   CALCIUM mg/dL 8.7 8.6 9.8   BILIRUBIN mg/dL  --   --  0.3   ALK PHOS U/L  --   --  81   ALT (SGPT) U/L  --   --  12   AST (SGOT) U/L  --   --  19   GLUCOSE mg/dL 91 84 111*     Results from last 7 days   Lab Units 08/10/23  0833 08/10/23  0504 08/09/23  1431   MAGNESIUM mg/dL 1.9  --   2.0   PHOSPHORUS mg/dL  --  3.0  --    HEMOGLOBIN g/dL  --  12.8 13.5   HEMATOCRIT %  --  39.2 39.3   WBC 10*3/mm3  --  8.08 11.14*   ALBUMIN g/dL  --   --  4.6     Results from last 7 days   Lab Units 08/10/23  0504 08/09/23  1431   PLATELETS 10*3/mm3 224 302     COVID19   Date Value Ref Range Status   08/10/2023 Detected (C) Not Detected - Ref. Range Final     Lab Results   Component Value Date    HGBA1C 6.30 (H) 06/21/2023          Medications           Scheduled Medications enoxaparin, 40 mg, Subcutaneous, Daily  famotidine, 20 mg, Intravenous, Q12H  gabapentin, 400 mg, Oral, BID  gabapentin, 600 mg, Oral, Nightly  insulin glargine, 12 Units, Subcutaneous, Q12H  insulin lispro, 2-9 Units, Subcutaneous, 4x Daily AC & at Bedtime  lisinopril, 5 mg, Oral, Daily  ondansetron, 4 mg, Intravenous, 4x Daily AC & at Bedtime  potassium chloride ER, 40 mEq, Oral, Q4H  remdesivir, 200 mg, Intravenous, Q24H   Followed by  [START ON 8/11/2023] remdesivir, 100 mg, Intravenous, Q24H  senna-docusate sodium, 1 tablet, Oral, BID  sodium chloride, 10 mL, Intravenous, Q12H       Infusions sodium chloride 0.9 % with KCl 20 mEq, 150 mL/hr, Last Rate: 150 mL/hr (08/10/23 1224)       PRN Medications   acetaminophen **OR** acetaminophen **OR** acetaminophen    senna-docusate sodium **AND** polyethylene glycol **AND** bisacodyl **AND** bisacodyl    calcium carbonate    Calcium Replacement - Follow Nurse / BPA Driven Protocol    dextrose    dextrose    glucagon (human recombinant)    LORazepam    Magnesium Standard Dose Replacement - Follow Nurse / BPA Driven Protocol    melatonin    Morphine **AND** naloxone    nitroglycerin    Phosphorus Replacement - Follow Nurse / BPA Driven Protocol    Potassium Replacement - Follow Nurse / BPA Driven Protocol    promethazine **OR** promethazine    sodium chloride    sodium chloride     Physical Findings          Physical Appearance alert, oriented, room air   Oral/Mouth Cavity WNL   Edema  no edema    Gastrointestinal nausea, non-distended , normoactive, vomiting, last bowel movement: 8/8   Skin  skin intact   Tubes/Drains/Lines none   NFPE No clinical signs of muscle wasting or fat loss   --  Current Nutrition Orders & Evaluation of Intake       Oral Nutrition     Food Allergies NKFA   Current PO Diet Diet: Liquid Diets, Diabetic Diets; Full Liquid; Consistent Carbohydrate; Texture: Regular Texture (IDDSI 7); Fluid Consistency: Thin (IDDSI 0)   Supplement n/a   PO Evaluation     % PO Intake 0%    # of Days Evaluated 1   --  PES STATEMENT / NUTRITION DIAGNOSIS      Nutrition Dx Problem  Problem: Inadequate Oral Intake  Etiology: Factors Affecting Nutrition N/V  Signs/Symptoms: Report of Minimal PO Intake and Report/Observation    Comment:    --  NUTRITION INTERVENTION / PLAN OF CARE      Intervention Goal(s) Maintain nutrition status, Reduce/improve symptoms, Meet estimated needs, Disease management/therapy, Establish PO intake, Tolerate PO , No significant weight loss, and PO intake goal %: 75%         RD Intervention/Action Encourage intake, Follow Tx Progress, and Care plan reviewed         Prescription/Orders:       PO Diet       Supplements       Snacks       Enteral Nutrition       Parenteral Nutrition    New Prescription Ordered? No changes at this time   --      Monitor/Evaluation Per protocol   Discharge Plan/Needs Pending clinical course   Education Will instruct as appropriate   --    RD to follow per protocol.      Electronically signed by:  Yessi Ferguson RD  08/10/23 14:42 EDT

## 2023-08-10 NOTE — PROGRESS NOTES
[facility]  Clinical Pharmacy Department     Remdesivir Review Note    Sonal De Dios is a 57 y.o. female with confirmed COVID-19 infection on day 2 of hospitalization.     Consulting Provider:  Dr. Joseph James  Date of Confirmed SARS-CoV-2: 8/10  Date of Symptom Onset: N/V reported from ~8/6  Other Antimicrobials: N/A  Hydroxychloroquine or chloroquine prior to arrival: No    Allergies  Allergies as of 08/09/2023 - Reviewed 08/09/2023   Allergen Reaction Noted    Codeine GI Intolerance 03/29/2018       Microbiology:  Microbiology Results (last 10 days)       Procedure Component Value - Date/Time    Respiratory Panel PCR w/COVID-19(SARS-CoV-2) JORDAN/CECILY/TRACY/PAD/COR/MAD/URIEL In-House, NP Swab in UTM/VTM, 3-4 HR TAT - Swab, Nasopharynx [[id number]]  (Abnormal) Collected: 08/10/23 1038    Lab Status: Final result Specimen: Swab from Nasopharynx Updated: 08/10/23 1154     ADENOVIRUS, PCR Not Detected     Coronavirus 229E Not Detected     Coronavirus HKU1 Not Detected     Coronavirus NL63 Not Detected     Coronavirus OC43 Not Detected     COVID19 Detected     Human Metapneumovirus Not Detected     Human Rhinovirus/Enterovirus Not Detected     Influenza A PCR Not Detected     Influenza B PCR Not Detected     Parainfluenza Virus 1 Not Detected     Parainfluenza Virus 2 Not Detected     Parainfluenza Virus 3 Not Detected     Parainfluenza Virus 4 Not Detected     RSV, PCR Not Detected     Bordetella pertussis pcr Not Detected     Bordetella parapertussis PCR Not Detected     Chlamydophila pneumoniae PCR Not Detected     Mycoplasma pneumo by PCR Not Detected    Narrative:      In the setting of a positive respiratory panel with a viral infection PLUS a negative procalcitonin without other underlying concern for bacterial infection, consider observing off antibiotics or discontinuation of antibiotics and continue supportive care. If the respiratory panel is positive for atypical bacterial infection (Bordetella  "pertussis, Chlamydophila pneumoniae, or Mycoplasma pneumoniae), consider antibiotic de-escalation to target atypical bacterial infection.            Vitals/Labs/I&O  Visit Vitals  /86 (BP Location: Right arm, Patient Position: Lying)   Pulse 94   Temp 97.3 øF (36.3 øC) (Oral)   Resp 18   Ht 162.6 cm (64\")   Wt 70.9 kg (156 lb 3.2 oz)   LMP 01/05/2019 (LMP Unknown)   SpO2 99%   BMI 26.81 kg/mý       Results from last 7 days   Lab Units 08/10/23  0504 08/09/23  1431   WBC 10*3/mm3 8.08 11.14*         Results from last 7 days   Lab Units 08/09/23  1431   AST (SGOT) U/L 19      Results from last 7 days   Lab Units 08/09/23  1431   ALT (SGPT) U/L 12       Estimated Creatinine Clearance: 80.1 mL/min (by C-G formula based on SCr of 0.74 mg/dL).  Results from last 7 days   Lab Units 08/10/23  0833 08/09/23  1643 08/09/23  1431   BUN mg/dL 12 19 22*   CREATININE mg/dL 0.74 0.73 0.86     Intake & Output (last 3 days)         08/07 0701  08/08 0700 08/08 0701  08/09 0700 08/09 0701  08/10 0700 08/10 0701  08/11 0700    P.O.   120     IV Piggyback   1000     Total Intake(mL/kg)   1120 (15.8)     Net   +1120                     Assessment/Plan:    Patient meets the following inclusion/exclusion criteria:  Patient is hospitalized with confirmed COVID-19 infection (and accompanying symptoms)  Patient meets one of the two below criteria:  Patient is requiring an increase in baseline supplemental oxygen requirements OR SpO2 </= 94% on room air secondary to COVID-19 infection OR  Patient is symptomatic but not requiring supplemental oxygen or an increase in baseline oxygen AND has at least one of the below high risk criteria for progression to severe illness. High risk criteria:   Age >/= 65  Cancer  Cardiovascular diseases (HF, CAD, or cardiomyopathies)  CKD  Chronic lung disease (COPD, CF, interstitial lung disease, PE, pulmonary hypertension, bronchopulmonary dysplasia, bronchiectasis)  Diabetes mellitis (insulin dependent " or poorly controlled)  Immunocompromising conditions or receipt of immunosuppressive medications  Obesity (BMI > 35 kg/m2)  Pregnancy and recent pregnancy (within 7 days of delivery)  Sickle cell disease  Baseline and daily LFTs and Scr have been ordered prior to remdesivir initiation  ALT is not ? 10 times the upper limit of normal  Patient is not on concomitant hydroxychloroquine or chloroquine  Patient does not require invasive mechanical ventilation (IMV) or ECMO  Patient is within 10 days from symptom onset (for criteria 2a) or within 7 days of symptom onset (for criteria 2b)    Remdesivir 200 mg IV x 1 dose, followed by 100 mg IV q24h for 3 days or until hospital discharge (whichever comes first) has been ordered.    Thank you for involving pharmacy in this patient's care. Please contact pharmacy with any questions or concerns.                           Jossue Rico [facility]  Clinical Pharmacist

## 2023-08-10 NOTE — SIGNIFICANT NOTE
08/10/23 1154   OTHER   Discipline occupational therapist   Rehab Time/Intention   Session Not Performed other (see comments)  (Screen completed, pt Ind at baseline and up ad lydia in room. No skilled needs at this time and OT will complete orders now.)   Therapy Assessment/Plan (PT)   Criteria for Skilled Interventions Met (PT) no problems identified which require skilled intervention

## 2023-08-10 NOTE — PLAN OF CARE
Goal Outcome Evaluation:      Pt admitted this shift with nausea and vomiting, RA, sr/st on monitor, administered meds per mar, resting at the time,still c/o nausea, denies vomiting ,continuous IVF running at 150, no ss of acute distress noted, will continue to monitor.           Problem: Adult Inpatient Plan of Care  Goal: Plan of Care Review  Outcome: Ongoing, Progressing  Goal: Patient-Specific Goal (Individualized)  Outcome: Ongoing, Progressing  Goal: Absence of Hospital-Acquired Illness or Injury  Outcome: Ongoing, Progressing  Intervention: Identify and Manage Fall Risk  Recent Flowsheet Documentation  Taken 8/10/2023 0612 by Kristopher Mcghee RN  Safety Promotion/Fall Prevention:   activity supervised   safety round/check completed   room organization consistent  Taken 8/10/2023 0401 by Kristopher Mcghee RN  Safety Promotion/Fall Prevention:   activity supervised   safety round/check completed   room organization consistent  Taken 8/10/2023 0204 by Kristopher Mcghee RN  Safety Promotion/Fall Prevention:   activity supervised   safety round/check completed   room organization consistent  Taken 8/10/2023 0018 by Kristopher Mcghee RN  Safety Promotion/Fall Prevention:   activity supervised   safety round/check completed   room organization consistent  Taken 8/9/2023 2201 by Kristopher Mcghee RN  Safety Promotion/Fall Prevention:   activity supervised   safety round/check completed   room organization consistent  Taken 8/9/2023 2010 by Kristopher Mcghee RN  Safety Promotion/Fall Prevention:   activity supervised   safety round/check completed   room organization consistent  Intervention: Prevent Skin Injury  Recent Flowsheet Documentation  Taken 8/10/2023 0612 by Kristopher Mcghee RN  Body Position: position changed independently  Taken 8/10/2023 0401 by Kristopher Mcghee RN  Body Position: position changed independently  Taken 8/10/2023 0204 by Kristopher Mgchee RN  Body Position: position changed  independently  Taken 8/10/2023 0018 by Kristopher Mcghee RN  Body Position: position changed independently  Skin Protection: adhesive use limited  Taken 8/9/2023 2201 by Kristopher Mcghee RN  Body Position: position changed independently  Taken 8/9/2023 2010 by Kristopher Mcghee RN  Body Position: position changed independently  Skin Protection: adhesive use limited  Intervention: Prevent and Manage VTE (Venous Thromboembolism) Risk  Recent Flowsheet Documentation  Taken 8/10/2023 0018 by Kristopher Mcghee RN  Activity Management: up ad lydia  VTE Prevention/Management: (Lovenox) other (see comments)  Range of Motion: active ROM (range of motion) encouraged  Taken 8/9/2023 2010 by Kristopher Mcghee RN  Activity Management: up ad lydia  VTE Prevention/Management: (pt on Lovenox) other (see comments)  Range of Motion: active ROM (range of motion) encouraged  Intervention: Prevent Infection  Recent Flowsheet Documentation  Taken 8/10/2023 0612 by Kristopher Mcghee RN  Infection Prevention:   hand hygiene promoted   rest/sleep promoted  Taken 8/10/2023 0401 by Kristopher Mcghee RN  Infection Prevention:   hand hygiene promoted   rest/sleep promoted  Taken 8/10/2023 0204 by Kristopher Mcghee RN  Infection Prevention:   hand hygiene promoted   rest/sleep promoted  Taken 8/10/2023 0018 by Kristopher Mcgehe RN  Infection Prevention:   hand hygiene promoted   rest/sleep promoted  Taken 8/9/2023 2201 by Kristopher Mcghee RN  Infection Prevention:   hand hygiene promoted   rest/sleep promoted  Taken 8/9/2023 2010 by Kristopher Mcghee RN  Infection Prevention:   hand hygiene promoted   rest/sleep promoted  Goal: Optimal Comfort and Wellbeing  Outcome: Ongoing, Progressing  Intervention: Provide Person-Centered Care  Recent Flowsheet Documentation  Taken 8/10/2023 0018 by Kristopher Mcghee RN  Trust Relationship/Rapport:   choices provided   care explained  Taken 8/9/2023 2010 by Kristopher Mcghee RN  Trust  Relationship/Rapport:   care explained   choices provided  Goal: Readiness for Transition of Care  Outcome: Ongoing, Progressing  Intervention: Mutually Develop Transition Plan  Recent Flowsheet Documentation  Taken 8/9/2023 2206 by Kristopher Mcghee RN  Transportation Anticipated: family or friend will provide  Patient/Family Anticipated Services at Transition: none  Patient/Family Anticipates Transition to: home with family  Taken 8/9/2023 2159 by Kristopher Mcghee RN  Transportation Anticipated: family or friend will provide  Patient/Family Anticipated Services at Transition: none  Patient/Family Anticipates Transition to: home with family  Taken 8/9/2023 2138 by Kristopher Mcghee, RN  Equipment Currently Used at Home: none     Problem: Diabetes Comorbidity  Goal: Blood Glucose Level Within Targeted Range  Outcome: Ongoing, Progressing

## 2023-08-10 NOTE — PROGRESS NOTES
Name: Sonal De Dios ADMIT: 2023   : 1965  PCP: Ammy Holman APRN    MRN: [id number] LOS: 0 days   AGE/SEX: 57 y.o. female  ROOM: [facility]     Subjective   Subjective   Patient seen and examined this morning.  Hospital day 1. At time of my examination, patient is awake, alert, resting in bed.  She continues to endorse feelings of nausea, inability to tolerate much oral intake, also endorsing symptoms of cough and congestion that have been ongoing for the past 3 to 4 days prior to arrival.       Objective   Objective   Vital Signs  Temp:  [97.3 øF (36.3 øC)-98.5 øF (36.9 øC)] 97.3 øF (36.3 øC)  Heart Rate:  [] 94  Resp:  [16-18] 18  BP: (142-171)/(84-97) 171/86  SpO2:  [94 %-99 %] 99 %  on   ;   Device (Oxygen Therapy): room air  Body mass index is 26.81 kg/mý.  Physical Exam  Vitals and nursing note reviewed.   Constitutional:       General: She is not in acute distress.     Appearance: She is ill-appearing.      Comments: Appears uncomfortable   Cardiovascular:      Rate and Rhythm: Normal rate and regular rhythm.      Pulses: Normal pulses.      Heart sounds: Normal heart sounds.   Pulmonary:      Effort: Pulmonary effort is normal. No respiratory distress.      Breath sounds: Wheezing and rhonchi present.   Abdominal:      General: Bowel sounds are normal. There is no distension.      Palpations: Abdomen is soft.      Tenderness: There is no abdominal tenderness.   Musculoskeletal:      Cervical back: No tenderness.   Skin:     General: Skin is warm and dry.   Neurological:      General: No focal deficit present.      Mental Status: She is alert and oriented to person, place, and time.     Results Review     I reviewed the patient's new clinical results.  Results from last 7 days   Lab Units 08/10/23  0504 23  1431   WBC 10*3/mm3 8.08 11.14*   HEMOGLOBIN g/dL 12.8 13.5   PLATELETS 10*3/mm3 224 302     Results from last 7 days   Lab Units 08/10/23  0833  08/09/23  1643 08/09/23  1431   SODIUM mmol/L 133* 134* 134*   POTASSIUM mmol/L 3.5 3.1* 3.5   CHLORIDE mmol/L 99 99 95*   CO2 mmol/L 21.5* 20.0* 22.0   BUN mg/dL 12 19 22*   CREATININE mg/dL 0.74 0.73 0.86   GLUCOSE mg/dL 91 84 111*   EGFR mL/min/1.73 93.9 95.5 78.4     Results from last 7 days   Lab Units 08/09/23  1431   ALBUMIN g/dL 4.6   BILIRUBIN mg/dL 0.3   ALK PHOS U/L 81   AST (SGOT) U/L 19   ALT (SGPT) U/L 12     Results from last 7 days   Lab Units 08/10/23  0833 08/10/23  0504 08/09/23  1643 08/09/23  1431   CALCIUM mg/dL 8.7  --  8.6 9.8   ALBUMIN g/dL  --   --   --  4.6   MAGNESIUM mg/dL 1.9  --   --  2.0   PHOSPHORUS mg/dL  --  3.0  --   --        Glucose   Date/Time Value Ref Range Status   08/10/2023 0646 93 70 - 130 mg/dL Final   08/09/2023 2135 97 70 - 130 mg/dL Final   08/09/2023 1326 123 70 - 130 mg/dL Final     Comment:     Meter: [id number] : [id number] [facility]       CT Abdomen Pelvis With Contrast    Result Date: 8/9/2023  Incidental findings as above, without acute or suspicious intraperitoneal process seen.  This report was finalized on 8/9/2023 4:04 PM by Dr. Darian Son M.D.       I have personally reviewed all medications:  Scheduled Medications  enoxaparin, 40 mg, Subcutaneous, Daily  famotidine, 20 mg, Intravenous, Q12H  gabapentin, 400 mg, Oral, BID  gabapentin, 600 mg, Oral, Nightly  insulin glargine, 12 Units, Subcutaneous, Q12H  insulin lispro, 2-9 Units, Subcutaneous, 4x Daily AC & at Bedtime  lisinopril, 5 mg, Oral, Daily  ondansetron, 4 mg, Intravenous, 4x Daily AC & at Bedtime  potassium chloride ER, 40 mEq, Oral, Q4H  senna-docusate sodium, 1 tablet, Oral, BID  sodium chloride, 10 mL, Intravenous, Q12H    Infusions  sodium chloride 0.9 % with KCl 20 mEq, 150 mL/hr, Last Rate: 150 mL/hr (08/10/23 0943)    Diet  Diet: Liquid Diets, Diabetic Diets; Full Liquid; Consistent Carbohydrate; Texture: Regular Texture (IDDSI 7); Fluid Consistency: Thin (IDDSI 0)    I  have personally reviewed:  [x]  Laboratory   [x]  Microbiology   [x]  Radiology   [x]  EKG/Telemetry  [x]  Cardiology/Vascular   []  Pathology    []  Records       Assessment/Plan     Active Hospital Problems    Diagnosis  POA    **Intractable nausea and vomiting [R11.2]  Yes    Hyperlipidemia [E78.5]  Yes    COVID-19 virus infection [U07.1]  Clinically Undetermined    Ketosis [E88.89]  Yes    Increased anion gap metabolic acidosis [E87.29]  Yes    Dehydration [E86.0]  Yes    Decreased oral intake [R63.8]  Yes    Type 1 diabetes mellitus with diabetic neuropathy [E10.40]  Yes    HTN (hypertension) [I10]  Yes      Resolved Hospital Problems   No resolved problems to display.       57 y.o. female admitted with Intractable nausea and vomiting.    COVID-19 infection  Acute respiratory failure with hypoxia  - Patient found to be + for COVID-19 on RVP obtained (08/10) after patient endorsed complaints of dyspnea, cough, congestion.   - CXR not obtained on admission, ordered and performed today (08/10) and was negative for acute cardiopulmonary process. On initial ABG, patient did have PO2 of 57.4 with P/F ratio <300 (273) consistent with acute hypoxic respiratory failure. Discussed with nurse, she is intermittently requiring supplemental oxygen. However, at present, appears to be stable, no evidence to suggest PE or other cardiopulmonary process at present.  - Start Remdesivir. Will not order corticosteroids, given her type 1 diabetes, want to avoid significantly worsening hyperglycemia which could lead to development of DKA.  - Trend COVID associated labs.  - Continue COVID-19 isolation for 10 days total (through 08/19).  - Continue to monitor, supplemental oxygen PRN to maintain O2 sat >90%, Tylenol for pain/fever, Zofran for nausea/vomiting.    Intractable nausea/vomiting  Decreased oral intake  Dehydration  - Etiology likely multifactorial in setting of COVID 19 virus, possibly side effect of Ozempic after dose  increase. CT AP unremarkable for acute pathology. Lipase negative.  - Order GI PCR. Continue supportive medications for symptom control.  - Continue IVF due to decreased oral intake.    Type 1 diabetes mellitus with hyperglycemia complicated by neuropathy  - Most recent A1c: 6.30% (06/21/23)  - Current treatment regimen: Glargine 12 units BID, correctional SSI.  - Per review of POC glucose checks, blood glucose does appear to be controlled within target inpatient range at this time, and does not warrant changes to insulin regimen.  - Continue current treatment as prescribed.  - Will monitor 24 hour insulin requirements and adjust as needed to achieve target inpatient range of 140-180.  - Diabetic diet once able to tolerate PO intake.    Anion gap metabolic acidosis  Ketonuria  - Etiology likely 2/2 starvation from severe hypovolemia/decreased intake. Labs/findings not consistent with concomitant DKA. Has been getting IVF, leading to improvement.  - Continue to monitor.    Hypertension  - BP acceptable acutely. Appears stable. No indications to warrant acute changes/intervention at this time.  - Continue current medications as prescribed. Trend BP to guide ongoing management decisions.      Lovenox 40 mg SC daily for DVT prophylaxis.  Full code.  Discussed with patient, nursing staff, CCP, and care team on multidisciplinary rounds.  Anticipate discharge  TBD  timing yet to be determined..      Joseph James DO  [facility]  08/10/23  10:51 EDT

## 2023-08-10 NOTE — PLAN OF CARE
Goal Outcome Evaluation:  Plan of Care Reviewed With: patient           Outcome Evaluation: Pt has moved to the negative pressure room r/t covid positive test.  Pt was not aware and was bit scared.  I told her she would be fine.  RA.  Up at lydia.  Remdesivir to be started IV this afternoon.  IS given and pt demonstrates perfectly.  NSR.       Need stool sample, hat in toilet.

## 2023-08-10 NOTE — CASE MANAGEMENT/SOCIAL WORK
Discharge Planning Assessment  [facility]     Patient Name: Sonal De Dios  MRN: [id number]  Today's Date: 8/10/2023    Admit Date: 8/9/2023    Plan: Plan home with spouse.  CARL Kerr RN   Discharge Needs Assessment       Row Name 08/10/23 0813       Living Environment    People in Home spouse    Name(s) of People in Home Spouse  ( Troy De Dios [phone])    Current Living Arrangements home    Potentially Unsafe Housing Conditions none    Primary Care Provided by self    Provides Primary Care For no one    Family Caregiver if Needed spouse    Family Caregiver Names Spouse ( Troy De Dios [phone])    Quality of Family Relationships supportive;helpful;involved    Able to Return to Prior Arrangements yes    Living Arrangement Comments Pt lives with her spouse ( Troy De Dios [phone]) in a two story house.       Resource/Environmental Concerns    Resource/Environmental Concerns none    Transportation Concerns none       Transition Planning    Patient/Family Anticipates Transition to home with family    Patient/Family Anticipated Services at Transition none    Transportation Anticipated family or friend will provide       Discharge Needs Assessment    Readmission Within the Last 30 Days no previous admission in last 30 days    Equipment Currently Used at Home bp cuff;glucometer;scales    Concerns to be Addressed no discharge needs identified;denies needs/concerns at this time    Anticipated Changes Related to Illness none    Equipment Needed After Discharge bp cuff;glucometer;scales                   Discharge Plan       Row Name 08/10/23 0817       Plan    Plan Plan home with spouse.  CARL Kerr RN    Patient/Family in Agreement with Plan yes    Plan Comments FACE SHEET VERIFIED.  Spoke with pt at bedside.  Pt's PCP is SAMANTHA Wilson.  Pt lives with her spouse ( Troy De Dios [phone]) in a two story house.  Pt is independent with ADLs.  Pt has a B/P cuff, Dexcom  Glucose Monitor, and scales for home use if needed.  Pt gets her prescriptions at [facility] in [place].  Pt denies any issues affording medications.  Pt is not current with HH.  Pt has not been in SNF.  Pt denies any discharge needs.    Pt states her spouse will assist her at home if needed and will transport her home.  Plan home.  CARL Kerr RN                  Continued Care and Services - Admitted Since 8/9/2023    Coordination has not been started for this encounter.       Expected Discharge Date and Time       Expected Discharge Date Expected Discharge Time    Aug 12, 2023            Demographic Summary       Row Name 08/10/23 0813       General Information    Admission Type observation    Arrived From emergency department    Referral Source admission list    Reason for Consult discharge planning    Preferred Language English                   Functional Status       Row Name 08/10/23 0813       Functional Status    Usual Activity Tolerance good    Current Activity Tolerance good       Functional Status, IADL    Medications independent    Meal Preparation independent    Housekeeping independent    Laundry independent    Shopping independent       Mental Status    General Appearance WDL WDL                   Psychosocial    No documentation.                  Abuse/Neglect    No documentation.                  Legal    No documentation.                  Substance Abuse    No documentation.                  Patient Forms    No documentation.                     Becca Kerr, RN

## 2023-08-10 NOTE — CASE MANAGEMENT/SOCIAL WORK
Continued Stay Note  [facility]     Patient Name: Sonal De Dios  MRN: [id number]  Today's Date: 8/10/2023    Admit Date: 8/9/2023    Plan: Plan home with spouse.  CARL Kerr RN   Discharge Plan       Row Name 08/10/23 0817       Plan    Plan Plan home with spouse.  CARL Kerr RN    Patient/Family in Agreement with Plan yes    Plan Comments FACE SHEET VERIFIED.  Spoke with pt at bedside.  Pt's PCP is SAMANTHA Wilson.  Pt lives with her spouse ( Troy De Dios [phone]) in a two story house.  Pt is independent with ADLs.  Pt has a B/P cuff, Dexcom Glucose Monitor, and scales for home use if needed.  Pt gets her prescriptions at [facility] in [facility].  Pt denies any issues affording medications.  Pt is not current with .  Pt has not been in SNF.  Pt denies any discharge needs.    Pt states her spouse will assist her at home if needed and will transport her home.  Plan home.  CARL Kerr RN                   Discharge Codes    No documentation.                 Expected Discharge Date and Time       Expected Discharge Date Expected Discharge Time    Aug 12, 2023               Becca Kerr RN

## 2023-08-10 NOTE — H&P
PCP: Ammy Holman APRN    Chief complaint   Chief Complaint   Patient presents with    Vomiting    Nausea       HPI  Patient is a 57 y.o. female presents with history of type 1 diabetes with some neuropathy who presents to the ER due to nausea and vomiting for 4-5 days.  Patient was in DKA approximately a year ago but has been taking good care of herself and has a Dexcom monitor.  She states that 5 days ago her Ozempic dose was increased and the next day she started having nausea vomiting which she had all day a couple days ago and that is occasionally improved has had decreased oral intake.  Has not been able to keep anything down.  Denies any sick contacts.  She states that she has been taking her Lantus 14 units twice daily.  She said Saturday which was 5 days ago her blood sugars were in the 300s and she was taking some sliding scale insulin to correct that.  ABG showed a pH of 7.55 and a bicarb of 22      PAST MEDICAL HISTORY  Past Medical History:   Diagnosis Date    Diabetes mellitus     Gestational diabetes     Pneumonia        PAST SURGICAL HISTORY  Past Surgical History:   Procedure Laterality Date    ENDOMETRIAL ABLATION         FAMILY HISTORY  Family History   Problem Relation Age of Onset    Diabetes Mother     Breast cancer Neg Hx        SOCIAL HISTORY  Social History     Tobacco Use    Smoking status: Never    Smokeless tobacco: Never   Vaping Use    Vaping Use: Never used   Substance Use Topics    Alcohol use: No    Drug use: No       MEDICATIONS:  Medications Prior to Admission   Medication Sig Dispense Refill Last Dose    BD PEN NEEDLE ROSITA U/F 32G X 4 MM misc USE AS DIRECTED FOUR TIMES DAILY 100 each 2     Continuous Blood Gluc  (Dexcom G6 ) device 1 each Take As Directed. 1 each 0     Continuous Blood Gluc Sensor (Dexcom G6 Sensor) Apply  topically to the appropriate area as directed Every 10 (Ten) Days. 3 each 6     Continuous Blood Gluc Transmit (Dexcom G6  Transmitter) misc 1 each See Admin Instructions. 1 each 0     gabapentin (NEURONTIN) 400 MG capsule TAKE 1 CAPSULE BY MOUTH TWICE DAILY AT 8 AM AND AT 2  capsule 1 8/9/2023    gabapentin (NEURONTIN) 600 MG tablet Take 1 tablet by mouth Every Night. 90 tablet 1 8/8/2023    glucose blood (ONETOUCH VERIO) test strip 1 each by Other route 5 (Five) Times a Day. Use as instructed 150 each 3     glucose blood test strip Use as instructed 100 each 12     glucose blood test strip Check 5 times daily 100 each 12     insulin detemir (Levemir FlexPen) 100 UNIT/ML injection Inject 15 units under the skin in the am and inject 13 units under the skin in the pm 18 mL 3 8/9/2023    Insulin Lispro (HUMALOG KWIKPEN) 100 UNIT/ML solution pen-injector Glucose below 150 no extra insulin, 150-200 give 2 units, 201-250 give 3 units, 251-300 give 4 units, >300 give 5 units 15 mL 0 Past Week    Insulin Lispro, 1 Unit Dial, (HumaLOG KwikPen) 100 UNIT/ML solution pen-injector INJECT 5 UNITS UNDER THE SKIN BEFORE BREAKFAST, 6 UNITS BEFORE LUNCH AND 8 UNITS BEFORE DINNER 18 mL 1 Past Week    Insulin Pen Needle (BD Pen Needle Alysha U/F) 32G X 4 MM misc Use as directed 200 each 12     Insulin Pen Needle (Pen Needles) 31G X 5 MM misc 1 each Take As Directed. 100 each 0     Ozempic, 1 MG/DOSE, 4 MG/3ML solution pen-injector INJECT 1 MG UNDER THE SKIN INTO THE APPROPRIATE AREA AS DIRECTED 1 TIME PER WEEK 3 mL 2 Past Week    atorvastatin (LIPITOR) 10 MG tablet TAKE 1 TABLET BY MOUTH DAILY 90 tablet 1 Unknown    Continuous Blood Gluc  (FREESTYLE JUAN 14 DAY READER) device 1 each 2 (Two) Times a Day. 1 Device 0 More than a month    Continuous Blood Gluc Sensor (FREESTYLE JUAN 14 DAY SENSOR) misc 1 each 2 (Two) Times a Day. 6 each 3 More than a month    escitalopram (LEXAPRO) 5 MG tablet Take 1 tablet by mouth Daily. 30 tablet 5 More than a month    hydrocortisone (ANUSOL-HC) 2.5 % rectal cream Insert  into the rectum 2 (Two) Times a Day.  "28.35 g 0 More than a month    lisinopril (PRINIVIL,ZESTRIL) 5 MG tablet Take 1 tablet by mouth Daily. 90 tablet 1 More than a month    ondansetron (Zofran) 4 MG tablet Take 1 tablet by mouth Every 8 (Eight) Hours As Needed for Nausea or Vomiting. 30 tablet 0 More than a month       Allergies:  Codeine    Review of Systems:  Fatigue, nausea vomiting  Negative for following (except as per HPI):  Constitution: chills, fevers,   Eyes: change of vision, loss of vision and discharge  ENT: ear drainage, ear ringing and facial trauma  Respiratory: cough, pleuritic pain, shortness of air  Cardiovascular: chest pressure, pain, lower extremity edema, palpitations  Gastrointestinal: constipation, diarrhea, nausea, vomiting, pain    Integument: rash and wound  Hematologic / Lymphatic: excessive bleeding and easy bruising  Musculoskeletal: joint pain, joint stiffness, joint swelling and muscle pain  Neurological: headaches, numbness, seizures and tremors  Behavioral / Psych: anxiety, depression and hallucinations         Vital Signs  Temp:  [98.5 øF (36.9 øC)] 98.5 øF (36.9 øC)  Heart Rate:  [] 94  Resp:  [16-18] 18  BP: (142-156)/(88-97) 148/95  Flowsheet Rows      Flowsheet Row First Filed Value   Admission Height 162.6 cm (64\") Documented at 08/09/2023 1306   Admission Weight 72.6 kg (160 lb) Documented at 08/09/2023 1306           Body mass index is 27.46 kg/mý.      Physical Exam:  General Appearance:    Alert, cooperative, in no acute distress, not fruity smelling   Head:    Normocephalic, without obvious abnormality, atraumatic   Eyes:         conjunctivae and sclerae normal, no icterus, PERRLA   ENT:    Ears grossly intact, oral mucosa moist,   Neck:   No adenopathy, supple, trachea midline,        Lungs:     Clear to auscultation,respirations regular, even and  mildly labored    Heart:    Regular rhythm and tachycardic,  no murmur, normal S1 and S2,   Abdomen:     Normal bowel sounds, no masses,  soft non-tender, " non-distended,    Extremities:   Moves all extremities well, no cyanosis, no edema,             Pulses:   Pulses palpable and equal bilaterally   Skin:   No bleeding, rash, bruising    Neurologic:    Psych:   Cranial nerves 2 - 12 grossly intact, sensation grossly intact,     Moves all extremities well, equal bilateral strength 4/5    Alert and Oriented x 3, Normal Affect    I washed/sanitized my hands before entering the room and immediately upon leaving the room.    LABS:  Admission on 08/09/2023   Component Date Value Ref Range Status    Glucose 08/09/2023 111 (H)  65 - 99 mg/dL Final    BUN 08/09/2023 22 (H)  6 - 20 mg/dL Final    Creatinine 08/09/2023 0.86  0.57 - 1.00 mg/dL Final    Sodium 08/09/2023 134 (L)  136 - 145 mmol/L Final    Potassium 08/09/2023 3.5  3.5 - 5.2 mmol/L Final    Slight hemolysis detected by analyzer. Results may be affected.    Chloride 08/09/2023 95 (L)  98 - 107 mmol/L Final    CO2 08/09/2023 22.0  22.0 - 29.0 mmol/L Final    Calcium 08/09/2023 9.8  8.6 - 10.5 mg/dL Final    Total Protein 08/09/2023 7.1  6.0 - 8.5 g/dL Final    Albumin 08/09/2023 4.6  3.5 - 5.2 g/dL Final    ALT (SGPT) 08/09/2023 12  1 - 33 U/L Final    AST (SGOT) 08/09/2023 19  1 - 32 U/L Final    Slight hemolysis detected by analyzer. Results may be affected.    Alkaline Phosphatase 08/09/2023 81  39 - 117 U/L Final    Total Bilirubin 08/09/2023 0.3  0.0 - 1.2 mg/dL Final    Globulin 08/09/2023 2.5  gm/dL Final    A/G Ratio 08/09/2023 1.8  g/dL Final    BUN/Creatinine Ratio 08/09/2023 25.6 (H)  7.0 - 25.0 Final    Anion Gap 08/09/2023 17.0 (H)  5.0 - 15.0 mmol/L Final    eGFR 08/09/2023 78.4  >60.0 mL/min/1.73 Final    Lipase 08/09/2023 25  13 - 60 U/L Final    Color, UA 08/09/2023 Yellow  Yellow, Straw Final    Appearance, UA 08/09/2023 Cloudy (A)  Clear Final    pH,  08/09/2023 7.0  5.0 - 8.0 Final    Specific Gravity,  08/09/2023 1.023  1.005 - 1.030 Final    Glucose,  08/09/2023 Negative  Negative Final     Ketones, UA 08/09/2023 80 mg/dL (3+) (A)  Negative Final    Bilirubin, UA 08/09/2023 Negative  Negative Final    Blood, UA 08/09/2023 Negative  Negative Final    Protein, UA 08/09/2023 30 mg/dL (1+) (A)  Negative Final    Leuk Esterase, UA 08/09/2023 Negative  Negative Final    Nitrite, UA 08/09/2023 Negative  Negative Final    Urobilinogen, UA 08/09/2023 1.0 E.U./dL  0.2 - 1.0 E.U./dL Final    HS Troponin T 08/09/2023 14 (H)  <10 ng/L Final    Magnesium 08/09/2023 2.0  1.6 - 2.6 mg/dL Final    QT Interval 08/09/2023 352  ms Final    WBC 08/09/2023 11.14 (H)  3.40 - 10.80 10*3/mm3 Final    RBC 08/09/2023 4.58  3.77 - 5.28 10*6/mm3 Final    Hemoglobin 08/09/2023 13.5  12.0 - 15.9 g/dL Final    Hematocrit 08/09/2023 39.3  34.0 - 46.6 % Final    MCV 08/09/2023 85.8  79.0 - 97.0 fL Final    MCH 08/09/2023 29.5  26.6 - 33.0 pg Final    MCHC 08/09/2023 34.4  31.5 - 35.7 g/dL Final    RDW 08/09/2023 12.2 (L)  12.3 - 15.4 % Final    RDW-SD 08/09/2023 37.7  37.0 - 54.0 fl Final    MPV 08/09/2023 10.0  6.0 - 12.0 fL Final    Platelets 08/09/2023 302  140 - 450 10*3/mm3 Final    Neutrophil % 08/09/2023 78.8 (H)  42.7 - 76.0 % Final    Lymphocyte % 08/09/2023 11.9 (L)  19.6 - 45.3 % Final    Monocyte % 08/09/2023 8.7  5.0 - 12.0 % Final    Eosinophil % 08/09/2023 0.0 (L)  0.3 - 6.2 % Final    Basophil % 08/09/2023 0.2  0.0 - 1.5 % Final    Immature Grans % 08/09/2023 0.4  0.0 - 0.5 % Final    Neutrophils, Absolute 08/09/2023 8.78 (H)  1.70 - 7.00 10*3/mm3 Final    Lymphocytes, Absolute 08/09/2023 1.33  0.70 - 3.10 10*3/mm3 Final    Monocytes, Absolute 08/09/2023 0.97 (H)  0.10 - 0.90 10*3/mm3 Final    Eosinophils, Absolute 08/09/2023 0.00  0.00 - 0.40 10*3/mm3 Final    Basophils, Absolute 08/09/2023 0.02  0.00 - 0.20 10*3/mm3 Final    Immature Grans, Absolute 08/09/2023 0.04  0.00 - 0.05 10*3/mm3 Final    nRBC 08/09/2023 0.0  0.0 - 0.2 /100 WBC Final    Beta-Hydroxybutyrate Quant 08/09/2023 1.690 (H)  0.020 - 0.270 mmol/L Final     Glucose 08/09/2023 123  70 - 130 mg/dL Final    Meter: [id number] : [id number] Jacob Brooks    Extra Tube 08/09/2023 Hold for add-ons.   Final    Auto resulted.    Extra Tube 08/09/2023 hold for add-on   Final    Auto resulted    Extra Tube 08/09/2023 Hold for add-ons.   Final    Auto resulted.    Extra Tube 08/09/2023 Hold for add-ons.   Final    Auto resulted    RBC, UA 08/09/2023 None Seen  None Seen, 0-2 /HPF Final    WBC, UA 08/09/2023 0-2  None Seen, 0-2 /HPF Final    Bacteria, UA 08/09/2023 None Seen  None Seen /HPF Final    Squamous Epithelial Cells, UA 08/09/2023 0-2  None Seen, 0-2 /HPF Final    Hyaline Casts, UA 08/09/2023 7-12  None Seen /LPF Final    Fine Granular Casts, UA 08/09/2023 3-6  None Seen /LPF Final    Amorphous Crystals, UA 08/09/2023 Moderate/2+  None Seen /HPF Final    Methodology 08/09/2023 Manual Light Microscopy   Final    HS Troponin T 08/09/2023 13 (H)  <10 ng/L Final    Troponin T Delta 08/09/2023 -1  >=-4 - <+4 ng/L Final    Site 08/09/2023 OTHER   Final    Morris's Test 08/09/2023 N/A   Final    pH, Arterial 08/09/2023 7.501 (H)  7.350 - 7.450 pH units Final    pCO2, Arterial 08/09/2023 28.6 (L)  35.0 - 45.0 mm Hg Final    pO2, Arterial 08/09/2023 57.4 (L)  80.0 - 100.0 mm Hg Final    HCO3, Arterial 08/09/2023 22.4  22.0 - 28.0 mmol/L Final    Base Excess, Arterial 08/09/2023 0.4  0.0 - 2.0 mmol/L Final    O2 Saturation Calculated 08/09/2023 92.3  92.0 - 99.0 % Final    [id number] Meter: [id number] : nmccomas Kwan Zabala    Barometric Pressure for Blood Gas 08/09/2023 747.3  mmHg Final    Modality 08/09/2023 Room Air   Final    Rate 08/09/2023 16  Breaths/minute Final    Glucose 08/09/2023 84  65 - 99 mg/dL Final    BUN 08/09/2023 19  6 - 20 mg/dL Final    Creatinine 08/09/2023 0.73  0.57 - 1.00 mg/dL Final    Sodium 08/09/2023 134 (L)  136 - 145 mmol/L Final    Potassium 08/09/2023 3.1 (L)  3.5 - 5.2 mmol/L Final    Chloride 08/09/2023 99  98 - 107  mmol/L Final    CO2 08/09/2023 20.0 (L)  22.0 - 29.0 mmol/L Final    Calcium 08/09/2023 8.6  8.6 - 10.5 mg/dL Final    BUN/Creatinine Ratio 08/09/2023 26.0 (H)  7.0 - 25.0 Final    Anion Gap 08/09/2023 15.0  5.0 - 15.0 mmol/L Final    eGFR 08/09/2023 95.5  >60.0 mL/min/1.73 Final         DIAGNOSTICS:  CT Abdomen Pelvis With Contrast  Limited evaluation of the inferior thorax demonstrates  atelectasis,  Uterine calcifications likely represent fibroids.   Incidental findings as above, without acute or suspicious intraperitoneal process seen.  This report was finalized on 8/9/2023 4:04 PM by Dr. Darian Son M.D.            Results Review:   I reviewed the patient's new clinical results.  Discussed with ER physician  Old records reviewed / Medical Decision Making High Complexity  I personally viewed and interpreted the patient's EKG/Telemetry data- sinus tachycardia      ASSESSMENT AND PLAN    Intractable nausea and vomiting    Type 1 diabetes mellitus with diabetic neuropathy    HTN (hypertension)    Ketosis    Increased anion gap metabolic acidosis    Dehydration    Decreased oral intake       Intractable nausea and vomiting/ Dehydration/ Decreased oral intake  -Unclear if this is secondary to medication change or to a viral gastritis  -Will schedule IV Zofran, put on as needed Ativan and as needed Phenergan    Elevated ion gap metabolic acidosis with respiratory compensation  -Likely secondary to a starvation ketosis patient is euglycemic and there is prob  a small component of DKA  -ivf      Type 1 diabetes mellitus with diabetic neuropathy  --Accu-Cheks  -hypoglycemia protocol  -sliding scale insulin to cover outlier blood sugars  -continue home medicine regimen  -Patient had some blood sugars in the 80s therefore we will decrease the Lantus slightly and placed on sliding scale insulin       HTN (hypertension)  -On ACE inhibitor, continue medical management-    Neuropathy secondary to diabetes  -Stable continue  Neurontin    +DVT proph:    lovenox 40  + CODE STATUS: Full       I discussed the patient's findings and my recommendations with the patient and/or family.  Please reference all orders placed.    Yamilka Tucker MD  08/09/23  20:47 EDT      This document is intended for medical expert use only. Reading of this document by patients and/or patient's family without participating in medical staff guidance may result in misinterpretation and unintended morbidity. Any interpretation of such data is the responsibility of the patient and/or family member responsible for the patient in concert with their primary or specialist providers, and NOT to be left for sources of online searches such as [employer], [employer] or similar queries. Relying on these approaches to knowledge may result in misinterpretation, misguided goals of care and even death should patients or family members try recommendations outside of the realm of professional medical care in a supervised way.    Dictated utilizing Voice dictation:  Parts of this note may be an electronic transcription/translation of spoke language to printed text using Dragon dictation system.

## 2023-08-11 PROBLEM — E83.39 HYPOPHOSPHATEMIA: Status: ACTIVE | Noted: 2023-08-11

## 2023-08-11 PROBLEM — E11.65 TYPE 2 DIABETES MELLITUS WITH HYPERGLYCEMIA, WITH LONG-TERM CURRENT USE OF INSULIN: Status: ACTIVE | Noted: 2023-08-11

## 2023-08-11 PROBLEM — E87.29 INCREASED ANION GAP METABOLIC ACIDOSIS: Status: RESOLVED | Noted: 2023-08-09 | Resolved: 2023-08-11

## 2023-08-11 PROBLEM — Z79.4 TYPE 2 DIABETES MELLITUS WITH HYPERGLYCEMIA, WITH LONG-TERM CURRENT USE OF INSULIN: Status: ACTIVE | Noted: 2023-08-11

## 2023-08-11 PROBLEM — R82.4 KETONURIA: Status: ACTIVE | Noted: 2023-08-11

## 2023-08-11 PROBLEM — E87.1 HYPONATREMIA: Status: ACTIVE | Noted: 2023-08-11

## 2023-08-11 PROBLEM — E88.89 KETOSIS: Status: RESOLVED | Noted: 2023-08-09 | Resolved: 2023-08-11

## 2023-08-11 PROBLEM — A08.11 NOROVIRUS: Status: ACTIVE | Noted: 2023-08-11

## 2023-08-11 LAB
ADV 40+41 DNA STL QL NAA+NON-PROBE: NOT DETECTED
ALBUMIN SERPL-MCNC: 3.8 G/DL (ref 3.5–5.2)
ALBUMIN/GLOB SERPL: 1.5 G/DL
ALP SERPL-CCNC: 66 U/L (ref 39–117)
ALT SERPL W P-5'-P-CCNC: 22 U/L (ref 1–33)
ANION GAP SERPL CALCULATED.3IONS-SCNC: 10.9 MMOL/L (ref 5–15)
AST SERPL-CCNC: 19 U/L (ref 1–32)
ASTRO TYP 1-8 RNA STL QL NAA+NON-PROBE: NOT DETECTED
BASOPHILS # BLD AUTO: 0.02 10*3/MM3 (ref 0–0.2)
BASOPHILS NFR BLD AUTO: 0.3 % (ref 0–1.5)
BILIRUB SERPL-MCNC: 0.3 MG/DL (ref 0–1.2)
BUN SERPL-MCNC: 9 MG/DL (ref 6–20)
BUN/CREAT SERPL: 14.3 (ref 7–25)
C CAYETANENSIS DNA STL QL NAA+NON-PROBE: NOT DETECTED
C COLI+JEJ+UPSA DNA STL QL NAA+NON-PROBE: NOT DETECTED
CALCIUM SPEC-SCNC: 9 MG/DL (ref 8.6–10.5)
CHLORIDE SERPL-SCNC: 99 MMOL/L (ref 98–107)
CK SERPL-CCNC: 36 U/L (ref 20–180)
CO2 SERPL-SCNC: 20.1 MMOL/L (ref 22–29)
CREAT SERPL-MCNC: 0.63 MG/DL (ref 0.57–1)
CREAT UR-MCNC: 27.8 MG/DL
CRP SERPL-MCNC: 0.34 MG/DL (ref 0–0.5)
CRYPTOSP DNA STL QL NAA+NON-PROBE: NOT DETECTED
D-LACTATE SERPL-SCNC: 0.9 MMOL/L (ref 0.5–2)
DEPRECATED RDW RBC AUTO: 39.4 FL (ref 37–54)
E HISTOLYT DNA STL QL NAA+NON-PROBE: NOT DETECTED
EAEC PAA PLAS AGGR+AATA ST NAA+NON-PRB: NOT DETECTED
EC STX1+STX2 GENES STL QL NAA+NON-PROBE: NOT DETECTED
EGFRCR SERPLBLD CKD-EPI 2021: 103 ML/MIN/1.73
EOSINOPHIL # BLD AUTO: 0 10*3/MM3 (ref 0–0.4)
EOSINOPHIL NFR BLD AUTO: 0 % (ref 0.3–6.2)
EPEC EAE GENE STL QL NAA+NON-PROBE: NOT DETECTED
ERYTHROCYTE [DISTWIDTH] IN BLOOD BY AUTOMATED COUNT: 12.4 % (ref 12.3–15.4)
ERYTHROCYTE [SEDIMENTATION RATE] IN BLOOD: 18 MM/HR (ref 0–30)
ETEC LTA+ST1A+ST1B TOX ST NAA+NON-PROBE: NOT DETECTED
FERRITIN SERPL-MCNC: 184 NG/ML (ref 13–150)
G LAMBLIA DNA STL QL NAA+NON-PROBE: NOT DETECTED
GLOBULIN UR ELPH-MCNC: 2.6 GM/DL
GLUCOSE BLDC GLUCOMTR-MCNC: 80 MG/DL (ref 70–130)
GLUCOSE BLDC GLUCOMTR-MCNC: 86 MG/DL (ref 70–130)
GLUCOSE BLDC GLUCOMTR-MCNC: 89 MG/DL (ref 70–130)
GLUCOSE BLDC GLUCOMTR-MCNC: 95 MG/DL (ref 70–130)
GLUCOSE SERPL-MCNC: 96 MG/DL (ref 65–99)
HCT VFR BLD AUTO: 38.7 % (ref 34–46.6)
HGB BLD-MCNC: 12.9 G/DL (ref 12–15.9)
IMM GRANULOCYTES # BLD AUTO: 0.04 10*3/MM3 (ref 0–0.05)
IMM GRANULOCYTES NFR BLD AUTO: 0.6 % (ref 0–0.5)
LYMPHOCYTES # BLD AUTO: 2.13 10*3/MM3 (ref 0.7–3.1)
LYMPHOCYTES NFR BLD AUTO: 32.1 % (ref 19.6–45.3)
MAGNESIUM SERPL-MCNC: 1.8 MG/DL (ref 1.6–2.6)
MCH RBC QN AUTO: 28.9 PG (ref 26.6–33)
MCHC RBC AUTO-ENTMCNC: 33.3 G/DL (ref 31.5–35.7)
MCV RBC AUTO: 86.6 FL (ref 79–97)
MONOCYTES # BLD AUTO: 0.53 10*3/MM3 (ref 0.1–0.9)
MONOCYTES NFR BLD AUTO: 8 % (ref 5–12)
NEUTROPHILS NFR BLD AUTO: 3.92 10*3/MM3 (ref 1.7–7)
NEUTROPHILS NFR BLD AUTO: 59 % (ref 42.7–76)
NOROVIRUS GI+II RNA STL QL NAA+NON-PROBE: DETECTED
NRBC BLD AUTO-RTO: 0 /100 WBC (ref 0–0.2)
OSMOLALITY UR: 487 MOSM/KG
P SHIGELLOIDES DNA STL QL NAA+NON-PROBE: NOT DETECTED
PHOSPHATE SERPL-MCNC: 2 MG/DL (ref 2.5–4.5)
PHOSPHATE SERPL-MCNC: 2.6 MG/DL (ref 2.5–4.5)
PLATELET # BLD AUTO: 254 10*3/MM3 (ref 140–450)
PMV BLD AUTO: 9.7 FL (ref 6–12)
POTASSIUM SERPL-SCNC: 4.2 MMOL/L (ref 3.5–5.2)
PROT SERPL-MCNC: 6.4 G/DL (ref 6–8.5)
RBC # BLD AUTO: 4.47 10*6/MM3 (ref 3.77–5.28)
RVA RNA STL QL NAA+NON-PROBE: NOT DETECTED
S ENT+BONG DNA STL QL NAA+NON-PROBE: NOT DETECTED
SAPO I+II+IV+V RNA STL QL NAA+NON-PROBE: NOT DETECTED
SHIGELLA SP+EIEC IPAH ST NAA+NON-PROBE: NOT DETECTED
SODIUM SERPL-SCNC: 130 MMOL/L (ref 136–145)
SODIUM UR-SCNC: 163 MMOL/L
UUN 24H UR-MCNC: 262 MG/DL
V CHOL+PARA+VUL DNA STL QL NAA+NON-PROBE: NOT DETECTED
V CHOLERAE DNA STL QL NAA+NON-PROBE: NOT DETECTED
WBC NRBC COR # BLD: 6.64 10*3/MM3 (ref 3.4–10.8)
Y ENTEROCOL DNA STL QL NAA+NON-PROBE: NOT DETECTED

## 2023-08-11 PROCEDURE — 84100 ASSAY OF PHOSPHORUS: CPT | Performed by: STUDENT IN AN ORGANIZED HEALTH CARE EDUCATION/TRAINING PROGRAM

## 2023-08-11 PROCEDURE — 82948 REAGENT STRIP/BLOOD GLUCOSE: CPT

## 2023-08-11 PROCEDURE — 86140 C-REACTIVE PROTEIN: CPT | Performed by: STUDENT IN AN ORGANIZED HEALTH CARE EDUCATION/TRAINING PROGRAM

## 2023-08-11 PROCEDURE — 82728 ASSAY OF FERRITIN: CPT | Performed by: STUDENT IN AN ORGANIZED HEALTH CARE EDUCATION/TRAINING PROGRAM

## 2023-08-11 PROCEDURE — 85652 RBC SED RATE AUTOMATED: CPT | Performed by: STUDENT IN AN ORGANIZED HEALTH CARE EDUCATION/TRAINING PROGRAM

## 2023-08-11 PROCEDURE — 83605 ASSAY OF LACTIC ACID: CPT | Performed by: STUDENT IN AN ORGANIZED HEALTH CARE EDUCATION/TRAINING PROGRAM

## 2023-08-11 PROCEDURE — 80053 COMPREHEN METABOLIC PANEL: CPT | Performed by: STUDENT IN AN ORGANIZED HEALTH CARE EDUCATION/TRAINING PROGRAM

## 2023-08-11 PROCEDURE — 25010000002 ONDANSETRON PER 1 MG: Performed by: INTERNAL MEDICINE

## 2023-08-11 PROCEDURE — 83735 ASSAY OF MAGNESIUM: CPT | Performed by: STUDENT IN AN ORGANIZED HEALTH CARE EDUCATION/TRAINING PROGRAM

## 2023-08-11 PROCEDURE — 96361 HYDRATE IV INFUSION ADD-ON: CPT

## 2023-08-11 PROCEDURE — 83935 ASSAY OF URINE OSMOLALITY: CPT | Performed by: STUDENT IN AN ORGANIZED HEALTH CARE EDUCATION/TRAINING PROGRAM

## 2023-08-11 PROCEDURE — 96376 TX/PRO/DX INJ SAME DRUG ADON: CPT

## 2023-08-11 PROCEDURE — 82550 ASSAY OF CK (CPK): CPT | Performed by: STUDENT IN AN ORGANIZED HEALTH CARE EDUCATION/TRAINING PROGRAM

## 2023-08-11 PROCEDURE — 85025 COMPLETE CBC W/AUTO DIFF WBC: CPT | Performed by: STUDENT IN AN ORGANIZED HEALTH CARE EDUCATION/TRAINING PROGRAM

## 2023-08-11 PROCEDURE — 25010000002 SODIUM CHLORIDE 0.9 % WITH KCL 20 MEQ 20-0.9 MEQ/L-% SOLUTION: Performed by: INTERNAL MEDICINE

## 2023-08-11 PROCEDURE — 25010000002 ENOXAPARIN PER 10 MG: Performed by: INTERNAL MEDICINE

## 2023-08-11 PROCEDURE — G0378 HOSPITAL OBSERVATION PER HR: HCPCS

## 2023-08-11 PROCEDURE — 82570 ASSAY OF URINE CREATININE: CPT | Performed by: STUDENT IN AN ORGANIZED HEALTH CARE EDUCATION/TRAINING PROGRAM

## 2023-08-11 PROCEDURE — 96372 THER/PROPH/DIAG INJ SC/IM: CPT

## 2023-08-11 PROCEDURE — 84540 ASSAY OF URINE/UREA-N: CPT | Performed by: STUDENT IN AN ORGANIZED HEALTH CARE EDUCATION/TRAINING PROGRAM

## 2023-08-11 PROCEDURE — 84300 ASSAY OF URINE SODIUM: CPT | Performed by: STUDENT IN AN ORGANIZED HEALTH CARE EDUCATION/TRAINING PROGRAM

## 2023-08-11 PROCEDURE — 25010000002 REMDESIVIR 100 MG/20ML SOLUTION 1 EACH VIAL: Performed by: STUDENT IN AN ORGANIZED HEALTH CARE EDUCATION/TRAINING PROGRAM

## 2023-08-11 RX ORDER — SODIUM PHOSPHATE IN 0.9 % NACL 15MMOL/100
15 PLASTIC BAG, INJECTION (ML) INTRAVENOUS ONCE
Status: COMPLETED | OUTPATIENT
Start: 2023-08-11 | End: 2023-08-11

## 2023-08-11 RX ADMIN — GABAPENTIN 400 MG: 400 CAPSULE ORAL at 14:40

## 2023-08-11 RX ADMIN — GABAPENTIN 400 MG: 400 CAPSULE ORAL at 08:01

## 2023-08-11 RX ADMIN — POTASSIUM CHLORIDE AND SODIUM CHLORIDE 150 ML/HR: 900; 150 INJECTION, SOLUTION INTRAVENOUS at 14:41

## 2023-08-11 RX ADMIN — Medication 10 ML: at 22:00

## 2023-08-11 RX ADMIN — ENOXAPARIN SODIUM 40 MG: 100 INJECTION SUBCUTANEOUS at 08:01

## 2023-08-11 RX ADMIN — SODIUM PHOSPHATE, MONOBASIC, MONOHYDRATE AND SODIUM PHOSPHATE, DIBASIC, ANHYDROUS 15 MMOL: 276; 142 INJECTION, SOLUTION INTRAVENOUS at 11:38

## 2023-08-11 RX ADMIN — POTASSIUM CHLORIDE AND SODIUM CHLORIDE 150 ML/HR: 900; 150 INJECTION, SOLUTION INTRAVENOUS at 03:10

## 2023-08-11 RX ADMIN — POTASSIUM CHLORIDE AND SODIUM CHLORIDE 150 ML/HR: 900; 150 INJECTION, SOLUTION INTRAVENOUS at 22:04

## 2023-08-11 RX ADMIN — Medication 10 ML: at 08:03

## 2023-08-11 RX ADMIN — GABAPENTIN 600 MG: 300 CAPSULE ORAL at 21:58

## 2023-08-11 RX ADMIN — REMDESIVIR 100 MG: 100 INJECTION, POWDER, LYOPHILIZED, FOR SOLUTION INTRAVENOUS at 14:41

## 2023-08-11 RX ADMIN — ONDANSETRON 4 MG: 2 INJECTION INTRAMUSCULAR; INTRAVENOUS at 18:21

## 2023-08-11 RX ADMIN — LISINOPRIL 5 MG: 5 TABLET ORAL at 08:01

## 2023-08-11 RX ADMIN — ONDANSETRON 4 MG: 2 INJECTION INTRAMUSCULAR; INTRAVENOUS at 06:59

## 2023-08-11 RX ADMIN — FAMOTIDINE 20 MG: 10 INJECTION INTRAVENOUS at 08:02

## 2023-08-11 RX ADMIN — ONDANSETRON 4 MG: 2 INJECTION INTRAMUSCULAR; INTRAVENOUS at 11:38

## 2023-08-11 RX ADMIN — FAMOTIDINE 20 MG: 10 INJECTION INTRAVENOUS at 21:58

## 2023-08-11 RX ADMIN — ONDANSETRON 4 MG: 2 INJECTION INTRAMUSCULAR; INTRAVENOUS at 21:58

## 2023-08-11 NOTE — PLAN OF CARE
Goal Outcome Evaluation:   Pt resting on the bed, AO x 4, sr on monitor, RA, no c/o pain or soa, had dry infrequent cough, had small BM x 1 this shift, sent sample to Lab result came with positive for Noravirus [facility] notified, vss, no ss of acute distress noted, will continue to monitor.        Problem: Adult Inpatient Plan of Care  Goal: Plan of Care Review  Outcome: Ongoing, Progressing  Goal: Patient-Specific Goal (Individualized)  Outcome: Ongoing, Progressing  Goal: Absence of Hospital-Acquired Illness or Injury  Outcome: Ongoing, Progressing  Intervention: Identify and Manage Fall Risk  Recent Flowsheet Documentation  Taken 8/11/2023 0604 by Kristopher Mcghee RN  Safety Promotion/Fall Prevention:   activity supervised   safety round/check completed   room organization consistent  Taken 8/11/2023 0416 by Kristopher Mcghee RN  Safety Promotion/Fall Prevention:   activity supervised   safety round/check completed   room organization consistent  Taken 8/11/2023 0212 by Kristopher Mcghee RN  Safety Promotion/Fall Prevention:   activity supervised   safety round/check completed   room organization consistent  Taken 8/11/2023 0012 by Kristopher Mcghee RN  Safety Promotion/Fall Prevention:   activity supervised   room organization consistent   safety round/check completed  Taken 8/10/2023 2212 by Kristopher Mcghee RN  Safety Promotion/Fall Prevention:   activity supervised   safety round/check completed   room organization consistent  Taken 8/10/2023 2022 by Kristopher Mcghee RN  Safety Promotion/Fall Prevention:   activity supervised   safety round/check completed   room organization consistent  Intervention: Prevent Skin Injury  Recent Flowsheet Documentation  Taken 8/11/2023 0604 by Kristopher Mcghee RN  Body Position: position changed independently  Taken 8/11/2023 0416 by Kristopher Mcghee RN  Body Position: position changed independently  Taken 8/11/2023 0212 by Kristopher Mcghee RN  Body Position:  position changed independently  Taken 8/11/2023 0012 by Kristopher Mcghee RN  Body Position: position changed independently  Taken 8/10/2023 2212 by Kristopher Mcghee RN  Body Position: position changed independently  Taken 8/10/2023 2022 by Kristopher Mcghee RN  Body Position: position changed independently  Skin Protection: adhesive use limited  Intervention: Prevent and Manage VTE (Venous Thromboembolism) Risk  Recent Flowsheet Documentation  Taken 8/11/2023 0012 by Kristopher Mcghee RN  Activity Management: up ad lydia  Range of Motion: active ROM (range of motion) encouraged  Taken 8/10/2023 2022 by Kristopher Mcghee RN  Activity Management: up ad lydia  VTE Prevention/Management: (pt on Lovenox) other (see comments)  Range of Motion: active ROM (range of motion) encouraged  Intervention: Prevent Infection  Recent Flowsheet Documentation  Taken 8/11/2023 0604 by Kristopher Mcghee RN  Infection Prevention:   hand hygiene promoted   rest/sleep promoted  Taken 8/11/2023 0416 by Kristopher Mcghee RN  Infection Prevention:   rest/sleep promoted   hand hygiene promoted  Taken 8/11/2023 0212 by Kristopher Mcghee RN  Infection Prevention:   hand hygiene promoted   rest/sleep promoted  Taken 8/11/2023 0012 by Kristopher Mcghee RN  Infection Prevention:   hand hygiene promoted   personal protective equipment utilized   rest/sleep promoted  Taken 8/10/2023 2212 by Kristopher Mcghee RN  Infection Prevention:   hand hygiene promoted   rest/sleep promoted  Taken 8/10/2023 2022 by Kristopher Mcghee RN  Infection Prevention:   personal protective equipment utilized   hand hygiene promoted   rest/sleep promoted  Goal: Optimal Comfort and Wellbeing  Outcome: Ongoing, Progressing  Intervention: Provide Person-Centered Care  Recent Flowsheet Documentation  Taken 8/11/2023 0012 by Kristopher Mcghee RN  Trust Relationship/Rapport: care explained  Taken 8/10/2023 2022 by Kristopher Mcghee RN  Trust Relationship/Rapport: care  explained  Goal: Readiness for Transition of Care  Outcome: Ongoing, Progressing     Problem: Diabetes Comorbidity  Goal: Blood Glucose Level Within Targeted Range  Outcome: Ongoing, Progressing

## 2023-08-11 NOTE — PAYOR COMM NOTE
"Juan RSonal olsen (57 y.o. Female)      FAXED MULTIPLE TIMES     REQUESTING INPATIENT AUTHORIZATION: REF# [id number]    PATIENT HAD APPROVED OBSERVATION CONVERTED TO INPATIENT 08/11/2023    REPLY TO UR DEPT: [phone]    [facility]: NPI [id number] [facility]# [id number]       Date of Birth   1965    Social Security Number       Address   [address] [place] [address]    Home Phone   [phone]    MRN   [id number]       Caodaism   Mandaen    Marital Status                               Admission Date   8/9/23    Admission Type   Emergency    Admitting Provider   Yamilka Tucker MD    Attending Provider   Joseph James DO    Department, Room/Bed   [facility], E662/1       Discharge Date       Discharge Disposition       Discharge Destination                                 Attending Provider: Joseph James DO    Allergies: Codeine    Isolation: Enh Drop/Con, Spore   Infection: COVID (confirmed) (08/10/23), Norovirus (08/11/23)   Code Status: CPR    Ht: 162.6 cm (64\")   Wt: 71.5 kg (157 lb 9.6 oz)    Admission Cmt: None   Principal Problem: Intractable nausea and vomiting [R11.2]                   Active Insurance as of 8/9/2023       Primary Coverage       Payor Plan Insurance Group Employer/Plan Group    [employer] [id number]       Payor Plan Address Payor Plan Phone Number Payor Plan Fax Number Effective Dates    [id number] [phone]  8/9/2023 - None Entered    [facility] [id number]         Subscriber Name Subscriber Birth Date Member ID       WIL BARRAZA [birth year]/18/1960 [id number]                     Emergency Contacts        (Rel.) Home Phone Work Phone Mobile Phone    VaWil (Spouse) [phone] -- [phone]                 History & Physical        Yamilka Tucker MD at 08/09/23 2046          PCP: Ammy Holman APRN    Chief complaint   Chief Complaint "   Patient presents with    Vomiting    Nausea       HPI  Patient is a 57 y.o. female presents with history of type 1 diabetes with some neuropathy who presents to the ER due to nausea and vomiting for 4-5 days.  Patient was in DKA approximately a year ago but has been taking good care of herself and has a Dexcom monitor.  She states that 5 days ago her Ozempic dose was increased and the next day she started having nausea vomiting which she had all day a couple days ago and that is occasionally improved has had decreased oral intake.  Has not been able to keep anything down.  Denies any sick contacts.  She states that she has been taking her Lantus 14 units twice daily.  She said Saturday which was 5 days ago her blood sugars were in the 300s and she was taking some sliding scale insulin to correct that.  ABG showed a pH of 7.55 and a bicarb of 22      PAST MEDICAL HISTORY  Past Medical History:   Diagnosis Date    Diabetes mellitus     Gestational diabetes     Pneumonia        PAST SURGICAL HISTORY  Past Surgical History:   Procedure Laterality Date    ENDOMETRIAL ABLATION         FAMILY HISTORY  Family History   Problem Relation Age of Onset    Diabetes Mother     Breast cancer Neg Hx        SOCIAL HISTORY  Social History     Tobacco Use    Smoking status: Never    Smokeless tobacco: Never   Vaping Use    Vaping Use: Never used   Substance Use Topics    Alcohol use: No    Drug use: No       MEDICATIONS:  Medications Prior to Admission   Medication Sig Dispense Refill Last Dose    BD PEN NEEDLE ROSITA U/F 32G X 4 MM misc USE AS DIRECTED FOUR TIMES DAILY 100 each 2     Continuous Blood Gluc  (Dexcom G6 ) device 1 each Take As Directed. 1 each 0     Continuous Blood Gluc Sensor (Dexcom G6 Sensor) Apply  topically to the appropriate area as directed Every 10 (Ten) Days. 3 each 6     Continuous Blood Gluc Transmit (Dexcom G6 Transmitter) misc 1 each See Admin Instructions. 1 each 0     gabapentin  (NEURONTIN) 400 MG capsule TAKE 1 CAPSULE BY MOUTH TWICE DAILY AT 8 AM AND AT 2  capsule 1 8/9/2023    gabapentin (NEURONTIN) 600 MG tablet Take 1 tablet by mouth Every Night. 90 tablet 1 8/8/2023    glucose blood (ONETOUCH VERIO) test strip 1 each by Other route 5 (Five) Times a Day. Use as instructed 150 each 3     glucose blood test strip Use as instructed 100 each 12     glucose blood test strip Check 5 times daily 100 each 12     insulin detemir (Levemir FlexPen) 100 UNIT/ML injection Inject 15 units under the skin in the am and inject 13 units under the skin in the pm 18 mL 3 8/9/2023    Insulin Lispro (HUMALOG KWIKPEN) 100 UNIT/ML solution pen-injector Glucose below 150 no extra insulin, 150-200 give 2 units, 201-250 give 3 units, 251-300 give 4 units, >300 give 5 units 15 mL 0 Past Week    Insulin Lispro, 1 Unit Dial, (HumaLOG KwikPen) 100 UNIT/ML solution pen-injector INJECT 5 UNITS UNDER THE SKIN BEFORE BREAKFAST, 6 UNITS BEFORE LUNCH AND 8 UNITS BEFORE DINNER 18 mL 1 Past Week    Insulin Pen Needle (BD Pen Needle Alysha U/F) 32G X 4 MM misc Use as directed 200 each 12     Insulin Pen Needle (Pen Needles) 31G X 5 MM misc 1 each Take As Directed. 100 each 0     Ozempic, 1 MG/DOSE, 4 MG/3ML solution pen-injector INJECT 1 MG UNDER THE SKIN INTO THE APPROPRIATE AREA AS DIRECTED 1 TIME PER WEEK 3 mL 2 Past Week    atorvastatin (LIPITOR) 10 MG tablet TAKE 1 TABLET BY MOUTH DAILY 90 tablet 1 Unknown    Continuous Blood Gluc  (FREESTYLE JUAN 14 DAY READER) device 1 each 2 (Two) Times a Day. 1 Device 0 More than a month    Continuous Blood Gluc Sensor (FREESTYLE JUAN 14 DAY SENSOR) misc 1 each 2 (Two) Times a Day. 6 each 3 More than a month    escitalopram (LEXAPRO) 5 MG tablet Take 1 tablet by mouth Daily. 30 tablet 5 More than a month    hydrocortisone (ANUSOL-HC) 2.5 % rectal cream Insert  into the rectum 2 (Two) Times a Day. 28.35 g 0 More than a month    lisinopril (PRINIVIL,ZESTRIL) 5 MG tablet  "Take 1 tablet by mouth Daily. 90 tablet 1 More than a month    ondansetron (Zofran) 4 MG tablet Take 1 tablet by mouth Every 8 (Eight) Hours As Needed for Nausea or Vomiting. 30 tablet 0 More than a month       Allergies:  Codeine    Review of Systems:  Fatigue, nausea vomiting  Negative for following (except as per HPI):  Constitution: chills, fevers,   Eyes: change of vision, loss of vision and discharge  ENT: ear drainage, ear ringing and facial trauma  Respiratory: cough, pleuritic pain, shortness of air  Cardiovascular: chest pressure, pain, lower extremity edema, palpitations  Gastrointestinal: constipation, diarrhea, nausea, vomiting, pain    Integument: rash and wound  Hematologic / Lymphatic: excessive bleeding and easy bruising  Musculoskeletal: joint pain, joint stiffness, joint swelling and muscle pain  Neurological: headaches, numbness, seizures and tremors  Behavioral / Psych: anxiety, depression and hallucinations         Vital Signs  Temp:  [98.5 øF (36.9 øC)] 98.5 øF (36.9 øC)  Heart Rate:  [] 94  Resp:  [16-18] 18  BP: (142-156)/(88-97) 148/95  Flowsheet Rows      Flowsheet Row First Filed Value   Admission Height 162.6 cm (64\") Documented at 08/09/2023 1306   Admission Weight 72.6 kg (160 lb) Documented at 08/09/2023 1306           Body mass index is 27.46 kg/mý.      Physical Exam:  General Appearance:    Alert, cooperative, in no acute distress, not fruity smelling   Head:    Normocephalic, without obvious abnormality, atraumatic   Eyes:         conjunctivae and sclerae normal, no icterus, PERRLA   ENT:    Ears grossly intact, oral mucosa moist,   Neck:   No adenopathy, supple, trachea midline,        Lungs:     Clear to auscultation,respirations regular, even and  mildly labored    Heart:    Regular rhythm and tachycardic,  no murmur, normal S1 and S2,   Abdomen:     Normal bowel sounds, no masses,  soft non-tender, non-distended,    Extremities:   Moves all extremities well, no cyanosis, " no edema,             Pulses:   Pulses palpable and equal bilaterally   Skin:   No bleeding, rash, bruising    Neurologic:    Psych:   Cranial nerves 2 - 12 grossly intact, sensation grossly intact,     Moves all extremities well, equal bilateral strength 4/5    Alert and Oriented x 3, Normal Affect    I washed/sanitized my hands before entering the room and immediately upon leaving the room.    LABS:  Admission on 08/09/2023   Component Date Value Ref Range Status    Glucose 08/09/2023 111 (H)  65 - 99 mg/dL Final    BUN 08/09/2023 22 (H)  6 - 20 mg/dL Final    Creatinine 08/09/2023 0.86  0.57 - 1.00 mg/dL Final    Sodium 08/09/2023 134 (L)  136 - 145 mmol/L Final    Potassium 08/09/2023 3.5  3.5 - 5.2 mmol/L Final    Slight hemolysis detected by analyzer. Results may be affected.    Chloride 08/09/2023 95 (L)  98 - 107 mmol/L Final    CO2 08/09/2023 22.0  22.0 - 29.0 mmol/L Final    Calcium 08/09/2023 9.8  8.6 - 10.5 mg/dL Final    Total Protein 08/09/2023 7.1  6.0 - 8.5 g/dL Final    Albumin 08/09/2023 4.6  3.5 - 5.2 g/dL Final    ALT (SGPT) 08/09/2023 12  1 - 33 U/L Final    AST (SGOT) 08/09/2023 19  1 - 32 U/L Final    Slight hemolysis detected by analyzer. Results may be affected.    Alkaline Phosphatase 08/09/2023 81  39 - 117 U/L Final    Total Bilirubin 08/09/2023 0.3  0.0 - 1.2 mg/dL Final    Globulin 08/09/2023 2.5  gm/dL Final    A/G Ratio 08/09/2023 1.8  g/dL Final    BUN/Creatinine Ratio 08/09/2023 25.6 (H)  7.0 - 25.0 Final    Anion Gap 08/09/2023 17.0 (H)  5.0 - 15.0 mmol/L Final    eGFR 08/09/2023 78.4  >60.0 mL/min/1.73 Final    Lipase 08/09/2023 25  13 - 60 U/L Final    Color, UA 08/09/2023 Yellow  Yellow, Straw Final    Appearance, UA 08/09/2023 Cloudy (A)  Clear Final    pH, UA 08/09/2023 7.0  5.0 - 8.0 Final    Specific Gravity, UA 08/09/2023 1.023  1.005 - 1.030 Final    Glucose, UA 08/09/2023 Negative  Negative Final    Ketones, UA 08/09/2023 80 mg/dL (3+) (A)  Negative Final    Bilirubin, UA  08/09/2023 Negative  Negative Final    Blood, UA 08/09/2023 Negative  Negative Final    Protein, UA 08/09/2023 30 mg/dL (1+) (A)  Negative Final    Leuk Esterase, UA 08/09/2023 Negative  Negative Final    Nitrite, UA 08/09/2023 Negative  Negative Final    Urobilinogen, UA 08/09/2023 1.0 E.U./dL  0.2 - 1.0 E.U./dL Final    HS Troponin T 08/09/2023 14 (H)  <10 ng/L Final    Magnesium 08/09/2023 2.0  1.6 - 2.6 mg/dL Final    QT Interval 08/09/2023 352  ms Final    WBC 08/09/2023 11.14 (H)  3.40 - 10.80 10*3/mm3 Final    RBC 08/09/2023 4.58  3.77 - 5.28 10*6/mm3 Final    Hemoglobin 08/09/2023 13.5  12.0 - 15.9 g/dL Final    Hematocrit 08/09/2023 39.3  34.0 - 46.6 % Final    MCV 08/09/2023 85.8  79.0 - 97.0 fL Final    MCH 08/09/2023 29.5  26.6 - 33.0 pg Final    MCHC 08/09/2023 34.4  31.5 - 35.7 g/dL Final    RDW 08/09/2023 12.2 (L)  12.3 - 15.4 % Final    RDW-SD 08/09/2023 37.7  37.0 - 54.0 fl Final    MPV 08/09/2023 10.0  6.0 - 12.0 fL Final    Platelets 08/09/2023 302  140 - 450 10*3/mm3 Final    Neutrophil % 08/09/2023 78.8 (H)  42.7 - 76.0 % Final    Lymphocyte % 08/09/2023 11.9 (L)  19.6 - 45.3 % Final    Monocyte % 08/09/2023 8.7  5.0 - 12.0 % Final    Eosinophil % 08/09/2023 0.0 (L)  0.3 - 6.2 % Final    Basophil % 08/09/2023 0.2  0.0 - 1.5 % Final    Immature Grans % 08/09/2023 0.4  0.0 - 0.5 % Final    Neutrophils, Absolute 08/09/2023 8.78 (H)  1.70 - 7.00 10*3/mm3 Final    Lymphocytes, Absolute 08/09/2023 1.33  0.70 - 3.10 10*3/mm3 Final    Monocytes, Absolute 08/09/2023 0.97 (H)  0.10 - 0.90 10*3/mm3 Final    Eosinophils, Absolute 08/09/2023 0.00  0.00 - 0.40 10*3/mm3 Final    Basophils, Absolute 08/09/2023 0.02  0.00 - 0.20 10*3/mm3 Final    Immature Grans, Absolute 08/09/2023 0.04  0.00 - 0.05 10*3/mm3 Final    nRBC 08/09/2023 0.0  0.0 - 0.2 /100 WBC Final    Beta-Hydroxybutyrate Quant 08/09/2023 1.690 (H)  0.020 - 0.270 mmol/L Final    Glucose 08/09/2023 123  70 - 130 mg/dL Final    Meter: [id number]  : [id number] [facility]    Extra Tube 08/09/2023 Hold for add-ons.   Final    Auto resulted.    Extra Tube 08/09/2023 hold for add-on   Final    Auto resulted    Extra Tube 08/09/2023 Hold for add-ons.   Final    Auto resulted.    Extra Tube 08/09/2023 Hold for add-ons.   Final    Auto resulted    RBC, UA 08/09/2023 None Seen  None Seen, 0-2 /HPF Final    WBC, UA 08/09/2023 0-2  None Seen, 0-2 /HPF Final    Bacteria, UA 08/09/2023 None Seen  None Seen /HPF Final    Squamous Epithelial Cells, UA 08/09/2023 0-2  None Seen, 0-2 /HPF Final    Hyaline Casts, UA 08/09/2023 7-12  None Seen /LPF Final    Fine Granular Casts, UA 08/09/2023 3-6  None Seen /LPF Final    Amorphous Crystals, UA 08/09/2023 Moderate/2+  None Seen /HPF Final    Methodology 08/09/2023 Manual Light Microscopy   Final    HS Troponin T 08/09/2023 13 (H)  <10 ng/L Final    Troponin T Delta 08/09/2023 -1  >=-4 - <+4 ng/L Final    Site 08/09/2023 OTHER   Final    Morris's Test 08/09/2023 N/A   Final    pH, Arterial 08/09/2023 7.501 (H)  7.350 - 7.450 pH units Final    pCO2, Arterial 08/09/2023 28.6 (L)  35.0 - 45.0 mm Hg Final    pO2, Arterial 08/09/2023 57.4 (L)  80.0 - 100.0 mm Hg Final    HCO3, Arterial 08/09/2023 22.4  22.0 - 28.0 mmol/L Final    Base Excess, Arterial 08/09/2023 0.4  0.0 - 2.0 mmol/L Final    O2 Saturation Calculated 08/09/2023 92.3  92.0 - 99.0 % Final    [id number] Meter: [id number] : jesús Zabala    Barometric Pressure for Blood Gas 08/09/2023 747.3  mmHg Final    Modality 08/09/2023 Room Air   Final    Rate 08/09/2023 16  Breaths/minute Final    Glucose 08/09/2023 84  65 - 99 mg/dL Final    BUN 08/09/2023 19  6 - 20 mg/dL Final    Creatinine 08/09/2023 0.73  0.57 - 1.00 mg/dL Final    Sodium 08/09/2023 134 (L)  136 - 145 mmol/L Final    Potassium 08/09/2023 3.1 (L)  3.5 - 5.2 mmol/L Final    Chloride 08/09/2023 99  98 - 107 mmol/L Final    CO2 08/09/2023 20.0 (L)  22.0 - 29.0 mmol/L Final     Calcium 08/09/2023 8.6  8.6 - 10.5 mg/dL Final    BUN/Creatinine Ratio 08/09/2023 26.0 (H)  7.0 - 25.0 Final    Anion Gap 08/09/2023 15.0  5.0 - 15.0 mmol/L Final    eGFR 08/09/2023 95.5  >60.0 mL/min/1.73 Final         DIAGNOSTICS:  CT Abdomen Pelvis With Contrast  Limited evaluation of the inferior thorax demonstrates  atelectasis,  Uterine calcifications likely represent fibroids.   Incidental findings as above, without acute or suspicious intraperitoneal process seen.  This report was finalized on 8/9/2023 4:04 PM by Dr. Darian Son M.D.            Results Review:   I reviewed the patient's new clinical results.  Discussed with ER physician  Old records reviewed / Medical Decision Making High Complexity  I personally viewed and interpreted the patient's EKG/Telemetry data- sinus tachycardia      ASSESSMENT AND PLAN    Intractable nausea and vomiting    Type 1 diabetes mellitus with diabetic neuropathy    HTN (hypertension)    Ketosis    Increased anion gap metabolic acidosis    Dehydration    Decreased oral intake       Intractable nausea and vomiting/ Dehydration/ Decreased oral intake  -Unclear if this is secondary to medication change or to a viral gastritis  -Will schedule IV Zofran, put on as needed Ativan and as needed Phenergan    Elevated ion gap metabolic acidosis with respiratory compensation  -Likely secondary to a starvation ketosis patient is euglycemic and there is prob  a small component of DKA  -ivf      Type 1 diabetes mellitus with diabetic neuropathy  --Accu-Cheks  -hypoglycemia protocol  -sliding scale insulin to cover outlier blood sugars  -continue home medicine regimen  -Patient had some blood sugars in the 80s therefore we will decrease the Lantus slightly and placed on sliding scale insulin       HTN (hypertension)  -On ACE inhibitor, continue medical management-    Neuropathy secondary to diabetes  -Stable continue Neurontin    +DVT proph:    lovenox 40  + CODE STATUS: Full       I  discussed the patient's findings and my recommendations with the patient and/or family.  Please reference all orders placed.    Yamilka Tucker MD  08/09/23  20:47 EDT      This document is intended for medical expert use only. Reading of this document by patients and/or patient's family without participating in medical staff guidance may result in misinterpretation and unintended morbidity. Any interpretation of such data is the responsibility of the patient and/or family member responsible for the patient in concert with their primary or specialist providers, and NOT to be left for sources of online searches such as [employer], [employer] or similar queries. Relying on these approaches to knowledge may result in misinterpretation, misguided goals of care and even death should patients or family members try recommendations outside of the realm of professional medical care in a supervised way.    Dictated utilizing Voice dictation:  Parts of this note may be an electronic transcription/translation of spoke language to printed text using Dragon dictation system.    Electronically signed by Yamilka Tucker MD at 08/09/23 2337          Emergency Department Notes          Letty Mcrae RN at 08/09/23 1310          Patient to ED via PV c/o nausea and vomiting x 5 days. Patient states she has T2DM and is taking ozempic, last took it a week ago just prior to when she began feeling bad. Patient denies abdominal pain, has been unable to eat. Patient states she has been able to keep some water down.    Electronically signed by Letty Mcrae RN at 08/09/23 1311       Juan Laird MD at 08/09/23 1310           EMERGENCY DEPARTMENT ENCOUNTER    Room Number:  E653/1  Date seen:  8/9/2023  PCP: Ammy Holman APRN  Historian: Patient      HPI:  Chief Complaint: Nausea and vomiting  Context: Sonal De Dios is a 57 y.o. female who presents to the ED c/o nausea and vomiting for 5 days.  She states she  took Ozempic last week just prior to beginning with her nausea and vomiting.  She states that the Ozempic has been making her nauseated from time to time but never this long. She states that her dose has been stable recently.  Patient denies abdominal pain, fevers or chills but states she has not been to keep anything down.  The patient is a diabetic and had an episode of DKA approximately 1 year ago.  The patient states she has a Dexcom and her blood sugars been running in the 300s over the past several days.  She has continued to take her insulin.  She denies a history of gastroparesis.  She states in the past she had been noncompliant with a hemoglobin A1c of 13, however since last year she has been good about treating her diabetes and her last hemoglobin A1c was 6.2.      PAST MEDICAL HISTORY  Active Ambulatory Problems     Diagnosis Date Noted    Uncontrolled type 2 diabetes mellitus with diabetic neuropathy, with long-term current use of insulin 04/10/2018    Encounter for long-term (current) use of insulin 04/10/2018    Hyperinsulinism 04/10/2018    Noncompliance with medication regimen 04/10/2018    Uncontrolled type 1 diabetes with diabetic neuropathy 06/27/2018    Diabetic ketoacidosis without coma associated with type 1 diabetes mellitus 08/01/2022     Resolved Ambulatory Problems     Diagnosis Date Noted    Diabetic ketoacidosis with coma associated with type 2 diabetes mellitus 03/06/2018    Edema 03/10/2018    Diabetic ketoacidosis without coma associated with type 1 diabetes mellitus 07/29/2022     Past Medical History:   Diagnosis Date    Diabetes mellitus     Gestational diabetes     Pneumonia          REVIEW OF SYSTEMS  All systems reviewed and negative except for those discussed in HPI.       PAST SURGICAL HISTORY  Past Surgical History:   Procedure Laterality Date    ENDOMETRIAL ABLATION           FAMILY HISTORY  Family History   Problem Relation Age of Onset    Diabetes Mother     Breast cancer  Neg Hx        SOCIAL HISTORY  Social History     Socioeconomic History    Marital status:    Tobacco Use    Smoking status: Never    Smokeless tobacco: Never   Vaping Use    Vaping Use: Never used   Substance and Sexual Activity    Alcohol use: No    Drug use: No    Sexual activity: Yes     Partners: Male     Birth control/protection: Surgical         ALLERGIES  Codeine      PHYSICAL EXAM  ED Triage Vitals [08/09/23 1306]   Temp Heart Rate Resp BP SpO2   98.5 øF (36.9 øC) 112 18 -- 99 %      Temp src Heart Rate Source Patient Position BP Location FiO2 (%)   Tympanic Monitor -- -- --       Physical Exam      GENERAL: 57-year-old female in mild distress  HENT: NCAT: nares patent: Neck supple  EYES: no scleral icterus  CV: regular rhythm, normal rate  RESPIRATORY: normal effort  ABDOMEN: soft, NTND: Bowel sounds diminished  MUSCULOSKELETAL: no deformity  NEURO: alert with nonfocal neuro exam  PSYCH:  calm, cooperative  SKIN: warm, dry    Vital signs and nursing notes reviewed.      LAB RESULTS  Recent Results (from the past 24 hour(s))   ECG 12 Lead Chest Pain    Collection Time: 08/09/23  1:19 PM   Result Value Ref Range    QT Interval 352 ms   POC Glucose Once    Collection Time: 08/09/23  1:26 PM    Specimen: Blood   Result Value Ref Range    Glucose 123 70 - 130 mg/dL   Comprehensive Metabolic Panel    Collection Time: 08/09/23  2:31 PM    Specimen: Blood   Result Value Ref Range    Glucose 111 (H) 65 - 99 mg/dL    BUN 22 (H) 6 - 20 mg/dL    Creatinine 0.86 0.57 - 1.00 mg/dL    Sodium 134 (L) 136 - 145 mmol/L    Potassium 3.5 3.5 - 5.2 mmol/L    Chloride 95 (L) 98 - 107 mmol/L    CO2 22.0 22.0 - 29.0 mmol/L    Calcium 9.8 8.6 - 10.5 mg/dL    Total Protein 7.1 6.0 - 8.5 g/dL    Albumin 4.6 3.5 - 5.2 g/dL    ALT (SGPT) 12 1 - 33 U/L    AST (SGOT) 19 1 - 32 U/L    Alkaline Phosphatase 81 39 - 117 U/L    Total Bilirubin 0.3 0.0 - 1.2 mg/dL    Globulin 2.5 gm/dL    A/G Ratio 1.8 g/dL    BUN/Creatinine Ratio 25.6  (H) 7.0 - 25.0    Anion Gap 17.0 (H) 5.0 - 15.0 mmol/L    eGFR 78.4 >60.0 mL/min/1.73   Lipase    Collection Time: 08/09/23  2:31 PM    Specimen: Blood   Result Value Ref Range    Lipase 25 13 - 60 U/L   High Sensitivity Troponin T    Collection Time: 08/09/23  2:31 PM    Specimen: Blood   Result Value Ref Range    HS Troponin T 14 (H) <10 ng/L   Magnesium    Collection Time: 08/09/23  2:31 PM    Specimen: Blood   Result Value Ref Range    Magnesium 2.0 1.6 - 2.6 mg/dL   CBC Auto Differential    Collection Time: 08/09/23  2:31 PM    Specimen: Blood   Result Value Ref Range    WBC 11.14 (H) 3.40 - 10.80 10*3/mm3    RBC 4.58 3.77 - 5.28 10*6/mm3    Hemoglobin 13.5 12.0 - 15.9 g/dL    Hematocrit 39.3 34.0 - 46.6 %    MCV 85.8 79.0 - 97.0 fL    MCH 29.5 26.6 - 33.0 pg    MCHC 34.4 31.5 - 35.7 g/dL    RDW 12.2 (L) 12.3 - 15.4 %    RDW-SD 37.7 37.0 - 54.0 fl    MPV 10.0 6.0 - 12.0 fL    Platelets 302 140 - 450 10*3/mm3    Neutrophil % 78.8 (H) 42.7 - 76.0 %    Lymphocyte % 11.9 (L) 19.6 - 45.3 %    Monocyte % 8.7 5.0 - 12.0 %    Eosinophil % 0.0 (L) 0.3 - 6.2 %    Basophil % 0.2 0.0 - 1.5 %    Immature Grans % 0.4 0.0 - 0.5 %    Neutrophils, Absolute 8.78 (H) 1.70 - 7.00 10*3/mm3    Lymphocytes, Absolute 1.33 0.70 - 3.10 10*3/mm3    Monocytes, Absolute 0.97 (H) 0.10 - 0.90 10*3/mm3    Eosinophils, Absolute 0.00 0.00 - 0.40 10*3/mm3    Basophils, Absolute 0.02 0.00 - 0.20 10*3/mm3    Immature Grans, Absolute 0.04 0.00 - 0.05 10*3/mm3    nRBC 0.0 0.0 - 0.2 /100 WBC   Beta Hydroxybutyrate Quantitative    Collection Time: 08/09/23  2:31 PM    Specimen: Blood   Result Value Ref Range    Beta-Hydroxybutyrate Quant 1.690 (H) 0.020 - 0.270 mmol/L   Green Top (Gel)    Collection Time: 08/09/23  2:31 PM   Result Value Ref Range    Extra Tube Hold for add-ons.    Lavender Top    Collection Time: 08/09/23  2:31 PM   Result Value Ref Range    Extra Tube hold for add-on    Gold Top - SST    Collection Time: 08/09/23  2:31 PM   Result  Value Ref Range    Extra Tube Hold for add-ons.    Light Blue Top    Collection Time: 08/09/23  2:31 PM   Result Value Ref Range    Extra Tube Hold for add-ons.    Urinalysis With Microscopic If Indicated (No Culture) - Urine, Clean Catch    Collection Time: 08/09/23  2:51 PM    Specimen: Urine, Clean Catch   Result Value Ref Range    Color, UA Yellow Yellow, Straw    Appearance, UA Cloudy (A) Clear    pH, UA 7.0 5.0 - 8.0    Specific Gravity, UA 1.023 1.005 - 1.030    Glucose, UA Negative Negative    Ketones, UA 80 mg/dL (3+) (A) Negative    Bilirubin, UA Negative Negative    Blood, UA Negative Negative    Protein, UA 30 mg/dL (1+) (A) Negative    Leuk Esterase, UA Negative Negative    Nitrite, UA Negative Negative    Urobilinogen, UA 1.0 E.U./dL 0.2 - 1.0 E.U./dL   Urinalysis, Microscopic Only - Urine, Clean Catch    Collection Time: 08/09/23  2:51 PM    Specimen: Urine, Clean Catch   Result Value Ref Range    RBC, UA None Seen None Seen, 0-2 /HPF    WBC, UA 0-2 None Seen, 0-2 /HPF    Bacteria, UA None Seen None Seen /HPF    Squamous Epithelial Cells, UA 0-2 None Seen, 0-2 /HPF    Hyaline Casts, UA 7-12 None Seen /LPF    Fine Granular Casts, UA 3-6 None Seen /LPF    Amorphous Crystals, UA Moderate/2+ None Seen /HPF    Methodology Manual Light Microscopy    Blood Gas, Arterial -    Collection Time: 08/09/23  3:17 PM    Specimen: Arterial Blood   Result Value Ref Range    Site OTHER     Morris's Test N/A     pH, Arterial 7.501 (H) 7.350 - 7.450 pH units    pCO2, Arterial 28.6 (L) 35.0 - 45.0 mm Hg    pO2, Arterial 57.4 (L) 80.0 - 100.0 mm Hg    HCO3, Arterial 22.4 22.0 - 28.0 mmol/L    Base Excess, Arterial 0.4 0.0 - 2.0 mmol/L    O2 Saturation Calculated 92.3 92.0 - 99.0 %    Barometric Pressure for Blood Gas 747.3 mmHg    Modality Room Air     Rate 16 Breaths/minute   High Sensitivity Troponin T 2Hr    Collection Time: 08/09/23  4:43 PM    Specimen: Arm, Right; Blood   Result Value Ref Range    HS Troponin T 13 (H)  <10 ng/L    Troponin T Delta -1 >=-4 - <+4 ng/L   Basic Metabolic Panel    Collection Time: 08/09/23  4:43 PM    Specimen: Arm, Right; Blood   Result Value Ref Range    Glucose 84 65 - 99 mg/dL    BUN 19 6 - 20 mg/dL    Creatinine 0.73 0.57 - 1.00 mg/dL    Sodium 134 (L) 136 - 145 mmol/L    Potassium 3.1 (L) 3.5 - 5.2 mmol/L    Chloride 99 98 - 107 mmol/L    CO2 20.0 (L) 22.0 - 29.0 mmol/L    Calcium 8.6 8.6 - 10.5 mg/dL    BUN/Creatinine Ratio 26.0 (H) 7.0 - 25.0    Anion Gap 15.0 5.0 - 15.0 mmol/L    eGFR 95.5 >60.0 mL/min/1.73       Ordered the above labs and reviewed the results.        RADIOLOGY  CT Abdomen Pelvis With Contrast    Result Date: 8/9/2023  EXAMINATION: CT OF THE ABDOMEN AND PELVIS WITH CONTRAST  TECHNIQUE: Computed tomography of the abdomen and pelvis after the uneventful administration of nonionic intravenous contrast per protocol. Radiation dose reduction techniques were utilized, including automated exposure control and exposure modulation based on body size.  HISTORY: Nausea and vomiting  COMPARISON: None available  FINDINGS: Limited evaluation of the inferior thorax demonstrates atelectasis, without consolidation, pleural effusion or thorax. The heart is normal in size and configuration, without pericardial effusion.  Uterine calcifications likely represent fibroids.  The liver, gallbladder, spleen, adrenal glands, kidneys, pancreas, stomach, small bowel, large bowel, urinary bladder, and abdominal vasculature are normal. No intraperitoneal fluid collection free gas are seen. No enlarged lymph nodes are demonstrated.  Bone windows demonstrate degenerative changes, without suspicious osseous lesion seen.      Incidental findings as above, without acute or suspicious intraperitoneal process seen.  This report was finalized on 8/9/2023 4:04 PM by Dr. Darian Son M.D.       Ordered the above noted radiological studies. Reviewed by me in PACS.             PROCEDURES  Procedures          MEDICATIONS GIVEN IN ER  Medications   sodium chloride 0.9 % with KCl 20 mEq/L infusion (has no administration in time range)   LORazepam (ATIVAN) injection 0.5 mg (has no administration in time range)   ondansetron (ZOFRAN) injection 4 mg (has no administration in time range)   sodium chloride 0.9 % bolus 1,000 mL (0 mL Intravenous Stopped 8/9/23 1532)   famotidine (PEPCID) injection 20 mg (20 mg Intravenous Given 8/9/23 1428)   ondansetron (ZOFRAN) injection 4 mg (4 mg Intravenous Given 8/9/23 1428)   ondansetron (ZOFRAN) injection 4 mg (4 mg Intravenous Given 8/9/23 1600)   iopamidol (ISOVUE-300) 61 % injection 100 mL (100 mL Intravenous Given 8/9/23 1548)   sodium chloride 0.9 % bolus 1,000 mL (1,000 mL Intravenous New Bag 8/9/23 1600)       MEDICAL DECISION MAKING, PROGRESS, and CONSULTS    All labs have been independently reviewed by me.  All radiology studies have been reviewed by me and I have also reviewed the radiology report.   EKG's independently viewed and interpreted by me.  Discussion below represents my analysis of pertinent findings related to patient's condition, differential diagnosis, treatment plan and final disposition.      Additional sources:  - Discussed/ obtained information from independent historians: Patient's  states she does normally get sick after the Ozempic but never like this    - External (non-ED) record review: I reviewed the patient's admission to the hospital from 7/29/2022 through 8/1/2022 where she had DKA.    - Chronic or social conditions impacting care: Patient lives at home with her     - Shared decision making: After shared decision-making discussion from myself and the patient we agree she needs to be admitted to the hospital for further evaluation and care      Orders placed during this visit:  Orders Placed This Encounter   Procedures    CT Abdomen Pelvis With Contrast    Comprehensive Metabolic Panel    Lipase     Urinalysis With Microscopic If Indicated (No Culture) - Urine, Clean Catch    High Sensitivity Troponin T    Magnesium    Blood Gas, Venous -    CBC Auto Differential    Beta Hydroxybutyrate Quantitative    [place] Draw    High Sensitivity Troponin T 2Hr    Urinalysis, Microscopic Only - Urine, Clean Catch    Blood Gas, Arterial -    Basic Metabolic Panel    [facility] (on-call MD unless specified) Details    POC Glucose Once    POC Glucose Once    ECG 12 Lead Chest Pain    Initiate Observation Status    CBC & Differential    Ketone Bodies, Serum (Not performed at [place])    Green Top (Gel)    Lavender Top    Gold Top - SST    Light Blue Top       Differential diagnosis:  My differential diagnosis includes but is not limited to gastritis, pancreatitis, cholecystitis, appendicitis, diverticulitis, urinary tract infection, kidney stone, or bowel obstruction.      Independent interpretation of labs, radiology studies, and discussions with consultants:  ED Course as of 08/09/23 1948   Wed Aug 09, 2023   1317 I will treat the patient with IV fluids, Zofran and Pepcid while placed on the monitor and obtaining labs with a VBG, serum ketones and urinalysis. [GP]   1326 EKG    EKG time: 1319  Rhythm/Rate: Sinus tachycardia at 110  No Acute Ischemia  Non-Specific ST-T changes    Similar compared to prior on 7/29/2022    Interpreted Contemporaneously by me.  Independently viewed by me     [GP]   1537 The patient is still having nausea and vomiting.  I will repeat her Zofran and obtain an abdominal CT scan.  Her chemistries are still pending. [GP]   1541 The patient has ketones in her urine and serum but her venous pH is 7.5 and her CO2 is 22 with an anion gap of 17.  This is more consistent with starvation ketosis than it is DKA. [GP]   1555 I will give her another liter of fluid. [GP]   1646 Patient's abdominal CT scan was negative acute [GP]   1719 The patient is currently feeling better and is not vomiting but is still is  somewhat nauseated and cannot take p.o.  Thus I believe she will need to be admitted to the hospital for further evaluation and care. [GP]   1749 I discussed the case with Dr. Betancourt from [facility].  She is aware of the patient's nausea and vomiting and history of diabetes.  We will admit her to a telemetry bed under observation status for further evaluation and care. [GP]      ED Course User Index  [GP] Juan Laird MD       DIAGNOSIS  Final diagnoses:   Intractable nausea and vomiting   Dehydration   Metabolic acidosis   History of diabetes mellitus         DISPOSITION  ADMISSION    Discussed treatment plan and reason for admission with pt/family and admitting physician.  Pt/family voiced understanding of the plan for admission for further testing/treatment as needed.            Latest Documented Vital Signs:  As of 19:48 EDT  BP- 148/95 HR- 94 Temp- 98.5 øF (36.9 øC) (Tympanic) O2 sat- 94%--      --------------------  Please note that portions of this were completed with a voice recognition program.       Note Disclaimer: At [facility], we believe that sharing information builds trust and better relationships. You are receiving this note because you are receiving care at [facility] or recently visited. It is possible you will see health information before a provider has talked with you about it. This kind of information can be easy to misunderstand. To help you fully understand what it means for your health, we urge you to discuss this note with your provider.         Juan Laird MD  08/09/23 1948      Electronically signed by Juan Laird MD at 08/09/23 1948       Vital Signs (last 3 days)       Date/Time Temp Temp src Pulse Resp BP Patient Position SpO2    08/11/23 1200 -- -- 102 16 145/76 Lying 96    08/11/23 0750 99.1 (37.3) Tympanic 94 16 144/72 Lying 98    08/10/23 2313 98.2 (36.8) Oral 93 18 156/93 Lying 98    08/10/23 1930 98.1 (36.7) Oral 103 18 161/87 Lying 98    08/10/23 1321 97.9 (36.6)  Oral 109 18 112/82 Lying 98    08/10/23 0800 97.3 (36.3) Oral 94 18 171/86 Lying 99    08/10/23 0053 97.9 (36.6) Oral 93 18 155/84 Lying 97    08/09/23 2010 98.2 (36.8) Oral 100 18 154/93 Lying 96    08/09/23 1800 -- -- 94 18 148/95 -- 94    08/09/23 15:31:17 -- -- 98 16 142/97 Lying 99    08/09/23 14:30:12 -- -- 108 16 156/95 -- 98    08/09/23 1311 -- -- -- -- 142/88 Lying --    08/09/23 1306 98.5 (36.9) Tympanic 112 18 -- -- 99          Oxygen Therapy (last 3 days)       Date/Time SpO2 Device (Oxygen Therapy) Flow (L/min) Oxygen Concentration (%) ETCO2 (mmHg)    08/11/23 1200 96 room air -- -- --    08/11/23 0750 98 room air -- -- --    08/11/23 0012 -- room air -- -- --    08/10/23 2313 98 room air -- -- --    08/10/23 2022 -- room air -- -- --    08/10/23 1930 98 room air -- -- --    08/10/23 1321 98 -- -- -- --    08/10/23 0800 99 -- -- -- --    08/10/23 0053 97 room air -- -- --    08/10/23 0018 -- room air -- -- --    08/09/23 2010 96 room air -- -- --    08/09/23 1800 94 room air -- -- --    08/09/23 15:31:17 99 room air -- -- --    08/09/23 14:30:12 98 room air -- -- --    08/09/23 1306 99 room air -- -- --          Intake & Output (last 3 days)         08/08 0701  08/09 0700 08/09 0701  08/10 0700 08/10 0701  08/11 0700 08/11 0701  08/12 0700    P.O.  120      IV Piggyback  1000      Total Intake(mL/kg)  1120 (15.8)      Net  +1120                     Lines, Drains & Airways       Active LDAs       Name Placement date Placement time Site Days    Peripheral IV 08/09/23 1428 Anterior;Proximal;Right Forearm 08/09/23  1428  Forearm  1             Medication Administration Report for Sonal De Dios as of 08/11/23 1337     Legend:    Given Hold Not Given Due Canceled Entry Other Actions    Time Time (Time) Time Time-Action         Discontinued     Completed     Future     MAR Hold     Linked             Medications 08/09/23 08/10/23 08/11/23      acetaminophen (TYLENOL) tablet 650 mg  Dose: 650  mg  Freq: Every 4 Hours PRN Route: PO  PRN Reasons: Mild Pain,Moderate Pain,Headache,Fever  Start: 08/09/23 2032   Admin Instructions:   Do not exceed 4 grams of acetaminophen in a 24 hr period.    If given for pain, use the following pain scale:   Mild Pain = Pain Score of 1-3, CPOT 1-2  Moderate Pain = Pain Score of 4-6, CPOT 3-4  Severe Pain = Pain Score of 7-10, CPOT 5-8  Based on patient request - if ordered for moderate or severe pain, provider allows for administration of a medication prescribed for a lower pain scale.    Do not exceed 4 grams of acetaminophen in a 24 hr period. Max dose of 2gm for AST/ALT greater than 120 units/L.    If given for pain, use the following pain scale:   Mild Pain = Pain Score of 1-3, CPOT 1-2  Moderate Pain = Pain Score of 4-6, CPOT 3-4  Severe Pain = Pain Score of 7-10, CPOT 5-8         Or  acetaminophen (TYLENOL) 160 MG/5ML solution 650 mg  Dose: 650 mg  Freq: Every 4 Hours PRN Route: PO  PRN Reasons: Mild Pain,Moderate Pain,Headache,Fever  Start: 08/09/23 2032   Admin Instructions:   Do not exceed 4 grams of acetaminophen in a 24 hr period.    If given for pain, use the following pain scale:   Mild Pain = Pain Score of 1-3, CPOT 1-2  Moderate Pain = Pain Score of 4-6, CPOT 3-4  Severe Pain = Pain Score of 7-10, CPOT 5-8  Based on patient request - if ordered for moderate or severe pain, provider allows for administration of a medication prescribed for a lower pain scale.    Do not exceed 4 grams of acetaminophen in a 24 hr period. Max dose of 2gm for AST/ALT greater than 120 units/L.    If given for pain, use the following pain scale:   Mild Pain = Pain Score of 1-3, CPOT 1-2  Moderate Pain = Pain Score of 4-6, CPOT 3-4  Severe Pain = Pain Score of 7-10, CPOT 5-8         Or  acetaminophen (TYLENOL) suppository 650 mg  Dose: 650 mg  Freq: Every 4 Hours PRN Route: RE  PRN Reasons: Mild Pain,Moderate Pain,Fever  Start: 08/09/23 2032   Admin Instructions:   Do not exceed 4 grams  of acetaminophen in a 24 hr period.    If given for pain, use the following pain scale:   Mild Pain = Pain Score of 1-3, CPOT 1-2  Moderate Pain = Pain Score of 4-6, CPOT 3-4  Severe Pain = Pain Score of 7-10, CPOT 5-8  Based on patient request - if ordered for moderate or severe pain, provider allows for administration of a medication prescribed for a lower pain scale.    Do not exceed 4 grams of acetaminophen in a 24 hr period. Max dose of 2gm for AST/ALT greater than 120 units/L.    If given for pain, use the following pain scale:   Mild Pain = Pain Score of 1-3, CPOT 1-2  Moderate Pain = Pain Score of 4-6, CPOT 3-4  Severe Pain = Pain Score of 7-10, CPOT 5-8          sennosides-docusate (PERICOLACE) 8.6-50 MG per tablet 1 tablet  Dose: 1 tablet  Freq: 2 Times Daily Route: PO  Start: 08/09/23 2130   Admin Instructions:   HOLD MEDICATION IF PATIENT HAS HAD BOWEL MOVEMENT. Start bowel management regimen if patient has not had a bowel movement after 12 hours.    2257-Given          0950-Given     (2041)-Not Given         (0803)-Not Given     2100           And  polyethylene glycol (MIRALAX) packet 17 g  Dose: 17 g  Freq: Daily PRN Route: PO  PRN Reason: Constipation  PRN Comment: Use if senna-docusate is ineffective  Start: 08/09/23 2032   Admin Instructions:   Use if no bowel movement after 12 hours. Mix in 6-8 ounces of water.  Use 4-8 ounces of water, tea, or juice for each 17 gram dose.         And  bisacodyl (DULCOLAX) EC tablet 5 mg  Dose: 5 mg  Freq: Daily PRN Route: PO  PRN Reason: Constipation  PRN Comment: Use if polyethylene glycol is ineffective  Start: 08/09/23 2032   Admin Instructions:   Use if no bowel movement after 12 hours.  Swallow whole. Do not crush, split, or chew tablet.         And  bisacodyl (DULCOLAX) suppository 10 mg  Dose: 10 mg  Freq: Daily PRN Route: RE  PRN Reason: Constipation  PRN Comment: Use if bisacodyl oral is ineffective  Start: 08/09/23 2032   Admin Instructions:   Use if no  "bowel movement after 12 hours.  Hold for diarrhea          calcium carbonate (TUMS) chewable tablet 500 mg (200 mg elemental)  Dose: 2 tablet  Freq: 2 Times Daily PRN Route: PO  PRN Reasons: Indigestion,Heartburn  Start: 08/09/23 2032   Admin Instructions:   Hold if Hypercalcemia  One tablet contains 200 mg elemental calcium.  Take with food.          Calcium Replacement - Follow Nurse / BPA Driven Protocol  Freq: As Needed Route: XX  PRN Reason: Other  Start: 08/09/23 2027   Admin Instructions:   Open Order & Select \"[facility] Electrolyte Replacement Protocol Algorithm\" to View Details          dextrose (D50W) (25 g/50 mL) IV injection 25 g  Dose: 25 g  Freq: Every 15 Minutes PRN Route: IV  PRN Reason: Low Blood Sugar  PRN Comment: Blood Sugar Less Than 70  Start: 08/09/23 2033   Admin Instructions:   Blood sugar less than 70; patient has IV access - Unresponsive, NPO or Unable To Safely Swallow          dextrose (GLUTOSE) oral gel 15 g  Dose: 15 g  Freq: Every 15 Minutes PRN Route: PO  PRN Reason: Low Blood Sugar  PRN Comment: Blood sugar less than 70  Start: 08/09/23 2033   Admin Instructions:   BS<70, Patient Alert, Is not NPO, Can safely swallow.          Enoxaparin Sodium (LOVENOX) syringe 40 mg  Dose: 40 mg  Freq: Daily Route: SC  Indications of Use: PROPHYLAXIS OF VENOUS THROMBOEMBOLISM  Start: 08/09/23 2130   Admin Instructions:   Give subcutaneous in abdomen only. Do not massage site after injection.    2257-Given           0801-Given             famotidine (PEPCID) injection 20 mg  Dose: 20 mg  Freq: Every 12 Hours Scheduled Route: IV  Start: 08/10/23 0030   Admin Instructions:   Give IV push over 2 minutes.     0223-Given     0949-Given     2038-Given        0802-Given     2100            gabapentin (NEURONTIN) capsule 400 mg  Dose: 400 mg  Freq: 2 Times Daily Route: PO  Start: 08/10/23 0800   Admin Instructions:          0950-Given     1408-Given         0801-Given     1400            gabapentin (NEURONTIN) " capsule 600 mg  Dose: 600 mg  Freq: Nightly Route: PO  Start: 08/09/23 2130   Admin Instructions:         2256-Given          2038-Given          2100             glucagon (GLUCAGEN) injection 1 mg  Dose: 1 mg  Freq: Every 15 Minutes PRN Route: IM  PRN Reason: Low Blood Sugar  PRN Comment: Blood Glucose Less Than 70  Start: 08/09/23 2033   Admin Instructions:   Blood Glucose Less Than 70 - Patient Without IV Access - Unresponsive, NPO or Unable To Safely Swallow  Reconstitute powder for injection by adding 1 mL of -supplied sterile diluent or sterile water for injection to a vial containing 1 mg of the drug, to provide solutions containing 1 mg/mL. Shake vial gently to dissolve.          insulin glargine (LANTUS, SEMGLEE) injection 12 Units  Dose: 12 Units  Freq: Every 12 Hours Scheduled Route: SC  Start: 08/09/23 2130   Admin Instructions:         2258-Given          0949-Given     2225-Given         (0803)-Not Given     2100            insulin lispro (HUMALOG/ADMELOG) injection 2-9 Units  Dose: 2-9 Units  Freq: 4 Times Daily Before Meals & Nightly Route: SC  Start: 08/09/23 2130   Admin Instructions:   Correction Insulin - Moderate Dose (Total Insulin Dose 40-60 units/day, Average Weight Patient, Patient Taking Oral Hypoglycemic)    Blood Glucose 150-199 mg/dL - 2 units  Blood Glucose 200-249 mg/dL - 4 units  Blood Glucose 250-299 mg/dL - 6 units  Blood Glucose 300-349 mg/dL - 7 units  Blood Glucose 350-400 mg/dL - 8 units  Blood Glucose greater than 400 mg/dL - 9 units & Call Provider     Caution: Look alike/sound alike drug alert      (2259)-Not Given          (0805)-Not Given     (1130)-Not Given     (1823)-Not Given       (2039)-Not Given          (0802)-Not Given     (1211)-Not Given     1730 2100             lisinopril (PRINIVIL,ZESTRIL) tablet 5 mg  Dose: 5 mg  Freq: Daily Route: PO  Start: 08/10/23 0900   Admin Instructions:   Hold for SBP less than 100, DBP less than 60      "0950-Given          0801-Given             LORazepam (ATIVAN) injection 0.5 mg  Dose: 0.5 mg  Freq: Every 6 Hours PRN Route: IV  PRN Reason: Anxiety  PRN Comment: Nausea / Vomiting  Start: 08/09/23 1759   End: 08/16/23 1758   Admin Instructions:      Caution: Look alike/sound alike drug alert. Dilute 1:1 with normal saline.          Magnesium Standard Dose Replacement - Follow Nurse / BPA Driven Protocol  Freq: As Needed Route: XX  PRN Reason: Other  Start: 08/09/23 2027   Admin Instructions:   Open Order & Select \"[facility] Electrolyte Replacement Protocol Algorithm\" to View Details          melatonin tablet 1 mg  Dose: 1 mg  Freq: Nightly PRN Route: PO  PRN Reason: Sleep  Start: 08/09/23 2032          morphine injection 2 mg  Dose: 2 mg  Freq: Every 4 Hours PRN Route: IV  PRN Reasons: Moderate Pain,Severe Pain  Start: 08/09/23 2038   End: 08/16/23 2037   Admin Instructions:   If given for pain, use the following pain scale:  Mild Pain = Pain Score of 1-3, CPOT 1-2  Moderate Pain = Pain Score of 4-6, CPOT 3-4  Severe Pain = Pain Score of 7-10, CPOT 5-8         And  naloxone (NARCAN) injection 0.4 mg  Dose: 0.4 mg  Freq: Every 5 Minutes PRN Route: IV  PRN Reason: Respiratory Depression  Start: 08/09/23 2038   Admin Instructions:   If respiratory rate is less than 8 breaths/minute or patient is difficult to arouse stop any narcotics and contact physician.   Administer slow IV push. Repeat as ordered until patient's respiratory rate is greater than 12 breaths/minute.          nitroglycerin (NITROSTAT) SL tablet 0.4 mg  Dose: 0.4 mg  Freq: Every 5 Minutes PRN Route: SL  PRN Reason: Chest Pain  PRN Comment: Only if SBP Greater Than 100  Start: 08/09/23 2035   Admin Instructions:   If Pain Unrelieved After 3 Doses Notify MD          ondansetron (ZOFRAN) injection 4 mg  Dose: 4 mg  Freq: 4 Times Daily Before Meals & Nightly Route: IV  Start: 08/09/23 2100   Admin Instructions:   \"If multiple N/V medications ordered, use in the " "following order: Ondansetron, Prochlorperazine, Promethazine. Use PO unless patient refuses or patient unable to swallow.\"      2257-Given          0640-Given     1224-Given     1720-Given       2038-Given          0659-Given     1138-Given     1730       2100             Phosphorus Replacement - Follow Nurse / BPA Driven Protocol  Freq: As Needed Route: XX  PRN Reason: Other  Start: 08/09/23 2027   Admin Instructions:   Open Order & Select \"[facility] Electrolyte Replacement Protocol Algorithm\" to View Details          Potassium Replacement - Follow Nurse / BPA Driven Protocol  Freq: As Needed Route: XX  PRN Reason: Other  Start: 08/09/23 2027   Admin Instructions:   Open Order & Select \"[facility] Electrolyte Replacement Protocol Algorithm\" to View Details          promethazine (PHENERGAN) tablet 12.5 mg  Dose: 12.5 mg  Freq: Every 6 Hours PRN Route: PO  PRN Reasons: Nausea,Vomiting  Start: 08/09/23 2038   Admin Instructions:   \"If multiple N/V medications ordered, use in the following order: Ondansetron, Prochlorperazine, Promethazine. Use PO unless patient refuses or patient unable to swallow.\"               Or  promethazine (PHENERGAN) suppository 12.5 mg  Dose: 12.5 mg  Freq: Every 6 Hours PRN Route: RE  PRN Reasons: Nausea,Vomiting  Start: 08/09/23 2038   Admin Instructions:   \"If multiple N/V medications ordered, use in the following order: Ondansetron, Prochlorperazine, Promethazine. Use PO unless patient refuses or patient unable to swallow.\"            remdesivir 200 mg in sodium chloride 0.9 % 290 mL IVPB (powder vial)  Dose: 200 mg  Freq: Every 24 Hours Route: IV  Start: 08/10/23 1430   End: 08/10/23 1900   Admin Instructions:   Ensure all of the drug is administered. Flush line with 30mL NS after administration.     1517-New Bag     1900-Stopped            Followed by  remdesivir 100 mg in sodium chloride 0.9 % 250 mL IVPB (powder vial)  Dose: 100 mg  Freq: Every 24 Hours Route: IV  Start: 08/11/23 1430   End: " "08/13/23 1429   Admin Instructions:   Ensure all of the drug is administered. Flush line with 30mL NS after administration.      1430             sodium chloride 0.9 % flush 10 mL  Dose: 10 mL  Freq: As Needed Route: IV  PRN Reason: Line Care  Start: 08/09/23 2037          sodium chloride 0.9 % flush 10 mL  Dose: 10 mL  Freq: Every 12 Hours Scheduled Route: IV  Start: 08/09/23 2130    2259-Given          0950-Given     2226-Given         0803-Given     2100            sodium chloride 0.9 % infusion 40 mL  Dose: 40 mL  Freq: As Needed Route: IV  PRN Reason: Line Care  Start: 08/09/23 2037   Admin Instructions:   Following administration of an IV intermittent medication, flush line with 40mL NS at 100mL/hr.          sodium chloride 0.9 % with KCl 20 mEq/L infusion  Rate: 150 mL/hr Dose: 150 mL/hr  Freq: Continuous Route: IV  Start: 08/09/23 2130    2100-Canceled Entry     2258-New Bag         0641-New Bag     0943-Currently Infusing     1143-Currently Infusing       1224-New Bag     2039-New Bag         0310-New Bag     0802-Currently Infusing            sodium phosphates 15 mmol in 250 mL 0.9% sodium chloride IVPB  Dose: 15 mmol  Freq: Once Route: IV  Start: 08/11/23 0915      1138-New Bag            Completed Medications  Medications 08/09/23 08/10/23 08/11/23       famotidine (PEPCID) injection 20 mg  Dose: 20 mg  Freq: Once Route: IV  Start: 08/09/23 1328   End: 08/09/23 1428   Admin Instructions:   Give IV push over 2 minutes.    1428-Given               iopamidol (ISOVUE-300) 61 % injection 100 mL  Dose: 100 mL  Freq: Once in Imaging Route: IV  Start: 08/09/23 1600   End: 08/09/23 1548    1548-Given               ondansetron (ZOFRAN) injection 4 mg  Dose: 4 mg  Freq: Once Route: IV  Start: 08/09/23 1553   End: 08/09/23 1600   Admin Instructions:   \"If multiple N/V medications ordered, use in the following order: Ondansetron, Prochlorperazine, Promethazine. Use PO unless patient refuses or patient unable to " "swallow.\"      1600-Given               ondansetron (ZOFRAN) injection 4 mg  Dose: 4 mg  Freq: Once Route: IV  Start: 08/09/23 1328   End: 08/09/23 1428   Admin Instructions:   \"If multiple N/V medications ordered, use in the following order: Ondansetron, Prochlorperazine, Promethazine. Use PO unless patient refuses or patient unable to swallow.\"      1428-Given               potassium chloride (K-DUR,KLOR-CON) ER tablet 40 mEq  Dose: 40 mEq  Freq: Every 4 Hours Route: PO  Start: 08/10/23 1100   End: 08/10/23 1720   Admin Instructions:   Swallow whole; do not crush, split, or chew.     1224-Given     1720-Given             sodium chloride 0.9 % bolus 1,000 mL  Dose: 1,000 mL  Freq: Once Route: IV  Start: 08/09/23 1612   End: 08/09/23 1630    1600-New Bag               sodium chloride 0.9 % bolus 1,000 mL  Dose: 1,000 mL  Freq: Once Route: IV  Start: 08/09/23 1328   End: 08/09/23 1532    1428-New Bag     1532-Stopped             Discontinued Medications  Medications 08/09/23 08/10/23 08/11/23       escitalopram (LEXAPRO) tablet 5 mg  Dose: 5 mg  Freq: Daily Route: PO  Start: 08/10/23 0900   End: 08/09/23 2241   Admin Instructions:   Caution: Look alike/sound alike drug alert.          iopamidol (ISOVUE-370) 76 % injection 100 mL  Dose: 100 mL  Freq: Once in Imaging Route: IV  Start: 08/09/23 1600   End: 08/09/23 1546          Pharmacy Consult - Remdesivir for Mild to Moderate COVID-19 (Within 5 days of symptom onset)- only if contraindicated to Paxlovid  Freq: Continuous PRN Route: XX  PRN Reason: Consult  PRN Comment: Mild to moderate COVID-19: Within 5 days of symptom onset --> Remdesivir x 3 days or hospital discharge (whichever comes first)  Start: 08/10/23 1308   End: 08/10/23 1346   Admin Instructions:   Verify date of symptom onset and contraindication to paxlovid   Order specific questions:   Days Since Symptom Onset: </= 7 Days  Patient must be considered high risk for progressing to severe COVID-19 and/or " hospitalization per the [facility]. Please select all of the following high risk criteria that apply. (SEE LINK ABOVE for list of immunocompromising conditions) Diabetes mellitus (insulin dependent or poorly controlled)            sodium chloride 0.9 % with KCl 20 mEq/L infusion  Rate: 200 mL/hr Dose: 200 mL/hr  Freq: Continuous Route: IV  Start: 08/09/23 1810   End: 08/09/23 2038 2304-Canceled Entry                Lab Results (all)       Procedure Component Value Units Date/Time    POC Glucose Once [[id number]]  (Normal) Collected: 08/11/23 1202    Specimen: Blood Updated: 08/11/23 1203     Glucose 89 mg/dL     Sedimentation Rate [[id number]]  (Normal) Collected: 08/11/23 0615    Specimen: Blood Updated: 08/11/23 0723     Sed Rate 18 mm/hr     Phosphorus [[id number]]  (Abnormal) Collected: 08/11/23 0615    Specimen: Blood Updated: 08/11/23 0711     Phosphorus 2.0 mg/dL     Ferritin [[id number]]  (Abnormal) Collected: 08/11/23 0615    Specimen: Blood Updated: 08/11/23 0711     Ferritin 184.00 ng/mL     Narrative:      Results may be falsely decreased if patient taking Biotin.      CK [[id number]]  (Normal) Collected: 08/11/23 0615    Specimen: Blood Updated: 08/11/23 0707     Creatine Kinase 36 U/L     Magnesium [[id number]]  (Normal) Collected: 08/11/23 0615    Specimen: Blood Updated: 08/11/23 0707     Magnesium 1.8 mg/dL     Comprehensive Metabolic Panel [[id number]]  (Abnormal) Collected: 08/11/23 0615    Specimen: Blood Updated: 08/11/23 0707     Glucose 96 mg/dL      BUN 9 mg/dL      Creatinine 0.63 mg/dL      Sodium 130 mmol/L      Potassium 4.2 mmol/L      Chloride 99 mmol/L      CO2 20.1 mmol/L      Calcium 9.0 mg/dL      Total Protein 6.4 g/dL      Albumin 3.8 g/dL      ALT (SGPT) 22 U/L      AST (SGOT) 19 U/L      Alkaline Phosphatase 66 U/L      Total Bilirubin 0.3 mg/dL      Globulin 2.6 gm/dL      A/G Ratio 1.5 g/dL      BUN/Creatinine Ratio 14.3     Anion Gap 10.9 mmol/L      eGFR 103.0 mL/min/1.73      Narrative:      GFR Normal >60  Chronic Kidney Disease <60  Kidney Failure <15      C-reactive Protein [[id number]]  (Normal) Collected: 08/11/23 0615    Specimen: Blood Updated: 08/11/23 0707     C-Reactive Protein 0.34 mg/dL     Lactic Acid, Plasma [[id number]]  (Normal) Collected: 08/11/23 0615    Specimen: Blood Updated: 08/11/23 0659     Lactate 0.9 mmol/L     CBC & Differential [[id number]]  (Abnormal) Collected: 08/11/23 0615    Specimen: Blood Updated: 08/11/23 0647    Narrative:      The following orders were created for panel order CBC & Differential.  Procedure                               Abnormality         Status                     ---------                               -----------         ------                     CBC Auto Differential[[id number]]        Abnormal            Final result                 Please view results for these tests on the individual orders.    CBC Auto Differential [[id number]]  (Abnormal) Collected: 08/11/23 0615    Specimen: Blood Updated: 08/11/23 0647     WBC 6.64 10*3/mm3      RBC 4.47 10*6/mm3      Hemoglobin 12.9 g/dL      Hematocrit 38.7 %      MCV 86.6 fL      MCH 28.9 pg      MCHC 33.3 g/dL      RDW 12.4 %      RDW-SD 39.4 fl      MPV 9.7 fL      Platelets 254 10*3/mm3      Neutrophil % 59.0 %      Lymphocyte % 32.1 %      Monocyte % 8.0 %      Eosinophil % 0.0 %      Basophil % 0.3 %      Immature Grans % 0.6 %      Neutrophils, Absolute 3.92 10*3/mm3      Lymphocytes, Absolute 2.13 10*3/mm3      Monocytes, Absolute 0.53 10*3/mm3      Eosinophils, Absolute 0.00 10*3/mm3      Basophils, Absolute 0.02 10*3/mm3      Immature Grans, Absolute 0.04 10*3/mm3      nRBC 0.0 /100 WBC     POC Glucose Once [[id number]]  (Normal) Collected: 08/11/23 0549    Specimen: Blood Updated: 08/11/23 0550     Glucose 95 mg/dL     Gastrointestinal Panel, PCR - Stool, Per Rectum [[id number]]  (Abnormal) Collected: 08/10/23 2231    Specimen: Stool from Per Rectum Updated: 08/11/23 0022      "Campylobacter Not Detected     Plesiomonas shigelloides Not Detected     Salmonella Not Detected     Vibrio Not Detected     Vibrio cholerae Not Detected     Yersinia enterocolitica Not Detected     Enteroaggregative E. coli (EAEC) Not Detected     Enteropathogenic E. coli (EPEC) Not Detected     Enterotoxigenic E. coli (ETEC) lt/st Not Detected     Shiga-like toxin-producing E. coli (STEC) stx1/stx2 Not Detected     Shigella/Enteroinvasive E. coli (EIEC) Not Detected     Cryptosporidium Not Detected     Cyclospora cayetanensis Not Detected     Entamoeba histolytica Not Detected     Giardia lamblia Not Detected     Adenovirus F40/41 Not Detected     Astrovirus Not Detected     Norovirus GI/GII Detected     Rotavirus A Not Detected     Sapovirus (I, II, IV or V) Not Detected    Procalcitonin [[id number]]  (Normal) Collected: 08/10/23 1916    Specimen: Blood Updated: 08/10/23 2308     Procalcitonin 0.08 ng/mL     Narrative:      As a Marker for Sepsis (Non-Neonates):    1. <0.5 ng/mL represents a low risk of severe sepsis and/or septic shock.  2. >2 ng/mL represents a high risk of severe sepsis and/or septic shock.    As a Marker for Lower Respiratory Tract Infections that require antibiotic therapy:    PCT on Admission    Antibiotic Therapy       6-12 Hrs later    >0.5                Strongly Recommended  >0.25 - <0.5        Recommended   0.1 - 0.25          Discouraged              Remeasure/reassess PCT  <0.1                Strongly Discouraged     Remeasure/reassess PCT    As 28 day mortality risk marker: \"Change in Procalcitonin Result\" (>80% or <=80%) if Day 0 (or Day 1) and Day 4 values are available. Refer to http://www.[employer]-pct-calculator.com    Change in PCT <=80%  A decrease of PCT levels below or equal to 80% defines a positive change in PCT test result representing a higher risk for 28-day all-cause mortality of patients diagnosed with severe sepsis for septic shock.    Change in PCT >80%  A decrease of " PCT levels of more than 80% defines a negative change in PCT result representing a lower risk for 28-day all-cause mortality of patients diagnosed with severe sepsis or septic shock.       Lactate Dehydrogenase [[id number]]  (Normal) Collected: 08/10/23 1916    Specimen: Blood Updated: 08/10/23 2305      U/L     Potassium [[id number]]  (Normal) Collected: 08/10/23 1916    Specimen: Blood Updated: 08/10/23 2021     Potassium 3.9 mmol/L     POC Glucose Once [[id number]]  (Normal) Collected: 08/10/23 2014    Specimen: Blood Updated: 08/10/23 2016     Glucose 118 mg/dL     POC Glucose Once [[id number]]  (Normal) Collected: 08/10/23 1715    Specimen: Blood Updated: 08/10/23 1716     Glucose 87 mg/dL     Respiratory Panel PCR w/COVID-19(SARS-CoV-2) JORDAN/CECILY/TRACY/PAD/COR/MAD/URIEL In-House, NP Swab in UTM/VTM, 3-4 HR TAT - Swab, Nasopharynx [[id number]]  (Abnormal) Collected: 08/10/23 1038    Specimen: Swab from Nasopharynx Updated: 08/10/23 1154     ADENOVIRUS, PCR Not Detected     Coronavirus 229E Not Detected     Coronavirus HKU1 Not Detected     Coronavirus NL63 Not Detected     Coronavirus OC43 Not Detected     COVID19 Detected     Human Metapneumovirus Not Detected     Human Rhinovirus/Enterovirus Not Detected     Influenza A PCR Not Detected     Influenza B PCR Not Detected     Parainfluenza Virus 1 Not Detected     Parainfluenza Virus 2 Not Detected     Parainfluenza Virus 3 Not Detected     Parainfluenza Virus 4 Not Detected     RSV, PCR Not Detected     Bordetella pertussis pcr Not Detected     Bordetella parapertussis PCR Not Detected     Chlamydophila pneumoniae PCR Not Detected     Mycoplasma pneumo by PCR Not Detected    Narrative:      In the setting of a positive respiratory panel with a viral infection PLUS a negative procalcitonin without other underlying concern for bacterial infection, consider observing off antibiotics or discontinuation of antibiotics and continue supportive care. If the respiratory  panel is positive for atypical bacterial infection (Bordetella pertussis, Chlamydophila pneumoniae, or Mycoplasma pneumoniae), consider antibiotic de-escalation to target atypical bacterial infection.    POC Glucose Once [[id number]]  (Normal) Collected: 08/10/23 1056    Specimen: Blood Updated: 08/10/23 1058     Glucose 130 mg/dL     Magnesium [[id number]]  (Normal) Collected: 08/10/23 0833    Specimen: Blood Updated: 08/10/23 1001     Magnesium 1.9 mg/dL     Basic Metabolic Panel [[id number]]  (Abnormal) Collected: 08/10/23 0833    Specimen: Blood Updated: 08/10/23 1001     Glucose 91 mg/dL      BUN 12 mg/dL      Creatinine 0.74 mg/dL      Sodium 133 mmol/L      Potassium 3.5 mmol/L      Comment: Slight hemolysis detected by analyzer. Results may be affected.        Chloride 99 mmol/L      CO2 21.5 mmol/L      Calcium 8.7 mg/dL      BUN/Creatinine Ratio 16.2     Anion Gap 12.5 mmol/L      eGFR 93.9 mL/min/1.73     Narrative:      GFR Normal >60  Chronic Kidney Disease <60  Kidney Failure <15      TSH [[id number]]  (Normal) Collected: 08/10/23 0504    Specimen: Blood Updated: 08/10/23 0704     TSH 1.750 uIU/mL     Phosphorus [[id number]]  (Normal) Collected: 08/10/23 0504    Specimen: Blood Updated: 08/10/23 0658     Phosphorus 3.0 mg/dL     POC Glucose Once [[id number]]  (Normal) Collected: 08/10/23 0646    Specimen: Blood Updated: 08/10/23 0650     Glucose 93 mg/dL     CBC & Differential [[id number]]  (Abnormal) Collected: 08/10/23 0504    Specimen: Blood Updated: 08/10/23 0630    Narrative:      The following orders were created for panel order CBC & Differential.  Procedure                               Abnormality         Status                     ---------                               -----------         ------                     CBC Auto Differential[[id number]]        Abnormal            Final result                 Please view results for these tests on the individual orders.    CBC Auto Differential  [[id number]]  (Abnormal) Collected: 08/10/23 0504    Specimen: Blood Updated: 08/10/23 0630     WBC 8.08 10*3/mm3      RBC 4.34 10*6/mm3      Hemoglobin 12.8 g/dL      Hematocrit 39.2 %      MCV 90.3 fL      MCH 29.5 pg      MCHC 32.7 g/dL      RDW 12.3 %      RDW-SD 40.7 fl      MPV 10.2 fL      Platelets 224 10*3/mm3      Neutrophil % 70.3 %      Lymphocyte % 19.8 %      Monocyte % 9.5 %      Eosinophil % 0.0 %      Basophil % 0.2 %      Immature Grans % 0.2 %      Neutrophils, Absolute 5.67 10*3/mm3      Lymphocytes, Absolute 1.60 10*3/mm3      Monocytes, Absolute 0.77 10*3/mm3      Eosinophils, Absolute 0.00 10*3/mm3      Basophils, Absolute 0.02 10*3/mm3      Immature Grans, Absolute 0.02 10*3/mm3      nRBC 0.0 /100 WBC     POC Glucose Once [[id number]]  (Normal) Collected: 08/09/23 2135    Specimen: Blood Updated: 08/09/23 2138     Glucose 97 mg/dL     Basic Metabolic Panel [[id number]]  (Abnormal) Collected: 08/09/23 1643    Specimen: Blood from Arm, Right Updated: 08/09/23 1859     Glucose 84 mg/dL      BUN 19 mg/dL      Creatinine 0.73 mg/dL      Sodium 134 mmol/L      Potassium 3.1 mmol/L      Chloride 99 mmol/L      CO2 20.0 mmol/L      Calcium 8.6 mg/dL      BUN/Creatinine Ratio 26.0     Anion Gap 15.0 mmol/L      eGFR 95.5 mL/min/1.73     Narrative:      GFR Normal >60  Chronic Kidney Disease <60  Kidney Failure <15      High Sensitivity Troponin T 2Hr [[id number]]  (Abnormal) Collected: 08/09/23 1643    Specimen: Blood from Arm, Right Updated: 08/09/23 1715     HS Troponin T 13 ng/L      Troponin T Delta -1 ng/L     Narrative:      High Sensitive Troponin T Reference Range:  <10.0 ng/L- Negative Female for AMI  <15.0 ng/L- Negative Male for AMI  >=10 - Abnormal Female indicating possible myocardial injury.  >=15 - Abnormal Male indicating possible myocardial injury.   Clinicians would have to utilize clinical acumen, EKG, Troponin, and serial changes to determine if it is an Acute Myocardial Infarction or  myocardial injury due to an underlying chronic condition.         [place] Draw [[id number]] Collected: 08/09/23 1431    Specimen: Blood Updated: 08/09/23 1546    Narrative:      The following orders were created for panel order [place] Draw.  Procedure                               Abnormality         Status                     ---------                               -----------         ------                     Green Top (Gel)[[id number]]                                  Final result               Lavender Top[[id number]]                                     Final result               Gold Top - SST[[id number]]                                   Final result               Light Blue Top[[id number]]                                   Final result                 Please view results for these tests on the individual orders.    Green Top (Gel) [[id number]] Collected: 08/09/23 1431    Specimen: Blood Updated: 08/09/23 1546     Extra Tube Hold for add-ons.     Comment: Auto resulted.       Lavender Top [[id number]] Collected: 08/09/23 1431    Specimen: Blood Updated: 08/09/23 1546     Extra Tube hold for add-on     Comment: Auto resulted       Gold Top - SST [[id number]] Collected: 08/09/23 1431    Specimen: Blood Updated: 08/09/23 1546     Extra Tube Hold for add-ons.     Comment: Auto resulted.       Light Blue Top [[id number]] Collected: 08/09/23 1431    Specimen: Blood Updated: 08/09/23 1546     Extra Tube Hold for add-ons.     Comment: Auto resulted       Comprehensive Metabolic Panel [[id number]]  (Abnormal) Collected: 08/09/23 1431    Specimen: Blood Updated: 08/09/23 1537     Glucose 111 mg/dL      BUN 22 mg/dL      Creatinine 0.86 mg/dL      Sodium 134 mmol/L      Potassium 3.5 mmol/L      Comment: Slight hemolysis detected by analyzer. Results may be affected.        Chloride 95 mmol/L      CO2 22.0 mmol/L      Calcium 9.8 mg/dL      Total Protein 7.1 g/dL      Albumin 4.6 g/dL      ALT (SGPT) 12 U/L      AST (SGOT) 19  U/L      Comment: Slight hemolysis detected by analyzer. Results may be affected.        Alkaline Phosphatase 81 U/L      Total Bilirubin 0.3 mg/dL      Globulin 2.5 gm/dL      A/G Ratio 1.8 g/dL      BUN/Creatinine Ratio 25.6     Anion Gap 17.0 mmol/L      eGFR 78.4 mL/min/1.73     Narrative:      GFR Normal >60  Chronic Kidney Disease <60  Kidney Failure <15      Ketone Bodies, Serum (Not performed at [facility]) [[id number]]  (Abnormal) Collected: 08/09/23 1431    Specimen: Blood Updated: 08/09/23 1526    Narrative:      The following orders were created for panel order Ketone Bodies, Serum (Not performed at [facility]).  Procedure                               Abnormality         Status                     ---------                               -----------         ------                     Beta Hydroxybutyrate [place]...[[id number]]  Abnormal            Final result                 Please view results for these tests on the individual orders.    Beta Hydroxybutyrate Quantitative [[id number]]  (Abnormal) Collected: 08/09/23 1431    Specimen: Blood Updated: 08/09/23 1526     Beta-Hydroxybutyrate Quant 1.690 mmol/L     Narrative:      In the assessment of possible diabetic ketoacidosis, the test should be interpreted along with other clinical and laboratory findings.  A level greater than 1 mmol/L should require further evaluation and levels of more than 3 mmol/L require immediate medical review.    Urinalysis, Microscopic Only - Urine, Clean Catch [[id number]] Collected: 08/09/23 1451    Specimen: Urine, Clean Catch Updated: 08/09/23 1524     RBC, UA None Seen /HPF      WBC, UA 0-2 /HPF      Bacteria, UA None Seen /HPF      Squamous Epithelial Cells, UA 0-2 /HPF      Hyaline Casts, UA 7-12 /LPF      Fine Granular Casts, UA 3-6 /LPF      Amorphous Crystals, UA Moderate/2+ /HPF      Methodology Manual Light Microscopy    Magnesium [[id number]]  (Normal) Collected: 08/09/23 1431    Specimen: Blood Updated: 08/09/23 1521      Magnesium 2.0 mg/dL     Blood Gas, Arterial - [[id number]]  (Abnormal) Collected: 08/09/23 1517    Specimen: Arterial Blood Updated: 08/09/23 1519     Site OTHER     Morris's Test N/A     pH, Arterial 7.501 pH units      pCO2, Arterial 28.6 mm Hg      pO2, Arterial 57.4 mm Hg      HCO3, Arterial 22.4 mmol/L      Base Excess, Arterial 0.4 mmol/L      O2 Saturation Calculated 92.3 %      Comment: [id number] Meter: [id number] : jesús Kwan Zabala        Barometric Pressure for Blood Gas 747.3 mmHg      Modality Room Air     Rate 16 Breaths/minute     Urinalysis With Microscopic If Indicated (No Culture) - Urine, Clean Catch [[id number]]  (Abnormal) Collected: 08/09/23 1451    Specimen: Urine, Clean Catch Updated: 08/09/23 1505     Color, UA Yellow     Appearance, UA Cloudy     pH, UA 7.0     Specific Gravity, UA 1.023     Glucose, UA Negative     Ketones, UA 80 mg/dL (3+)     Bilirubin, UA Negative     Blood, UA Negative     Protein, UA 30 mg/dL (1+)     Leuk Esterase, UA Negative     Nitrite, UA Negative     Urobilinogen, UA 1.0 E.U./dL    Lipase [[id number]]  (Normal) Collected: 08/09/23 1431    Specimen: Blood Updated: 08/09/23 1504     Lipase 25 U/L     High Sensitivity Troponin T [[id number]]  (Abnormal) Collected: 08/09/23 1431    Specimen: Blood Updated: 08/09/23 1504     HS Troponin T 14 ng/L     Narrative:      High Sensitive Troponin T Reference Range:  <10.0 ng/L- Negative Female for AMI  <15.0 ng/L- Negative Male for AMI  >=10 - Abnormal Female indicating possible myocardial injury.  >=15 - Abnormal Male indicating possible myocardial injury.   Clinicians would have to utilize clinical acumen, EKG, Troponin, and serial changes to determine if it is an Acute Myocardial Infarction or myocardial injury due to an underlying chronic condition.         CBC & Differential [[id number]]  (Abnormal) Collected: 08/09/23 1431    Specimen: Blood Updated: 08/09/23 1446    Narrative:      The following orders  were created for panel order CBC & Differential.  Procedure                               Abnormality         Status                     ---------                               -----------         ------                     CBC Auto Differential[[id number]]        Abnormal            Final result                 Please view results for these tests on the individual orders.    CBC Auto Differential [[id number]]  (Abnormal) Collected: 08/09/23 1431    Specimen: Blood Updated: 08/09/23 1446     WBC 11.14 10*3/mm3      RBC 4.58 10*6/mm3      Hemoglobin 13.5 g/dL      Hematocrit 39.3 %      MCV 85.8 fL      MCH 29.5 pg      MCHC 34.4 g/dL      RDW 12.2 %      RDW-SD 37.7 fl      MPV 10.0 fL      Platelets 302 10*3/mm3      Neutrophil % 78.8 %      Lymphocyte % 11.9 %      Monocyte % 8.7 %      Eosinophil % 0.0 %      Basophil % 0.2 %      Immature Grans % 0.4 %      Neutrophils, Absolute 8.78 10*3/mm3      Lymphocytes, Absolute 1.33 10*3/mm3      Monocytes, Absolute 0.97 10*3/mm3      Eosinophils, Absolute 0.00 10*3/mm3      Basophils, Absolute 0.02 10*3/mm3      Immature Grans, Absolute 0.04 10*3/mm3      nRBC 0.0 /100 WBC     POC Glucose Once [[id number]]  (Normal) Collected: 08/09/23 1326    Specimen: Blood Updated: 08/09/23 1327     Glucose 123 mg/dL      Comment: Meter: [id number] : [id number] [facility]             Imaging Results (All)       Procedure Component Value Units Date/Time    XR Chest 1 View [[id number]] Collected: 08/10/23 1204     Updated: 08/10/23 1209    Narrative:      XR CHEST 1 VW-     HISTORY: Female who is 57 years-old,  hypoxia     TECHNIQUE: Frontal view of the chest     COMPARISON: 07/29/2022     FINDINGS: Heart, mediastinum and pulmonary vasculature are unremarkable.  No focal pulmonary consolidation, pleural effusion, or pneumothorax. No  acute osseous process.       Impression:      No evidence for acute pulmonary process. Follow-up as  clinical indications persist.     This report  was finalized on 8/10/2023 12:06 PM by Dr. Ovidio Briggs M.D.       CT Abdomen Pelvis With Contrast [[id number]] Collected: 08/09/23 1558     Updated: 08/09/23 1607    Narrative:      EXAMINATION: CT OF THE ABDOMEN AND PELVIS WITH CONTRAST     TECHNIQUE: Computed tomography of the abdomen and pelvis after the  uneventful administration of nonionic intravenous contrast per protocol.  Radiation dose reduction techniques were utilized, including automated  exposure control and exposure modulation based on body size.      HISTORY: Nausea and vomiting     COMPARISON: None available     FINDINGS: Limited evaluation of the inferior thorax demonstrates  atelectasis, without consolidation, pleural effusion or thorax. The  heart is normal in size and configuration, without pericardial effusion.     Uterine calcifications likely represent fibroids.     The liver, gallbladder, spleen, adrenal glands, kidneys, pancreas,  stomach, small bowel, large bowel, urinary bladder, and abdominal  vasculature are normal. No intraperitoneal fluid collection free gas are  seen. No enlarged lymph nodes are demonstrated.     Bone windows demonstrate degenerative changes, without suspicious  osseous lesion seen.       Impression:      Incidental findings as above, without acute or suspicious  intraperitoneal process seen.     This report was finalized on 8/9/2023 4:04 PM by Dr. Darian Son M.D.             ECG/EMG Results (all)       Procedure Component Value Units Date/Time    ECG 12 Lead Chest Pain [[id number]] Collected: 08/09/23 1319     Updated: 08/09/23 1607     QT Interval 352 ms     Narrative:      HEART RATE= 110  bpm  RR Interval= 545  ms  TX Interval= 167  ms  P Horizontal Axis= -6  deg  P Front Axis= 70  deg  QRSD Interval= 96  ms  QT Interval= 352  ms  QRS Axis= -5  deg  T Wave Axis= 45  deg  - ABNORMAL ECG -  Sinus tachycardia  LAE, consider biatrial enlargement  Low voltage, extremity leads  PVCs have  resolved  Electronically Signed By: Sarah Sigala ([facility]) 09-Aug-2023 16:06:51  Date and Time of Study: 2023-08-09 13:19:33    SCANNED - TELEMETRY   [[id number]] Resulted: 08/09/23     Updated: 08/09/23 2228    SCANNED - TELEMETRY   [[id number]] Resulted: 08/09/23     Updated: 08/10/23 0503    SCANNED - TELEMETRY   [[id number]] Resulted: 08/09/23     Updated: 08/10/23 1008    SCANNED - TELEMETRY   [[id number]] Resulted: 08/09/23     Updated: 08/10/23 1041    SCANNED - TELEMETRY   [[id number]] Resulted: 08/09/23     Updated: 08/10/23 1648    SCANNED - TELEMETRY   [[id number]] Resulted: 08/09/23     Updated: 08/10/23 2317    SCANNED - TELEMETRY   [[id number]] Resulted: 08/09/23     Updated: 08/11/23 0619          Orders (last 72 hrs)        Start     Ordered    08/12/23 1009  Auto Discontinue GI Panel in 48 Hours if not Collected  ONCE GI PANEL         08/10/23 1009    08/11/23 1822  Phosphorus  Timed         08/11/23 0821    08/11/23 1430  remdesivir 100 mg in sodium chloride 0.9 % 250 mL IVPB (powder vial)  Every 24 Hours        See Hyperspace for full Linked Orders Report.    08/10/23 1328    08/11/23 1204  POC Glucose Once  PROCEDURE ONCE        Comments: Complete no more than 45 minutes prior to patient eating      08/11/23 1202    08/11/23 1131  Inpatient Admission  Once         08/11/23 1131    08/11/23 0915  sodium phosphates 15 mmol in 250 mL 0.9% sodium chloride IVPB  Once         08/11/23 0821    08/11/23 0600  Basic Metabolic Panel  Daily       08/10/23 1046    08/11/23 0600  CBC & Differential  Daily       08/10/23 1046    08/11/23 0600  Phosphorus  Daily       08/10/23 1046    08/11/23 0600  Magnesium  Daily       08/10/23 1046    08/11/23 0600  Comprehensive Metabolic Panel  Daily       08/10/23 1308    08/11/23 0600  CK  Daily       08/10/23 1308    08/11/23 0600  C-reactive Protein  Daily       08/10/23 1308    08/11/23 0600  Ferritin  Daily       08/10/23 1308    08/11/23 0600  Sedimentation Rate  Daily        08/10/23 1308    08/11/23 0600  Procalcitonin  Every Other Day       08/10/23 1308    08/11/23 0600  Lactic Acid, Plasma  Every Other Day       08/10/23 1308    08/11/23 0600  Lactate Dehydrogenase  Every Other Day       08/10/23 1308    08/11/23 0600  CBC Auto Differential  PROCEDURE ONCE         08/10/23 2203    08/11/23 0550  POC Glucose Once  PROCEDURE ONCE        Comments: Complete no more than 45 minutes prior to patient eating      08/11/23 0549    08/10/23 2017  POC Glucose Once  PROCEDURE ONCE        Comments: Complete no more than 45 minutes prior to patient eating      08/10/23 2014    08/10/23 1904  Potassium  Timed         08/10/23 1003    08/10/23 1717  POC Glucose Once  PROCEDURE ONCE        Comments: Complete no more than 45 minutes prior to patient eating      08/10/23 1715    08/10/23 1430  remdesivir 200 mg in sodium chloride 0.9 % 290 mL IVPB (powder vial)  Every 24 Hours        [facility] for full [facility] Orders Report.    08/10/23 1328    08/10/23 1308  Pharmacy Consult - Remdesivir for Mild to Moderate COVID-19 (Within 5 days of symptom onset)- only if contraindicated to Paxlovid  Continuous PRN,   Status:  Discontinued         08/10/23 1308    08/10/23 1100  potassium chloride (K-DUR,KLOR-CON) ER tablet 40 mEq  Every 4 Hours         08/10/23 1003    08/10/23 1059  POC Glucose Once  PROCEDURE ONCE        Comments: Complete no more than 45 minutes prior to patient eating      08/10/23 1056    08/10/23 1050  XR Chest 1 View  1 Time Imaging         08/10/23 1050    08/10/23 1047  Aspiration Precautions  Continuous         08/10/23 1046    08/10/23 1047  Elevate HOB  Continuous         08/10/23 1046    08/10/23 1047  Fall Precautions  Continuous         08/10/23 1046    08/10/23 1047  OT Consult: Eval & Treat  Once         08/10/23 1046    08/10/23 1047  PT Consult: Eval & Treat Discharge Placement Assessment, Functional Mobility Below Baseline  Once         08/10/23 1046    08/10/23 1047   Notify Provider Acute Urinary Retention  Until Discontinued         08/10/23 1046    08/10/23 1010  Respiratory Panel PCR w/COVID-19(SARS-CoV-2) JORDAN/CECILY/TRACY/PAD/COR/MAD/URIEL In-House, NP Swab in UTM/VTM, 3-4 HR TAT - Swab, Nasopharynx  Once         08/10/23 1009    08/10/23 1009  Gastrointestinal Panel, PCR - Stool, Per Rectum  Once         08/10/23 1009    08/10/23 0900  escitalopram (LEXAPRO) tablet 5 mg  Daily,   Status:  Discontinued         08/09/23 2038    08/10/23 0900  lisinopril (PRINIVIL,ZESTRIL) tablet 5 mg  Daily         08/09/23 2038    08/10/23 0800  gabapentin (NEURONTIN) capsule 400 mg  2 Times Daily         08/09/23 2038    08/10/23 0800  Oral Care  2 Times Daily       08/09/23 2038    08/10/23 0709  Magnesium  Morning Draw         08/09/23 2038    08/10/23 0651  POC Glucose Once  PROCEDURE ONCE        Comments: Complete no more than 45 minutes prior to patient eating      08/10/23 0646    08/10/23 0600  Basic Metabolic Panel  Daily,   Status:  Canceled       08/09/23 2038    08/10/23 0600  CBC & Differential  Daily,   Status:  Canceled       08/09/23 2038    08/10/23 0600  Phosphorus  Morning Draw         08/09/23 2038    08/10/23 0600  TSH  Morning Draw         08/09/23 2038    08/10/23 0600  CBC Auto Differential  PROCEDURE ONCE         08/09/23 2205    08/10/23 0030  famotidine (PEPCID) injection 20 mg  Every 12 Hours Scheduled         08/09/23 2334    08/09/23 2300  Basic Metabolic Panel  Once         08/09/23 1758    08/09/23 2200  POC Glucose 4x Daily Before Meals & at Bedtime  4 Times Daily Before Meals & at Bedtime      Comments: Complete no more than 45 minutes prior to patient eating      08/09/23 2038 08/09/23 2139  POC Glucose Once  PROCEDURE ONCE        Comments: Complete no more than 45 minutes prior to patient eating      08/09/23 2135 08/09/23 2130  sodium chloride 0.9 % with KCl 20 mEq/L infusion  Continuous         08/09/23 2038 08/09/23 2130  gabapentin (NEURONTIN)  capsule 600 mg  Nightly         08/09/23 2038 08/09/23 2130  insulin glargine (LANTUS, SEMGLEE) injection 12 Units  Every 12 Hours Scheduled         08/09/23 2038 08/09/23 2130  sennosides-docusate (PERICOLACE) 8.6-50 MG per tablet 1 tablet  2 Times Daily        See Hyperspace for full Linked Orders Report.    08/09/23 2038 08/09/23 2130  insulin lispro (HUMALOG/ADMELOG) injection 2-9 Units  4 Times Daily Before Meals & Nightly         08/09/23 2038 08/09/23 2130  Enoxaparin Sodium (LOVENOX) syringe 40 mg  Daily         08/09/23 2038 08/09/23 2130  sodium chloride 0.9 % flush 10 mL  Every 12 Hours Scheduled         08/09/23 2038 08/09/23 2100  ondansetron (ZOFRAN) injection 4 mg  4 Times Daily Before Meals & Nightly         08/09/23 1800    08/09/23 2039  Daily Weights  Daily       08/09/23 2038 08/09/23 2038  promethazine (PHENERGAN) tablet 12.5 mg  Every 6 Hours PRN        See Hyperspace for full Linked Orders Report.    08/09/23 2038 08/09/23 2038  promethazine (PHENERGAN) suppository 12.5 mg  Every 6 Hours PRN        See Hyperspace for full Linked Orders Report.    08/09/23 2038 08/09/23 2038  morphine injection 2 mg  Every 4 Hours PRN        See Hyperspace for full Linked Orders Report.    08/09/23 2038 08/09/23 2038  naloxone (NARCAN) injection 0.4 mg  Every 5 Minutes PRN        See Hyperspace for full Linked Orders Report.    08/09/23 2038 08/09/23 2038  Saline Lock & Maintain IV Access  Continuous         08/09/23 2038 08/09/23 2037  sodium chloride 0.9 % flush 10 mL  As Needed         08/09/23 2038 08/09/23 2037  sodium chloride 0.9 % infusion 40 mL  As Needed         08/09/23 2038 08/09/23 2037  Advance Diet As Tolerated -  Until Discontinued         08/09/23 2038 08/09/23 2036  Continuous Cardiac Monitoring  Continuous        Comments: Follow Standing Orders As Outlined in Process Instructions (Open Order Report to View Full Instructions)    08/09/23 8511     08/09/23 2036  Telemetry - Maintain IV Access  Continuous,   Status:  Canceled         08/09/23 2038 08/09/23 2036  Telemetry - Place Orders & Notify Provider of Results When Patient Experiences Acute Chest Pain, Dysrhythmia or Respiratory Distress  Until Discontinued         08/09/23 2038 08/09/23 2036  May Be Off Telemetry for Tests  Continuous         08/09/23 2038 08/09/23 2035  nitroglycerin (NITROSTAT) SL tablet 0.4 mg  Every 5 Minutes PRN         08/09/23 2038 08/09/23 2033  dextrose (GLUTOSE) oral gel 15 g  Every 15 Minutes PRN         08/09/23 2038 08/09/23 2033  dextrose (D50W) (25 g/50 mL) IV injection 25 g  Every 15 Minutes PRN         08/09/23 2038 08/09/23 2033  glucagon (GLUCAGEN) injection 1 mg  Every 15 Minutes PRN         08/09/23 2038 08/09/23 2033  Code Status and Medical Interventions:  Continuous         08/09/23 2038 08/09/23 2033  Vital Signs  Per Hospital Policy        Comments: Per per hospital policy    08/09/23 2038 08/09/23 2033  Pulse Oximetry,  Spot  Per Hospital Policy         08/09/23 2038 08/09/23 2033  Notify Physician (with Parameters)  Until Discontinued         08/09/23 2038 08/09/23 2033  Activity - Ad Kelsea  Until Discontinued        Comments: Unless PT eval ordered- then OOB w/ assistance til cleared by PT    08/09/23 2038 08/09/23 2033  Strict Intake & Output  Every Shift       08/09/23 2038 08/09/23 2033  Incentive Spirometry  Every Hour While Awake       08/09/23 2038 08/09/23 2033  Inpatient Consult to Case Management   Once        Provider:  (Not yet assigned)    08/09/23 2038 08/09/23 2033  OT Consult: Eval & Treat  Once         08/09/23 2038 08/09/23 2033  PT Consult: Eval & Treat  Once         08/09/23 2038 08/09/23 2032  acetaminophen (TYLENOL) tablet 650 mg  Every 4 Hours PRN        See Hyperspace for full Linked Orders Report.    08/09/23 2038 08/09/23 2032  acetaminophen (TYLENOL) 160 MG/5ML  solution 650 mg  Every 4 Hours PRN        See Hyperspace for full Linked Orders Report.    08/09/23 2038 08/09/23 2032  acetaminophen (TYLENOL) suppository 650 mg  Every 4 Hours PRN        See Hyperspace for full Linked Orders Report.    08/09/23 2038 08/09/23 2032  calcium carbonate (TUMS) chewable tablet 500 mg (200 mg elemental)  2 Times Daily PRN         08/09/23 2038 08/09/23 2032  polyethylene glycol (MIRALAX) packet 17 g  Daily PRN        See Hyperspace for full Linked Orders Report.    08/09/23 2038 08/09/23 2032  bisacodyl (DULCOLAX) EC tablet 5 mg  Daily PRN        See Hyperspace for full Linked Orders Report.    08/09/23 2038 08/09/23 2032  bisacodyl (DULCOLAX) suppository 10 mg  Daily PRN        See Hyperspace for full Linked Orders Report.    08/09/23 2038 08/09/23 2032  melatonin tablet 1 mg  Nightly PRN         08/09/23 2038 08/09/23 2027  Potassium Replacement - Follow Nurse / BPA Driven Protocol  As Needed         08/09/23 2027 08/09/23 2027  Magnesium Standard Dose Replacement - Follow Nurse / BPA Driven Protocol  As Needed         08/09/23 2027 08/09/23 2027  Phosphorus Replacement - Follow Nurse / BPA Driven Protocol  As Needed         08/09/23 2027 08/09/23 2027  Calcium Replacement - Follow Nurse / BPA Driven Protocol  As Needed         08/09/23 2027 08/09/23 2007  Diet: Liquid Diets, Diabetic Diets; Full Liquid; Consistent Carbohydrate; Texture: Regular Texture (IDDSI 7); Fluid Consistency: Thin (IDDSI 0)  Diet Effective Now         08/09/23 2006 08/09/23 1810  sodium chloride 0.9 % with KCl 20 mEq/L infusion  Continuous,   Status:  Discontinued         08/09/23 1754 08/09/23 1800  Bowel Regimen Not Indicated  Once         08/09/23 1800    08/09/23 1759  LORazepam (ATIVAN) injection 0.5 mg  Every 6 Hours PRN         08/09/23 1800    08/09/23 1751  Initiate Observation Status  Once         08/09/23 1750 08/09/23 1720  [facility] (on-call MD unless specified)  Details  Once,   Status:  Canceled        Specialty:  Hospitalist  Provider:  (Not yet assigned)    08/09/23 1719    08/09/23 1631  High Sensitivity Troponin T 2Hr  PROCEDURE ONCE         08/09/23 1504    08/09/23 1612  sodium chloride 0.9 % bolus 1,000 mL  Once         08/09/23 1556    08/09/23 1600  iopamidol (ISOVUE-370) 76 % injection 100 mL  Once in Imaging,   Status:  Discontinued         08/09/23 1544    08/09/23 1600  iopamidol (ISOVUE-300) 61 % injection 100 mL  Once in Imaging         08/09/23 1544    08/09/23 1553  ondansetron (ZOFRAN) injection 4 mg  Once         08/09/23 1537    08/09/23 1538  CT Abdomen Pelvis With Contrast  1 Time Imaging        Comments: IV CONTRAST ONLY      08/09/23 1537    08/09/23 1520  Blood Gas, Arterial -  PROCEDURE ONCE         08/09/23 1517    08/09/23 1505  Urinalysis, Microscopic Only - Urine, Clean Catch  Once         08/09/23 1504    08/09/23 1432  [place] Draw  Once         08/09/23 1431    08/09/23 1432  Green Top (Gel)  PROCEDURE ONCE         08/09/23 1431    08/09/23 1432  Lavender Top  PROCEDURE ONCE         08/09/23 1431    08/09/23 1432  Gold Top - SST  PROCEDURE ONCE         08/09/23 1431    08/09/23 1432  Light Blue Top  PROCEDURE ONCE         08/09/23 1431    08/09/23 1328  sodium chloride 0.9 % bolus 1,000 mL  Once         08/09/23 1312    08/09/23 1328  famotidine (PEPCID) injection 20 mg  Once         08/09/23 1312    08/09/23 1328  ondansetron (ZOFRAN) injection 4 mg  Once         08/09/23 1312    08/09/23 1328  POC Glucose Once  PROCEDURE ONCE        Comments: Complete no more than 45 minutes prior to patient eating      08/09/23 1326    08/09/23 1313  POC Glucose Once  Once        Comments: Complete no more than 45 minutes prior to patient eating      08/09/23 1312    08/09/23 1312  CBC & Differential  Once         08/09/23 1312    08/09/23 1312  Comprehensive Metabolic Panel  Once         08/09/23 1312    08/09/23 1312  Lipase  Once         08/09/23 1312     08/09/23 1312  Urinalysis With Microscopic If Indicated (No Culture) - Urine, Clean Catch  Once         08/09/23 1312    08/09/23 1312  High Sensitivity Troponin T  Once         08/09/23 1312    08/09/23 1312  Ketone Bodies, Serum (Not performed at [place])  Once         08/09/23 1312    08/09/23 1312  Magnesium  Once         08/09/23 1312    08/09/23 1312  Blood Gas, Venous -  Once         08/09/23 1312    08/09/23 1312  ECG 12 Lead Chest Pain  Once         08/09/23 1312    08/09/23 1312  CBC Auto Differential  PROCEDURE ONCE         08/09/23 1312    08/09/23 1312  Beta Hydroxybutyrate Quantitative  PROCEDURE ONCE         08/09/23 1312    Unscheduled  Follow Hypoglycemia Standing Orders For Blood Glucose <70 & Notify Provider of Treatment  As Needed      Comments: Follow Hypoglycemia Orders As Outlined in Process Instructions (Open Order Report to View Full Instructions)  Notify Provider Any Time Hypoglycemia Treatment is Administered    08/09/23 2038    Unscheduled  Assess Patient For Urinary Retention  As Needed      Comments: Signs / Symptoms Urinary Retention:  - Bladder Palpable  - Suprapubic / Bladder Discomfort or Pain  - Urge to Void, But Unable  - Reports Unable to Void After 8 Hours    08/10/23 1046    Unscheduled  Utilize Voiding Measures if Retention is Suspected  As Needed      Comments: Voiding Measures:  - Privacy  - Relaxed Environment  - Suprapubic Massage  - Suprapubic Warm Compress  - Trigger Techniques  - Stand to Void   - Bedside Commode    08/10/23 1046    Unscheduled  Bladder Scan for Suspected Urinary Retention  As Needed       08/10/23 1046    Unscheduled  Monitor Every 1-2 Hours for Spontaneous Void if Bladder Scan Volume is Less Than 500mL & Patient is Without Symptoms of Bladder Discomfort / Distention  As Needed       08/10/23 1046    Unscheduled  Straight Cath For Acute Retention if Bladder Scan Volume is Greater Than 500mL or Patient Has Symptoms of Bladder Discomfort / Distention  (Unless Contraindicated)  As Needed       08/10/23 1046    --  SCANNED - TELEMETRY           23 0000    --  SCANNED - TELEMETRY           23 0000    --  SCANNED - TELEMETRY           23 0000    --  SCANNED - TELEMETRY           23 0000    --  SCANNED - TELEMETRY           23 0000    --  SCANNED - TELEMETRY           23 0000    --  SCANNED - TELEMETRY           23 0000                Physician Progress Notes (all)        Joseph James, DO at 08/10/23 1051              Name: Sonal De Dios ADMIT: 2023   : 1965  PCP: Ammy Holman APRN    MRN: [id number] LOS: 0 days   AGE/SEX: 57 y.o. female  ROOM: [facility]     Subjective   Subjective   Patient seen and examined this morning.  Hospital day 1. At time of my examination, patient is awake, alert, resting in bed.  She continues to endorse feelings of nausea, inability to tolerate much oral intake, also endorsing symptoms of cough and congestion that have been ongoing for the past 3 to 4 days prior to arrival.      Objective   Objective   Vital Signs  Temp:  [97.3 øF (36.3 øC)-98.5 øF (36.9 øC)] 97.3 øF (36.3 øC)  Heart Rate:  [] 94  Resp:  [16-18] 18  BP: (142-171)/(84-97) 171/86  SpO2:  [94 %-99 %] 99 %  on   ;   Device (Oxygen Therapy): room air  Body mass index is 26.81 kg/mý.  Physical Exam  Vitals and nursing note reviewed.   Constitutional:       General: She is not in acute distress.     Appearance: She is ill-appearing.      Comments: Appears uncomfortable   Cardiovascular:      Rate and Rhythm: Normal rate and regular rhythm.      Pulses: Normal pulses.      Heart sounds: Normal heart sounds.   Pulmonary:      Effort: Pulmonary effort is normal. No respiratory distress.      Breath sounds: Wheezing and rhonchi present.   Abdominal:      General: Bowel sounds are normal. There is no distension.      Palpations: Abdomen is soft.      Tenderness: There is no abdominal tenderness.    Musculoskeletal:      Cervical back: No tenderness.   Skin:     General: Skin is warm and dry.   Neurological:      General: No focal deficit present.      Mental Status: She is alert and oriented to person, place, and time.     Results Review     I reviewed the patient's new clinical results.  Results from last 7 days   Lab Units 08/10/23  0504 08/09/23  1431   WBC 10*3/mm3 8.08 11.14*   HEMOGLOBIN g/dL 12.8 13.5   PLATELETS 10*3/mm3 224 302     Results from last 7 days   Lab Units 08/10/23  0833 08/09/23  1643 08/09/23  1431   SODIUM mmol/L 133* 134* 134*   POTASSIUM mmol/L 3.5 3.1* 3.5   CHLORIDE mmol/L 99 99 95*   CO2 mmol/L 21.5* 20.0* 22.0   BUN mg/dL 12 19 22*   CREATININE mg/dL 0.74 0.73 0.86   GLUCOSE mg/dL 91 84 111*   EGFR mL/min/1.73 93.9 95.5 78.4     Results from last 7 days   Lab Units 08/09/23  1431   ALBUMIN g/dL 4.6   BILIRUBIN mg/dL 0.3   ALK PHOS U/L 81   AST (SGOT) U/L 19   ALT (SGPT) U/L 12     Results from last 7 days   Lab Units 08/10/23  0833 08/10/23  0504 08/09/23  1643 08/09/23  1431   CALCIUM mg/dL 8.7  --  8.6 9.8   ALBUMIN g/dL  --   --   --  4.6   MAGNESIUM mg/dL 1.9  --   --  2.0   PHOSPHORUS mg/dL  --  3.0  --   --        Glucose   Date/Time Value Ref Range Status   08/10/2023 0646 93 70 - 130 mg/dL Final   08/09/2023 2135 97 70 - 130 mg/dL Final   08/09/2023 1326 123 70 - 130 mg/dL Final     Comment:     Meter: [id number] : [id number] [facility]       CT Abdomen Pelvis With Contrast    Result Date: 8/9/2023  Incidental findings as above, without acute or suspicious intraperitoneal process seen.  This report was finalized on 8/9/2023 4:04 PM by Dr. Darian Son M.D.       I have personally reviewed all medications:  Scheduled Medications  enoxaparin, 40 mg, Subcutaneous, Daily  famotidine, 20 mg, Intravenous, Q12H  gabapentin, 400 mg, Oral, BID  gabapentin, 600 mg, Oral, Nightly  insulin glargine, 12 Units, Subcutaneous, Q12H  insulin lispro, 2-9 Units, Subcutaneous,  4x Daily AC & at Bedtime  lisinopril, 5 mg, Oral, Daily  ondansetron, 4 mg, Intravenous, 4x Daily AC & at Bedtime  potassium chloride ER, 40 mEq, Oral, Q4H  senna-docusate sodium, 1 tablet, Oral, BID  sodium chloride, 10 mL, Intravenous, Q12H    Infusions  sodium chloride 0.9 % with KCl 20 mEq, 150 mL/hr, Last Rate: 150 mL/hr (08/10/23 0943)    Diet  Diet: Liquid Diets, Diabetic Diets; Full Liquid; Consistent Carbohydrate; Texture: Regular Texture (IDDSI 7); Fluid Consistency: Thin (IDDSI 0)    I have personally reviewed:  [x]  Laboratory   [x]  Microbiology   [x]  Radiology   [x]  EKG/Telemetry  [x]  Cardiology/Vascular   []  Pathology    []  Records      Assessment/Plan     Active Hospital Problems    Diagnosis  POA    **Intractable nausea and vomiting [R11.2]  Yes    Hyperlipidemia [E78.5]  Yes    COVID-19 virus infection [U07.1]  Clinically Undetermined    Ketosis [E88.89]  Yes    Increased anion gap metabolic acidosis [E87.29]  Yes    Dehydration [E86.0]  Yes    Decreased oral intake [R63.8]  Yes    Type 1 diabetes mellitus with diabetic neuropathy [E10.40]  Yes    HTN (hypertension) [I10]  Yes      Resolved Hospital Problems   No resolved problems to display.       57 y.o. female admitted with Intractable nausea and vomiting.    COVID-19 infection  Acute respiratory failure with hypoxia  - Patient found to be + for COVID-19 on RVP obtained (08/10) after patient endorsed complaints of dyspnea, cough, congestion.   - CXR not obtained on admission, ordered and performed today (08/10) and was negative for acute cardiopulmonary process. On initial ABG, patient did have PO2 of 57.4 with P/F ratio <300 (273) consistent with acute hypoxic respiratory failure. Discussed with nurse, she is intermittently requiring supplemental oxygen. However, at present, appears to be stable, no evidence to suggest PE or other cardiopulmonary process at present.  - Start Remdesivir. Will not order corticosteroids, given her type 1  diabetes, want to avoid significantly worsening hyperglycemia which could lead to development of DKA.  - Trend COVID associated labs.  - Continue COVID-19 isolation for 10 days total (through 08/19).  - Continue to monitor, supplemental oxygen PRN to maintain O2 sat >90%, Tylenol for pain/fever, Zofran for nausea/vomiting.    Intractable nausea/vomiting  Decreased oral intake  Dehydration  - Etiology likely multifactorial in setting of COVID 19 virus, possibly side effect of Ozempic after dose increase. CT AP unremarkable for acute pathology. Lipase negative.  - Order GI PCR. Continue supportive medications for symptom control.  - Continue IVF due to decreased oral intake.    Type 1 diabetes mellitus with hyperglycemia complicated by neuropathy  - Most recent A1c: 6.30% (06/21/23)  - Current treatment regimen: Glargine 12 units BID, correctional SSI.  - Per review of POC glucose checks, blood glucose does appear to be controlled within target inpatient range at this time, and does not warrant changes to insulin regimen.  - Continue current treatment as prescribed.  - Will monitor 24 hour insulin requirements and adjust as needed to achieve target inpatient range of 140-180.  - Diabetic diet once able to tolerate PO intake.    Anion gap metabolic acidosis  Ketonuria  - Etiology likely 2/2 starvation from severe hypovolemia/decreased intake. Labs/findings not consistent with concomitant DKA. Has been getting IVF, leading to improvement.  - Continue to monitor.    Hypertension  - BP acceptable acutely. Appears stable. No indications to warrant acute changes/intervention at this time.  - Continue current medications as prescribed. Trend BP to guide ongoing management decisions.      Lovenox 40 mg SC daily for DVT prophylaxis.  Full code.  Discussed with patient, nursing staff, CCP, and care team on multidisciplinary rounds.  Anticipate discharge  TBD  timing yet to be determined..      DO Huey Friedman  [facility]  08/10/23  10:51 EDT        Electronically signed by Joseph James DO at 08/10/23 1309       Consult Notes (all)    No notes of this type exist for this encounter.

## 2023-08-11 NOTE — PROGRESS NOTES
"Nutrition Services    Patient Name:  Sonal De Dios  YOB: 1965  MRN: [id number]  Admit Date:  8/9/2023    Assessment Date:  08/11/23    Comment: Follow up:  Pt has now also tested positive for Norovirus as well as COVID which may be the etiology of her symptoms per MD notes. Pt only tolerating bites of Full Liquid trays. Pt continues with nausea but no vomiting today. Continues on zofran q 6 hrs.    Recommend:  Boost Breeze TID while on clears, will order.  Advance diet as tolerates to Consistent CHO diet once able.    Will follow for ability to advance diet.    CLINICAL NUTRITION ASSESSMENT      Reason for Assessment Follow-up Protocol     Diagnosis/Problem   Intractable N/V, COVID-19, HLD, ketosis, dehydration, Type 1 DM   Medical/Surgical History Past Medical History:   Diagnosis Date    Diabetes mellitus     Gestational diabetes     Pneumonia        Past Surgical History:   Procedure Laterality Date    ENDOMETRIAL ABLATION          Anthropometrics        Current Height  Current Weight  BMI kg/m2 Height: 162.6 cm (64\")  Weight: 71.5 kg (157 lb 9.6 oz) (08/11/23 0556)  Body mass index is 27.05 kg/mý.   Adjusted BMI (if applicable)    BMI Category Overweight (25 - 29.9)       Admission Weight 156 lb       Ideal Body Weight (IBW) 120 lb   Adjusted IBW (if applicable)        Usual Body Weight (UBW) 190 lb   Weight Change/Trend Loss, Amount/Timeframe: 34 lb (18%) wt loss x 9 months       Weight History Wt Readings from Last 30 Encounters:   08/11/23 0556 71.5 kg (157 lb 9.6 oz)   08/10/23 0647 70.9 kg (156 lb 3.2 oz)   08/09/23 2010 70.9 kg (156 lb 3.2 oz)   08/09/23 1306 72.6 kg (160 lb)   06/21/23 0747 75.8 kg (167 lb 3.2 oz)   02/21/23 0801 81.3 kg (179 lb 4.8 oz)   12/28/22 0813 86.2 kg (190 lb 1.6 oz)   08/12/22 1106 78.2 kg (172 lb 8 oz)   08/01/22 0619 72.1 kg (158 lb 15.2 oz)   07/31/22 0500 76.6 kg (168 lb 14 oz)   07/30/22 0600 77.7 kg (171 lb 4.8 oz)   07/29/22 0921 71.2 kg (156 lb " 15.5 oz)   07/29/22 0646 72.6 kg (160 lb)   07/12/22 0746 80.3 kg (177 lb 1.6 oz)   02/08/22 0933 80.9 kg (178 lb 4.8 oz)   01/05/22 1214 80.7 kg (178 lb)   12/07/21 0757 77.3 kg (170 lb 6.4 oz)   12/02/21 1858 70.3 kg (155 lb)   07/22/21 0743 77.9 kg (171 lb 12.8 oz)   01/28/20 0823 76.2 kg (168 lb 1.6 oz)   05/15/19 0813 72.6 kg (160 lb 1.6 oz)   02/05/19 0816 74.4 kg (164 lb 1.6 oz)   06/01/18 1405 72.4 kg (159 lb 9.6 oz)   04/10/18 1025 70.2 kg (154 lb 12.8 oz)   03/29/18 1324 69.9 kg (154 lb 1.6 oz)   03/07/18 0600 70.3 kg (154 lb 15.7 oz)   03/06/18 1826 65.8 kg (145 lb)           --  Tests/Procedures        Tests/Procedures X-Ray     Labs       Pertinent Labs    Results from last 7 days   Lab Units 08/11/23  0615 08/10/23  1916 08/10/23  0833 08/09/23  1643 08/09/23  1431   SODIUM mmol/L 130*  --  133* 134* 134*   POTASSIUM mmol/L 4.2 3.9 3.5 3.1* 3.5   CHLORIDE mmol/L 99  --  99 99 95*   CO2 mmol/L 20.1*  --  21.5* 20.0* 22.0   BUN mg/dL 9  --  12 19 22*   CREATININE mg/dL 0.63  --  0.74 0.73 0.86   CALCIUM mg/dL 9.0  --  8.7 8.6 9.8   BILIRUBIN mg/dL 0.3  --   --   --  0.3   ALK PHOS U/L 66  --   --   --  81   ALT (SGPT) U/L 22  --   --   --  12   AST (SGOT) U/L 19  --   --   --  19   GLUCOSE mg/dL 96  --  91 84 111*       Results from last 7 days   Lab Units 08/11/23  0615 08/10/23  0833 08/10/23  0504 08/09/23  1431   MAGNESIUM mg/dL 1.8 1.9  --  2.0   PHOSPHORUS mg/dL 2.0*  --    < >  --    HEMOGLOBIN g/dL 12.9  --    < > 13.5   HEMATOCRIT % 38.7  --    < > 39.3   WBC 10*3/mm3 6.64  --    < > 11.14*   ALBUMIN g/dL 3.8  --   --  4.6    < > = values in this interval not displayed.       Results from last 7 days   Lab Units 08/11/23  0615 08/10/23  0504 08/09/23  1431   PLATELETS 10*3/mm3 254 224 302       COVID19   Date Value Ref Range Status   08/10/2023 Detected (C) Not Detected - Ref. Range Final     Lab Results   Component Value Date    HGBA1C 6.30 (H) 06/21/2023          Medications            Scheduled Medications enoxaparin, 40 mg, Subcutaneous, Daily  famotidine, 20 mg, Intravenous, Q12H  gabapentin, 400 mg, Oral, BID  gabapentin, 600 mg, Oral, Nightly  insulin glargine, 12 Units, Subcutaneous, Q12H  insulin lispro, 2-9 Units, Subcutaneous, 4x Daily AC & at Bedtime  lisinopril, 5 mg, Oral, Daily  ondansetron, 4 mg, Intravenous, 4x Daily AC & at Bedtime  remdesivir, 100 mg, Intravenous, Q24H  senna-docusate sodium, 1 tablet, Oral, BID  sodium chloride, 10 mL, Intravenous, Q12H  sodium phosphate, 15 mmol, Intravenous, Once       Infusions sodium chloride 0.9 % with KCl 20 mEq, 150 mL/hr, Last Rate: 150 mL/hr (08/11/23 0802)       PRN Medications   acetaminophen **OR** acetaminophen **OR** acetaminophen    senna-docusate sodium **AND** polyethylene glycol **AND** bisacodyl **AND** bisacodyl    calcium carbonate    Calcium Replacement - Follow Nurse / BPA Driven Protocol    dextrose    dextrose    glucagon (human recombinant)    LORazepam    Magnesium Standard Dose Replacement - Follow Nurse / BPA Driven Protocol    melatonin    Morphine **AND** naloxone    nitroglycerin    Phosphorus Replacement - Follow Nurse / BPA Driven Protocol    Potassium Replacement - Follow Nurse / BPA Driven Protocol    promethazine **OR** promethazine    sodium chloride    sodium chloride     Physical Findings          Physical Appearance alert, oriented, room air   Oral/Mouth Cavity WNL   Edema  no edema   Gastrointestinal nausea, non-distended , normoactive, vomiting, last bowel movement: 8/8   Skin  skin intact   Tubes/Drains/Lines none   NFPE No clinical signs of muscle wasting or fat loss   --  Current Nutrition Orders & Evaluation of Intake       Oral Nutrition     Food Allergies NKFA   Current PO Diet Diet: Liquid Diets, Diabetic Diets; Full Liquid; Consistent Carbohydrate; Texture: Regular Texture (IDDSI 7); Fluid Consistency: Thin (IDDSI 0)   Supplement n/a   PO Evaluation     % PO Intake bites    # of Days Evaluated  1   --  NUTRITION INTERVENTION / PLAN OF CARE      Intervention Goal(s) Maintain nutrition status, Reduce/improve symptoms, Meet estimated needs, Disease management/therapy, Establish PO intake, Tolerate PO , No significant weight loss, and PO intake goal %: 75%         RD Intervention/Action Encourage intake, Follow Tx Progress, and Care plan reviewed         Prescription/Orders:       PO Diet ADAT to Consistent CHO diet      Supplements Boost Breeze TID      Snacks       Enteral Nutrition       Parenteral Nutrition    New Prescription Ordered? Yes   --      Monitor/Evaluation Per protocol   Discharge Plan/Needs Pending clinical course   Education Will instruct as appropriate   --    RD to follow per protocol.      Electronically signed by:  Yessi Ferguson RD  08/11/23 14:17 EDT

## 2023-08-11 NOTE — PROGRESS NOTES
Name: Sonal De Dios ADMIT: 2023   : 1965  PCP: Ammy Holman APRN    MRN: [id number] LOS: 0 days   AGE/SEX: 57 y.o. female  ROOM: [facility]     Subjective   Subjective   Patient seen and examined this morning.  Hospital day 2. At time of my examination, patient is awake, alert, sitting up in chair next to bedside.  Currently on room air with O2 sats greater than 90%.  She continues to have dry cough, intermittent dyspnea, mild nausea, however, symptoms are much improved overall when compared to admission.      Objective   Objective   Vital Signs  Temp:  [97.9 øF (36.6 øC)-99.1 øF (37.3 øC)] 99.1 øF (37.3 øC)  Heart Rate:  [] 94  Resp:  [16-18] 16  BP: (112-161)/(72-93) 144/72  SpO2:  [98 %] 98 %  on   ;   Device (Oxygen Therapy): room air  Body mass index is 27.05 kg/mý.  Physical Exam  Vitals and nursing note reviewed.   Constitutional:       General: She is not in acute distress.     Appearance: She is ill-appearing.      Comments: Appears uncomfortable   Cardiovascular:      Rate and Rhythm: Normal rate and regular rhythm.      Pulses: Normal pulses.      Heart sounds: Normal heart sounds.   Pulmonary:      Effort: Pulmonary effort is normal. No respiratory distress.      Breath sounds: Wheezing (faint) present. No rhonchi.   Abdominal:      General: Bowel sounds are normal. There is no distension.      Palpations: Abdomen is soft.      Tenderness: There is no abdominal tenderness.   Skin:     General: Skin is warm and dry.   Neurological:      General: No focal deficit present.      Mental Status: She is alert and oriented to person, place, and time.     Results Review     I reviewed the patient's new clinical results.  Results from last 7 days   Lab Units 23  0615 08/10/23  0504 23  1431   WBC 10*3/mm3 6.64 8.08 11.14*   HEMOGLOBIN g/dL 12.9 12.8 13.5   PLATELETS 10*3/mm3 254 224 302       Results from last 7 days   Lab Units 23  0615 08/10/23  1916  08/10/23  0833 08/09/23  1643 08/09/23  1431   SODIUM mmol/L 130*  --  133* 134* 134*   POTASSIUM mmol/L 4.2 3.9 3.5 3.1* 3.5   CHLORIDE mmol/L 99  --  99 99 95*   CO2 mmol/L 20.1*  --  21.5* 20.0* 22.0   BUN mg/dL 9  --  12 19 22*   CREATININE mg/dL 0.63  --  0.74 0.73 0.86   GLUCOSE mg/dL 96  --  91 84 111*   EGFR mL/min/1.73 103.0  --  93.9 95.5 78.4       Results from last 7 days   Lab Units 08/11/23  0615 08/09/23  1431   ALBUMIN g/dL 3.8 4.6   BILIRUBIN mg/dL 0.3 0.3   ALK PHOS U/L 66 81   AST (SGOT) U/L 19 19   ALT (SGPT) U/L 22 12       Results from last 7 days   Lab Units 08/11/23  0615 08/10/23  0833 08/10/23  0504 08/09/23  1643 08/09/23  1431   CALCIUM mg/dL 9.0 8.7  --  8.6 9.8   ALBUMIN g/dL 3.8  --   --   --  4.6   MAGNESIUM mg/dL 1.8 1.9  --   --  2.0   PHOSPHORUS mg/dL 2.0*  --  3.0  --   --      Results from last 7 days   Lab Units 08/11/23  0615 08/10/23  1916   PROCALCITONIN ng/mL  --  0.08   LACTATE mmol/L 0.9  --      Glucose   Date/Time Value Ref Range Status   08/11/2023 0549 95 70 - 130 mg/dL Final   08/10/2023 2014 118 70 - 130 mg/dL Final   08/10/2023 1715 87 70 - 130 mg/dL Final   08/10/2023 1056 130 70 - 130 mg/dL Final   08/10/2023 0646 93 70 - 130 mg/dL Final   08/09/2023 2135 97 70 - 130 mg/dL Final   08/09/2023 1326 123 70 - 130 mg/dL Final     Comment:     Meter: [id number] : [id number] [facility]       CT Abdomen Pelvis With Contrast    Result Date: 8/9/2023  Incidental findings as above, without acute or suspicious intraperitoneal process seen.  This report was finalized on 8/9/2023 4:04 PM by Dr. Darian Son M.D.      XR Chest 1 View    Result Date: 8/10/2023  No evidence for acute pulmonary process. Follow-up as clinical indications persist.  This report was finalized on 8/10/2023 12:06 PM by Dr. Ovidio Briggs M.D.       I have personally reviewed all medications:  Scheduled Medications  enoxaparin, 40 mg, Subcutaneous, Daily  famotidine, 20 mg, Intravenous,  Q12H  gabapentin, 400 mg, Oral, BID  gabapentin, 600 mg, Oral, Nightly  insulin glargine, 12 Units, Subcutaneous, Q12H  insulin lispro, 2-9 Units, Subcutaneous, 4x Daily AC & at Bedtime  lisinopril, 5 mg, Oral, Daily  ondansetron, 4 mg, Intravenous, 4x Daily AC & at Bedtime  remdesivir, 100 mg, Intravenous, Q24H  senna-docusate sodium, 1 tablet, Oral, BID  sodium chloride, 10 mL, Intravenous, Q12H  sodium phosphate, 15 mmol, Intravenous, Once    Infusions  sodium chloride 0.9 % with KCl 20 mEq, 150 mL/hr, Last Rate: 150 mL/hr (08/11/23 0802)    Diet  Diet: Liquid Diets, Diabetic Diets; Full Liquid; Consistent Carbohydrate; Texture: Regular Texture (IDDSI 7); Fluid Consistency: Thin (IDDSI 0)    I have personally reviewed:  [x]  Laboratory   [x]  Microbiology   [x]  Radiology   [x]  EKG/Telemetry  [x]  Cardiology/Vascular   []  Pathology    []  Records       Assessment/Plan     Active Hospital Problems    Diagnosis  POA    **Intractable nausea and vomiting [R11.2]  Yes    Norovirus [A08.11]  Yes    Hyponatremia [E87.1]  No    Hypophosphatemia [E83.39]  No    Type 2 diabetes mellitus with hyperglycemia, with long-term current use of insulin [E11.65, Z79.4]  Not Applicable    Hyperlipidemia [E78.5]  Yes    COVID-19 virus infection [U07.1]  Yes    Dehydration [E86.0]  Yes    Decreased oral intake [R63.8]  Yes    HTN (hypertension) [I10]  Yes      Resolved Hospital Problems    Diagnosis Date Resolved POA    Ketosis [E88.89] 08/11/2023 Yes    Increased anion gap metabolic acidosis [E87.29] 08/11/2023 Yes       57 y.o. female admitted with Intractable nausea and vomiting.    COVID-19 infection  Acute respiratory failure with hypoxia  - Patient found to be + for COVID-19 on RVP obtained (08/10) after patient endorsed complaints of dyspnea, cough, congestion.   - CXR not obtained on admission, ordered and performed (08/10) and was negative for acute cardiopulmonary process. On initial ABG, patient did have PO2 of 57.4 with  P/F ratio <300 (273) consistent with acute hypoxic respiratory failure.   - Patient started on treatment with Remdesivir, and remains on at present (Day #2, 08/11). Did not start treatment with corticosteroids, given her type 1 diabetes, want to avoid significantly worsening hyperglycemia which could lead to development of DKA.  - At present, she continues to endorse mild dyspnea and nonproductive cough, however, symptoms are significantly improved from admission.  She is currently on room air with O2 sats greater than 90%.  Procalcitonin negative, CK/CRP/ESR/lactate all within normal limits, ferritin mildly elevated.  - Continue current treatment as prescribed.  - Trend COVID associated labs.  - Continue COVID-19 isolation for 10 days total (through 08/19).  - Continue to monitor, supplemental oxygen PRN to maintain O2 sat >90%, Tylenol for pain/fever, Zofran for nausea/vomiting.    Intractable nausea/vomiting  Decreased oral intake  Dehydration  Gastroenteritis due to Norovirus  - GI PCR obtained following arrival and was + for Norovirus. Etiology of symptoms likely due to that, plus COVID 19 virus, possibly side effect of Ozempic after dose increase. CT AP unremarkable for acute pathology. Lipase negative.  - Continue supportive medications for symptom control.  - Continue IVF due to decreased oral intake.    Type 2 diabetes mellitus with hyperglycemia complicated by neuropathy  - Most recent A1c: 6.30% (06/21/23)  - Current treatment regimen: Glargine 12 units BID, correctional SSI.  - Per review of POC glucose checks, blood glucose does appear to be acceptable, no evidence of severe hyperglycemia or hypoglycemia that would warrant medication adjustment.  - Continue current treatment as prescribed.  - Will monitor 24 hour insulin requirements and adjust as needed to achieve target inpatient range of 140-180.  - Diabetic diet once able to tolerate PO intake.    Hyponatremia  - Na level low at 130. Etiology likely  hypovolemic in nature, however, has been getting IVF and sodium has dropped despite that.  - Order urine sodium, urine osmolality, uric acid, urine creatinine, urea urea, TSH.  - Follow up results of testing to guide ongoing management decisions.  - Order repeat BMP in AM for reassessment.    Hypophosphatemia  - Phos low on most recent labs; Will replete via electrolyte protocol. Follow up repeat labs to guide ongoing management decisions. Replete PRN per protocol. Continue to monitor    Hypertension  - BP acceptable acutely. Appears stable. No indications to warrant acute changes/intervention at this time.  - Continue current medications as prescribed. Trend BP to guide ongoing management decisions.    Anion gap metabolic acidosis, resolved  Ketonuria  - Etiology likely 2/2 starvation from severe hypovolemia/decreased intake. Labs/findings not consistent with concomitant DKA. Has been getting IVF, leading to improvement and resolution on most recent labs.  - Continue to monitor.      Lovenox 40 mg SC daily for DVT prophylaxis.  Full code.  Discussed with patient, nursing staff, CCP, and care team on multidisciplinary rounds.  Anticipate discharge home  tomorrow vs. Sunday depending on clinical course .      Joseph James DO  [facility]  08/11/23  09:40 EDT

## 2023-08-12 ENCOUNTER — READMISSION MANAGEMENT (OUTPATIENT)
Dept: CALL CENTER | Facility: HOSPITAL | Age: 58
End: 2023-08-12
Payer: COMMERCIAL

## 2023-08-12 VITALS
RESPIRATION RATE: 16 BRPM | TEMPERATURE: 98.1 F | OXYGEN SATURATION: 98 % | WEIGHT: 158.3 LBS | DIASTOLIC BLOOD PRESSURE: 70 MMHG | SYSTOLIC BLOOD PRESSURE: 141 MMHG | BODY MASS INDEX: 27.02 KG/M2 | HEIGHT: 64 IN | HEART RATE: 93 BPM

## 2023-08-12 PROBLEM — D89.831 CYTOKINE RELEASE SYNDROME, GRADE 1: Status: ACTIVE | Noted: 2023-08-12

## 2023-08-12 LAB
ALBUMIN SERPL-MCNC: 3.7 G/DL (ref 3.5–5.2)
ALBUMIN/GLOB SERPL: 1.9 G/DL
ALP SERPL-CCNC: 60 U/L (ref 39–117)
ALT SERPL W P-5'-P-CCNC: 33 U/L (ref 1–33)
ANION GAP SERPL CALCULATED.3IONS-SCNC: 11.1 MMOL/L (ref 5–15)
AST SERPL-CCNC: 30 U/L (ref 1–32)
BASOPHILS # BLD AUTO: 0.02 10*3/MM3 (ref 0–0.2)
BASOPHILS NFR BLD AUTO: 0.4 % (ref 0–1.5)
BILIRUB SERPL-MCNC: 0.3 MG/DL (ref 0–1.2)
BUN SERPL-MCNC: 8 MG/DL (ref 6–20)
BUN/CREAT SERPL: 14 (ref 7–25)
CALCIUM SPEC-SCNC: 8.6 MG/DL (ref 8.6–10.5)
CHLORIDE SERPL-SCNC: 100 MMOL/L (ref 98–107)
CK SERPL-CCNC: 31 U/L (ref 20–180)
CO2 SERPL-SCNC: 21.9 MMOL/L (ref 22–29)
CREAT SERPL-MCNC: 0.57 MG/DL (ref 0.57–1)
CRP SERPL-MCNC: <0.3 MG/DL (ref 0–0.5)
DEPRECATED RDW RBC AUTO: 39.6 FL (ref 37–54)
EGFRCR SERPLBLD CKD-EPI 2021: 105.5 ML/MIN/1.73
EOSINOPHIL # BLD AUTO: 0.01 10*3/MM3 (ref 0–0.4)
EOSINOPHIL NFR BLD AUTO: 0.2 % (ref 0.3–6.2)
ERYTHROCYTE [DISTWIDTH] IN BLOOD BY AUTOMATED COUNT: 12.6 % (ref 12.3–15.4)
ERYTHROCYTE [SEDIMENTATION RATE] IN BLOOD: 9 MM/HR (ref 0–30)
FERRITIN SERPL-MCNC: 224 NG/ML (ref 13–150)
GLOBULIN UR ELPH-MCNC: 2 GM/DL
GLUCOSE BLDC GLUCOMTR-MCNC: 108 MG/DL (ref 70–130)
GLUCOSE BLDC GLUCOMTR-MCNC: 119 MG/DL (ref 70–130)
GLUCOSE SERPL-MCNC: 117 MG/DL (ref 65–99)
HCT VFR BLD AUTO: 35.8 % (ref 34–46.6)
HGB BLD-MCNC: 12.2 G/DL (ref 12–15.9)
IMM GRANULOCYTES # BLD AUTO: 0.02 10*3/MM3 (ref 0–0.05)
IMM GRANULOCYTES NFR BLD AUTO: 0.4 % (ref 0–0.5)
LYMPHOCYTES # BLD AUTO: 2.09 10*3/MM3 (ref 0.7–3.1)
LYMPHOCYTES NFR BLD AUTO: 38 % (ref 19.6–45.3)
MAGNESIUM SERPL-MCNC: 1.7 MG/DL (ref 1.6–2.6)
MCH RBC QN AUTO: 29.7 PG (ref 26.6–33)
MCHC RBC AUTO-ENTMCNC: 34.1 G/DL (ref 31.5–35.7)
MCV RBC AUTO: 87.1 FL (ref 79–97)
MONOCYTES # BLD AUTO: 0.45 10*3/MM3 (ref 0.1–0.9)
MONOCYTES NFR BLD AUTO: 8.2 % (ref 5–12)
NEUTROPHILS NFR BLD AUTO: 2.91 10*3/MM3 (ref 1.7–7)
NEUTROPHILS NFR BLD AUTO: 52.8 % (ref 42.7–76)
NRBC BLD AUTO-RTO: 0 /100 WBC (ref 0–0.2)
PHOSPHATE SERPL-MCNC: 2.4 MG/DL (ref 2.5–4.5)
PLATELET # BLD AUTO: 234 10*3/MM3 (ref 140–450)
PMV BLD AUTO: 9.7 FL (ref 6–12)
POTASSIUM SERPL-SCNC: 4 MMOL/L (ref 3.5–5.2)
PROT SERPL-MCNC: 5.7 G/DL (ref 6–8.5)
RBC # BLD AUTO: 4.11 10*6/MM3 (ref 3.77–5.28)
SODIUM SERPL-SCNC: 133 MMOL/L (ref 136–145)
TSH SERPL DL<=0.05 MIU/L-ACNC: 2.83 UIU/ML (ref 0.27–4.2)
URATE SERPL-MCNC: 3.4 MG/DL (ref 2.4–5.7)
WBC NRBC COR # BLD: 5.5 10*3/MM3 (ref 3.4–10.8)

## 2023-08-12 PROCEDURE — 83735 ASSAY OF MAGNESIUM: CPT | Performed by: STUDENT IN AN ORGANIZED HEALTH CARE EDUCATION/TRAINING PROGRAM

## 2023-08-12 PROCEDURE — 96372 THER/PROPH/DIAG INJ SC/IM: CPT

## 2023-08-12 PROCEDURE — 80053 COMPREHEN METABOLIC PANEL: CPT | Performed by: STUDENT IN AN ORGANIZED HEALTH CARE EDUCATION/TRAINING PROGRAM

## 2023-08-12 PROCEDURE — 25010000002 ONDANSETRON PER 1 MG: Performed by: INTERNAL MEDICINE

## 2023-08-12 PROCEDURE — 84100 ASSAY OF PHOSPHORUS: CPT | Performed by: STUDENT IN AN ORGANIZED HEALTH CARE EDUCATION/TRAINING PROGRAM

## 2023-08-12 PROCEDURE — 85025 COMPLETE CBC W/AUTO DIFF WBC: CPT | Performed by: STUDENT IN AN ORGANIZED HEALTH CARE EDUCATION/TRAINING PROGRAM

## 2023-08-12 PROCEDURE — 25010000002 ENOXAPARIN PER 10 MG: Performed by: INTERNAL MEDICINE

## 2023-08-12 PROCEDURE — 82550 ASSAY OF CK (CPK): CPT | Performed by: STUDENT IN AN ORGANIZED HEALTH CARE EDUCATION/TRAINING PROGRAM

## 2023-08-12 PROCEDURE — 84550 ASSAY OF BLOOD/URIC ACID: CPT | Performed by: STUDENT IN AN ORGANIZED HEALTH CARE EDUCATION/TRAINING PROGRAM

## 2023-08-12 PROCEDURE — 96376 TX/PRO/DX INJ SAME DRUG ADON: CPT

## 2023-08-12 PROCEDURE — 82948 REAGENT STRIP/BLOOD GLUCOSE: CPT

## 2023-08-12 PROCEDURE — G0378 HOSPITAL OBSERVATION PER HR: HCPCS

## 2023-08-12 PROCEDURE — 25010000002 REMDESIVIR 100 MG/20ML SOLUTION 1 EACH VIAL: Performed by: STUDENT IN AN ORGANIZED HEALTH CARE EDUCATION/TRAINING PROGRAM

## 2023-08-12 PROCEDURE — 84443 ASSAY THYROID STIM HORMONE: CPT | Performed by: STUDENT IN AN ORGANIZED HEALTH CARE EDUCATION/TRAINING PROGRAM

## 2023-08-12 PROCEDURE — 85652 RBC SED RATE AUTOMATED: CPT | Performed by: STUDENT IN AN ORGANIZED HEALTH CARE EDUCATION/TRAINING PROGRAM

## 2023-08-12 PROCEDURE — 86140 C-REACTIVE PROTEIN: CPT | Performed by: STUDENT IN AN ORGANIZED HEALTH CARE EDUCATION/TRAINING PROGRAM

## 2023-08-12 PROCEDURE — 82728 ASSAY OF FERRITIN: CPT | Performed by: STUDENT IN AN ORGANIZED HEALTH CARE EDUCATION/TRAINING PROGRAM

## 2023-08-12 RX ADMIN — GABAPENTIN 400 MG: 400 CAPSULE ORAL at 09:03

## 2023-08-12 RX ADMIN — ONDANSETRON 4 MG: 2 INJECTION INTRAMUSCULAR; INTRAVENOUS at 06:43

## 2023-08-12 RX ADMIN — ENOXAPARIN SODIUM 40 MG: 100 INJECTION SUBCUTANEOUS at 09:04

## 2023-08-12 RX ADMIN — REMDESIVIR 100 MG: 100 INJECTION, POWDER, LYOPHILIZED, FOR SOLUTION INTRAVENOUS at 12:42

## 2023-08-12 RX ADMIN — LISINOPRIL 5 MG: 5 TABLET ORAL at 09:03

## 2023-08-12 RX ADMIN — GABAPENTIN 400 MG: 400 CAPSULE ORAL at 12:43

## 2023-08-12 NOTE — DISCHARGE SUMMARY
Patient Name: Sonal De Dios  : 1965  MRN: [id number]    Date of Admission: 2023  Date of Discharge:  2023  Primary Care Physician: Ammy Holman APRN      Chief Complaint:   Vomiting and Nausea      Discharge Diagnoses     Active Hospital Problems    Diagnosis  POA    **Intractable nausea and vomiting [R11.2]  Yes    Cytokine release syndrome, grade 1 [D89.831]  No    Norovirus [A08.11]  Yes    Hyponatremia [E87.1]  No    Hypophosphatemia [E83.39]  No    Type 2 diabetes mellitus with hyperglycemia, with long-term current use of insulin [E11.65, Z79.4]  Not Applicable    Hyperlipidemia [E78.5]  Yes    COVID-19 virus infection [U07.1]  Yes    Dehydration [E86.0]  Yes    Decreased oral intake [R63.8]  Yes    HTN (hypertension) [I10]  Yes      Resolved Hospital Problems    Diagnosis Date Resolved POA    Ketosis [E88.89] 2023 Yes    Increased anion gap metabolic acidosis [E87.29] 2023 Yes        Hospital Course     57 year old lady with history of T2DM, HTN who presented with complaints of nausea, vomiting, cough, congestion, and dyspnea. She was found to have COViD-19 and Norovirus. She was treated with 3d of Remdesivir. She was not treated with steroids as she was not hypoxic--also due to her diabetes mellitus and concern for her developing hyperglycemic crisis. For Norovirus, she was treated supportively. She is doing much better today, on room air with sats >90%, and symptoms from respiratory and GI perspective much improved. Labs look fine. Plan to discharge her this afternoon after she completes 3rd dose of Remdesivir.     D/w pt and RN  During all interaction with pt proper PPE was utilized (gown, gloves, mask, goggles, and face shield) and was donned/doffed according to recommendations. Hands were cleaned before and after interaction.     Day of Discharge     Subjective:  Feeling fine today. Tolerating diet--no N/V/D/abd pain. No resp symptoms at all. Eager  to go home.    Physical Exam:  Temp:  [98.1 øF (36.7 øC)-98.6 øF (37 øC)] 98.1 øF (36.7 øC)  Heart Rate:  [] 100  Resp:  [16] 16  BP: (129-150)/(76-88) 129/85  Body mass index is 27.17 kg/mý.  Physical Exam  Vitals and nursing note reviewed. Exam conducted with a chaperone present.   Constitutional:       General: She is not in acute distress.     Appearance: She is not ill-appearing, toxic-appearing or diaphoretic.   HENT:      Head: Normocephalic.      Mouth/Throat:      Mouth: Mucous membranes are moist.      Pharynx: Oropharynx is clear.   Eyes:      General: No scleral icterus.        Right eye: No discharge.         Left eye: No discharge.      Conjunctiva/sclera: Conjunctivae normal.   Cardiovascular:      Rate and Rhythm: Normal rate and regular rhythm.      Pulses: Normal pulses.   Pulmonary:      Effort: Pulmonary effort is normal. No respiratory distress.      Breath sounds: Normal breath sounds. No wheezing or rales.   Abdominal:      General: Bowel sounds are normal. There is no distension.      Palpations: Abdomen is soft.      Tenderness: There is no abdominal tenderness.   Musculoskeletal:         General: No swelling. Normal range of motion.      Cervical back: Neck supple. No rigidity.   Lymphadenopathy:      Cervical: No cervical adenopathy.   Skin:     General: Skin is warm and dry.      Capillary Refill: Capillary refill takes less than 2 seconds.      Coloration: Skin is not jaundiced.   Neurological:      General: No focal deficit present.      Mental Status: She is alert. Mental status is at baseline.   Psychiatric:         Mood and Affect: Mood normal.         Behavior: Behavior normal.         Thought Content: Thought content normal.       Consultants     Consult Orders (all) (From admission, onward)       Start     Ordered    08/09/23 2033  Inpatient Consult to Case Management   Once        Provider:  (Not yet assigned)    08/09/23 2038 08/09/23 1720  A (on-call  MD unless specified) Details  Once,   Status:  Canceled        Specialty:  Hospitalist  Provider:  (Not yet assigned)    08/09/23 1719                  Procedures     * Surgery not found *    Imaging Results (All)       Procedure Component Value Units Date/Time    XR Chest 1 View [[id number]] Collected: 08/10/23 1204     Updated: 08/10/23 1209    Narrative:      XR CHEST 1 VW-     HISTORY: Female who is 57 years-old,  hypoxia     TECHNIQUE: Frontal view of the chest     COMPARISON: 07/29/2022     FINDINGS: Heart, mediastinum and pulmonary vasculature are unremarkable.  No focal pulmonary consolidation, pleural effusion, or pneumothorax. No  acute osseous process.       Impression:      No evidence for acute pulmonary process. Follow-up as  clinical indications persist.     This report was finalized on 8/10/2023 12:06 PM by Dr. Ovidio Briggs M.D.       CT Abdomen Pelvis With Contrast [[id number]] Collected: 08/09/23 1558     Updated: 08/09/23 1607    Narrative:      EXAMINATION: CT OF THE ABDOMEN AND PELVIS WITH CONTRAST     TECHNIQUE: Computed tomography of the abdomen and pelvis after the  uneventful administration of nonionic intravenous contrast per protocol.  Radiation dose reduction techniques were utilized, including automated  exposure control and exposure modulation based on body size.      HISTORY: Nausea and vomiting     COMPARISON: None available     FINDINGS: Limited evaluation of the inferior thorax demonstrates  atelectasis, without consolidation, pleural effusion or thorax. The  heart is normal in size and configuration, without pericardial effusion.     Uterine calcifications likely represent fibroids.     The liver, gallbladder, spleen, adrenal glands, kidneys, pancreas,  stomach, small bowel, large bowel, urinary bladder, and abdominal  vasculature are normal. No intraperitoneal fluid collection free gas are  seen. No enlarged lymph nodes are demonstrated.     Bone windows demonstrate  degenerative changes, without suspicious  osseous lesion seen.       Impression:      Incidental findings as above, without acute or suspicious  intraperitoneal process seen.     This report was finalized on 8/9/2023 4:04 PM by Dr. Darian Son M.D.                 Pertinent Labs     Results from last 7 days   Lab Units 08/12/23  0633 08/11/23  0615 08/10/23  0504 08/09/23  1431   WBC 10*3/mm3 5.50 6.64 8.08 11.14*   HEMOGLOBIN g/dL 12.2 12.9 12.8 13.5   PLATELETS 10*3/mm3 234 254 224 302     Results from last 7 days   Lab Units 08/12/23  0633 08/11/23  0615 08/10/23  1916 08/10/23  0833 08/09/23  1643   SODIUM mmol/L 133* 130*  --  133* 134*   POTASSIUM mmol/L 4.0 4.2 3.9 3.5 3.1*   CHLORIDE mmol/L 100 99  --  99 99   CO2 mmol/L 21.9* 20.1*  --  21.5* 20.0*   BUN mg/dL 8 9  --  12 19   CREATININE mg/dL 0.57 0.63  --  0.74 0.73   GLUCOSE mg/dL 117* 96  --  91 84   EGFR mL/min/1.73 105.5 103.0  --  93.9 95.5     Results from last 7 days   Lab Units 08/12/23  0633 08/11/23  0615 08/09/23  1431   ALBUMIN g/dL 3.7 3.8 4.6   BILIRUBIN mg/dL 0.3 0.3 0.3   ALK PHOS U/L 60 66 81   AST (SGOT) U/L 30 19 19   ALT (SGPT) U/L 33 22 12     Results from last 7 days   Lab Units 08/12/23  0633 08/11/23  1814 08/11/23  0615 08/10/23  0833 08/10/23  0504 08/09/23  1643 08/09/23  1431   CALCIUM mg/dL 8.6  --  9.0 8.7  --  8.6 9.8   ALBUMIN g/dL 3.7  --  3.8  --   --   --  4.6   MAGNESIUM mg/dL 1.7  --  1.8 1.9  --   --  2.0   PHOSPHORUS mg/dL 2.4* 2.6 2.0*  --  3.0  --   --      Results from last 7 days   Lab Units 08/09/23  1431   LIPASE U/L 25     Results from last 7 days   Lab Units 08/12/23  0633 08/11/23  0615 08/09/23  1643 08/09/23  1431   CK TOTAL U/L 31 36  --   --    HSTROP T ng/L  --   --  13* 14*     Results from last 7 days   Lab Units 08/12/23  0633 08/11/23  1442   SODIUM UR mmol/L  --  163   CREATININE UR mg/dL  --  27.8   OSMOLALITY UR mOsm/kg  --  487   URIC ACID mg/dL 3.4  --          Invalid input(s): LDLCALC       Results from last 7 days   Lab Units 08/10/23  1038   COVID19  Detected*       Test Results Pending at Discharge       Discharge Details        Discharge Medications        Continue These Medications        Instructions Start Date   atorvastatin 10 MG tablet  Commonly known as: LIPITOR   10 mg, Oral, Daily      BD Pen Needle Alysha U/F 32G X 4 MM misc  Generic drug: Insulin Pen Needle   USE AS DIRECTED FOUR TIMES DAILY      BD Pen Needle Alysha U/F 32G X 4 MM misc  Generic drug: Insulin Pen Needle   Use as directed      Pen Needles 31G X 5 MM misc   1 each, Does not apply, Take As Directed      Dexcom G6 Transmitter misc   1 each, Does not apply, See Admin Instructions      FreeStyle Bruce 14 Day [place] device   1 each, Does not apply, 2 Times Daily      Dexcom G6  device   1 each, Does not apply, Take As Directed      FreeStyle Bruce 14 Day Sensor misc   1 each, Does not apply, 2 Times Daily      Dexcom G6 Sensor   Topical, Every 10 Days      gabapentin 600 MG tablet  Commonly known as: NEURONTIN   600 mg, Oral, Nightly      gabapentin 400 MG capsule  Commonly known as: NEURONTIN   TAKE 1 CAPSULE BY MOUTH TWICE DAILY AT 8 AM AND AT 2 PM      glucose blood test strip  Commonly known as: OneTouch Verio   1 each, Other, 5 Times Daily, Use as instructed      glucose blood test strip   Use as instructed      glucose blood test strip   Check 5 times daily      Insulin Lispro 100 UNIT/ML solution pen-injector  Commonly known as: HumaLOG KwikPen   Glucose below 150 no extra insulin, 150-200 give 2 units, 201-250 give 3 units, 251-300 give 4 units, >300 give 5 units      Insulin Lispro (1 Unit Dial) 100 UNIT/ML solution pen-injector  Commonly known as: HumaLOG KwikPen   INJECT 5 UNITS UNDER THE SKIN BEFORE BREAKFAST, 6 UNITS BEFORE LUNCH AND 8 UNITS BEFORE DINNER      Levemir FlexPen 100 UNIT/ML injection  Generic drug: insulin detemir   Inject 15 units under the skin in the am and inject 13 units under the skin in  the pm      lisinopril 5 MG tablet  Commonly known as: PRINIVIL,ZESTRIL   5 mg, Oral, Daily      Ozempic (1 MG/DOSE) 4 MG/3ML solution pen-injector  Generic drug: Semaglutide (1 MG/DOSE)   INJECT 1 MG UNDER THE SKIN INTO THE APPROPRIATE AREA AS DIRECTED 1 TIME PER WEEK             Stop These Medications      escitalopram 5 MG tablet  Commonly known as: Lexapro     hydrocortisone 2.5 % rectal cream  Commonly known as: Anusol-HC     ondansetron 4 MG tablet  Commonly known as: Zofran              Allergies   Allergen Reactions    Codeine GI Intolerance       Discharge Disposition:  Home or Self Care      Discharge Diet:  Diet Order   Procedures    Diet: Liquid Diets, Diabetic Diets; Full Liquid; Consistent Carbohydrate; Texture: Regular Texture (IDDSI 7); Fluid Consistency: Thin (IDDSI 0)       Discharge Activity:   as tolerated    CODE STATUS:    Code Status and Medical Interventions:   Ordered at: 08/09/23 2038     Code Status (Patient has no pulse and is not breathing):    CPR (Attempt to Resuscitate)     Medical Interventions (Patient has pulse or is breathing):    Full       Future Appointments   Date Time Provider Department Center   10/24/2023  7:45 AM Ammy Holman APRN [facility]     Additional Instructions for the Follow-ups that You Need to Schedule       Discharge Follow-up with PCP   As directed       Currently Documented PCP:    Ammy Holman APRN    PCP Phone Number:    [phone]     Follow Up Details: [facility] NP (PCP) in 1 week               Follow-up Information       Ammy Holman APRN .    Specialties: Family Medicine, Urgent Care  Why: [facility] NP (PCP) in 1 week  Contact information:  [address]  [place] [address]  [phone]                             Additional Instructions for the Follow-ups that You Need to Schedule       Discharge Follow-up with PCP   As directed       Currently Documented PCP:    Ammy Holman APRN     PCP Phone Number:    [phone]     Follow Up Details: [facility] VASQUEZ (PCP) in 1 week            Time Spent on Discharge:  Greater than 30 minutes      Michael Reeder MD  [facility]  08/12/23  10:54 EDT

## 2023-08-12 NOTE — PLAN OF CARE
Goal Outcome Evaluation:         Pt resting in bed , Aox4 , afebrile , SR on tele, Room air. Denies N/V or diarrhea. Plan of care ongoing.

## 2023-08-12 NOTE — PLAN OF CARE
Goal Outcome Evaluation:     Patient is AOX4. Patient does not complain of any pain or discomfort. Eager and ready for d/c.

## 2023-08-13 NOTE — OUTREACH NOTE
Prep Survey      Flowsheet Row Responses   [facility] patient discharged from? [facility]   Is LACE score < 7 ? No   Eligibility [facility]   Date of Admission 08/09/23   Date of Discharge 08/12/23   Discharge Disposition Home or Self Care   Discharge diagnosis Intractable nausea and vomiting   Does the patient have one of the following disease processes/diagnoses(primary or secondary)? Other   Does the patient have Home health ordered? No   Is there a DME ordered? No   Prep survey completed? Yes            Kita CARTER - Registered Nurse

## 2023-08-14 ENCOUNTER — TRANSITIONAL CARE MANAGEMENT TELEPHONE ENCOUNTER (OUTPATIENT)
Dept: CALL CENTER | Facility: HOSPITAL | Age: 58
End: 2023-08-14
Payer: COMMERCIAL

## 2023-08-14 NOTE — CASE MANAGEMENT/SOCIAL WORK
Case Management Discharge Note      Final Note: Patient discharged home with spouse. Maikol GALVEZ LCSW         Selected Continued Care - Discharged on 8/12/2023 Admission date: 8/9/2023 - Discharge disposition: Home or Self Care      Destination    No services have been selected for the patient.                Durable Medical Equipment    No services have been selected for the patient.                Dialysis/Infusion    No services have been selected for the patient.                Home Medical Care    No services have been selected for the patient.                Therapy    No services have been selected for the patient.                Community Resources    No services have been selected for the patient.                Community & DME    No services have been selected for the patient.                    Transportation Services  Private: Car    Final Discharge Disposition Code: 01 - home or self-care

## 2023-08-14 NOTE — PAYOR COMM NOTE
"Sonal De Dios (57 y.o. Female)        PATIENT DISCHARGED 23:  REF#  [id number]     PLEASE REPLY WITH ALL APPROVED DAYS TO UR DEPT: [phone],  [phone]    [facility]: NPI [id number]  TIN# [id number]    RYAN CASTRO RN,CCP,UR       Date of Birth   1965    Social Security Number       Address   [address] [place] [address]    Home Phone   [phone]    MRN   [id number]       Samaritan   Judaism    Marital Status                               Admission Date   23    Admission Type   Emergency    Admitting Provider   Yamilka Tucker MD    Attending Provider       Department, Room/Bed   [address], E662/1       Discharge Date   2023    Discharge Disposition   Home or Self Care    Discharge Destination                                 Attending Provider: (none)   Allergies: Codeine    Isolation: None   Infection: COVID (confirmed) (08/10/23), Norovirus (23)   Code Status: Prior    Ht: 162.6 cm (64\")   Wt: 71.8 kg (158 lb 4.8 oz)    Admission Cmt: None   Principal Problem: Intractable nausea and vomiting [R11.2]                   Active Insurance as of 2023       Primary Coverage       Payor Plan Insurance Group Employer/Plan Group    [id number]       Payor Plan Address Payor Plan Phone Number Payor Plan Fax Number Effective Dates    [phone]  2023 - None Entered    [facility] [id number]         Subscriber Name Subscriber Birth Date Member ID       WIL DE DIOS 1961 [id number]                     Emergency Contacts        (Rel.) Home Phone Work Phone Mobile Phone    Wil De Dios (Spouse) [phone] -- [phone]               Discharge Summary        Michael Reeder MD at 23 1054              Patient Name: Sonal De Dios  : 1965  MRN: [id number]    Date of Admission: 2023  Date of Discharge:  " 8/12/2023  Primary Care Physician: Ammy Holman APRN      Chief Complaint:   Vomiting and Nausea      Discharge Diagnoses     Active Hospital Problems    Diagnosis  POA    **Intractable nausea and vomiting [R11.2]  Yes    Cytokine release syndrome, grade 1 [D89.831]  No    Norovirus [A08.11]  Yes    Hyponatremia [E87.1]  No    Hypophosphatemia [E83.39]  No    Type 2 diabetes mellitus with hyperglycemia, with long-term current use of insulin [E11.65, Z79.4]  Not Applicable    Hyperlipidemia [E78.5]  Yes    COVID-19 virus infection [U07.1]  Yes    Dehydration [E86.0]  Yes    Decreased oral intake [R63.8]  Yes    HTN (hypertension) [I10]  Yes      Resolved Hospital Problems    Diagnosis Date Resolved POA    Ketosis [E88.89] 08/11/2023 Yes    Increased anion gap metabolic acidosis [E87.29] 08/11/2023 Yes        Hospital Course     57 year old lady with history of T2DM, HTN who presented with complaints of nausea, vomiting, cough, congestion, and dyspnea. She was found to have COViD-19 and Norovirus. She was treated with 3d of Remdesivir. She was not treated with steroids as she was not hypoxic--also due to her diabetes mellitus and concern for her developing hyperglycemic crisis. For Norovirus, she was treated supportively. She is doing much better today, on room air with sats >90%, and symptoms from respiratory and GI perspective much improved. Labs look fine. Plan to discharge her this afternoon after she completes 3rd dose of Remdesivir.     D/w pt and RN  During all interaction with pt proper PPE was utilized (gown, gloves, mask, goggles, and face shield) and was donned/doffed according to recommendations. Hands were cleaned before and after interaction.     Day of Discharge     Subjective:  Feeling fine today. Tolerating diet--no N/V/D/abd pain. No resp symptoms at all. Eager to go home.    Physical Exam:  Temp:  [98.1 øF (36.7 øC)-98.6 øF (37 øC)] 98.1 øF (36.7 øC)  Heart Rate:  [] 100  Resp:   [16] 16  BP: (129-150)/(76-88) 129/85  Body mass index is 27.17 kg/mý.  Physical Exam  Vitals and nursing note reviewed. Exam conducted with a chaperone present.   Constitutional:       General: She is not in acute distress.     Appearance: She is not ill-appearing, toxic-appearing or diaphoretic.   HENT:      Head: Normocephalic.      Mouth/Throat:      Mouth: Mucous membranes are moist.      Pharynx: Oropharynx is clear.   Eyes:      General: No scleral icterus.        Right eye: No discharge.         Left eye: No discharge.      Conjunctiva/sclera: Conjunctivae normal.   Cardiovascular:      Rate and Rhythm: Normal rate and regular rhythm.      Pulses: Normal pulses.   Pulmonary:      Effort: Pulmonary effort is normal. No respiratory distress.      Breath sounds: Normal breath sounds. No wheezing or rales.   Abdominal:      General: Bowel sounds are normal. There is no distension.      Palpations: Abdomen is soft.      Tenderness: There is no abdominal tenderness.   Musculoskeletal:         General: No swelling. Normal range of motion.      Cervical back: Neck supple. No rigidity.   Lymphadenopathy:      Cervical: No cervical adenopathy.   Skin:     General: Skin is warm and dry.      Capillary Refill: Capillary refill takes less than 2 seconds.      Coloration: Skin is not jaundiced.   Neurological:      General: No focal deficit present.      Mental Status: She is alert. Mental status is at baseline.   Psychiatric:         Mood and Affect: Mood normal.         Behavior: Behavior normal.         Thought Content: Thought content normal.       Consultants     Consult Orders (all) (From admission, onward)       Start     Ordered    08/09/23 2033  Inpatient Consult to Case Management   Once        Provider:  (Not yet assigned)    08/09/23 2038 08/09/23 1720  [facility] (on-call MD unless specified) Details  Once,   Status:  Canceled        Specialty:  Hospitalist  Provider:  (Not yet assigned)     08/09/23 1719                  Procedures     * Surgery not found *    Imaging Results (All)       Procedure Component Value Units Date/Time    XR Chest 1 View [[id number]] Collected: 08/10/23 1204     Updated: 08/10/23 1209    Narrative:      XR CHEST 1 VW-     HISTORY: Female who is 57 years-old,  hypoxia     TECHNIQUE: Frontal view of the chest     COMPARISON: 07/29/2022     FINDINGS: Heart, mediastinum and pulmonary vasculature are unremarkable.  No focal pulmonary consolidation, pleural effusion, or pneumothorax. No  acute osseous process.       Impression:      No evidence for acute pulmonary process. Follow-up as  clinical indications persist.     This report was finalized on 8/10/2023 12:06 PM by Dr. Ovidio Briggs M.D.       CT Abdomen Pelvis With Contrast [[id number]] Collected: 08/09/23 1558     Updated: 08/09/23 1607    Narrative:      EXAMINATION: CT OF THE ABDOMEN AND PELVIS WITH CONTRAST     TECHNIQUE: Computed tomography of the abdomen and pelvis after the  uneventful administration of nonionic intravenous contrast per protocol.  Radiation dose reduction techniques were utilized, including automated  exposure control and exposure modulation based on body size.      HISTORY: Nausea and vomiting     COMPARISON: None available     FINDINGS: Limited evaluation of the inferior thorax demonstrates  atelectasis, without consolidation, pleural effusion or thorax. The  heart is normal in size and configuration, without pericardial effusion.     Uterine calcifications likely represent fibroids.     The liver, gallbladder, spleen, adrenal glands, kidneys, pancreas,  stomach, small bowel, large bowel, urinary bladder, and abdominal  vasculature are normal. No intraperitoneal fluid collection free gas are  seen. No enlarged lymph nodes are demonstrated.     Bone windows demonstrate degenerative changes, without suspicious  osseous lesion seen.       Impression:      Incidental findings as above, without acute  or suspicious  intraperitoneal process seen.     This report was finalized on 8/9/2023 4:04 PM by Dr. Darian Son M.D.                 Pertinent Labs     Results from last 7 days   Lab Units 08/12/23  0633 08/11/23  0615 08/10/23  0504 08/09/23  1431   WBC 10*3/mm3 5.50 6.64 8.08 11.14*   HEMOGLOBIN g/dL 12.2 12.9 12.8 13.5   PLATELETS 10*3/mm3 234 254 224 302     Results from last 7 days   Lab Units 08/12/23  0633 08/11/23  0615 08/10/23  1916 08/10/23  0833 08/09/23  1643   SODIUM mmol/L 133* 130*  --  133* 134*   POTASSIUM mmol/L 4.0 4.2 3.9 3.5 3.1*   CHLORIDE mmol/L 100 99  --  99 99   CO2 mmol/L 21.9* 20.1*  --  21.5* 20.0*   BUN mg/dL 8 9  --  12 19   CREATININE mg/dL 0.57 0.63  --  0.74 0.73   GLUCOSE mg/dL 117* 96  --  91 84   EGFR mL/min/1.73 105.5 103.0  --  93.9 95.5     Results from last 7 days   Lab Units 08/12/23  0633 08/11/23  0615 08/09/23  1431   ALBUMIN g/dL 3.7 3.8 4.6   BILIRUBIN mg/dL 0.3 0.3 0.3   ALK PHOS U/L 60 66 81   AST (SGOT) U/L 30 19 19   ALT (SGPT) U/L 33 22 12     Results from last 7 days   Lab Units 08/12/23  0633 08/11/23  1814 08/11/23  0615 08/10/23  0833 08/10/23  0504 08/09/23  1643 08/09/23  1431   CALCIUM mg/dL 8.6  --  9.0 8.7  --  8.6 9.8   ALBUMIN g/dL 3.7  --  3.8  --   --   --  4.6   MAGNESIUM mg/dL 1.7  --  1.8 1.9  --   --  2.0   PHOSPHORUS mg/dL 2.4* 2.6 2.0*  --  3.0  --   --      Results from last 7 days   Lab Units 08/09/23  1431   LIPASE U/L 25     Results from last 7 days   Lab Units 08/12/23  0633 08/11/23  0615 08/09/23  1643 08/09/23  1431   CK TOTAL U/L 31 36  --   --    HSTROP T ng/L  --   --  13* 14*     Results from last 7 days   Lab Units 08/12/23  0633 08/11/23  1442   SODIUM UR mmol/L  --  163   CREATININE UR mg/dL  --  27.8   OSMOLALITY UR mOsm/kg  --  487   URIC ACID mg/dL 3.4  --          Invalid input(s): LDLCALC      Results from last 7 days   Lab Units 08/10/23  1038   COVID19  Detected*       Test Results Pending at Discharge       Discharge  Details        Discharge Medications        Continue These Medications        Instructions Start Date   atorvastatin 10 MG tablet  Commonly known as: LIPITOR   10 mg, Oral, Daily      BD Pen Needle Alysha U/F 32G X 4 MM misc  Generic drug: Insulin Pen Needle   USE AS DIRECTED FOUR TIMES DAILY      BD Pen Needle Alysha U/F 32G X 4 MM misc  Generic drug: Insulin Pen Needle   Use as directed      Pen Needles 31G X 5 MM misc   1 each, Does not apply, Take As Directed      Dexcom G6 Transmitter misc   1 each, Does not apply, See Admin Instructions      FreeStyle Bruce 14 Day [place] device   1 each, Does not apply, 2 Times Daily      Dexcom G6  device   1 each, Does not apply, Take As Directed      FreeStyle Bruce 14 Day Sensor misc   1 each, Does not apply, 2 Times Daily      Dexcom G6 Sensor   Topical, Every 10 Days      gabapentin 600 MG tablet  Commonly known as: NEURONTIN   600 mg, Oral, Nightly      gabapentin 400 MG capsule  Commonly known as: NEURONTIN   TAKE 1 CAPSULE BY MOUTH TWICE DAILY AT 8 AM AND AT 2 PM      glucose blood test strip  Commonly known as: OneTouch Verio   1 each, Other, 5 Times Daily, Use as instructed      glucose blood test strip   Use as instructed      glucose blood test strip   Check 5 times daily      Insulin Lispro 100 UNIT/ML solution pen-injector  Commonly known as: HumaLOG KwikPen   Glucose below 150 no extra insulin, 150-200 give 2 units, 201-250 give 3 units, 251-300 give 4 units, >300 give 5 units      Insulin Lispro (1 Unit Dial) 100 UNIT/ML solution pen-injector  Commonly known as: HumaLOG KwikPen   INJECT 5 UNITS UNDER THE SKIN BEFORE BREAKFAST, 6 UNITS BEFORE LUNCH AND 8 UNITS BEFORE DINNER      Levemir FlexPen 100 UNIT/ML injection  Generic drug: insulin detemir   Inject 15 units under the skin in the am and inject 13 units under the skin in the pm      lisinopril 5 MG tablet  Commonly known as: PRINIVIL,ZESTRIL   5 mg, Oral, Daily      Ozempic (1 MG/DOSE) 4 MG/3ML  solution pen-injector  Generic drug: Semaglutide (1 MG/DOSE)   INJECT 1 MG UNDER THE SKIN INTO THE APPROPRIATE AREA AS DIRECTED 1 TIME PER WEEK             Stop These Medications      escitalopram 5 MG tablet  Commonly known as: Lexapro     hydrocortisone 2.5 % rectal cream  Commonly known as: Anusol-HC     ondansetron 4 MG tablet  Commonly known as: Zofran              Allergies   Allergen Reactions    Codeine GI Intolerance       Discharge Disposition:  Home or Self Care      Discharge Diet:  Diet Order   Procedures    Diet: Liquid Diets, Diabetic Diets; Full Liquid; Consistent Carbohydrate; Texture: Regular Texture (IDDSI 7); Fluid Consistency: Thin (IDDSI 0)       Discharge Activity:   as tolerated    CODE STATUS:    Code Status and Medical Interventions:   Ordered at: 08/09/23 2038     Code Status (Patient has no pulse and is not breathing):    CPR (Attempt to Resuscitate)     Medical Interventions (Patient has pulse or is breathing):    Full       Future Appointments   Date Time Provider Department Center   10/24/2023  7:45 AM Ammy Holman APRN [facility]     Additional Instructions for the Follow-ups that You Need to Schedule       Discharge Follow-up with PCP   As directed       Currently Documented PCP:    Ammy Holman APRN    PCP Phone Number:    [phone]     Follow Up Details: River NP (PCP) in 1 week               Follow-up Information       Ammy Holman APRN .    Specialties: Family Medicine, Urgent Care  Why: River NP (PCP) in 1 week  Contact information:  [address]  [place] [address] [place] [address]  [phone]                             Additional Instructions for the Follow-ups that You Need to Schedule       Discharge Follow-up with PCP   As directed       Currently Documented PCP:    Ammy Holman APRN    PCP Phone Number:    [phone]     Follow Up Details: River NP (PCP) in 1 week            Time Spent on Discharge:   Greater than 30 minutes      Michael Reeder MD  [facility]  08/12/23  10:54 EDT                Electronically signed by Michael Reeder MD at 08/12/23 1058       Discharge Order (From admission, onward)       Start     Ordered    08/12/23 1051  Discharge patient  Once        Expected Discharge Date: 08/12/23   Expected Discharge Time: Afternoon   Discharge Disposition: Home or Self Care   Physician of Record for Attribution - Please select from Treatment Team: JHONNY DRIVER [[id number]]   Review needed by CMO to determine Physician of Record: No      Question Answer Comment   Physician of Record for Attribution - Please select from Treatment Team JHONNY DRIVER    Review needed by CMO to determine Physician of Record No        08/12/23 1050

## 2023-08-14 NOTE — OUTREACH NOTE
Call Center TCM Note      Flowsheet Row Responses   [facility] patient discharged from? [place]   Does the patient have one of the following disease processes/diagnoses(primary or secondary)? Other   TCM attempt successful? No   Unsuccessful attempts Attempt 2            Lisa Cifuentes LPN    8/14/2023, 14:45 EDT

## 2023-08-15 ENCOUNTER — TRANSITIONAL CARE MANAGEMENT TELEPHONE ENCOUNTER (OUTPATIENT)
Dept: CALL CENTER | Facility: HOSPITAL | Age: 58
End: 2023-08-15
Payer: COMMERCIAL

## 2023-08-15 NOTE — OUTREACH NOTE
Call Center TCM Note      Flowsheet Row Responses   [facility] patient discharged from? [place]   Does the patient have one of the following disease processes/diagnoses(primary or secondary)? Other   TCM attempt successful? Yes   Call start time 1544   Call end time 1547   Discharge diagnosis Intractable nausea and vomiting, COVID19 and Norovirus   Person spoke with today (if not patient) and relationship Patient   Meds reviewed with patient/caregiver? Yes   Does the patient have all medications ordered at discharge? N/A  [No new meds ordered at discharge. ]   Is the patient taking all medications as directed (includes completed medication regime)? Yes   Comments PCP Ammy SINGH. Patient declines to schedule appt with call today. She states that she will arrange appt herself at a later time.   Does the patient have an appointment with their PCP within 7-14 days of discharge? No   Nursing Interventions Patient declined scheduling/rescheduling appointment at this time, Routed TCM call to PCP office   Has home health visited the patient within 72 hours of discharge? N/A   Psychosocial issues? No   Did the patient receive a copy of their discharge instructions? Yes   Nursing interventions Reviewed instructions with patient   What is the patient's perception of their health status since discharge? Improving  [Patient reports getting a little stronger every day, tolerating her food and fluids ok, denies any respiratory symptoms.]   Is the patient/caregiver able to teach back signs and symptoms related to disease process for when to call PCP? Yes   If the patient is a current smoker, are they able to teach back resources for cessation? Not a smoker   TCM call completed? Yes   Call end time 1547   Would this patient benefit from a Referral to [facility] Social Work? No   Is the patient interested in additional calls from an ambulatory ? No            Kierra Antonio RN    8/15/2023, 15:50  EDT

## 2023-08-22 ENCOUNTER — READMISSION MANAGEMENT (OUTPATIENT)
Dept: CALL CENTER | Facility: HOSPITAL | Age: 58
End: 2023-08-22
Payer: COMMERCIAL

## 2023-08-22 NOTE — OUTREACH NOTE
Medical Week 2 Survey      Flowsheet Row Responses   [facility] patient discharged from? [place]   Does the patient have one of the following disease processes/diagnoses(primary or secondary)? Other   Week 2 attempt successful? Yes   Call start time 1609   Discharge diagnosis Intractable nausea and vomiting, COVID19 and Norovirus   Call end time 1609   Meds reviewed with patient/caregiver? Yes   Is the patient having any side effects they believe may be caused by any medication additions or changes? No   Does the patient have all medications ordered at discharge? Yes   Is the patient taking all medications as directed (includes completed medication regime)? Yes   Does the patient have a primary care provider?  Yes   Does the patient have an appointment with their PCP within 7 days of discharge? Yes   Has the patient kept scheduled appointments due by today? N/A   Has home health visited the patient within 72 hours of discharge? N/A   Psychosocial issues? No   What is the patient's perception of their health status since discharge? Improving   Week 2 Call Completed? Yes   Call end time 1609            Savana CARTER - Registered Nurse

## 2023-08-25 DIAGNOSIS — E11.59 TYPE 2 DIABETES MELLITUS WITH OTHER CIRCULATORY COMPLICATION, WITH LONG-TERM CURRENT USE OF INSULIN: ICD-10-CM

## 2023-08-25 DIAGNOSIS — Z79.4 TYPE 2 DIABETES MELLITUS WITH OTHER CIRCULATORY COMPLICATION, WITH LONG-TERM CURRENT USE OF INSULIN: ICD-10-CM

## 2023-08-25 RX ORDER — GABAPENTIN 600 MG/1
600 TABLET ORAL NIGHTLY
Qty: 90 TABLET | Refills: 1 | Status: SHIPPED | OUTPATIENT
Start: 2023-08-25

## 2023-08-25 NOTE — TELEPHONE ENCOUNTER
Rx Refill Note  Requested Prescriptions     Pending Prescriptions Disp Refills    gabapentin (NEURONTIN) 600 MG tablet [Pharmacy Med Name: GABAPENTIN 600MG TABLETS] 90 tablet      Sig: TAKE 1 TABLET BY MOUTH EVERY NIGHT      Last office visit with prescribing clinician: 6/21/2023   Last telemedicine visit with prescribing clinician: Visit date not found   Next office visit with prescribing clinician: 8/28/2023                         Would you like a call back once the refill request has been completed: [] Yes [] No    If the office needs to give you a call back, can they leave a voicemail: [] Yes [] No    Quinn Fairchild MA  08/25/23, 08:31 EDT

## 2023-08-28 ENCOUNTER — OFFICE VISIT (OUTPATIENT)
Dept: FAMILY MEDICINE CLINIC | Facility: CLINIC | Age: 58
End: 2023-08-28
Payer: COMMERCIAL

## 2023-08-28 VITALS
HEIGHT: 64 IN | DIASTOLIC BLOOD PRESSURE: 78 MMHG | SYSTOLIC BLOOD PRESSURE: 122 MMHG | TEMPERATURE: 96.9 F | WEIGHT: 158 LBS | BODY MASS INDEX: 26.98 KG/M2

## 2023-08-28 DIAGNOSIS — R30.0 DYSURIA: ICD-10-CM

## 2023-08-28 DIAGNOSIS — E11.59 TYPE 2 DIABETES MELLITUS WITH OTHER CIRCULATORY COMPLICATION, WITH LONG-TERM CURRENT USE OF INSULIN: ICD-10-CM

## 2023-08-28 DIAGNOSIS — Z09 HOSPITAL DISCHARGE FOLLOW-UP: Primary | ICD-10-CM

## 2023-08-28 DIAGNOSIS — Z79.4 TYPE 2 DIABETES MELLITUS WITH OTHER CIRCULATORY COMPLICATION, WITH LONG-TERM CURRENT USE OF INSULIN: ICD-10-CM

## 2023-08-28 LAB
BILIRUB BLD-MCNC: NEGATIVE MG/DL
CLARITY, POC: CLEAR
COLOR UR: YELLOW
EXPIRATION DATE: ABNORMAL
GLUCOSE UR STRIP-MCNC: NEGATIVE MG/DL
KETONES UR QL: NEGATIVE
LEUKOCYTE EST, POC: ABNORMAL
Lab: ABNORMAL
NITRITE UR-MCNC: NEGATIVE MG/ML
PH UR: 7 [PH] (ref 5–8)
PROT UR STRIP-MCNC: NEGATIVE MG/DL
RBC # UR STRIP: NEGATIVE /UL
SP GR UR: 1.01 (ref 1–1.03)
UROBILINOGEN UR QL: ABNORMAL

## 2023-08-29 RX ORDER — SEMAGLUTIDE 1.34 MG/ML
INJECTION, SOLUTION SUBCUTANEOUS
Qty: 9 ML | Refills: 1 | Status: SHIPPED | OUTPATIENT
Start: 2023-08-29

## 2023-08-29 NOTE — TELEPHONE ENCOUNTER
Rx Refill Note  Requested Prescriptions     Pending Prescriptions Disp Refills    Ozempic, 1 MG/DOSE, 4 MG/3ML solution pen-injector [Pharmacy Med Name: OZEMPIC 1MG PER DOSE (1X4MG PEN)] 3 mL 2     Sig: INJECT 1 MG UNDER THE SKIN INTO THE APPROPRIATE AREA AS DIRECTED ONCE PER WEEK      Last office visit with prescribing clinician: 8/28/2023   Last telemedicine visit with prescribing clinician: Visit date not found   Next office visit with prescribing clinician: 10/24/2023                         Would you like a call back once the refill request has been completed: [] Yes [] No    If the office needs to give you a call back, can they leave a voicemail: [] Yes [] No    Quinn Fairchild MA  08/29/23, 10:36 EDT

## 2023-08-31 LAB
BACTERIA UR CULT: ABNORMAL
BACTERIA UR CULT: ABNORMAL
OTHER ANTIBIOTIC SUSC ISLT: ABNORMAL

## 2023-08-31 RX ORDER — CIPROFLOXACIN 500 MG/1
500 TABLET, FILM COATED ORAL 2 TIMES DAILY
Qty: 14 TABLET | Refills: 0 | Status: SHIPPED | OUTPATIENT
Start: 2023-08-31

## 2023-10-04 DIAGNOSIS — E11.59 TYPE 2 DIABETES MELLITUS WITH OTHER CIRCULATORY COMPLICATION, WITH LONG-TERM CURRENT USE OF INSULIN: ICD-10-CM

## 2023-10-04 DIAGNOSIS — Z79.4 TYPE 2 DIABETES MELLITUS WITH OTHER CIRCULATORY COMPLICATION, WITH LONG-TERM CURRENT USE OF INSULIN: ICD-10-CM

## 2023-10-04 RX ORDER — PROCHLORPERAZINE 25 MG/1
SUPPOSITORY RECTAL
Qty: 1 EACH | Refills: 1 | Status: SHIPPED | OUTPATIENT
Start: 2023-10-04

## 2023-10-20 DIAGNOSIS — E11.59 TYPE 2 DIABETES MELLITUS WITH OTHER CIRCULATORY COMPLICATION, WITH LONG-TERM CURRENT USE OF INSULIN: Primary | ICD-10-CM

## 2023-10-20 DIAGNOSIS — Z79.4 TYPE 2 DIABETES MELLITUS WITH OTHER CIRCULATORY COMPLICATION, WITH LONG-TERM CURRENT USE OF INSULIN: Primary | ICD-10-CM

## 2023-10-23 RX ORDER — BLOOD SUGAR DIAGNOSTIC
STRIP MISCELLANEOUS SEE ADMIN INSTRUCTIONS
Qty: 100 EACH | Refills: 12 | Status: SHIPPED | OUTPATIENT
Start: 2023-10-23

## 2023-10-24 ENCOUNTER — OFFICE VISIT (OUTPATIENT)
Dept: FAMILY MEDICINE CLINIC | Facility: CLINIC | Age: 58
End: 2023-10-24
Payer: COMMERCIAL

## 2023-10-24 VITALS
OXYGEN SATURATION: 99 % | RESPIRATION RATE: 18 BRPM | BODY MASS INDEX: 27.88 KG/M2 | DIASTOLIC BLOOD PRESSURE: 78 MMHG | TEMPERATURE: 96.4 F | SYSTOLIC BLOOD PRESSURE: 130 MMHG | HEIGHT: 64 IN | HEART RATE: 75 BPM | WEIGHT: 163.3 LBS

## 2023-10-24 DIAGNOSIS — E11.59 TYPE 2 DIABETES MELLITUS WITH OTHER CIRCULATORY COMPLICATION, WITH LONG-TERM CURRENT USE OF INSULIN: Primary | ICD-10-CM

## 2023-10-24 DIAGNOSIS — I10 ESSENTIAL HYPERTENSION: ICD-10-CM

## 2023-10-24 DIAGNOSIS — Z79.4 TYPE 2 DIABETES MELLITUS WITH OTHER CIRCULATORY COMPLICATION, WITH LONG-TERM CURRENT USE OF INSULIN: Primary | ICD-10-CM

## 2023-10-24 RX ORDER — GABAPENTIN 600 MG/1
600 TABLET ORAL NIGHTLY
Qty: 90 TABLET | Refills: 1 | Status: SHIPPED | OUTPATIENT
Start: 2023-10-24

## 2023-10-24 RX ORDER — GABAPENTIN 400 MG/1
CAPSULE ORAL
Qty: 180 CAPSULE | Refills: 1 | Status: SHIPPED | OUTPATIENT
Start: 2023-10-24

## 2023-10-24 RX ORDER — ACYCLOVIR 400 MG/1
1 TABLET ORAL
Qty: 9 EACH | Refills: 4 | Status: SHIPPED | OUTPATIENT
Start: 2023-10-24

## 2023-10-24 NOTE — PROGRESS NOTES
"Chief Complaint  Diabetes    Subjective        Sonal De Dios presents to [facility]  History of Present Illness    Sonal De Dios is a 57-year-old female who presents today for a diabetic follow-up.    The patient states she is doing well. She reports she was in the hospital in 07/2023 or 08/2023. She notes she has had some issues with her Dexcom. She states her blood glucose levels have been elevated. She inquires if she can try the Dexcom 7.    She reports she has not been taking the lisinopril. She states she was having episodes; however, she notes she has not had any episodes. She notes she is checking her blood pressure at home, and it is still running in good range.    The patient states she will like an influenza vaccine.     Objective   Vital Signs:  /78 (BP Location: Right arm, Patient Position: Sitting, Cuff Size: Adult)   Pulse 75   Temp 96.4 °F (35.8 °C) (Temporal)   Resp 18   Ht 162.6 cm (64.02\")   Wt 74.1 kg (163 lb 4.8 oz)   SpO2 99%   BMI 28.02 kg/m²   Estimated body mass index is 28.02 kg/m² as calculated from the following:    Height as of this encounter: 162.6 cm (64.02\").    Weight as of this encounter: 74.1 kg (163 lb 4.8 oz).     A review of systems was performed, and the pertinent positives are noted in the HPI.      Physical Exam  Vitals reviewed.   Constitutional:       Appearance: Normal appearance.   Cardiovascular:      Rate and Rhythm: Normal rate and regular rhythm.      Heart sounds: No murmur heard.     No friction rub. No gallop.   Pulmonary:      Effort: Pulmonary effort is normal. No respiratory distress.      Breath sounds: Normal breath sounds. No wheezing, rhonchi or rales.   Skin:     General: Skin is warm and dry.   Neurological:      Mental Status: She is alert and oriented to person, place, and time.   Psychiatric:         Mood and Affect: Mood normal.        /78 (BP Location: Right arm, Patient Position: Sitting, " "Cuff Size: Adult)   Pulse 75   Temp 96.4 °F (35.8 °C) (Temporal)   Resp 18   Ht 162.6 cm (64.02\")   Wt 74.1 kg (163 lb 4.8 oz)   LMP 01/05/2019 (LMP Unknown)   SpO2 99%   BMI 28.02 kg/m²        Result Review :                   Assessment and Plan   Diagnoses and all orders for this visit:    1. Type 2 diabetes mellitus with other circulatory complication, with long-term current use of insulin (Primary)  -     Hemoglobin A1c  -     Lipid Panel With LDL / HDL Ratio  -     Comprehensive metabolic panel  -     Continuous Blood Gluc Sensor (Dexcom G7 Sensor) misc; Use 1 Application Every 10 (Ten) Days.  Dispense: 9 each; Refill: 4  -     gabapentin (NEURONTIN) 600 MG tablet; Take 1 tablet by mouth Every Night.  Dispense: 90 tablet; Refill: 1  -     gabapentin (NEURONTIN) 400 MG capsule; TAKE 1 CAPSULE BY MOUTH TWICE DAILY AT 8 AM AND AT 2 PM  Dispense: 180 capsule; Refill: 1    2. Essential hypertension    Other orders  -     Fluzone >6 Months (2475-2510)      1. Type 2 diabetes mellitus  - The patient will complete laboratory studies including CMP, lipid panel, hemoglobin A1c, and a urine sample. A prescription for Dexcom G7 sensor has been sent to her pharmacy. Refilled the patient's gabapentin 600 mg, and gabapentin 400 mg. She updated her gabapentin contract today.     2. Hypertension  - The patient's blood pressure today is 130/78 mmHg. She will discontinue lisinopril.    3. Health maintenance  - The patient will receive her influenza vaccine today. She declines the pneumonia vaccine.              Follow Up   No follow-ups on file.  Patient was given instructions and counseling regarding her condition or for health maintenance advice. Please see specific information pulled into the AVS if appropriate.           Transcribed from ambient dictation for SAMANTHA Perdomo by Bruce Escalera.  10/24/23   08:38 EDT    Patient or patient representative verbalized consent to the visit recording.  I have " personally performed the services described in this document as transcribed by the above individual, and it is both accurate and complete.

## 2023-10-25 LAB
ALBUMIN SERPL-MCNC: 4.4 G/DL (ref 3.5–5.2)
ALBUMIN/GLOB SERPL: 2 G/DL
ALP SERPL-CCNC: 91 U/L (ref 39–117)
ALT SERPL-CCNC: 33 U/L (ref 1–33)
AST SERPL-CCNC: 22 U/L (ref 1–32)
BILIRUB SERPL-MCNC: 0.3 MG/DL (ref 0–1.2)
BUN SERPL-MCNC: 14 MG/DL (ref 6–20)
BUN/CREAT SERPL: 17.9 (ref 7–25)
CALCIUM SERPL-MCNC: 10.1 MG/DL (ref 8.6–10.5)
CHLORIDE SERPL-SCNC: 102 MMOL/L (ref 98–107)
CHOLEST SERPL-MCNC: 191 MG/DL (ref 0–200)
CO2 SERPL-SCNC: 26.2 MMOL/L (ref 22–29)
CREAT SERPL-MCNC: 0.78 MG/DL (ref 0.57–1)
EGFRCR SERPLBLD CKD-EPI 2021: 88.2 ML/MIN/1.73
GLOBULIN SER CALC-MCNC: 2.2 GM/DL
GLUCOSE SERPL-MCNC: 107 MG/DL (ref 65–99)
HBA1C MFR BLD: 6.5 % (ref 4.8–5.6)
HDLC SERPL-MCNC: 54 MG/DL (ref 40–60)
LDLC SERPL CALC-MCNC: 116 MG/DL (ref 0–100)
LDLC/HDLC SERPL: 2.11 {RATIO}
POTASSIUM SERPL-SCNC: 4.8 MMOL/L (ref 3.5–5.2)
PROT SERPL-MCNC: 6.6 G/DL (ref 6–8.5)
SODIUM SERPL-SCNC: 138 MMOL/L (ref 136–145)
TRIGL SERPL-MCNC: 116 MG/DL (ref 0–150)
VLDLC SERPL CALC-MCNC: 21 MG/DL (ref 5–40)

## 2023-11-28 ENCOUNTER — PATIENT MESSAGE (OUTPATIENT)
Dept: FAMILY MEDICINE CLINIC | Facility: CLINIC | Age: 58
End: 2023-11-28
Payer: COMMERCIAL

## 2023-11-28 DIAGNOSIS — E11.59 TYPE 2 DIABETES MELLITUS WITH OTHER CIRCULATORY COMPLICATION, WITH LONG-TERM CURRENT USE OF INSULIN: ICD-10-CM

## 2023-11-28 DIAGNOSIS — E11.9 TYPE 2 DIABETES MELLITUS WITHOUT COMPLICATION, WITHOUT LONG-TERM CURRENT USE OF INSULIN: ICD-10-CM

## 2023-11-28 DIAGNOSIS — Z79.4 TYPE 2 DIABETES MELLITUS WITH OTHER CIRCULATORY COMPLICATION, WITH LONG-TERM CURRENT USE OF INSULIN: ICD-10-CM

## 2023-11-28 NOTE — TELEPHONE ENCOUNTER
From: Sonal De Dios  To: Ammy Holman  Sent: 11/28/2023 10:00 AM EST  Subject: Test Strips    Good morning, we have recently changed insurance companies to [employer] and they are not covering the dexcom nor the ozempic. Can I get you to send in a new prescription for more test strips as I'm now going to have to check my sugar at least 5-6 times a day. Thanks!

## 2023-11-29 RX ORDER — BLOOD SUGAR DIAGNOSTIC
1 STRIP MISCELLANEOUS
Qty: 200 EACH | Refills: 3 | Status: SHIPPED | OUTPATIENT
Start: 2023-11-29

## 2023-12-08 RX ORDER — INSULIN LISPRO 100 [IU]/ML
INJECTION, SOLUTION INTRAVENOUS; SUBCUTANEOUS
Qty: 18 ML | Refills: 1 | Status: SHIPPED | OUTPATIENT
Start: 2023-12-08

## 2023-12-08 RX ORDER — ACYCLOVIR 400 MG/1
1 TABLET ORAL
Qty: 9 EACH | Refills: 4 | Status: SHIPPED | OUTPATIENT
Start: 2023-12-08

## 2023-12-08 NOTE — TELEPHONE ENCOUNTER
Rx Refill Note  Requested Prescriptions     Pending Prescriptions Disp Refills    Insulin Lispro, 1 Unit Dial, (HumaLOG KwikPen) 100 UNIT/ML solution pen-injector [Pharmacy Med Name: HUMALOG 100 U/ML KWIK PEN INJ 3ML] 18 mL 1     Sig: INJECT 5 UNITS UNDER THE SKIN BEFORE BREAKFAST, 6 UNITS BEFORE LUNCH AND 8 UNITS BEFORE DINNER      Last office visit with prescribing clinician: 10/24/2023   Last telemedicine visit with prescribing clinician: Visit date not found   Next office visit with prescribing clinician: Visit date not found                         Would you like a call back once the refill request has been completed: [] Yes [] No    If the office needs to give you a call back, can they leave a voicemail: [] Yes [] No    Cipriano Shetty Rep  12/08/23, 13:58 EST

## 2023-12-14 ENCOUNTER — TELEPHONE (OUTPATIENT)
Dept: FAMILY MEDICINE CLINIC | Facility: CLINIC | Age: 58
End: 2023-12-14

## 2023-12-14 NOTE — TELEPHONE ENCOUNTER
PA submitted for Insulin Lispro and was denied due to the following:  The information submitted for the requested drug does not demonstrate [employer] preferred medications, Novolog Flexpen U-100 Insulin aspart subcutaneous (non-relion brand) or Novolog U-100 Insulin aspart subcutaneous solution (non-relion brand) and Fiasp FlexTouch U-100 Insulin subcutaneous pen or Fiasp U-100 Insulin, were ineffective.      Please advise.

## 2023-12-15 RX ORDER — INSULIN ASPART 100 [IU]/ML
INJECTION, SOLUTION INTRAVENOUS; SUBCUTANEOUS
Qty: 18 ML | Refills: 1 | Status: SHIPPED | OUTPATIENT
Start: 2023-12-15

## 2023-12-15 NOTE — TELEPHONE ENCOUNTER
Insurance wants 3 month trial of novalog before they will pay for any other drug. She was given novalog in the hospital only it looks like. Please advise.

## 2023-12-28 RX ORDER — ATORVASTATIN CALCIUM 10 MG/1
10 TABLET, FILM COATED ORAL DAILY
Qty: 90 TABLET | Refills: 1 | Status: SHIPPED | OUTPATIENT
Start: 2023-12-28

## 2024-01-11 ENCOUNTER — PATIENT MESSAGE (OUTPATIENT)
Dept: FAMILY MEDICINE CLINIC | Facility: CLINIC | Age: 59
End: 2024-01-11
Payer: COMMERCIAL

## 2024-01-11 DIAGNOSIS — E11.59 TYPE 2 DIABETES MELLITUS WITH OTHER CIRCULATORY COMPLICATION, WITH LONG-TERM CURRENT USE OF INSULIN: ICD-10-CM

## 2024-01-11 DIAGNOSIS — Z79.4 TYPE 2 DIABETES MELLITUS WITH OTHER CIRCULATORY COMPLICATION, WITH LONG-TERM CURRENT USE OF INSULIN: ICD-10-CM

## 2024-01-11 DIAGNOSIS — E11.9 TYPE 2 DIABETES MELLITUS WITHOUT COMPLICATION, WITHOUT LONG-TERM CURRENT USE OF INSULIN: ICD-10-CM

## 2024-01-11 RX ORDER — SEMAGLUTIDE 1.34 MG/ML
1 INJECTION, SOLUTION SUBCUTANEOUS WEEKLY
Qty: 9 ML | Refills: 1 | Status: SHIPPED | OUTPATIENT
Start: 2024-01-11

## 2024-01-11 RX ORDER — INSULIN LISPRO 100 [IU]/ML
INJECTION, SOLUTION INTRAVENOUS; SUBCUTANEOUS
Qty: 18 ML | Refills: 1 | Status: SHIPPED | OUTPATIENT
Start: 2024-01-11

## 2024-01-11 RX ORDER — ACYCLOVIR 400 MG/1
1 TABLET ORAL
Qty: 9 EACH | Refills: 4 | Status: SHIPPED | OUTPATIENT
Start: 2024-01-11

## 2024-01-11 RX ORDER — PEN NEEDLE, DIABETIC 32GX 5/32"
NEEDLE, DISPOSABLE MISCELLANEOUS
Qty: 100 EACH | Refills: 2 | Status: SHIPPED | OUTPATIENT
Start: 2024-01-11

## 2024-01-11 NOTE — TELEPHONE ENCOUNTER
Rx Refill Note  Requested Prescriptions     Pending Prescriptions Disp Refills   • gabapentin (NEURONTIN) 400 MG capsule [Pharmacy Med Name: GABAPENTIN 400MG CAPSULES] 60 capsule      Sig: TAKE 1 CAPSULE BY MOUTH TWICE DAILY AT 8 AM AND AT 2 PM      Last office visit with prescribing clinician: 2/8/2022      Next office visit with prescribing clinician: 7/12/2022       {TIP  Please add Last Relevant Lab Date if appropriate: 01/05/22    Marifer Salgado MA  07/08/22, 16:12 EDT  
10-Junior-2024 22:00

## 2024-01-11 NOTE — TELEPHONE ENCOUNTER
From: Sonal De Dios  To: Ammy Holman  Sent: 1/11/2024 9:09 AM EST  Subject: [employer] insurance change    Good morning Ammy,  We have changed insurances again and I'm still having issues getting prescriptions covered. Was hoping you could send in new prescriptions to [facility] for:  Ozempic 1mg  Dexcom 7 sensor  Humalog pen  B-D Pen Needles mini [id number] 100   One Touch Ultra Blue Test Strips 100 (change from 25)  Levemir isn't covered so maybe we could switch me to Lantas?  Gapabetin's are covered so I'm good with this one for now.  If these don't work then I may have to switch all the prescriptions to their mail pres service but prices came up higher than with [facility] for whatever reason so let's try [facility] first.  I have put in my new insurance card - it says pending as of right now in the portal.  Sorry for all the hassle but appreciate your help!  Thanks!

## 2024-01-17 ENCOUNTER — TELEPHONE (OUTPATIENT)
Dept: FAMILY MEDICINE CLINIC | Facility: CLINIC | Age: 59
End: 2024-01-17

## 2024-04-09 ENCOUNTER — OFFICE VISIT (OUTPATIENT)
Dept: FAMILY MEDICINE CLINIC | Facility: CLINIC | Age: 59
End: 2024-04-09
Payer: COMMERCIAL

## 2024-04-09 VITALS
TEMPERATURE: 96 F | OXYGEN SATURATION: 99 % | HEART RATE: 87 BPM | WEIGHT: 163.6 LBS | BODY MASS INDEX: 27.93 KG/M2 | HEIGHT: 64 IN | RESPIRATION RATE: 18 BRPM | SYSTOLIC BLOOD PRESSURE: 138 MMHG | DIASTOLIC BLOOD PRESSURE: 82 MMHG

## 2024-04-09 DIAGNOSIS — M79.601 BILATERAL ARM PAIN: ICD-10-CM

## 2024-04-09 DIAGNOSIS — E11.59 TYPE 2 DIABETES MELLITUS WITH OTHER CIRCULATORY COMPLICATION, WITH LONG-TERM CURRENT USE OF INSULIN: Primary | ICD-10-CM

## 2024-04-09 DIAGNOSIS — M79.602 BILATERAL ARM PAIN: ICD-10-CM

## 2024-04-09 DIAGNOSIS — Z79.4 TYPE 2 DIABETES MELLITUS WITH OTHER CIRCULATORY COMPLICATION, WITH LONG-TERM CURRENT USE OF INSULIN: Primary | ICD-10-CM

## 2024-04-09 LAB
ALBUMIN SERPL-MCNC: 4.7 G/DL (ref 3.5–5.2)
ALBUMIN/GLOB SERPL: 2 G/DL
ALP SERPL-CCNC: 93 U/L (ref 39–117)
ALT SERPL-CCNC: 16 U/L (ref 1–33)
AST SERPL-CCNC: 14 U/L (ref 1–32)
BILIRUB SERPL-MCNC: 0.2 MG/DL (ref 0–1.2)
BUN SERPL-MCNC: 16 MG/DL (ref 6–20)
BUN/CREAT SERPL: 18 (ref 7–25)
CALCIUM SERPL-MCNC: 9.8 MG/DL (ref 8.6–10.5)
CHLORIDE SERPL-SCNC: 103 MMOL/L (ref 98–107)
CO2 SERPL-SCNC: 27.9 MMOL/L (ref 22–29)
CREAT SERPL-MCNC: 0.89 MG/DL (ref 0.57–1)
EGFRCR SERPLBLD CKD-EPI 2021: 74.8 ML/MIN/1.73
GLOBULIN SER CALC-MCNC: 2.3 GM/DL
GLUCOSE SERPL-MCNC: 122 MG/DL (ref 65–99)
HBA1C MFR BLD: 6.3 % (ref 4.8–5.6)
POTASSIUM SERPL-SCNC: 5.1 MMOL/L (ref 3.5–5.2)
PROT SERPL-MCNC: 7 G/DL (ref 6–8.5)
SODIUM SERPL-SCNC: 139 MMOL/L (ref 136–145)

## 2024-04-09 PROCEDURE — 99214 OFFICE O/P EST MOD 30 MIN: CPT | Performed by: NURSE PRACTITIONER

## 2024-04-09 RX ORDER — LISINOPRIL 2.5 MG/1
2.5 TABLET ORAL DAILY
Start: 2024-04-09

## 2024-04-09 NOTE — PROGRESS NOTES
"Chief Complaint  Diabetes    Subjective        Sonal De Dios presents to [facility]  History of Present Illness    Sonal De Dios is a 58-year-old female who presents for diabetic follow-up.    The patient is uncertain of her current insulin regimen; however, she was previously on Humalog. Her long-term insulin regimen was switched from Levemir to Lantus since her insurance did not cover the cost. She will start the Lantus after she finishes out the Levemir. She has not had to refill the Humalog. She inquires if she should refill the Humalog. She is currently utilizing a Dexcom 7 device, with benefit. She temporarily discontinued Ozempic due to feeling \" blah\" one day out the week. However, she has had to increase her insulin usage, and has gained weight. She has recently restarted the Ozempic..      The patient's lisinopril medication was discontinued due to hypotension. She currently still has lisinopril at home. Recommend resuming lisinopril at 2.5 mg dose for renoprotection. She no longer exhibits signs of hypotension. She verbally understood and agrees with recommendations.     The patient describes experiencing pain in her arm, she originally thought the pain was a side effect from the COVID-19 vaccine. However, she inquires if the pain is associated with arthritis in her arms. She is not currently on any cholesterol medications. She states she is a side sleeper, which results in arm pain. She will experience pain when holding a cup. She is currently utilizing gabapentin and insulin. She has Voltaren gel at home.    Objective   Vital Signs:  /82 (BP Location: Right arm, Patient Position: Sitting, Cuff Size: Adult)   Pulse 87   Temp 96 °F (35.6 °C) (Temporal)   Resp 18   Ht 162.6 cm (64.02\")   Wt 74.2 kg (163 lb 9.6 oz)   SpO2 99%   BMI 28.07 kg/m²   Estimated body mass index is 28.07 kg/m² as calculated from the following:    Height as of this encounter: " "162.6 cm (64.02\").    Weight as of this encounter: 74.2 kg (163 lb 9.6 oz).          Physical Exam  Vitals reviewed.   Constitutional:       General: She is not in acute distress.     Appearance: She is well-developed. She is not diaphoretic.   HENT:      Head: Normocephalic and atraumatic.      Right Ear: Tympanic membrane, ear canal and external ear normal.      Left Ear: Tympanic membrane, ear canal and external ear normal.      Nose: Nose normal.      Mouth/Throat:      Pharynx: Uvula midline. No oropharyngeal exudate.   Cardiovascular:      Rate and Rhythm: Normal rate and regular rhythm.      Heart sounds: Normal heart sounds. No murmur heard.     No friction rub. No gallop.   Pulmonary:      Effort: Pulmonary effort is normal. No respiratory distress.      Breath sounds: Normal breath sounds. No wheezing or rales.   Abdominal:      General: Bowel sounds are normal. There is no distension.      Palpations: Abdomen is soft.      Tenderness: There is no abdominal tenderness.   Musculoskeletal:      Cervical back: Neck supple.   Skin:     General: Skin is warm and dry.   Neurological:      Mental Status: She is alert and oriented to person, place, and time.        Result Review :                     Assessment and Plan     Diagnoses and all orders for this visit:    1. Type 2 diabetes mellitus with other circulatory complication, with long-term current use of insulin (Primary)  -     Hemoglobin A1c  -     Comprehensive metabolic panel  -     Microalbumin / Creatinine Urine Ratio - Urine, Clean Catch    2. Bilateral arm pain    Other orders  -     lisinopril (PRINIVIL,ZESTRIL) 2.5 MG tablet; Take 1 tablet by mouth Daily.      1. Diabetes Mellitus  The patient's blood glucose levels have been well-managed. She will continue the NovoLog as prescribed. The use of Levemir will be discontinued. She will complete a Microalbumin / Creatinine Urine, comprehensive panel, and Hemoglobin A1c.     2. Hypertension  The " patient's blood pressure is well-regulated. She will restart lisinopril 2.5 mg. Should the patient experience any symptoms, lisinopril will be discontinued. She will continue to monitor her blood pressure.     3. 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 The patient was advised to use Biofreeze or Voltaren gel. Should the pain persist, a referral to a hand specialist will be made for potential injections.         Follow Up     No follow-ups on file.  Patient was given instructions and counseling regarding her condition or for health maintenance advice. Please see specific information pulled into the AVS if appropriate.       Transcribed from ambient dictation for SAMANTHA Perdomo by Bruce Escalera.  04/09/24   09:44 EDT    Patient or patient representative verbalized consent to the visit recording.  I have personally performed the services described in this document as transcribed by the above individual, and it is both accurate and complete.

## 2024-04-10 LAB
ALBUMIN/CREAT UR: ABNORMAL MG/G CREAT (ref 0–29)
CREAT UR-MCNC: 9.2 MG/DL
MICROALBUMIN UR-MCNC: <3 UG/ML

## 2024-05-24 ENCOUNTER — PATIENT MESSAGE (OUTPATIENT)
Dept: FAMILY MEDICINE CLINIC | Facility: CLINIC | Age: 59
End: 2024-05-24
Payer: COMMERCIAL

## 2024-05-24 DIAGNOSIS — E11.59 TYPE 2 DIABETES MELLITUS WITH OTHER CIRCULATORY COMPLICATION, WITH LONG-TERM CURRENT USE OF INSULIN: ICD-10-CM

## 2024-05-24 DIAGNOSIS — Z79.4 TYPE 2 DIABETES MELLITUS WITH OTHER CIRCULATORY COMPLICATION, WITH LONG-TERM CURRENT USE OF INSULIN: ICD-10-CM

## 2024-05-24 RX ORDER — GABAPENTIN 600 MG/1
600 TABLET ORAL NIGHTLY
Qty: 90 TABLET | Refills: 1 | Status: SHIPPED | OUTPATIENT
Start: 2024-05-24

## 2024-05-24 RX ORDER — GABAPENTIN 600 MG/1
600 TABLET ORAL NIGHTLY
Qty: 90 TABLET | OUTPATIENT
Start: 2024-05-24

## 2024-05-24 NOTE — TELEPHONE ENCOUNTER
Rx Refill Note  Requested Prescriptions     Pending Prescriptions Disp Refills    gabapentin (NEURONTIN) 600 MG tablet 90 tablet 1     Sig: Take 1 tablet by mouth Every Night.      Last office visit with prescribing clinician: 4/9/2024   Last telemedicine visit with prescribing clinician: Visit date not found   Next office visit with prescribing clinician: Visit date not found                         Would you like a call back once the refill request has been completed: [] Yes [] No    If the office needs to give you a call back, can they leave a voicemail: [] Yes [] No    Matilde Wang LPN  05/24/24, 07:49 EDT

## 2024-06-06 ENCOUNTER — PATIENT MESSAGE (OUTPATIENT)
Dept: FAMILY MEDICINE CLINIC | Facility: CLINIC | Age: 59
End: 2024-06-06
Payer: COMMERCIAL

## 2024-06-06 DIAGNOSIS — E11.59 TYPE 2 DIABETES MELLITUS WITH OTHER CIRCULATORY COMPLICATION, WITH LONG-TERM CURRENT USE OF INSULIN: ICD-10-CM

## 2024-06-06 DIAGNOSIS — Z79.4 TYPE 2 DIABETES MELLITUS WITH OTHER CIRCULATORY COMPLICATION, WITH LONG-TERM CURRENT USE OF INSULIN: ICD-10-CM

## 2024-06-07 RX ORDER — ACYCLOVIR 400 MG/1
1 TABLET ORAL
Qty: 9 EACH | Refills: 4 | Status: SHIPPED | OUTPATIENT
Start: 2024-06-07

## 2024-06-07 NOTE — TELEPHONE ENCOUNTER
From: Sonal De Dios  To: Ammy Holman  Sent: 6/6/2024 2:45 PM EDT  Subject: Dexcom G7 sensor    I’m trying to start [facility]. Much cheaper. I have sent in a request to get the Dexcom G7 mailed to me but they said they would need a prescription for this from you and they were sending something to you. So just fyi…in case and hopefully when you see this come over. Thanks!

## 2024-06-07 NOTE — TELEPHONE ENCOUNTER
Rx Refill Note  Requested Prescriptions     Pending Prescriptions Disp Refills    Continuous Glucose Sensor (Dexcom G7 Sensor) misc 9 each 4     Sig: Use 1 Application Every 10 (Ten) Days.      Last office visit with prescribing clinician: 4/9/2024   Last telemedicine visit with prescribing clinician: Visit date not found   Next office visit with prescribing clinician: Visit date not found                         Would you like a call back once the refill request has been completed: [] Yes [] No    If the office needs to give you a call back, can they leave a voicemail: [] Yes [] No    Matilde Wang LPN  06/07/24, 09:35 EDT

## 2024-06-14 RX ORDER — SEMAGLUTIDE 1.34 MG/ML
1 INJECTION, SOLUTION SUBCUTANEOUS WEEKLY
Qty: 9 ML | Refills: 1 | Status: SHIPPED | OUTPATIENT
Start: 2024-06-14

## 2024-06-14 NOTE — TELEPHONE ENCOUNTER
Rx Refill Note  Requested Prescriptions     Pending Prescriptions Disp Refills    Semaglutide, 1 MG/DOSE, (Ozempic, 1 MG/DOSE,) 4 MG/3ML solution pen-injector 9 mL 1     Sig: Inject 1 mg under the skin into the appropriate area as directed 1 (One) Time Per Week.      Last office visit with prescribing clinician: 4/9/2024   Last telemedicine visit with prescribing clinician: Visit date not found   Next office visit with prescribing clinician: Visit date not found                         Would you like a call back once the refill request has been completed: [] Yes [] No    If the office needs to give you a call back, can they leave a voicemail: [] Yes [] No    Lianne Rose MA  06/14/24, 16:40 EDT

## 2024-06-28 ENCOUNTER — PATIENT MESSAGE (OUTPATIENT)
Dept: FAMILY MEDICINE CLINIC | Facility: CLINIC | Age: 59
End: 2024-06-28
Payer: COMMERCIAL

## 2024-06-28 DIAGNOSIS — Z79.4 TYPE 2 DIABETES MELLITUS WITH OTHER CIRCULATORY COMPLICATION, WITH LONG-TERM CURRENT USE OF INSULIN: ICD-10-CM

## 2024-06-28 DIAGNOSIS — E11.59 TYPE 2 DIABETES MELLITUS WITH OTHER CIRCULATORY COMPLICATION, WITH LONG-TERM CURRENT USE OF INSULIN: ICD-10-CM

## 2024-06-28 RX ORDER — GABAPENTIN 400 MG/1
CAPSULE ORAL
Qty: 180 CAPSULE | Refills: 1 | Status: SHIPPED | OUTPATIENT
Start: 2024-06-28

## 2024-06-28 NOTE — TELEPHONE ENCOUNTER
From: Sonal De Dios  To: Ammy Holman  Sent: 6/28/2024 9:20 AM EDT  Subject: Gabapentin 400 mg    Good morning! Could you please send in a refill for the Gapabentin 400mg to my [facility]? Thanks so much!

## 2024-08-27 ENCOUNTER — OFFICE VISIT (OUTPATIENT)
Dept: FAMILY MEDICINE CLINIC | Facility: CLINIC | Age: 59
End: 2024-08-27
Payer: COMMERCIAL

## 2024-08-27 VITALS
HEIGHT: 64 IN | HEART RATE: 98 BPM | BODY MASS INDEX: 26.46 KG/M2 | SYSTOLIC BLOOD PRESSURE: 138 MMHG | DIASTOLIC BLOOD PRESSURE: 84 MMHG | OXYGEN SATURATION: 100 % | WEIGHT: 155 LBS

## 2024-08-27 DIAGNOSIS — E11.59 TYPE 2 DIABETES MELLITUS WITH OTHER CIRCULATORY COMPLICATION, WITH LONG-TERM CURRENT USE OF INSULIN: ICD-10-CM

## 2024-08-27 DIAGNOSIS — Z79.4 TYPE 2 DIABETES MELLITUS WITH OTHER CIRCULATORY COMPLICATION, WITH LONG-TERM CURRENT USE OF INSULIN: ICD-10-CM

## 2024-08-27 DIAGNOSIS — N30.00 ACUTE CYSTITIS WITHOUT HEMATURIA: Primary | ICD-10-CM

## 2024-08-27 LAB
BILIRUB BLD-MCNC: NEGATIVE MG/DL
CLARITY, POC: ABNORMAL
COLOR UR: ABNORMAL
EXPIRATION DATE: ABNORMAL
GLUCOSE UR STRIP-MCNC: NEGATIVE MG/DL
KETONES UR QL: NEGATIVE
LEUKOCYTE EST, POC: ABNORMAL
Lab: ABNORMAL
NITRITE UR-MCNC: POSITIVE MG/ML
PH UR: 7 [PH] (ref 5–8)
PROT UR STRIP-MCNC: ABNORMAL MG/DL
RBC # UR STRIP: ABNORMAL /UL
SP GR UR: 1.02 (ref 1–1.03)
UROBILINOGEN UR QL: NORMAL

## 2024-08-27 PROCEDURE — 99213 OFFICE O/P EST LOW 20 MIN: CPT | Performed by: NURSE PRACTITIONER

## 2024-08-27 PROCEDURE — 81003 URINALYSIS AUTO W/O SCOPE: CPT | Performed by: NURSE PRACTITIONER

## 2024-08-27 RX ORDER — NITROFURANTOIN 25; 75 MG/1; MG/1
100 CAPSULE ORAL 2 TIMES DAILY
Qty: 14 CAPSULE | Refills: 0 | Status: SHIPPED | OUTPATIENT
Start: 2024-08-27

## 2024-08-27 RX ORDER — PHENAZOPYRIDINE HYDROCHLORIDE 200 MG/1
200 TABLET, FILM COATED ORAL 3 TIMES DAILY PRN
Qty: 6 TABLET | Refills: 0 | Status: SHIPPED | OUTPATIENT
Start: 2024-08-27

## 2024-08-27 NOTE — PROGRESS NOTES
"Chief Complaint  Urinary Tract Infection    Subjective        Sonal De Dios presents to [facility]  Urinary Tract Infection     History of Present Illness  The patient presents for evaluation of a urinary tract infection (UTI).    She began experiencing symptoms of a UTI approximately two weeks ago, which initially presented as nausea. Due to the nausea, she discontinued her Ozempic medication, but continues to experience this symptom. She has noticed that her urine is cloudy and has an unusual odor. To alleviate these symptoms, she has been consuming large amounts of water in an attempt to flush out her system. She occasionally experiences a sharp, sudden pain  She reports no fever, back pain, or blood in her urine.    Objective   Vital Signs:  /84 (BP Location: Left arm, Patient Position: Sitting, Cuff Size: Large Adult)   Pulse 98   Ht 162.6 cm (64.02\")   Wt 70.3 kg (155 lb)   SpO2 100%   BMI 26.59 kg/m²   Estimated body mass index is 26.59 kg/m² as calculated from the following:    Height as of this encounter: 162.6 cm (64.02\").    Weight as of this encounter: 70.3 kg (155 lb).               Physical Exam  Constitutional:       General: She is not in acute distress.     Appearance: She is well-developed. She is not diaphoretic.   Cardiovascular:      Rate and Rhythm: Normal rate and regular rhythm.      Heart sounds: Normal heart sounds. No murmur heard.     No friction rub. No gallop.   Pulmonary:      Effort: Pulmonary effort is normal. No respiratory distress.      Breath sounds: Normal breath sounds. No wheezing or rales.   Abdominal:      Tenderness: There is no abdominal tenderness. There is no right CVA tenderness or left CVA tenderness.   Musculoskeletal:      Cervical back: Neck supple.   Skin:     General: Skin is warm and dry.   Neurological:      Mental Status: She is alert and oriented to person, place, and time.     Physical Exam      Result Review " :          Results  Laboratory Studies  A1c is in the six range.    Assessment & Plan  1. Urinary Tract Infection.  Symptoms of cloudy urine with odor, nausea, and absence of fever or back pain suggest a urinary tract infection. The patient has been prescribed Macrobid (nitrofurantoin) and Pyridium for symptom relief. A urine culture will be obtained to confirm the diagnosis and guide further treatment. If the culture results necessitate a change in antibiotics, the patient will be informed. She is advised to monitor her symptoms and report any worsening.    2. Diabetes Mellitus.  Her blood sugar levels have improved, with an A1c in the six range. She has stopped taking Ozempic due to nausea. She should continue monitoring her blood sugar levels and report any significant changes. She will resume ozempic               Assessment and Plan     Diagnoses and all orders for this visit:    1. Acute cystitis without hematuria (Primary)  -     POC Urinalysis Dipstick, Automated  -     Urine Culture - Urine, Urine, Clean Catch  -     nitrofurantoin, macrocrystal-monohydrate, (Macrobid) 100 MG capsule; Take 1 capsule by mouth 2 (Two) Times a Day.  Dispense: 14 capsule; Refill: 0  -     phenazopyridine (Pyridium) 200 MG tablet; Take 1 tablet by mouth 3 (Three) Times a Day As Needed for Dysuria.  Dispense: 6 tablet; Refill: 0    2. Type 2 diabetes mellitus with other circulatory complication, with long-term current use of insulin             Follow Up     No follow-ups on file.  Patient was given instructions and counseling regarding her condition or for health maintenance advice. Please see specific information pulled into the AVS if appropriate.       Patient or patient representative verbalized consent for the use of Ambient Listening during the visit with  SAMANTHA Perdomo for chart documentation. 8/27/2024  18:48 EDT

## 2024-08-30 LAB
BACTERIA UR CULT: ABNORMAL
BACTERIA UR CULT: ABNORMAL
OTHER ANTIBIOTIC SUSC ISLT: ABNORMAL

## 2024-09-09 ENCOUNTER — TELEPHONE (OUTPATIENT)
Dept: FAMILY MEDICINE CLINIC | Facility: CLINIC | Age: 59
End: 2024-09-09
Payer: COMMERCIAL

## 2024-09-11 DIAGNOSIS — R30.0 DYSURIA: Primary | ICD-10-CM

## 2024-09-17 ENCOUNTER — PATIENT MESSAGE (OUTPATIENT)
Dept: FAMILY MEDICINE CLINIC | Facility: CLINIC | Age: 59
End: 2024-09-17
Payer: COMMERCIAL

## 2024-09-17 DIAGNOSIS — E11.59 TYPE 2 DIABETES MELLITUS WITH OTHER CIRCULATORY COMPLICATION, WITH LONG-TERM CURRENT USE OF INSULIN: ICD-10-CM

## 2024-09-17 DIAGNOSIS — Z79.4 TYPE 2 DIABETES MELLITUS WITH OTHER CIRCULATORY COMPLICATION, WITH LONG-TERM CURRENT USE OF INSULIN: ICD-10-CM

## 2024-09-17 RX ORDER — SEMAGLUTIDE 1.34 MG/ML
1 INJECTION, SOLUTION SUBCUTANEOUS WEEKLY
Qty: 9 ML | Refills: 1 | Status: SHIPPED | OUTPATIENT
Start: 2024-09-17 | End: 2024-09-19 | Stop reason: SDUPTHER

## 2024-09-19 RX ORDER — SEMAGLUTIDE 1.34 MG/ML
1 INJECTION, SOLUTION SUBCUTANEOUS WEEKLY
Qty: 9 ML | Refills: 1 | Status: SHIPPED | OUTPATIENT
Start: 2024-09-19

## 2024-09-19 RX ORDER — GABAPENTIN 400 MG/1
CAPSULE ORAL
Qty: 180 CAPSULE | Refills: 1 | Status: SHIPPED | OUTPATIENT
Start: 2024-09-19

## 2024-09-19 RX ORDER — GABAPENTIN 600 MG/1
600 TABLET ORAL NIGHTLY
Qty: 90 TABLET | Refills: 1 | Status: SHIPPED | OUTPATIENT
Start: 2024-09-19

## 2024-11-18 ENCOUNTER — PATIENT MESSAGE (OUTPATIENT)
Dept: FAMILY MEDICINE CLINIC | Facility: CLINIC | Age: 59
End: 2024-11-18
Payer: COMMERCIAL

## 2024-11-18 DIAGNOSIS — Z79.4 TYPE 2 DIABETES MELLITUS WITH OTHER CIRCULATORY COMPLICATION, WITH LONG-TERM CURRENT USE OF INSULIN: ICD-10-CM

## 2024-11-18 DIAGNOSIS — E11.59 TYPE 2 DIABETES MELLITUS WITH OTHER CIRCULATORY COMPLICATION, WITH LONG-TERM CURRENT USE OF INSULIN: ICD-10-CM

## 2024-11-19 RX ORDER — INSULIN LISPRO 100 [IU]/ML
INJECTION, SOLUTION INTRAVENOUS; SUBCUTANEOUS
Qty: 18 ML | Refills: 1 | Status: SHIPPED | OUTPATIENT
Start: 2024-11-19

## 2024-11-19 RX ORDER — GABAPENTIN 600 MG/1
600 TABLET ORAL NIGHTLY
Qty: 90 TABLET | Refills: 1 | Status: SHIPPED | OUTPATIENT
Start: 2024-11-19

## 2024-11-19 NOTE — TELEPHONE ENCOUNTER
Rx Refill Note  Requested Prescriptions     Pending Prescriptions Disp Refills    Insulin Lispro, 1 Unit Dial, (HUMALOG) 100 UNIT/ML solution pen-injector [Pharmacy Med Name: INSULIN LISPRO 100U/ML KWIKPEN 3ML] 18 mL 1     Sig: INJECT 5 UNITS UNDER THE SKIN BEFORE BREAKFAST, 6 UNITS BEFORE LUNCH, AND 8 UNITS BEFORE DINNER      Last office visit with prescribing clinician: 8/27/2024   Last telemedicine visit with prescribing clinician: Visit date not found   Next office visit with prescribing clinician: Visit date not found                         Would you like a call back once the refill request has been completed: [] Yes [] No    If the office needs to give you a call back, can they leave a voicemail: [] Yes [] No    Quinn Fairchild MA  11/19/24, 13:46 EST

## 2025-01-05 DIAGNOSIS — Z79.4 TYPE 2 DIABETES MELLITUS WITH OTHER CIRCULATORY COMPLICATION, WITH LONG-TERM CURRENT USE OF INSULIN: ICD-10-CM

## 2025-01-05 DIAGNOSIS — E11.59 TYPE 2 DIABETES MELLITUS WITH OTHER CIRCULATORY COMPLICATION, WITH LONG-TERM CURRENT USE OF INSULIN: ICD-10-CM

## 2025-01-08 RX ORDER — GABAPENTIN 400 MG/1
CAPSULE ORAL
Qty: 180 CAPSULE | Refills: 1 | Status: SHIPPED | OUTPATIENT
Start: 2025-01-08

## 2025-01-14 DIAGNOSIS — E11.9 TYPE 2 DIABETES MELLITUS WITHOUT COMPLICATION, WITHOUT LONG-TERM CURRENT USE OF INSULIN: ICD-10-CM

## 2025-01-24 RX ORDER — INSULIN GLARGINE 100 [IU]/ML
INJECTION, SOLUTION SUBCUTANEOUS
Qty: 18 ML | Refills: 2 | Status: SHIPPED | OUTPATIENT
Start: 2025-01-24

## 2025-01-24 NOTE — TELEPHONE ENCOUNTER
Rx Refill Note  Requested Prescriptions     Pending Prescriptions Disp Refills    Lantus SoloStar 100 UNIT/ML injection pen [Pharmacy Med Name: LANTUS SOLOSTAR PEN INJ 3ML] 18 mL      Sig: INJECT 15 UNITS UNDER THE SKIN IN THE MORNING AND 13 UNITS UNDER THE SKIN IN THE EVENING      Last office visit with prescribing clinician: 8/27/2024   Last telemedicine visit with prescribing clinician: Visit date not found   Next office visit with prescribing clinician: 2/6/2025                         Would you like a call back once the refill request has been completed: [] Yes [] No    If the office needs to give you a call back, can they leave a voicemail: [] Yes [] No    Quinn Fairchild MA  01/24/25, 09:36 EST

## 2025-02-06 ENCOUNTER — OFFICE VISIT (OUTPATIENT)
Dept: FAMILY MEDICINE CLINIC | Facility: CLINIC | Age: 60
End: 2025-02-06
Payer: COMMERCIAL

## 2025-02-06 VITALS
DIASTOLIC BLOOD PRESSURE: 82 MMHG | WEIGHT: 166 LBS | BODY MASS INDEX: 28.34 KG/M2 | SYSTOLIC BLOOD PRESSURE: 132 MMHG | HEIGHT: 64 IN | OXYGEN SATURATION: 97 % | HEART RATE: 88 BPM

## 2025-02-06 DIAGNOSIS — R53.83 FATIGUE, UNSPECIFIED TYPE: ICD-10-CM

## 2025-02-06 DIAGNOSIS — Z00.00 ANNUAL PHYSICAL EXAM: Primary | ICD-10-CM

## 2025-02-06 DIAGNOSIS — E11.9 TYPE 2 DIABETES MELLITUS WITHOUT COMPLICATION, WITHOUT LONG-TERM CURRENT USE OF INSULIN: ICD-10-CM

## 2025-02-06 DIAGNOSIS — I10 PRIMARY HYPERTENSION: ICD-10-CM

## 2025-02-06 LAB
25(OH)D3+25(OH)D2 SERPL-MCNC: 35.9 NG/ML (ref 30–100)
ALBUMIN SERPL-MCNC: 4.4 G/DL (ref 3.5–5.2)
ALBUMIN/GLOB SERPL: 1.7 G/DL
ALP SERPL-CCNC: 88 U/L (ref 39–117)
ALT SERPL-CCNC: 19 U/L (ref 1–33)
AST SERPL-CCNC: 16 U/L (ref 1–32)
BASOPHILS # BLD AUTO: 0.04 10*3/MM3 (ref 0–0.2)
BASOPHILS NFR BLD AUTO: 0.8 % (ref 0–1.5)
BILIRUB SERPL-MCNC: 0.3 MG/DL (ref 0–1.2)
BUN SERPL-MCNC: 14 MG/DL (ref 8–23)
BUN/CREAT SERPL: 15.6 (ref 7–25)
CALCIUM SERPL-MCNC: 9.7 MG/DL (ref 8.6–10.5)
CHLORIDE SERPL-SCNC: 104 MMOL/L (ref 98–107)
CHOLEST SERPL-MCNC: 224 MG/DL (ref 0–200)
CO2 SERPL-SCNC: 23.7 MMOL/L (ref 22–29)
CREAT SERPL-MCNC: 0.9 MG/DL (ref 0.57–1)
EGFRCR SERPLBLD CKD-EPI 2021: 73.3 ML/MIN/1.73
EOSINOPHIL # BLD AUTO: 0.08 10*3/MM3 (ref 0–0.4)
EOSINOPHIL NFR BLD AUTO: 1.6 % (ref 0.3–6.2)
ERYTHROCYTE [DISTWIDTH] IN BLOOD BY AUTOMATED COUNT: 12.7 % (ref 12.3–15.4)
GLOBULIN SER CALC-MCNC: 2.6 GM/DL
GLUCOSE SERPL-MCNC: 159 MG/DL (ref 65–99)
HBA1C MFR BLD: 6.1 % (ref 4.8–5.6)
HCT VFR BLD AUTO: 35.4 % (ref 34–46.6)
HDLC SERPL-MCNC: 66 MG/DL (ref 40–60)
HGB BLD-MCNC: 11.7 G/DL (ref 12–15.9)
IMM GRANULOCYTES # BLD AUTO: 0.02 10*3/MM3 (ref 0–0.05)
IMM GRANULOCYTES NFR BLD AUTO: 0.4 % (ref 0–0.5)
IRON SATN MFR SERPL: 20 % (ref 20–50)
IRON SERPL-MCNC: 80 MCG/DL (ref 37–145)
LDLC SERPL CALC-MCNC: 142 MG/DL (ref 0–100)
LDLC/HDLC SERPL: 2.12 {RATIO}
LYMPHOCYTES # BLD AUTO: 1.61 10*3/MM3 (ref 0.7–3.1)
LYMPHOCYTES NFR BLD AUTO: 31.7 % (ref 19.6–45.3)
MCH RBC QN AUTO: 29.7 PG (ref 26.6–33)
MCHC RBC AUTO-ENTMCNC: 33.1 G/DL (ref 31.5–35.7)
MCV RBC AUTO: 89.8 FL (ref 79–97)
MONOCYTES # BLD AUTO: 0.24 10*3/MM3 (ref 0.1–0.9)
MONOCYTES NFR BLD AUTO: 4.7 % (ref 5–12)
NEUTROPHILS # BLD AUTO: 3.09 10*3/MM3 (ref 1.7–7)
NEUTROPHILS NFR BLD AUTO: 60.8 % (ref 42.7–76)
NRBC BLD AUTO-RTO: 0 /100 WBC (ref 0–0.2)
PLATELET # BLD AUTO: 297 10*3/MM3 (ref 140–450)
POTASSIUM SERPL-SCNC: 4.8 MMOL/L (ref 3.5–5.2)
PROT SERPL-MCNC: 7 G/DL (ref 6–8.5)
RBC # BLD AUTO: 3.94 10*6/MM3 (ref 3.77–5.28)
SODIUM SERPL-SCNC: 138 MMOL/L (ref 136–145)
TIBC SERPL-MCNC: 408 MCG/DL
TRIGL SERPL-MCNC: 90 MG/DL (ref 0–150)
TSH SERPL DL<=0.005 MIU/L-ACNC: 2.92 UIU/ML (ref 0.27–4.2)
UIBC SERPL-MCNC: 328 MCG/DL (ref 112–346)
VIT B12 SERPL-MCNC: 724 PG/ML (ref 211–946)
VLDLC SERPL CALC-MCNC: 16 MG/DL (ref 5–40)
WBC # BLD AUTO: 5.08 10*3/MM3 (ref 3.4–10.8)

## 2025-02-06 PROCEDURE — 99396 PREV VISIT EST AGE 40-64: CPT | Performed by: NURSE PRACTITIONER

## 2025-02-06 RX ORDER — LISINOPRIL 5 MG/1
5 TABLET ORAL DAILY
Start: 2025-02-06

## 2025-02-06 RX ORDER — LISINOPRIL 5 MG/1
5 TABLET ORAL DAILY
COMMUNITY
End: 2025-02-06

## 2025-02-06 NOTE — PROGRESS NOTES
"Chief Complaint  Diabetes and Annual Exam    Subjective        Sonal De Dios presents to [facility]  Diabetes      History of Present Illness  The patient presents for an annual exam and follow-up on diabetes.    She experienced a severe influenza infection post-[place], characterized by significant vomiting, diarrhea, and alternating episodes of sweating and chills. Her mammogram and Pap smear are current. She believes her colonoscopy is up to date however would like to proceed with cologuard in the future.  She has been experiencing fatigue and is interested in assessing her iron levels. She has resumed physical activity, specifically walking, after a month-long illness. Her sleep pattern is generally good, with the exception of the previous night.    She has been managing her diabetes with Ozempic but discontinued it for a month during her illness. She has resumed this but is not up to her previous dose. She has noticed weight gain since discontinuing Ozempic.   She had previously discontinued lisinopril due to episodes of dizziness, which she attributes to hypotension. However, she has since resumed the medication.    FAMILY HISTORY  She does not have a family history of colon cancer.    MEDICATIONS  Current: lisinopril, Ozempic, Dexcom  Discontinued: gabapentin    IMMUNIZATIONS  She declined the pneumonia vaccine, tetanus vaccine, and COVID-19 vaccine.    Objective   Vital Signs:  /82 (BP Location: Left arm, Patient Position: Sitting, Cuff Size: Large Adult)   Pulse 88   Ht 162.6 cm (64.02\")   Wt 75.3 kg (166 lb)   SpO2 97%   BMI 28.48 kg/m²   Estimated body mass index is 28.48 kg/m² as calculated from the following:    Height as of this encounter: 162.6 cm (64.02\").    Weight as of this encounter: 75.3 kg (166 lb).               Physical Exam  Vitals reviewed.   Constitutional:       General: She is not in acute distress.     Appearance: She is " well-developed. She is not diaphoretic.   HENT:      Head: Normocephalic and atraumatic.      Right Ear: Tympanic membrane, ear canal and external ear normal.      Left Ear: Tympanic membrane, ear canal and external ear normal.      Nose: Nose normal.      Mouth/Throat:      Mouth: Mucous membranes are moist.      Pharynx: Oropharynx is clear. Uvula midline. No oropharyngeal exudate.   Eyes:      Conjunctiva/sclera: Conjunctivae normal.      Pupils: Pupils are equal, round, and reactive to light.   Cardiovascular:      Rate and Rhythm: Normal rate and regular rhythm.      Heart sounds: Normal heart sounds. No murmur heard.     No friction rub. No gallop.   Pulmonary:      Effort: Pulmonary effort is normal. No respiratory distress.      Breath sounds: Normal breath sounds. No wheezing or rales.   Abdominal:      General: Bowel sounds are normal. There is no distension.      Palpations: Abdomen is soft.      Tenderness: There is no abdominal tenderness.   Musculoskeletal:      Cervical back: Neck supple.   Lymphadenopathy:      Cervical: No cervical adenopathy.   Skin:     General: Skin is warm and dry.   Neurological:      Mental Status: She is alert and oriented to person, place, and time.   Psychiatric:         Mood and Affect: Mood normal.       Physical Exam  Vital Signs  Blood pressure is 132/82.    Result Review :          Results      Assessment & Plan  1. Health maintenance.  Her blood pressure readings are within the normal range at 132/82. She is checking records for her colonoscopy. Her mammogram and Pap smear are up to date. She has declined the pneumonia, tetanus, and COVID-19 vaccines at this time. She has been advised to receive the shingles vaccine from her pharmacy. She has been informed that Cologuard is an alternative to colonoscopy, given her lack of family history of colon cancer.     2. Diabetes mellitus.  She has resumed her Ozempic regimen after a month-long hiatus due to illness. She reports  starting to gain weight back. She is advised to gradually increase the dose to avoid adverse effects. She will continue using Dexcom for glucose monitoring and will notify if she needs to switch pharmacies for refills.    3. Hypertension.  She had previously stopped taking lisinopril due to dizziness but has resumed it. Lisinopril 5 mg has been added back to her medication list. She is advised to request a refill when needed.    4. Fatigue  Will monitor labs today to include iron, vit d and vit b12    Follow-up  The patient will follow up in 6 months.    PROCEDURE  The patient has a history of C-sections and an ablation procedure.        Information/counseling provided to the patient regarding periodic josh maintenance recommendations, including but not limited to immunizations, diet/exercise/healthy lifestyle, laboratory, and other screenings. BMI is discussed. Appropriate exercise, diet, and weight plans are discussed.       Assessment and Plan     Diagnoses and all orders for this visit:    1. Annual physical exam (Primary)  -     CBC & Differential  -     Comprehensive Metabolic Panel  -     Lipid Panel With LDL / HDL Ratio  -     TSH Rfx On Abnormal To Free T4  -     Hemoglobin A1c    2. Fatigue, unspecified type  -     Iron and TIBC  -     Vitamin D 25 hydroxy  -     Vitamin B12    3. Type 2 diabetes mellitus without complication, without long-term current use of insulin  -     Hemoglobin A1c    4. Primary hypertension  -     CBC & Differential  -     Comprehensive Metabolic Panel    Other orders  -     lisinopril (PRINIVIL,ZESTRIL) 5 MG tablet; Take 1 tablet by mouth Daily.             Follow Up     No follow-ups on file.  Patient was given instructions and counseling regarding her condition or for health maintenance advice. Please see specific information pulled into the AVS if appropriate.       Patient or patient representative verbalized consent for the use of Ambient Listening during the visit with   SAMANTHA Perdomo for chart documentation. 2/6/2025  08:41 EST

## 2025-02-06 NOTE — PATIENT INSTRUCTIONS
Katina Bolton [facility]  [address] @[facility]  [phone]

## 2025-02-12 ENCOUNTER — PATIENT MESSAGE (OUTPATIENT)
Dept: FAMILY MEDICINE CLINIC | Facility: CLINIC | Age: 60
End: 2025-02-12
Payer: COMMERCIAL

## 2025-02-12 DIAGNOSIS — E11.59 TYPE 2 DIABETES MELLITUS WITH OTHER CIRCULATORY COMPLICATION, WITH LONG-TERM CURRENT USE OF INSULIN: ICD-10-CM

## 2025-02-12 DIAGNOSIS — Z79.4 TYPE 2 DIABETES MELLITUS WITH OTHER CIRCULATORY COMPLICATION, WITH LONG-TERM CURRENT USE OF INSULIN: ICD-10-CM

## 2025-02-12 RX ORDER — ACYCLOVIR 400 MG/1
TABLET ORAL
Qty: 3 EACH | OUTPATIENT
Start: 2025-02-12

## 2025-02-12 RX ORDER — ACYCLOVIR 400 MG/1
1 TABLET ORAL
Qty: 9 EACH | Refills: 4 | Status: SHIPPED | OUTPATIENT
Start: 2025-02-12

## 2025-03-12 RX ORDER — INSULIN LISPRO 100 [IU]/ML
INJECTION, SOLUTION INTRAVENOUS; SUBCUTANEOUS
Qty: 36 ML | Refills: 3 | Status: SHIPPED | OUTPATIENT
Start: 2025-03-12

## 2025-03-12 NOTE — TELEPHONE ENCOUNTER
Rx Refill Note  Requested Prescriptions     Pending Prescriptions Disp Refills    Insulin Lispro, 1 Unit Dial, (HUMALOG) 100 UNIT/ML solution pen-injector 18 mL 1     Sig: INJECT 5 UNITS UNDER THE SKIN BEFORE BREAKFAST, 6 UNITS BEFORE LUNCH AND 8 UNITS BEFORE DINNER      Last office visit with prescribing clinician: 2/6/2025   Last telemedicine visit with prescribing clinician: Visit date not found   Next office visit with prescribing clinician: 3/11/2025                         Would you like a call back once the refill request has been completed: [] Yes [] No    If the office needs to give you a call back, can they leave a voicemail: [] Yes [] No    Quinn Fairchild MA  03/12/25, 08:33 EDT

## 2025-03-26 DIAGNOSIS — Z79.4 TYPE 2 DIABETES MELLITUS WITH OTHER CIRCULATORY COMPLICATION, WITH LONG-TERM CURRENT USE OF INSULIN: ICD-10-CM

## 2025-03-26 DIAGNOSIS — E11.59 TYPE 2 DIABETES MELLITUS WITH OTHER CIRCULATORY COMPLICATION, WITH LONG-TERM CURRENT USE OF INSULIN: ICD-10-CM

## 2025-03-26 RX ORDER — SEMAGLUTIDE 1.34 MG/ML
INJECTION, SOLUTION SUBCUTANEOUS
Qty: 9 ML | Refills: 3 | Status: SHIPPED | OUTPATIENT
Start: 2025-03-26

## 2025-03-26 NOTE — TELEPHONE ENCOUNTER
Rx Refill Note  Requested Prescriptions     Pending Prescriptions Disp Refills    Ozempic, 1 MG/DOSE, 4 MG/3ML solution pen-injector [Pharmacy Med Name: Ozempic (1 MG/DOSE) 4 MG/3ML Subcutaneous Solution Pen-injector] 9 mL 3     Sig: INJECT SUBCUTANEOUSLY 1 MG EVERY WEEK AS DIRECTED INTO THE  APPROPRIATE AREA      Last office visit with prescribing clinician: 2/6/2025   Last telemedicine visit with prescribing clinician: Visit date not found   Next office visit with prescribing clinician: Visit date not found                         Would you like a call back once the refill request has been completed: [] Yes [] No    If the office needs to give you a call back, can they leave a voicemail: [] Yes [] No    Quinn Fairchild MA  03/26/25, 08:02 EDT

## 2025-04-08 RX ORDER — INSULIN GLARGINE 100 [IU]/ML
INJECTION, SOLUTION SUBCUTANEOUS
Qty: 21 ML | Refills: 2 | Status: SHIPPED | OUTPATIENT
Start: 2025-04-08

## 2025-04-21 ENCOUNTER — APPOINTMENT (OUTPATIENT)
Dept: GENERAL RADIOLOGY | Facility: HOSPITAL | Age: 60
End: 2025-04-21
Payer: COMMERCIAL

## 2025-04-21 ENCOUNTER — HOSPITAL ENCOUNTER (OUTPATIENT)
Facility: HOSPITAL | Age: 60
Setting detail: OBSERVATION
Discharge: HOME | End: 2025-04-23
Attending: EMERGENCY MEDICINE | Admitting: EMERGENCY MEDICINE
Payer: COMMERCIAL

## 2025-04-21 DIAGNOSIS — R05.9 COUGH, UNSPECIFIED TYPE: ICD-10-CM

## 2025-04-21 DIAGNOSIS — R11.2 NAUSEA AND VOMITING, UNSPECIFIED VOMITING TYPE: Primary | ICD-10-CM

## 2025-04-21 DIAGNOSIS — E10.9 TYPE 1 DIABETES MELLITUS WITHOUT COMPLICATION: ICD-10-CM

## 2025-04-21 DIAGNOSIS — E87.1 HYPONATREMIA: ICD-10-CM

## 2025-04-21 DIAGNOSIS — E87.6 HYPOKALEMIA: ICD-10-CM

## 2025-04-21 LAB
ALBUMIN SERPL-MCNC: 4.6 G/DL (ref 3.5–5.2)
ALBUMIN/GLOB SERPL: 1.6 G/DL
ALP SERPL-CCNC: 89 U/L (ref 39–117)
ALT SERPL W P-5'-P-CCNC: 11 U/L (ref 1–33)
ANION GAP SERPL CALCULATED.3IONS-SCNC: 18.6 MMOL/L (ref 5–15)
AST SERPL-CCNC: 14 U/L (ref 1–32)
B PARAPERT DNA SPEC QL NAA+PROBE: NOT DETECTED
B PERT DNA SPEC QL NAA+PROBE: NOT DETECTED
BACTERIA UR QL AUTO: ABNORMAL /HPF
BASOPHILS # BLD AUTO: 0.03 10*3/MM3 (ref 0–0.2)
BASOPHILS NFR BLD AUTO: 0.2 % (ref 0–1.5)
BILIRUB SERPL-MCNC: 0.7 MG/DL (ref 0–1.2)
BILIRUB UR QL STRIP: NEGATIVE
BUN SERPL-MCNC: 26 MG/DL (ref 8–23)
BUN/CREAT SERPL: 34.7 (ref 7–25)
C PNEUM DNA NPH QL NAA+NON-PROBE: NOT DETECTED
CALCIUM SPEC-SCNC: 9.3 MG/DL (ref 8.6–10.5)
CHLORIDE SERPL-SCNC: 92 MMOL/L (ref 98–107)
CLARITY UR: CLEAR
CO2 SERPL-SCNC: 19.4 MMOL/L (ref 22–29)
COLOR UR: YELLOW
CREAT SERPL-MCNC: 0.75 MG/DL (ref 0.57–1)
D-LACTATE SERPL-SCNC: 0.9 MMOL/L (ref 0.5–2)
DEPRECATED RDW RBC AUTO: 43 FL (ref 37–54)
EGFRCR SERPLBLD CKD-EPI 2021: 91.3 ML/MIN/1.73
EOSINOPHIL # BLD AUTO: 0 10*3/MM3 (ref 0–0.4)
EOSINOPHIL NFR BLD AUTO: 0 % (ref 0.3–6.2)
ERYTHROCYTE [DISTWIDTH] IN BLOOD BY AUTOMATED COUNT: 13.6 % (ref 12.3–15.4)
FLUAV SUBTYP SPEC NAA+PROBE: NOT DETECTED
FLUBV RNA ISLT QL NAA+PROBE: NOT DETECTED
GEN 5 1HR TROPONIN T REFLEX: 14 NG/L
GLOBULIN UR ELPH-MCNC: 2.8 GM/DL
GLUCOSE SERPL-MCNC: 123 MG/DL (ref 65–99)
GLUCOSE UR STRIP-MCNC: ABNORMAL MG/DL
HADV DNA SPEC NAA+PROBE: NOT DETECTED
HCOV 229E RNA SPEC QL NAA+PROBE: NOT DETECTED
HCOV HKU1 RNA SPEC QL NAA+PROBE: NOT DETECTED
HCOV NL63 RNA SPEC QL NAA+PROBE: NOT DETECTED
HCOV OC43 RNA SPEC QL NAA+PROBE: NOT DETECTED
HCT VFR BLD AUTO: 41.4 % (ref 34–46.6)
HGB BLD-MCNC: 14.1 G/DL (ref 12–15.9)
HGB UR QL STRIP.AUTO: NEGATIVE
HMPV RNA NPH QL NAA+NON-PROBE: NOT DETECTED
HPIV1 RNA ISLT QL NAA+PROBE: NOT DETECTED
HPIV2 RNA SPEC QL NAA+PROBE: NOT DETECTED
HPIV3 RNA NPH QL NAA+PROBE: NOT DETECTED
HPIV4 P GENE NPH QL NAA+PROBE: NOT DETECTED
HYALINE CASTS UR QL AUTO: ABNORMAL /LPF
IMM GRANULOCYTES # BLD AUTO: 0.09 10*3/MM3 (ref 0–0.05)
IMM GRANULOCYTES NFR BLD AUTO: 0.7 % (ref 0–0.5)
KETONES UR QL STRIP: ABNORMAL
LEUKOCYTE ESTERASE UR QL STRIP.AUTO: NEGATIVE
LIPASE SERPL-CCNC: 19 U/L (ref 13–60)
LYMPHOCYTES # BLD AUTO: 1.97 10*3/MM3 (ref 0.7–3.1)
LYMPHOCYTES NFR BLD AUTO: 14.8 % (ref 19.6–45.3)
M PNEUMO IGG SER IA-ACNC: NOT DETECTED
MAGNESIUM SERPL-MCNC: 2 MG/DL (ref 1.6–2.4)
MCH RBC QN AUTO: 29.7 PG (ref 26.6–33)
MCHC RBC AUTO-ENTMCNC: 34.1 G/DL (ref 31.5–35.7)
MCV RBC AUTO: 87.2 FL (ref 79–97)
MONOCYTES # BLD AUTO: 0.75 10*3/MM3 (ref 0.1–0.9)
MONOCYTES NFR BLD AUTO: 5.6 % (ref 5–12)
NEUTROPHILS NFR BLD AUTO: 10.45 10*3/MM3 (ref 1.7–7)
NEUTROPHILS NFR BLD AUTO: 78.7 % (ref 42.7–76)
NITRITE UR QL STRIP: NEGATIVE
NRBC BLD AUTO-RTO: 0 /100 WBC (ref 0–0.2)
NT-PROBNP SERPL-MCNC: 1445 PG/ML (ref 0–900)
PH UR STRIP.AUTO: 6 [PH] (ref 5–8)
PHOSPHATE SERPL-MCNC: 2.8 MG/DL (ref 2.5–4.5)
PLATELET # BLD AUTO: 390 10*3/MM3 (ref 140–450)
PMV BLD AUTO: 9.7 FL (ref 6–12)
POTASSIUM SERPL-SCNC: 2.8 MMOL/L (ref 3.5–5.2)
PROT SERPL-MCNC: 7.4 G/DL (ref 6–8.5)
PROT UR QL STRIP: ABNORMAL
QT INTERVAL: 337 MS
QTC INTERVAL: 450 MS
RBC # BLD AUTO: 4.75 10*6/MM3 (ref 3.77–5.28)
RBC # UR STRIP: ABNORMAL /HPF
REF LAB TEST METHOD: ABNORMAL
RHINOVIRUS RNA SPEC NAA+PROBE: NOT DETECTED
RSV RNA NPH QL NAA+NON-PROBE: NOT DETECTED
SARS-COV-2 RNA NPH QL NAA+NON-PROBE: NOT DETECTED
SODIUM SERPL-SCNC: 130 MMOL/L (ref 136–145)
SP GR UR STRIP: 1.02 (ref 1–1.03)
SQUAMOUS #/AREA URNS HPF: ABNORMAL /HPF
TROPONIN T % DELTA: 0
TROPONIN T NUMERIC DELTA: 0 NG/L
TROPONIN T SERPL HS-MCNC: 14 NG/L
UROBILINOGEN UR QL STRIP: ABNORMAL
WBC # UR STRIP: ABNORMAL /HPF
WBC NRBC COR # BLD AUTO: 13.29 10*3/MM3 (ref 3.4–10.8)

## 2025-04-21 PROCEDURE — 63710000001 ONDANSETRON ODT 4 MG TABLET DISPERSIBLE: Performed by: NURSE PRACTITIONER

## 2025-04-21 PROCEDURE — 96375 TX/PRO/DX INJ NEW DRUG ADDON: CPT

## 2025-04-21 PROCEDURE — 25010000002 POTASSIUM CHLORIDE 10 MEQ/100ML SOLUTION: Performed by: EMERGENCY MEDICINE

## 2025-04-21 PROCEDURE — 83735 ASSAY OF MAGNESIUM: CPT | Performed by: EMERGENCY MEDICINE

## 2025-04-21 PROCEDURE — 84484 ASSAY OF TROPONIN QUANT: CPT | Performed by: EMERGENCY MEDICINE

## 2025-04-21 PROCEDURE — 80053 COMPREHEN METABOLIC PANEL: CPT | Performed by: EMERGENCY MEDICINE

## 2025-04-21 PROCEDURE — 85025 COMPLETE CBC W/AUTO DIFF WBC: CPT | Performed by: EMERGENCY MEDICINE

## 2025-04-21 PROCEDURE — 84100 ASSAY OF PHOSPHORUS: CPT | Performed by: NURSE PRACTITIONER

## 2025-04-21 PROCEDURE — 82570 ASSAY OF URINE CREATININE: CPT

## 2025-04-21 PROCEDURE — 25810000003 LACTATED RINGERS SOLUTION: Performed by: EMERGENCY MEDICINE

## 2025-04-21 PROCEDURE — 81001 URINALYSIS AUTO W/SCOPE: CPT | Performed by: EMERGENCY MEDICINE

## 2025-04-21 PROCEDURE — 96365 THER/PROPH/DIAG IV INF INIT: CPT

## 2025-04-21 PROCEDURE — 25010000002 POTASSIUM CHLORIDE 10 MEQ/100ML SOLUTION

## 2025-04-21 PROCEDURE — 83880 ASSAY OF NATRIURETIC PEPTIDE: CPT | Performed by: EMERGENCY MEDICINE

## 2025-04-21 PROCEDURE — 99285 EMERGENCY DEPT VISIT HI MDM: CPT

## 2025-04-21 PROCEDURE — G0378 HOSPITAL OBSERVATION PER HR: HCPCS

## 2025-04-21 PROCEDURE — 25010000002 ONDANSETRON PER 1 MG: Performed by: EMERGENCY MEDICINE

## 2025-04-21 PROCEDURE — 96366 THER/PROPH/DIAG IV INF ADDON: CPT

## 2025-04-21 PROCEDURE — 83690 ASSAY OF LIPASE: CPT | Performed by: EMERGENCY MEDICINE

## 2025-04-21 PROCEDURE — 96376 TX/PRO/DX INJ SAME DRUG ADON: CPT

## 2025-04-21 PROCEDURE — 83605 ASSAY OF LACTIC ACID: CPT | Performed by: NURSE PRACTITIONER

## 2025-04-21 PROCEDURE — 82436 ASSAY OF URINE CHLORIDE: CPT

## 2025-04-21 PROCEDURE — 71045 X-RAY EXAM CHEST 1 VIEW: CPT

## 2025-04-21 PROCEDURE — 84156 ASSAY OF PROTEIN URINE: CPT

## 2025-04-21 PROCEDURE — 25010000002 ONDANSETRON PER 1 MG: Performed by: NURSE PRACTITIONER

## 2025-04-21 PROCEDURE — 87040 BLOOD CULTURE FOR BACTERIA: CPT | Performed by: NURSE PRACTITIONER

## 2025-04-21 PROCEDURE — 36415 COLL VENOUS BLD VENIPUNCTURE: CPT

## 2025-04-21 PROCEDURE — 96361 HYDRATE IV INFUSION ADD-ON: CPT

## 2025-04-21 PROCEDURE — 0202U NFCT DS 22 TRGT SARS-COV-2: CPT | Performed by: EMERGENCY MEDICINE

## 2025-04-21 PROCEDURE — 93005 ELECTROCARDIOGRAM TRACING: CPT | Performed by: EMERGENCY MEDICINE

## 2025-04-21 PROCEDURE — 25810000003 SODIUM CHLORIDE 0.9 % SOLUTION: Performed by: NURSE PRACTITIONER

## 2025-04-21 PROCEDURE — 93010 ELECTROCARDIOGRAM REPORT: CPT | Performed by: INTERNAL MEDICINE

## 2025-04-21 RX ORDER — SODIUM CHLORIDE 0.9 % (FLUSH) 0.9 %
10 SYRINGE (ML) INJECTION AS NEEDED
Status: DISCONTINUED | OUTPATIENT
Start: 2025-04-21 | End: 2025-04-23 | Stop reason: HOSPADM

## 2025-04-21 RX ORDER — AMOXICILLIN 250 MG
2 CAPSULE ORAL 2 TIMES DAILY PRN
Status: DISCONTINUED | OUTPATIENT
Start: 2025-04-21 | End: 2025-04-23 | Stop reason: HOSPADM

## 2025-04-21 RX ORDER — POTASSIUM CHLORIDE 1500 MG/1
40 TABLET, EXTENDED RELEASE ORAL EVERY 4 HOURS
Status: DISCONTINUED | OUTPATIENT
Start: 2025-04-21 | End: 2025-04-21

## 2025-04-21 RX ORDER — SODIUM CHLORIDE 0.9 % (FLUSH) 0.9 %
10 SYRINGE (ML) INJECTION EVERY 12 HOURS SCHEDULED
Status: DISCONTINUED | OUTPATIENT
Start: 2025-04-21 | End: 2025-04-23 | Stop reason: HOSPADM

## 2025-04-21 RX ORDER — NITROGLYCERIN 0.4 MG/1
0.4 TABLET SUBLINGUAL
Status: DISCONTINUED | OUTPATIENT
Start: 2025-04-21 | End: 2025-04-23 | Stop reason: HOSPADM

## 2025-04-21 RX ORDER — POTASSIUM CHLORIDE 7.45 MG/ML
10 INJECTION INTRAVENOUS
Status: COMPLETED | OUTPATIENT
Start: 2025-04-22 | End: 2025-04-22

## 2025-04-21 RX ORDER — ONDANSETRON 2 MG/ML
4 INJECTION INTRAMUSCULAR; INTRAVENOUS EVERY 6 HOURS PRN
Status: DISCONTINUED | OUTPATIENT
Start: 2025-04-21 | End: 2025-04-23 | Stop reason: HOSPADM

## 2025-04-21 RX ORDER — POLYETHYLENE GLYCOL 3350 17 G/17G
17 POWDER, FOR SOLUTION ORAL DAILY PRN
Status: DISCONTINUED | OUTPATIENT
Start: 2025-04-21 | End: 2025-04-23 | Stop reason: HOSPADM

## 2025-04-21 RX ORDER — SODIUM CHLORIDE 9 MG/ML
100 INJECTION, SOLUTION INTRAVENOUS CONTINUOUS
Status: ACTIVE | OUTPATIENT
Start: 2025-04-21 | End: 2025-04-22

## 2025-04-21 RX ORDER — POTASSIUM CHLORIDE 7.45 MG/ML
10 INJECTION INTRAVENOUS ONCE
Status: COMPLETED | OUTPATIENT
Start: 2025-04-21 | End: 2025-04-21

## 2025-04-21 RX ORDER — ONDANSETRON 2 MG/ML
4 INJECTION INTRAMUSCULAR; INTRAVENOUS ONCE
Status: COMPLETED | OUTPATIENT
Start: 2025-04-21 | End: 2025-04-21

## 2025-04-21 RX ORDER — BISACODYL 10 MG
10 SUPPOSITORY, RECTAL RECTAL DAILY PRN
Status: DISCONTINUED | OUTPATIENT
Start: 2025-04-21 | End: 2025-04-23 | Stop reason: HOSPADM

## 2025-04-21 RX ORDER — SODIUM CHLORIDE 9 MG/ML
40 INJECTION, SOLUTION INTRAVENOUS AS NEEDED
Status: DISCONTINUED | OUTPATIENT
Start: 2025-04-21 | End: 2025-04-23 | Stop reason: HOSPADM

## 2025-04-21 RX ORDER — BISACODYL 5 MG/1
5 TABLET, DELAYED RELEASE ORAL DAILY PRN
Status: DISCONTINUED | OUTPATIENT
Start: 2025-04-21 | End: 2025-04-23 | Stop reason: HOSPADM

## 2025-04-21 RX ORDER — SEMAGLUTIDE 1.34 MG/ML
INJECTION, SOLUTION SUBCUTANEOUS WEEKLY
COMMUNITY
End: 2025-05-02

## 2025-04-21 RX ORDER — ONDANSETRON 4 MG/1
4 TABLET, ORALLY DISINTEGRATING ORAL EVERY 6 HOURS PRN
Status: DISCONTINUED | OUTPATIENT
Start: 2025-04-21 | End: 2025-04-23 | Stop reason: HOSPADM

## 2025-04-21 RX ADMIN — Medication 10 ML: at 11:44

## 2025-04-21 RX ADMIN — ONDANSETRON 4 MG: 2 INJECTION, SOLUTION INTRAMUSCULAR; INTRAVENOUS at 20:24

## 2025-04-21 RX ADMIN — POTASSIUM CHLORIDE 10 MEQ: 7.46 INJECTION, SOLUTION INTRAVENOUS at 11:38

## 2025-04-21 RX ADMIN — Medication 10 ML: at 20:19

## 2025-04-21 RX ADMIN — SODIUM CHLORIDE 100 ML/HR: 9 INJECTION, SOLUTION INTRAVENOUS at 11:38

## 2025-04-21 RX ADMIN — SODIUM CHLORIDE, SODIUM LACTATE, POTASSIUM CHLORIDE, CALCIUM CHLORIDE 1000 ML: 20; 30; 600; 310 INJECTION, SOLUTION INTRAVENOUS at 10:08

## 2025-04-21 RX ADMIN — POTASSIUM CHLORIDE 10 MEQ: 7.46 INJECTION, SOLUTION INTRAVENOUS at 23:58

## 2025-04-21 RX ADMIN — SODIUM CHLORIDE 100 ML/HR: 9 INJECTION, SOLUTION INTRAVENOUS at 20:19

## 2025-04-21 RX ADMIN — POTASSIUM CHLORIDE 40 MEQ: 1500 TABLET, EXTENDED RELEASE ORAL at 15:03

## 2025-04-21 RX ADMIN — ONDANSETRON 4 MG: 4 TABLET, ORALLY DISINTEGRATING ORAL at 14:38

## 2025-04-21 RX ADMIN — ONDANSETRON 4 MG: 2 INJECTION, SOLUTION INTRAMUSCULAR; INTRAVENOUS at 10:14

## 2025-04-21 NOTE — H&P
[facility]   HISTORY AND PHYSICAL    Patient Name: Sonal De Dios  : 1965  MRN: [id number]  Primary Care Physician:  Ammy Holman APRN  Date of admission: 2025    Subjective   Subjective     Chief Complaint:   Chief Complaint   Patient presents with    Nausea    Vomiting    Cough         HPI:    Sonal De Dios is a 59 y.o. female ***    Review of Systems   {Gen ROS:[id number]}    Personal History     Past Medical History:   Diagnosis Date    Diabetes mellitus     Gestational diabetes     Pneumonia        Past Surgical History:   Procedure Laterality Date    ENDOMETRIAL ABLATION         Family History: family history includes Diabetes in her mother. Otherwise pertinent FHx was reviewed and not pertinent to current issue.    Social History:  reports that she has never smoked. She has never used smokeless tobacco. She reports that she does not drink alcohol and does not use drugs.    Home Medications:  Dexcom G7 Sensor, Insulin Glargine, Insulin Lispro (1 Unit Dial), Insulin Pen Needle, Semaglutide (1 MG/DOSE), gabapentin, glucose blood, and lisinopril    Allergies:  Allergies   Allergen Reactions    Codeine GI Intolerance       Objective   Objective     Vitals:   Temp:  [97.7 °F (36.5 °C)-98.8 °F (37.1 °C)] 98.8 °F (37.1 °C)  Heart Rate:  [101-116] 114  Resp:  [18] 18  BP: (165-169)/(91-95) 165/95  Physical Exam    Constitutional: Awake, alert   Eyes: PERRLA, sclerae anicteric, no conjunctival injection   HENT: NCAT, mucous membranes moist   Neck: Supple, no thyromegaly, no lymphadenopathy, trachea midline   Respiratory: Clear to auscultation bilaterally, nonlabored respirations    Cardiovascular: RRR, no murmurs, rubs, or gallops, palpable pedal pulses bilaterally   Gastrointestinal: Positive bowel sounds, soft, nontender, nondistended   Musculoskeletal: No bilateral ankle edema, no clubbing or cyanosis to extremities   Psychiatric: Appropriate affect,  cooperative   Neurologic: Oriented x 3, strength symmetric in all extremities, Cranial Nerves grossly intact to confrontation, speech clear   Skin: No rashes     Result Review    Result Review:  I have personally reviewed the results from the time of this admission to 4/21/2025 14:26 EDT and agree with these findings:  []  Laboratory list / accordion  []  Microbiology  []  Radiology  []  EKG/Telemetry   []  Cardiology/Vascular   []  Pathology  []  Old records  []  Other:  Most notable findings include: ***      Assessment & Plan   The 10-year ASCVD risk score (Taylor IRELAND, et al., 2019) is: 13.2%    Values used to calculate the score:      Age: 59 years      Sex: Female      Is Non- : No      Diabetic: Yes      Tobacco smoker: No      Systolic Blood Pressure: 165 mmHg      Is BP treated: Yes      HDL Cholesterol: 66 mg/dL      Total Cholesterol: 224 mg/dL    Assessment / Plan     Brief Patient Summary:  Sonal De Dios is a 59 y.o. female who ***    Active Hospital Problems:  Active Hospital Problems    Diagnosis     **Intractable nausea and vomiting      Plan:       VTE Prophylaxis:  Mechanical VTE prophylaxis orders are present.        CODE STATUS:       Admission Status:  I believe this patient meets observation status.    Electronically signed by SAMANTHA Steele, 04/21/25, 2:26 PM EDT.        75 minutes has been spent by [facility] providers in the care of this patient while under observation status on this date 04/21/25        I have worn appropriate PPE during this patient encounter, sanitized my hands both with entering and exiting patient's room.    I have discussed plan of care with patient including advance care plan and/or surrogate decision maker.  Patient advises that their *** will be their primary surrogate decision maker

## 2025-04-21 NOTE — ED NOTES
"Nursing report ED to floor  Sonal De Dios  59 y.o.  female    HPI :  HPI  Stated Reason for Visit: cough, congestion, n/v x few days; \"unable to keep anything down\"    Chief Complaint  Chief Complaint   Patient presents with    Nausea    Vomiting    Cough       Admitting doctor:   Neal Gibson MD    Admitting diagnosis:   The primary encounter diagnosis was Nausea and vomiting, unspecified vomiting type. Diagnoses of Cough, unspecified type, Hypokalemia, Hyponatremia, and Type 1 diabetes mellitus without complication were also pertinent to this visit.    Code status:   Current Code Status       Date Active Code Status Order ID Comments User Context       Prior            Allergies:   Codeine    Isolation:   No active isolations    Intake and Output    Intake/Output Summary (Last 24 hours) at 4/21/2025 1159  Last data filed at 4/21/2025 1141  Gross per 24 hour   Intake 1000 ml   Output --   Net 1000 ml       Weight:   There were no vitals filed for this visit.    Most recent vitals:   Vitals:    04/21/25 0923 04/21/25 0929 04/21/25 1001   BP:  165/94 169/91   Pulse: 116  101   Resp: 18     Temp: 97.7 °F (36.5 °C)     TempSrc: Tympanic     SpO2: 99%  98%       Active LDAs/IV Access:   Lines, Drains & Airways       Active LDAs       Name Placement date Placement time Site Days    Peripheral IV 04/21/25 1007 20 G Right Antecubital 04/21/25  1007  Antecubital  less than 1                    Labs (abnormal labs have a star):   Labs Reviewed   COMPREHENSIVE METABOLIC PANEL - Abnormal; Notable for the following components:       Result Value    Glucose 123 (*)     BUN 26 (*)     Sodium 130 (*)     Potassium 2.8 (*)     Chloride 92 (*)     CO2 19.4 (*)     BUN/Creatinine Ratio 34.7 (*)     Anion Gap 18.6 (*)     All other components within normal limits    Narrative:     GFR Categories in Chronic Kidney Disease (CKD)      GFR Category          GFR (mL/min/1.73)    Interpretation  G1                     90 or " greater         Normal or high (1)  G2                      60-89                Mild decrease (1)  G3a                   45-59                Mild to moderate decrease  G3b                   30-44                Moderate to severe decrease  G4                    15-29                Severe decrease  G5                    14 or less           Kidney failure          (1)In the absence of evidence of kidney disease, neither GFR category G1 or G2 fulfill the criteria for CKD.    eGFR calculation 2021 CKD-EPI creatinine equation, which does not include race as a factor   URINALYSIS W/ MICROSCOPIC IF INDICATED (NO CULTURE) - Abnormal; Notable for the following components:    Glucose,  mg/dL (Trace) (*)     Ketones, UA 80 mg/dL (3+) (*)     Protein,  mg/dL (2+) (*)     All other components within normal limits   CBC WITH AUTO DIFFERENTIAL - Abnormal; Notable for the following components:    WBC 13.29 (*)     Neutrophil % 78.7 (*)     Lymphocyte % 14.8 (*)     Eosinophil % 0.0 (*)     Immature Grans % 0.7 (*)     Neutrophils, Absolute 10.45 (*)     Immature Grans, Absolute 0.09 (*)     All other components within normal limits   BNP (IN-HOUSE) - Abnormal; Notable for the following components:    proBNP 1,445.0 (*)     All other components within normal limits    Narrative:     This assay is used as an aid in the diagnosis of individuals suspected of having heart failure. It can be used as an aid in the diagnosis of acute decompensated heart failure (ADHF) in patients presenting with signs and symptoms of ADHF to the emergency department (ED). In addition, NT-proBNP of <300 pg/mL indicates ADHF is not likely.    Age Range Result Interpretation  NT-proBNP Concentration (pg/mL:      <50             Positive            >450                   Gray                 300-450                    Negative             <300    50-75           Positive            >900                  Gray                300-900                   Negative            <300      >75             Positive            >1800                  Gray                300-1800                  Negative            <300   TROPONIN - Abnormal; Notable for the following components:    HS Troponin T 14 (*)     All other components within normal limits    Narrative:     High Sensitive Troponin T Reference Range:  <14.0 ng/L- Negative Female for AMI  <22.0 ng/L- Negative Male for AMI  >=14 - Abnormal Female indicating possible myocardial injury.  >=22 - Abnormal Male indicating possible myocardial injury.   Clinicians would have to utilize clinical acumen, EKG, Troponin, and serial changes to determine if it is an Acute Myocardial Infarction or myocardial injury due to an underlying chronic condition.        URINALYSIS, MICROSCOPIC ONLY - Abnormal; Notable for the following components:    WBC, UA 3-5 (*)     Squamous Epithelial Cells, UA 3-6 (*)     All other components within normal limits   RESPIRATORY PANEL PCR W/ COVID-19 (SARS-COV-2), NP SWAB IN UTM/VTP, 2 HR TAT - Normal    Narrative:     In the setting of a positive respiratory panel with a viral infection PLUS a negative procalcitonin without other underlying concern for bacterial infection, consider observing off antibiotics or discontinuation of antibiotics and continue supportive care. If the respiratory panel is positive for atypical bacterial infection (Bordetella pertussis, Chlamydophila pneumoniae, or Mycoplasma pneumoniae), consider antibiotic de-escalation to target atypical bacterial infection.   LIPASE - Normal   MAGNESIUM - Normal   PHOSPHORUS - Normal   BLOOD GAS, VENOUS   HIGH SENSITIVITIY TROPONIN T 1HR   CBC AND DIFFERENTIAL    Narrative:     The following orders were created for panel order CBC & Differential.  Procedure                               Abnormality         Status                     ---------                               -----------         ------                     CBC Auto  Differential[[id number]]        Abnormal            Final result                 Please view results for these tests on the individual orders.       EKG:   ECG 12 Lead Other; nausea, cough, weak   Preliminary Result   HEART WTVW=696  bpm   RR Ieeydwnp=839  ms   NH Uyyricqj=196  ms   P Horizontal Axis=-5  deg   P Front Axis=73  deg   QRSD Interval=93  ms   QT Yqgywcou=131  ms   DYeQ=642  ms   QRS Axis=-10  deg   T Wave Axis=54  deg   - BORDERLINE ECG -   Sinus tachycardia   LAE, consider biatrial enlargement   Borderline T abnormalities, anterior leads   Date and Time of Study:2025-04-21 10:30:55          Meds given in ED:   Medications   sodium chloride 0.9 % flush 10 mL (has no administration in time range)   potassium chloride 10 mEq in 100 mL IVPB (10 mEq Intravenous New Bag 4/21/25 1138)   sodium chloride 0.9 % infusion (100 mL/hr Intravenous New Bag 4/21/25 1138)   sodium chloride 0.9 % flush 10 mL (10 mL Intravenous Given 4/21/25 1144)   sodium chloride 0.9 % flush 10 mL (has no administration in time range)   sodium chloride 0.9 % infusion 40 mL (has no administration in time range)   nitroglycerin (NITROSTAT) SL tablet 0.4 mg (has no administration in time range)   Potassium Replacement - Follow Nurse / BPA Driven Protocol (has no administration in time range)   Magnesium Standard Dose Replacement - Follow Nurse / BPA Driven Protocol (has no administration in time range)   Phosphorus Replacement - Follow Nurse / BPA Driven Protocol (has no administration in time range)   Calcium Replacement - Follow Nurse / BPA Driven Protocol (has no administration in time range)   sennosides-docusate (PERICOLACE) 8.6-50 MG per tablet 2 tablet (has no administration in time range)     And   polyethylene glycol (MIRALAX) packet 17 g (has no administration in time range)     And   bisacodyl (DULCOLAX) EC tablet 5 mg (has no administration in time range)     And   bisacodyl (DULCOLAX) suppository 10 mg (has no administration in  time range)   lactated ringers bolus 1,000 mL (0 mL Intravenous Stopped 4/21/25 1141)   ondansetron (ZOFRAN) injection 4 mg (4 mg Intravenous Given 4/21/25 1014)       Imaging results:  XR Chest 1 View  Result Date: 4/21/2025  No evidence for acute pulmonary process. Follow-up as clinical indications persist.  This report was finalized on 4/21/2025 10:53 AM by Dr. Ovidio Briggs M.D on Workstation: [id number]        Ambulatory status:   - assist     Social issues:   Social History     Socioeconomic History    Marital status:    Tobacco Use    Smoking status: Never    Smokeless tobacco: Never   Vaping Use    Vaping status: Never Used   Substance and Sexual Activity    Alcohol use: No    Drug use: No    Sexual activity: Yes     Partners: Male     Birth control/protection: Surgical       Peripheral Neurovascular  Peripheral Neurovascular (Adult)  Peripheral Neurovascular WDL: WDL    Neuro Cognitive  Neuro Cognitive (Adult)  Cognitive/Neuro/Behavioral WDL: WDL    Learning  Learning Assessment  Learning Readiness and Ability: no barriers identified    Respiratory  Respiratory WDL  Respiratory WDL: .WDL except, cough, all  Rhythm/Pattern, Respiratory: shortness of breath  Cough Frequency: infrequent  Cough Type: dry    Abdominal Pain       Pain Assessments  Pain (Adult)  (0-10) Pain Rating: Rest: 0    NIH Stroke Scale       Allie Guillaume RN  04/21/25 11:59 EDT

## 2025-04-21 NOTE — PROGRESS NOTES
PING GONZALEZ Attestation Note    I supervised care provided by the midlevel provider.    The FATUMA and I have discussed this patient's history, physical exam, and treatment plan. I have reviewed the documentation and personally had a face to face interaction with the patient  I affirm the documentation and agree with the treatment and plan. I provided a substantive portion of the care of this patient.  I personally performed the physical exam, in its entirety.  My personal findings are documented in below:    History:  59-year-old female presents with intractable nausea and vomiting without diarrhea for the last 4 days.  No black or bloody stools reported.  Patient feeling better after fluids.  No abdominal pain reported.    Physical Exam:  General: No acute distress.  HENT: NCAT  Eyes: no scleral icterus.  Neck: Painless range of motion  CV: Pink warm and well-perfused throughout  Respiratory: No distress or increased work of breathing  Abdomen: soft, no focal tenderness or rigidity.  Benign exam  Musculoskeletal: no deformity.  Neuro: Alert, speech fluent and easily intelligible  Skin: warm, dry.    Assessment and Plan:  Nausea vomiting: Improved with IV fluids and antiemetics  Electrolyte disturbance: Monitor closely following replacement

## 2025-04-21 NOTE — ED PROVIDER NOTES
EMERGENCY DEPARTMENT ENCOUNTER  Room Number:  31/31  PCP: Ammy Holman APRN  Independent Historians: Patient      HPI:  Chief Complaint: had concerns including Nausea, Vomiting, and Cough.     A complete HPI/ROS/PMH/PSH/SH/FH are unobtainable due to: N/A      Context: Sonal De Dios is a 59 y.o. female with a medical history of type 1 diabetes, hypertension who presents to the ED c/o acute nausea and vomiting for the last 3 days.  She is a type I diabetic and states that her blood sugar has been running high according to her Dexcom initially but in the last 24 hours have improved to the 140s.  She denies abdominal pain.  She denies diarrhea.  She has not taken her temperature but she has been having intermittent periods of feeling hot and then cold.  She has had a dry cough and she denies shortness of breath or chest pain.      Review of prior external notes (non-ED) -and- Review of prior external test results outside of this encounter: PCP visit reviewed from 2/6/2025 for patient was seen for fatigue, diabetes, hypertension.  Of note, the diagnosis during that visit was type 2 diabetes however patient reports history of type 1 diabetes.        PAST MEDICAL HISTORY  Active Ambulatory Problems     Diagnosis Date Noted    Uncontrolled type 2 diabetes mellitus with diabetic neuropathy, with long-term current use of insulin 04/10/2018    Encounter for long-term (current) use of insulin 04/10/2018    Hyperinsulinism 04/10/2018    Noncompliance with medication regimen 04/10/2018    Type 1 diabetes mellitus with diabetic neuropathy 06/27/2018    Diabetic ketoacidosis without coma associated with type 1 diabetes mellitus 08/01/2022    Intractable nausea and vomiting 08/09/2023    HTN (hypertension) 07/23/2013    Status post hysteroscopic polypectomy 12/01/2015    Dehydration 08/09/2023    Decreased oral intake 08/09/2023    Hyperlipidemia 08/10/2023    COVID-19 virus infection 08/10/2023    Norovirus  "Subjective:      Patient ID: Myriam Denise is a 18 y.o. female.    Vitals:  height is 5' 2" (1.575 m) and weight is 64.9 kg (143 lb). Her temperature is 97.3 °F (36.3 °C). Her blood pressure is 95/69 and her pulse is 58 (abnormal). Her respiration is 18 and oxygen saturation is 99%.     Chief Complaint: Ear Drainage (PATIENT STATES HER EAR DRUM RUPTURED)    Myriam Denise is a 18 year old female whom presents today with ear pain since last night. Patient states her "ear drum ruptured last night." Patient denies any trauma to the ear. Patient reports multiple tympanic membrane perforations in the past to bilateral ears. Patient reports most recent perforation was approximately one year prior but she is unsure which ear was affected. Patient has not been evaluated by ENT but would like to do so when she goes back to her home town, [place].     Ear Drainage   There is pain in the left ear. This is a new problem. The current episode started yesterday. The problem has been gradually worsening. There has been no fever. The pain is at a severity of 2/10 (Patient states last night was an 8). Associated symptoms include ear discharge and a sore throat. Pertinent negatives include no hearing loss. Treatments tried: Patient took Advil and sudafed. The treatment provided mild relief.       HENT:  Positive for ear pain, ear discharge and sore throat. Negative for tinnitus, hearing loss, trouble swallowing and voice change.       Objective:     Physical Exam   HENT:   Head: Normocephalic and atraumatic.   Ears:   Right Ear: Hearing and ear canal normal. Tympanic membrane is scarred.   Left Ear: There is drainage and tenderness. Tympanic membrane is perforated and erythematous.   Mouth/Throat: Cobblestoning present. No oropharyngeal exudate, posterior oropharyngeal edema, posterior oropharyngeal erythema or tonsillar abscesses. Tonsils are 2+ on the right. Tonsils are 2+ on the left. No tonsillar exudate.   Neck: Neck supple. " Called [facility] and advised that until patient is seen, Dr. Andre is unable to refill/prescribe medication and that previous neurologist should still be managing this until established with new neurologist.  [facility]icaela was understanding and they are reaching out to previous neurologist.     Cardiovascular: Normal rate and normal pulses.   Pulmonary/Chest: Effort normal.   Abdominal: Normal appearance.   Musculoskeletal: Normal range of motion.         General: Normal range of motion.   Neurological: no focal deficit. She is alert.   Skin: Skin is warm and dry. Capillary refill takes less than 2 seconds.   Psychiatric: Her behavior is normal. Mood normal.   Nursing note and vitals reviewed.      Assessment:     1. Left otitis media with spontaneous rupture of eardrum        Plan:       Left otitis media with spontaneous rupture of eardrum  -     ofloxacin (FLOXIN) 0.3 % otic solution; Place 5 drops into the left ear 2 (two) times daily. for 5 days  Dispense: 5 mL; Refill: 0          Medical Decision Making:   Differential Diagnosis:   Serous otitis media, bacterial vs fungal otitis media, otalgia, ruptured TM, cerumen impaction.     Urgent Care Management:  Previous encounters were independently reviewed. Discussed with patient  all pertinent information and results. Discussed patient diagnosis and plan of treatment. Additional plan of care as outlined above. Patient  was given all follow up and return instructions. All questions and concerns were addressed at this time. Patient  expresses understanding of information and instructions, and is comfortable with plan.    Patient was instructed to follow up with his Primary Care Provider if no improvement in symptoms in 3-5 DAYS:  or go to ED immediately for any worsening or change in current symptoms. Patient verbalized understanding and agrees with the discussed plan of care. Patient remained stable throughout the visit and exited the exam room in NAD.             Patient Instructions     Please keep ears clean and dry. Avoid sticking any objects into the ear.    Please complete your entire course of antibiotics as prescribed to ensure effectiveness.   Please start an OTC probiotic while taking antibiotics to replenish GI shivam affected by antibiotic  08/11/2023    Hyponatremia 08/11/2023    Hypophosphatemia 08/11/2023    Ketonuria 08/11/2023    Type 2 diabetes mellitus with hyperglycemia, with long-term current use of insulin 08/11/2023    Cytokine release syndrome, grade 1 08/12/2023     Resolved Ambulatory Problems     Diagnosis Date Noted    Diabetic ketoacidosis with coma associated with type 2 diabetes mellitus 03/06/2018    Edema 03/10/2018    Diabetic ketoacidosis without coma associated with type 1 diabetes mellitus 07/29/2022    Ketosis 08/09/2023    Increased anion gap metabolic acidosis 08/09/2023     Past Medical History:   Diagnosis Date    Diabetes mellitus     Gestational diabetes     Pneumonia          PAST SURGICAL HISTORY  Past Surgical History:   Procedure Laterality Date    ENDOMETRIAL ABLATION           FAMILY HISTORY  Family History   Problem Relation Age of Onset    Diabetes Mother     Breast cancer Neg Hx          SOCIAL HISTORY  Social History     Socioeconomic History    Marital status:    Tobacco Use    Smoking status: Never    Smokeless tobacco: Never   Vaping Use    Vaping status: Never Used   Substance and Sexual Activity    Alcohol use: No    Drug use: No    Sexual activity: Yes     Partners: Male     Birth control/protection: Surgical         ALLERGIES  Codeine      REVIEW OF SYSTEMS  Review of all 14 systems is negative other than stated in the HPI above.      PHYSICAL EXAM    I have reviewed the triage vital signs and nursing notes.    ED Triage Vitals   Temp Heart Rate Resp BP SpO2   04/21/25 0923 04/21/25 0923 04/21/25 0923 04/21/25 0929 04/21/25 0923   97.7 °F (36.5 °C) 116 18 165/94 99 %      Temp src Heart Rate Source Patient Position BP Location FiO2 (%)   04/21/25 0923 -- -- -- --   Tympanic             GENERAL: awake and alert, no acute distress  HENT: Normocephalic, atraumatic  EYES: no scleral icterus  CV: regular rhythm, regular rate  RESPIRATORY: normal effort, lungs clear to auscultation bilaterally  ABDOMEN:  soft, nondistended, nontender throughout  MUSCULOSKELETAL: no deformity  NEURO: alert, moves all extremities, follows commands, cranial nerves II through XII intact, speech fluent and clear  PSYCH: calm, cooperative  SKIN: Warm, dry          LAB RESULTS  Recent Results (from the past 24 hours)   Comprehensive Metabolic Panel    Collection Time: 04/21/25 10:07 AM    Specimen: Arm, Right; Blood   Result Value Ref Range    Glucose 123 (H) 65 - 99 mg/dL    BUN 26 (H) 8 - 23 mg/dL    Creatinine 0.75 0.57 - 1.00 mg/dL    Sodium 130 (L) 136 - 145 mmol/L    Potassium 2.8 (L) 3.5 - 5.2 mmol/L    Chloride 92 (L) 98 - 107 mmol/L    CO2 19.4 (L) 22.0 - 29.0 mmol/L    Calcium 9.3 8.6 - 10.5 mg/dL    Total Protein 7.4 6.0 - 8.5 g/dL    Albumin 4.6 3.5 - 5.2 g/dL    ALT (SGPT) 11 1 - 33 U/L    AST (SGOT) 14 1 - 32 U/L    Alkaline Phosphatase 89 39 - 117 U/L    Total Bilirubin 0.7 0.0 - 1.2 mg/dL    Globulin 2.8 gm/dL    A/G Ratio 1.6 g/dL    BUN/Creatinine Ratio 34.7 (H) 7.0 - 25.0    Anion Gap 18.6 (H) 5.0 - 15.0 mmol/L    eGFR 91.3 >60.0 mL/min/1.73   Lipase    Collection Time: 04/21/25 10:07 AM    Specimen: Arm, Right; Blood   Result Value Ref Range    Lipase 19 13 - 60 U/L   CBC Auto Differential    Collection Time: 04/21/25 10:07 AM    Specimen: Arm, Right; Blood   Result Value Ref Range    WBC 13.29 (H) 3.40 - 10.80 10*3/mm3    RBC 4.75 3.77 - 5.28 10*6/mm3    Hemoglobin 14.1 12.0 - 15.9 g/dL    Hematocrit 41.4 34.0 - 46.6 %    MCV 87.2 79.0 - 97.0 fL    MCH 29.7 26.6 - 33.0 pg    MCHC 34.1 31.5 - 35.7 g/dL    RDW 13.6 12.3 - 15.4 %    RDW-SD 43.0 37.0 - 54.0 fl    MPV 9.7 6.0 - 12.0 fL    Platelets 390 140 - 450 10*3/mm3    Neutrophil % 78.7 (H) 42.7 - 76.0 %    Lymphocyte % 14.8 (L) 19.6 - 45.3 %    Monocyte % 5.6 5.0 - 12.0 %    Eosinophil % 0.0 (L) 0.3 - 6.2 %    Basophil % 0.2 0.0 - 1.5 %    Immature Grans % 0.7 (H) 0.0 - 0.5 %    Neutrophils, Absolute 10.45 (H) 1.70 - 7.00 10*3/mm3    Lymphocytes, Absolute 1.97 0.70 -  use.   Start Antihistamine such as zyrtec, Claritin, xyzal to help with effusion (fluid)  Tylenol/Ibuprofen as needed for pain/discomfort.     Please arrange follow up with your primary medical clinic as soon as possible if symptoms worsen or persist. You must understand that you've received an Urgent Care treatment only and that you may be released before all of your medical problems are known or treated. You, the patient, will arrange for follow up as instructed. If your symptoms worsen or fail to improve you should go to the Emergency Room.           3.10 10*3/mm3    Monocytes, Absolute 0.75 0.10 - 0.90 10*3/mm3    Eosinophils, Absolute 0.00 0.00 - 0.40 10*3/mm3    Basophils, Absolute 0.03 0.00 - 0.20 10*3/mm3    Immature Grans, Absolute 0.09 (H) 0.00 - 0.05 10*3/mm3    nRBC 0.0 0.0 - 0.2 /100 WBC   BNP    Collection Time: 04/21/25 10:07 AM    Specimen: Arm, Right; Blood   Result Value Ref Range    proBNP 1,445.0 (H) 0.0 - 900.0 pg/mL   High Sensitivity Troponin T    Collection Time: 04/21/25 10:07 AM    Specimen: Arm, Right; Blood   Result Value Ref Range    HS Troponin T 14 (H) <14 ng/L   Magnesium    Collection Time: 04/21/25 10:07 AM    Specimen: Arm, Right; Blood   Result Value Ref Range    Magnesium 2.0 1.6 - 2.4 mg/dL   Phosphorus    Collection Time: 04/21/25 10:07 AM    Specimen: Arm, Right; Blood   Result Value Ref Range    Phosphorus 2.8 2.5 - 4.5 mg/dL   Respiratory Panel PCR w/COVID-19(SARS-CoV-2) JORDAN/CECILY/TRACY/PAD/COR/URIEL In-House, NP Swab in UTM/VTM, 2 HR TAT - Swab, Nasopharynx    Collection Time: 04/21/25 10:16 AM    Specimen: Nasopharynx; Swab   Result Value Ref Range    ADENOVIRUS, PCR Not Detected Not Detected    Coronavirus 229E Not Detected Not Detected    Coronavirus HKU1 Not Detected Not Detected    Coronavirus NL63 Not Detected Not Detected    Coronavirus OC43 Not Detected Not Detected    COVID19 Not Detected Not Detected - Ref. Range    Human Metapneumovirus Not Detected Not Detected    Human Rhinovirus/Enterovirus Not Detected Not Detected    Influenza A PCR Not Detected Not Detected    Influenza B PCR Not Detected Not Detected    Parainfluenza Virus 1 Not Detected Not Detected    Parainfluenza Virus 2 Not Detected Not Detected    Parainfluenza Virus 3 Not Detected Not Detected    Parainfluenza Virus 4 Not Detected Not Detected    RSV, PCR Not Detected Not Detected    Bordetella pertussis pcr Not Detected Not Detected    Bordetella parapertussis PCR Not Detected Not Detected    Chlamydophila pneumoniae PCR Not Detected Not Detected     Mycoplasma pneumo by PCR Not Detected Not Detected   ECG 12 Lead Other; nausea, cough, weak    Collection Time: 04/21/25 10:30 AM   Result Value Ref Range    QT Interval 337 ms    QTC Interval 450 ms   High Sensitivity Troponin T 1Hr    Collection Time: 04/21/25 11:30 AM    Specimen: Blood   Result Value Ref Range    HS Troponin T 14 (H) <14 ng/L    Troponin T Numeric Delta 0 ng/L    Troponin T % Delta 0 Abnormal if >/= 20%   Urinalysis With Microscopic If Indicated (No Culture) - Urine, Clean Catch    Collection Time: 04/21/25 11:31 AM    Specimen: Urine, Clean Catch   Result Value Ref Range    Color, UA Yellow Yellow, Straw    Appearance, UA Clear Clear    pH, UA 6.0 5.0 - 8.0    Specific Gravity, UA 1.023 1.005 - 1.030    Glucose,  mg/dL (Trace) (A) Negative    Ketones, UA 80 mg/dL (3+) (A) Negative    Bilirubin, UA Negative Negative    Blood, UA Negative Negative    Protein,  mg/dL (2+) (A) Negative    Leuk Esterase, UA Negative Negative    Nitrite, UA Negative Negative    Urobilinogen, UA 1.0 E.U./dL 0.2 - 1.0 E.U./dL   Urinalysis, Microscopic Only - Urine, Clean Catch    Collection Time: 04/21/25 11:31 AM    Specimen: Urine, Clean Catch   Result Value Ref Range    RBC, UA 0-2 None Seen, 0-2 /HPF    WBC, UA 3-5 (A) None Seen, 0-2 /HPF    Bacteria, UA None Seen None Seen /HPF    Squamous Epithelial Cells, UA 3-6 (A) None Seen, 0-2 /HPF    Hyaline Casts, UA 7-12 None Seen /LPF    Methodology Automated Microscopy        The above labs were ordered by me and independently reviewed by me.     RADIOLOGY  XR Chest 1 View  Result Date: 4/21/2025  XR CHEST 1 VW-  HISTORY: Female who is 59 years-old, cough  TECHNIQUE: Frontal view of the chest  COMPARISON: 8/10/2023  FINDINGS: Heart, mediastinum and pulmonary vasculature are unremarkable. No focal pulmonary consolidation, pleural effusion, or pneumothorax. No acute osseous process.      No evidence for acute pulmonary process. Follow-up as clinical  indications persist.  This report was finalized on 4/21/2025 10:53 AM by Dr. Ovidio Briggs M.D on Workstation: [id number]        The above radiology studies were ordered by me.  See ED course for independent interpretations.     MEDICATIONS GIVEN IN ER  Medications   sodium chloride 0.9 % flush 10 mL (has no administration in time range)   potassium chloride 10 mEq in 100 mL IVPB (10 mEq Intravenous New Bag 4/21/25 1138)   sodium chloride 0.9 % infusion (100 mL/hr Intravenous New Bag 4/21/25 1138)   sodium chloride 0.9 % flush 10 mL (10 mL Intravenous Given 4/21/25 1144)   sodium chloride 0.9 % flush 10 mL (has no administration in time range)   sodium chloride 0.9 % infusion 40 mL (has no administration in time range)   nitroglycerin (NITROSTAT) SL tablet 0.4 mg (has no administration in time range)   Potassium Replacement - Follow Nurse / BPA Driven Protocol (has no administration in time range)   Magnesium Standard Dose Replacement - Follow Nurse / BPA Driven Protocol (has no administration in time range)   Phosphorus Replacement - Follow Nurse / BPA Driven Protocol (has no administration in time range)   Calcium Replacement - Follow Nurse / BPA Driven Protocol (has no administration in time range)   sennosides-docusate (PERICOLACE) 8.6-50 MG per tablet 2 tablet (has no administration in time range)     And   polyethylene glycol (MIRALAX) packet 17 g (has no administration in time range)     And   bisacodyl (DULCOLAX) EC tablet 5 mg (has no administration in time range)     And   bisacodyl (DULCOLAX) suppository 10 mg (has no administration in time range)   lactated ringers bolus 1,000 mL (0 mL Intravenous Stopped 4/21/25 1141)   ondansetron (ZOFRAN) injection 4 mg (4 mg Intravenous Given 4/21/25 1014)         ORDERS PLACED DURING THIS VISIT:  Orders Placed This Encounter   Procedures    Respiratory Panel PCR w/COVID-19(SARS-CoV-2) JORDAN/CECILY/TRACY/PAD/COR/URIEL In-House, NP Swab in UTM/VTM, 2 HR TAT - Swab,  Nasopharynx    XR Chest 1 View    Comprehensive Metabolic Panel    Lipase    Urinalysis With Microscopic If Indicated (No Culture) - Urine, Clean Catch    Blood Gas, Venous -    CBC Auto Differential    BNP    High Sensitivity Troponin T    High Sensitivity Troponin T 1Hr    Magnesium    Phosphorus    Basic Metabolic Panel    CBC Auto Differential    Urinalysis, Microscopic Only - Urine, Clean Catch    Diet: Liquid; Clear Liquid; Fluid Consistency: Thin (IDDSI 0)    Continuous Pulse Oximetry    Monitor Blood Pressure    Vital Signs    Intake & Output    Weigh Patient    Oral Care    Maintain IV Access    Telemetry - Place Orders & Notify Provider of Results When Patient Experiences Acute Chest Pain, Dysrhythmia or Respiratory Distress    May Be Off Telemetry for Tests    Advance Diet As Tolerated -    ECG 12 Lead Other; nausea, cough, weak    Insert Peripheral IV    Insert Peripheral IV    Initiate Emergency Department Observation Status    CBC & Differential         OUTPATIENT MEDICATION MANAGEMENT:  Current Facility-Administered Medications Ordered in Epic   Medication Dose Route Frequency Provider Last Rate Last Admin    sennosides-docusate (PERICOLACE) 8.6-50 MG per tablet 2 tablet  2 tablet Oral BID PRN Blevens, Marifer C, APRN        And    polyethylene glycol (MIRALAX) packet 17 g  17 g Oral Daily PRN Blevens, Marifer C, APRN        And    bisacodyl (DULCOLAX) EC tablet 5 mg  5 mg Oral Daily PRN Blevens, Marifer C, APRN        And    bisacodyl (DULCOLAX) suppository 10 mg  10 mg Rectal Daily PRN Blevens, Marifer C, APRN        Calcium Replacement - Follow Nurse / BPA Driven Protocol   Not Applicable PRN Blevens, Marifer C, APRN        Magnesium Standard Dose Replacement - Follow Nurse / BPA Driven Protocol   Not Applicable PRN Blevens, Marifer C, APRN        nitroglycerin (NITROSTAT) SL tablet 0.4 mg  0.4 mg Sublingual Q5 Min PRN Blevens, Marifer C, APRN        Phosphorus Replacement - Follow Nurse / BPA Driven Protocol   Not  Applicable PRN Blevens, Marifer C, APRN        potassium chloride 10 mEq in 100 mL IVPB  10 mEq Intravenous Once Ronald Guillen  mL/hr at 04/21/25 1138 10 mEq at 04/21/25 1138    Potassium Replacement - Follow Nurse / BPA Driven Protocol   Not Applicable PRN Blevens, Marifer C, APRN        sodium chloride 0.9 % flush 10 mL  10 mL Intravenous PRN Ronald Guillen MD        sodium chloride 0.9 % flush 10 mL  10 mL Intravenous Q12H Blevens, Marifer C, APRN   10 mL at 04/21/25 1144    sodium chloride 0.9 % flush 10 mL  10 mL Intravenous PRN Blevens, Marifer C, APRN        sodium chloride 0.9 % infusion 40 mL  40 mL Intravenous PRN Blevens, Marifer C, APRN        sodium chloride 0.9 % infusion  100 mL/hr Intravenous Continuous Blevens, Marifer C, APRN 100 mL/hr at 04/21/25 1138 100 mL/hr at 04/21/25 1138     Current Outpatient Medications Ordered in Epic   Medication Sig Dispense Refill    gabapentin (NEURONTIN) 400 MG capsule TAKE 1 CAPSULE BY MOUTH TWICE DAILY AT 8 AM AND AT 2 PM (Patient taking differently: Take 1 capsule by mouth 2 (Two) Times a Day. TAKE 1 CAPSULE BY MOUTH TWICE DAILY AT 8 AM AND AT 2 PM) 180 capsule 1    gabapentin (NEURONTIN) 600 MG tablet Take 1 tablet by mouth Every Night. 90 tablet 1    Insulin Glargine (Lantus SoloStar) 100 UNIT/ML injection pen Inject 15 units under the skin in the morning and 13 units under the skin in the evening. (Patient taking differently: Inject  under the skin into the appropriate area as directed See Admin Instructions. Inject 15 units under the skin in the morning and 13 units under the skin in the evening.) 21 mL 2    Insulin Lispro, 1 Unit Dial, (HUMALOG) 100 UNIT/ML solution pen-injector INJECT 5 UNITS UNDER THE SKIN BEFORE BREAKFAST, 6 UNITS BEFORE LUNCH AND 8 UNITS BEFORE DINNER (Patient taking differently: Inject  under the skin into the appropriate area as directed 3 (Three) Times a Day With Meals. INJECT 5 UNITS UNDER THE SKIN BEFORE BREAKFAST, 6 UNITS  BEFORE LUNCH AND 8 UNITS BEFORE DINNER) 36 mL 3    Semaglutide, 1 MG/DOSE, (Ozempic, 1 MG/DOSE,) 4 MG/3ML solution pen-injector Inject  under the skin into the appropriate area as directed 1 (One) Time Per Week.      Continuous Glucose Sensor (Dexcom G7 Sensor) misc Use 1 Application Every 10 (Ten) Days. 9 each 4    glucose blood (OneTouch Verio) test strip 1 each by Other route 5 (Five) Times a Day. Use as instructed 200 each 3    glucose blood test strip Use to test blood sugar 5 times daily. USE Onetouch Ultra Blue test strips. Dx E11.65 100 each 1    Insulin Pen Needle (BD Pen Needle Alysha U/F) 32G X 4 MM misc Check glucose three times daily as directed 100 each 2    lisinopril (PRINIVIL,ZESTRIL) 5 MG tablet Take 1 tablet by mouth Daily.           PROCEDURES  Procedures            PROGRESS, DATA ANALYSIS, CONSULTS, AND MEDICAL DECISION MAKING  All labs have been independently interpreted by me.  All radiology studies have been reviewed by me. All EKG's have been independently viewed and interpreted by me.  Discussion below represents my analysis of pertinent findings related to patient's condition, differential diagnosis, treatment plan and final disposition.    Differential diagnosis includes but is not limited to:  Viral syndrome  Pneumonia  Gastroparesis  Gastritis      Clinical Scores:                  ED Course as of 04/21/25 1233   Mon Apr 21, 2025   1046 EKG          EKG time: 10:30 AM  Rhythm/Rate: Sinus tach, 107  P waves and KS: Normal  QRS, axis: Borderline left axis  ST and T waves: No acute ischemic changes    Interpreted Contemporaneously by me, independently viewed  Similar compared to prior 8/9/2023       [JR]   1047 Potassium(!): 2.8 [JR]   1047 Sodium(!): 130 [JR]   1047 Glucose(!): 123 [JR]   1047 WBC(!): 13.29 [JR]   1047 Lipase: 19 [JR]   1125 COVID19: Not Detected [JR]   1125 CO2(!): 19.4 [JR]   1125 Anion Gap(!): 18.6 [JR]   1126 Chest x-ray independently interpreted in PACS and  demonstrates no infiltrate. [JR]   1130 Patient has very minimal acidosis however her glucose is 123 and she does not meet criteria for DKA at this time.  Urinalysis is pending however she denies dysuria.  Viral panel is negative however I suspect she may have a viral infection giving her cough, nausea vomiting, and subjective fever and chills.  I recommended admission for IV hydration, antiemetics, correction of her hyponatremia and hypokalemia. [JR]   1133 Discussed with FATUMA Feng in the ED observation unit who agrees to admit for further management. [JR]   1136 Patient has no abdominal pain or tenderness on exam.  Abdominal imaging deferred.  She could have a component of gastroparesis as she does have diabetes and she also uses Ozempic. [JR]      ED Course User Index  [JR] Ronald Guillen MD             AS OF 12:33 EDT VITALS:    BP - 169/91  HR - 101  TEMP - 97.7 °F (36.5 °C) (Tympanic)  O2 SATS - 98%    COMPLEXITY OF CARE  The patient requires admission.      Chronic or social conditions impacting patient care (Social Determinants of Health):     DIAGNOSIS  Final diagnoses:   Nausea and vomiting, unspecified vomiting type   Cough, unspecified type   Hypokalemia   Hyponatremia   Type 1 diabetes mellitus without complication           DISPOSITION  Admit      Prescription drug monitoring program review:           Please note that portions of this document were completed with a voice recognition program.    Note Disclaimer: At [facility], we believe that sharing information builds trust and better relationships. You are receiving this note because you recently visited [facility]. It is possible you will see health information before a provider has talked with you about it. This kind of information can be easy to misunderstand. To help you fully understand what it means for your health, we urge you to discuss this note with your provider.         Ronald Guillen MD  04/21/25 1237

## 2025-04-21 NOTE — H&P
[facility]   HISTORY AND PHYSICAL    Patient Name: Sonal De Dios  : 1965  MRN: [id number]  Primary Care Physician:  Ammy Holman APRN  Date of admission: 2025    Subjective   Subjective     Chief Complaint:   Chief Complaint   Patient presents with    Nausea    Vomiting    Cough     HPI:    Sonal De Dios is a 59 y.o. female with a past medical history significant for type 2 diabetes, hypertension who presented to the emergency department with nausea and vomiting and subjective fever.  Symptoms started 3 days ago.  She denies any sick contacts, recent travel, recent antibiotic use.  Denies abdominal pain or diarrhea.  Had a nonproductive cough but denies chest pain or dyspnea.    High-sensitivity troponin was 14, 14.  proBNP mildly elevated at 1445.  Chemistry significant for sodium of 130, potassium 2.8, chloride 92, bicarb 19.4.  Lipase was normal.  White blood cell count was elevated at 13.29, otherwise CBC unremarkable.  RVP was negative.  Chest x-ray was negative for acute findings.  EKG shows sinus tachycardia, borderline T wave abnormalities, no significant change from prior.    She will be admitted to the ED observation unit for further management.  We will continue IV fluids and correct her potassium.  Antiemetics as needed.    Review of Systems   All systems were reviewed and negative except for: Those mentioned in HPI    Personal History     Past Medical History:   Diagnosis Date    Diabetes mellitus     Gestational diabetes     Pneumonia        Past Surgical History:   Procedure Laterality Date    ENDOMETRIAL ABLATION         Family History: family history includes Diabetes in her mother. Otherwise pertinent FHx was reviewed and not pertinent to current issue.    Social History:  reports that she has never smoked. She has never used smokeless tobacco. She reports that she does not drink alcohol and does not use drugs.    Home Medications:  Dexcom G7  Sensor, Insulin Glargine, Insulin Lispro (1 Unit Dial), Insulin Pen Needle, Semaglutide (1 MG/DOSE), gabapentin, glucose blood, and lisinopril    Allergies:  Allergies   Allergen Reactions    Codeine GI Intolerance       Objective   Objective     Vitals:   Temp:  [97.7 °F (36.5 °C)-98.8 °F (37.1 °C)] 98.8 °F (37.1 °C)  Heart Rate:  [101-116] 114  Resp:  [18] 18  BP: (165-169)/(91-95) 165/95  Physical Exam    Constitutional: Awake, alert   Eyes: PERRLA, sclerae anicteric, no conjunctival injection   HENT: NCAT, mucous membranes moist   Neck: Supple, no thyromegaly, no lymphadenopathy, trachea midline   Respiratory: Clear to auscultation bilaterally, nonlabored respirations    Cardiovascular: RRR, no murmurs, rubs, or gallops, palpable pedal pulses bilaterally   Gastrointestinal: Positive bowel sounds, soft, nontender, nondistended   Musculoskeletal: No bilateral ankle edema, no clubbing or cyanosis to extremities   Psychiatric: Appropriate affect, cooperative   Neurologic: Oriented x 3, strength symmetric in all extremities, Cranial Nerves grossly intact to confrontation, speech clear   Skin: No rashes     Result Review    Result Review:  I have personally reviewed the results from the time of this admission to 4/21/2025 14:20 EDT and agree with these findings:  [x]  Laboratory list / accordion  []  Microbiology  [x]  Radiology  [x]  EKG/Telemetry   []  Cardiology/Vascular   []  Pathology  []  Old records  []  Other:  Most notable findings include: Troponin 14, 14, proBNP 1445, sodium 130, potassium 2.8, chloride 92, bicarb 19.4, anion gap 18.6, BUN 26, glucose 123, WBC 13.29    Assessment / Plan     Brief Patient Summary:  Sonal De Dios is a 59 y.o. female who be admitted to the ED observation unit for further management due to intractable nausea and vomiting.  We will continue IV fluids and correct electrolytes.  Antiemetics as needed.    Active Hospital Problems:  Active Hospital Problems    Diagnosis      **Intractable nausea and vomiting      Plan:     Intractable nausea vomiting  Leukocytosis  - Telemetry monitoring  - WBCs 13.29-possibly reactive secondary to vomiting  - Lipase 19  - RVP negative  - Antiemetics as needed  - Add lactic, blood cultures  - CBC/CMP in am    Mild hyponatremia  - Likely secondary to vomiting  - Gentle IV hydration    Hypokalemia  - Correct per protocol    VTE Prophylaxis:  Mechanical VTE prophylaxis orders are present.    CODE STATUS:       Admission Status:  I believe this patient meets observation status.    Electronically signed by SAMANTHA Steele, 04/21/25, 2:20 PM EDT.    75 minutes has been spent by [facility] providers in the care of this patient while under observation status on this date 04/21/25    I have worn appropriate PPE during this patient encounter, sanitized my hands both with entering and exiting patient's room.    I have discussed plan of care with patient including advance care plan and/or surrogate decision maker.  Patient advises that their  will be their primary surrogate decision maker

## 2025-04-21 NOTE — PLAN OF CARE
Goal Outcome Evaluation:  Plan of Care Reviewed With: patient        Progress: improving  Outcome Evaluation: pt admitted with multi day hypokalemia, nausea and vomitting and inability to keep foor and fluids down. fluids given and running, replace potassium per protocol, clear liquid diet.       Problem: Adult Inpatient Plan of Care  Goal: Plan of Care Review  Outcome: Progressing  Flowsheets (Taken 4/21/2025 1838)  Progress: improving  Outcome Evaluation: pt admitted with multi day hypokalemia, nausea and vomitting and inability to keep foor and fluids down. fluids given and running, replace potassium per protocol, clear liquid diet.  Plan of Care Reviewed With: patient  Goal: Patient-Specific Goal (Individualized)  Outcome: Progressing  Goal: Absence of Hospital-Acquired Illness or Injury  Outcome: Progressing  Intervention: Identify and Manage Fall Risk  Recent Flowsheet Documentation  Taken 4/21/2025 1800 by Haja López RN  Safety Promotion/Fall Prevention:   safety round/check completed   room organization consistent   lighting adjusted   nonskid shoes/slippers when out of bed   activity supervised   clutter free environment maintained  Taken 4/21/2025 1640 by Haja López RN  Safety Promotion/Fall Prevention:   safety round/check completed   room organization consistent   lighting adjusted   nonskid shoes/slippers when out of bed   activity supervised   clutter free environment maintained  Taken 4/21/2025 1400 by Haja López RN  Safety Promotion/Fall Prevention:   safety round/check completed   room organization consistent   lighting adjusted   nonskid shoes/slippers when out of bed   activity supervised   clutter free environment maintained  Intervention: Prevent Skin Injury  Recent Flowsheet Documentation  Taken 4/21/2025 1800 by Haja López RN  Body Position: position changed independently  Taken 4/21/2025 1640 by Haja López RN  Body Position: position changed independently  Taken 4/21/2025 1400  by Llasos, Llan, RN  Body Position: position changed independently  Intervention: Prevent Infection  Recent Flowsheet Documentation  Taken 4/21/2025 1800 by Haja López RN  Infection Prevention:   rest/sleep promoted   single patient room provided  Taken 4/21/2025 1640 by Haja López RN  Infection Prevention:   rest/sleep promoted   single patient room provided  Taken 4/21/2025 1400 by Haja López RN  Infection Prevention:   rest/sleep promoted   single patient room provided  Goal: Optimal Comfort and Wellbeing  Outcome: Progressing  Intervention: Provide Person-Centered Care  Recent Flowsheet Documentation  Taken 4/21/2025 1400 by Haja López RN  Trust Relationship/Rapport:   empathic listening provided   questions answered   care explained   thoughts/feelings acknowledged  Goal: Readiness for Transition of Care  Outcome: Progressing  Intervention: Mutually Develop Transition Plan  Recent Flowsheet Documentation  Taken 4/21/2025 1354 by Haja López RN  Transportation Anticipated: family or friend will provide  Patient/Family Anticipated Services at Transition: none  Patient/Family Anticipates Transition to: home with family  Taken 4/21/2025 1353 by Haja López RN  Equipment Currently Used at Home: none     Problem: Sepsis/Septic Shock  Goal: Optimal Coping  Outcome: Progressing  Intervention: Support Patient and Family Response  Recent Flowsheet Documentation  Taken 4/21/2025 1400 by Haja López RN  Family/Support System Care:   involvement promoted   presence promoted   self-care encouraged  Goal: Absence of Bleeding  Outcome: Progressing  Goal: Blood Glucose Level Within Target Range  Outcome: Progressing  Goal: Absence of Infection Signs and Symptoms  Outcome: Progressing  Intervention: Initiate Sepsis Management  Recent Flowsheet Documentation  Taken 4/21/2025 1800 by Haja López RN  Infection Prevention:   rest/sleep promoted   single patient room provided  Taken 4/21/2025 1640 by Haja López  RN  Infection Prevention:   rest/sleep promoted   single patient room provided  Taken 4/21/2025 1400 by Haja López, RN  Infection Prevention:   rest/sleep promoted   single patient room provided  Intervention: Promote Recovery  Recent Flowsheet Documentation  Taken 4/21/2025 1800 by Haja López, RN  Activity Management:   ambulated to bathroom   up ad lydia  Taken 4/21/2025 1640 by Haja López, RN  Activity Management: ambulated to bathroom  Taken 4/21/2025 1400 by Haja López, RN  Activity Management:   up ad lydia   activity encouraged  Goal: Optimal Nutrition Delivery  Outcome: Progressing

## 2025-04-22 LAB
ALBUMIN SERPL-MCNC: 4 G/DL (ref 3.5–5.2)
ALBUMIN SERPL-MCNC: 4.1 G/DL (ref 3.5–5.2)
ALBUMIN/GLOB SERPL: 1.8 G/DL
ALP SERPL-CCNC: 72 U/L (ref 39–117)
ALT SERPL W P-5'-P-CCNC: 11 U/L (ref 1–33)
ANION GAP SERPL CALCULATED.3IONS-SCNC: 14.5 MMOL/L (ref 5–15)
ANION GAP SERPL CALCULATED.3IONS-SCNC: 9.1 MMOL/L (ref 5–15)
ANION GAP SERPL CALCULATED.3IONS-SCNC: 9.3 MMOL/L (ref 5–15)
AST SERPL-CCNC: 14 U/L (ref 1–32)
BASOPHILS # BLD AUTO: 0.03 10*3/MM3 (ref 0–0.2)
BASOPHILS NFR BLD AUTO: 0.2 % (ref 0–1.5)
BILIRUB SERPL-MCNC: 0.6 MG/DL (ref 0–1.2)
BUN SERPL-MCNC: 13 MG/DL (ref 8–23)
BUN SERPL-MCNC: 15 MG/DL (ref 8–23)
BUN SERPL-MCNC: 18 MG/DL (ref 8–23)
BUN/CREAT SERPL: 19.4 (ref 7–25)
BUN/CREAT SERPL: 22.7 (ref 7–25)
BUN/CREAT SERPL: 27.7 (ref 7–25)
CALCIUM SPEC-SCNC: 9 MG/DL (ref 8.6–10.5)
CHLORIDE SERPL-SCNC: 95 MMOL/L (ref 98–107)
CHLORIDE SERPL-SCNC: 96 MMOL/L (ref 98–107)
CHLORIDE SERPL-SCNC: 97 MMOL/L (ref 98–107)
CHLORIDE UR-SCNC: 53 MMOL/L
CO2 SERPL-SCNC: 18.5 MMOL/L (ref 22–29)
CO2 SERPL-SCNC: 20.7 MMOL/L (ref 22–29)
CO2 SERPL-SCNC: 20.9 MMOL/L (ref 22–29)
CREAT SERPL-MCNC: 0.65 MG/DL (ref 0.57–1)
CREAT SERPL-MCNC: 0.66 MG/DL (ref 0.57–1)
CREAT SERPL-MCNC: 0.67 MG/DL (ref 0.57–1)
CREAT UR-MCNC: 112.7 MG/DL
DEPRECATED RDW RBC AUTO: 43.6 FL (ref 37–54)
EGFRCR SERPLBLD CKD-EPI 2021: 100.2 ML/MIN/1.73
EGFRCR SERPLBLD CKD-EPI 2021: 100.6 ML/MIN/1.73
EGFRCR SERPLBLD CKD-EPI 2021: 100.9 ML/MIN/1.73
EOSINOPHIL # BLD AUTO: 0.01 10*3/MM3 (ref 0–0.4)
EOSINOPHIL NFR BLD AUTO: 0.1 % (ref 0.3–6.2)
ERYTHROCYTE [DISTWIDTH] IN BLOOD BY AUTOMATED COUNT: 13.7 % (ref 12.3–15.4)
GLOBULIN UR ELPH-MCNC: 2.3 GM/DL
GLUCOSE SERPL-MCNC: 62 MG/DL (ref 65–99)
GLUCOSE SERPL-MCNC: 67 MG/DL (ref 65–99)
GLUCOSE SERPL-MCNC: 73 MG/DL (ref 65–99)
HCT VFR BLD AUTO: 36.8 % (ref 34–46.6)
HGB BLD-MCNC: 12.2 G/DL (ref 12–15.9)
IMM GRANULOCYTES # BLD AUTO: 0.06 10*3/MM3 (ref 0–0.05)
IMM GRANULOCYTES NFR BLD AUTO: 0.5 % (ref 0–0.5)
LYMPHOCYTES # BLD AUTO: 2.42 10*3/MM3 (ref 0.7–3.1)
LYMPHOCYTES NFR BLD AUTO: 19.7 % (ref 19.6–45.3)
MCH RBC QN AUTO: 29.2 PG (ref 26.6–33)
MCHC RBC AUTO-ENTMCNC: 33.2 G/DL (ref 31.5–35.7)
MCV RBC AUTO: 88 FL (ref 79–97)
MONOCYTES # BLD AUTO: 1.08 10*3/MM3 (ref 0.1–0.9)
MONOCYTES NFR BLD AUTO: 8.8 % (ref 5–12)
NEUTROPHILS NFR BLD AUTO: 70.7 % (ref 42.7–76)
NEUTROPHILS NFR BLD AUTO: 8.71 10*3/MM3 (ref 1.7–7)
NRBC BLD AUTO-RTO: 0 /100 WBC (ref 0–0.2)
OSMOLALITY SERPL: 267 MOSM/KG (ref 280–301)
OSMOLALITY UR: 612 MOSM/KG
PHOSPHATE SERPL-MCNC: 2.6 MG/DL (ref 2.5–4.5)
PLATELET # BLD AUTO: 338 10*3/MM3 (ref 140–450)
PMV BLD AUTO: 9.6 FL (ref 6–12)
POTASSIUM SERPL-SCNC: 3.5 MMOL/L (ref 3.5–5.2)
POTASSIUM SERPL-SCNC: 3.9 MMOL/L (ref 3.5–5.2)
POTASSIUM SERPL-SCNC: 4.2 MMOL/L (ref 3.5–5.2)
PROT ?TM UR-MCNC: 87.2 MG/DL
PROT SERPL-MCNC: 6.4 G/DL (ref 6–8.5)
PROT/CREAT UR: 773.7 MG/G CREA (ref 0–200)
RBC # BLD AUTO: 4.18 10*6/MM3 (ref 3.77–5.28)
SODIUM SERPL-SCNC: 125 MMOL/L (ref 136–145)
SODIUM SERPL-SCNC: 127 MMOL/L (ref 136–145)
SODIUM SERPL-SCNC: 129 MMOL/L (ref 136–145)
SODIUM UR-SCNC: 187 MMOL/L
URATE SERPL-MCNC: 4 MG/DL (ref 2.4–5.7)
WBC NRBC COR # BLD AUTO: 12.31 10*3/MM3 (ref 3.4–10.8)

## 2025-04-22 PROCEDURE — 83930 ASSAY OF BLOOD OSMOLALITY: CPT

## 2025-04-22 PROCEDURE — 25010000002 ONDANSETRON PER 1 MG: Performed by: NURSE PRACTITIONER

## 2025-04-22 PROCEDURE — 85025 COMPLETE CBC W/AUTO DIFF WBC: CPT | Performed by: NURSE PRACTITIONER

## 2025-04-22 PROCEDURE — 25010000002 POTASSIUM CHLORIDE 10 MEQ/100ML SOLUTION

## 2025-04-22 PROCEDURE — 84100 ASSAY OF PHOSPHORUS: CPT | Performed by: NURSE PRACTITIONER

## 2025-04-22 PROCEDURE — 84300 ASSAY OF URINE SODIUM: CPT

## 2025-04-22 PROCEDURE — 84550 ASSAY OF BLOOD/URIC ACID: CPT

## 2025-04-22 PROCEDURE — 80053 COMPREHEN METABOLIC PANEL: CPT | Performed by: NURSE PRACTITIONER

## 2025-04-22 PROCEDURE — 96366 THER/PROPH/DIAG IV INF ADDON: CPT

## 2025-04-22 PROCEDURE — 25810000003 SODIUM CHLORIDE 0.9 % SOLUTION

## 2025-04-22 PROCEDURE — 96376 TX/PRO/DX INJ SAME DRUG ADON: CPT

## 2025-04-22 PROCEDURE — 80048 BASIC METABOLIC PNL TOTAL CA: CPT

## 2025-04-22 PROCEDURE — 83935 ASSAY OF URINE OSMOLALITY: CPT

## 2025-04-22 PROCEDURE — 96361 HYDRATE IV INFUSION ADD-ON: CPT

## 2025-04-22 PROCEDURE — 25810000003 SODIUM CHLORIDE 0.9 % SOLUTION: Performed by: NURSE PRACTITIONER

## 2025-04-22 PROCEDURE — G0378 HOSPITAL OBSERVATION PER HR: HCPCS

## 2025-04-22 RX ORDER — POTASSIUM CHLORIDE 1.5 G/1.58G
40 POWDER, FOR SOLUTION ORAL ONCE
Status: COMPLETED | OUTPATIENT
Start: 2025-04-22 | End: 2025-04-22

## 2025-04-22 RX ORDER — SODIUM CHLORIDE 9 MG/ML
100 INJECTION, SOLUTION INTRAVENOUS CONTINUOUS
Status: DISCONTINUED | OUTPATIENT
Start: 2025-04-22 | End: 2025-04-23

## 2025-04-22 RX ADMIN — Medication 10 ML: at 08:12

## 2025-04-22 RX ADMIN — POTASSIUM CHLORIDE 10 MEQ: 7.46 INJECTION, SOLUTION INTRAVENOUS at 03:04

## 2025-04-22 RX ADMIN — SODIUM CHLORIDE 100 ML/HR: 9 INJECTION, SOLUTION INTRAVENOUS at 17:49

## 2025-04-22 RX ADMIN — POTASSIUM CHLORIDE 40 MEQ: 1.5 POWDER, FOR SOLUTION ORAL at 17:49

## 2025-04-22 RX ADMIN — ONDANSETRON 4 MG: 2 INJECTION, SOLUTION INTRAMUSCULAR; INTRAVENOUS at 01:59

## 2025-04-22 RX ADMIN — SODIUM CHLORIDE 100 ML/HR: 9 INJECTION, SOLUTION INTRAVENOUS at 06:35

## 2025-04-22 RX ADMIN — Medication 10 ML: at 20:17

## 2025-04-22 RX ADMIN — POTASSIUM CHLORIDE 10 MEQ: 7.46 INJECTION, SOLUTION INTRAVENOUS at 00:57

## 2025-04-22 RX ADMIN — POTASSIUM CHLORIDE 10 MEQ: 7.46 INJECTION, SOLUTION INTRAVENOUS at 02:00

## 2025-04-22 NOTE — PLAN OF CARE
Problem: Adult Inpatient Plan of Care  Goal: Plan of Care Review  Outcome: Progressing  Flowsheets (Taken 4/22/2025 0540)  Progress: no change  Outcome Evaluation: Pt admitted for nausea and vomiting for several days. Unable to advance diet much due to nausea. Denies vomiting or diarrhea. PO KCL switched to IV KCL due to inability to take pills. Pt had 4 runs of KCL overnight, K+ normal at 3.9 today. HR & BP has been running high overnight, provider aware. Will CTM closely.  Plan of Care Reviewed With: patient  Goal: Patient-Specific Goal (Individualized)  Outcome: Progressing  Goal: Absence of Hospital-Acquired Illness or Injury  Outcome: Progressing  Intervention: Identify and Manage Fall Risk  Recent Flowsheet Documentation  Taken 4/22/2025 0405 by Kassy Becerra RN  Safety Promotion/Fall Prevention:   clutter free environment maintained   nonskid shoes/slippers when out of bed   room organization consistent   safety round/check completed  Taken 4/22/2025 0200 by Kassy Becerra RN  Safety Promotion/Fall Prevention:   clutter free environment maintained   nonskid shoes/slippers when out of bed   room organization consistent   safety round/check completed  Taken 4/22/2025 0100 by Kassy Becerra RN  Safety Promotion/Fall Prevention:   room organization consistent   safety round/check completed  Taken 4/22/2025 0000 by Kassy Becerra RN  Safety Promotion/Fall Prevention:   clutter free environment maintained   nonskid shoes/slippers when out of bed   room organization consistent   safety round/check completed  Taken 4/21/2025 2200 by Kassy Becerra RN  Safety Promotion/Fall Prevention:   clutter free environment maintained   fall prevention program maintained   nonskid shoes/slippers when out of bed   room organization consistent   safety round/check completed  Taken 4/21/2025 2019 by Kassy Becerra RN  Safety Promotion/Fall Prevention:   clutter free environment maintained   fall prevention  program maintained   nonskid shoes/slippers when out of bed   room organization consistent   safety round/check completed   lighting adjusted  Taken 4/21/2025 1900 by Kassy Becerra RN  Safety Promotion/Fall Prevention:   room organization consistent   safety round/check completed  Intervention: Prevent Skin Injury  Recent Flowsheet Documentation  Taken 4/22/2025 0405 by Kassy Becerra RN  Body Position: position changed independently  Taken 4/22/2025 0200 by Kassy Becerra RN  Body Position: position changed independently  Taken 4/22/2025 0100 by Kassy Becerra RN  Body Position: position changed independently  Taken 4/22/2025 0000 by Kassy Becerra RN  Body Position: position changed independently  Taken 4/21/2025 2200 by Kassy Becerra RN  Body Position: position changed independently  Taken 4/21/2025 2019 by Kassy Becerra RN  Body Position: position changed independently  Taken 4/21/2025 1900 by Kassy Becerra RN  Body Position: position changed independently  Intervention: Prevent and Manage VTE (Venous Thromboembolism) Risk  Recent Flowsheet Documentation  Taken 4/21/2025 2019 by Kassy Becerra RN  VTE Prevention/Management: patient refused intervention  Intervention: Prevent Infection  Recent Flowsheet Documentation  Taken 4/22/2025 0405 by Kassy Becerra RN  Infection Prevention:   equipment surfaces disinfected   hand hygiene promoted   personal protective equipment utilized   rest/sleep promoted   single patient room provided  Taken 4/22/2025 0200 by Kassy Becerra RN  Infection Prevention:   equipment surfaces disinfected   hand hygiene promoted   personal protective equipment utilized   rest/sleep promoted   single patient room provided  Taken 4/22/2025 0000 by Kassy Becerra RN  Infection Prevention:   equipment surfaces disinfected   hand hygiene promoted   personal protective equipment utilized   rest/sleep promoted   single patient room provided  Taken 4/21/2025  2200 by Kassy Becerra, RN  Infection Prevention:   equipment surfaces disinfected   hand hygiene promoted   personal protective equipment utilized   rest/sleep promoted   single patient room provided  Taken 4/21/2025 2019 by Kassy Becerra RN  Infection Prevention:   equipment surfaces disinfected   hand hygiene promoted   rest/sleep promoted   personal protective equipment utilized   single patient room provided  Goal: Optimal Comfort and Wellbeing  Outcome: Progressing  Intervention: Provide Person-Centered Care  Recent Flowsheet Documentation  Taken 4/21/2025 2019 by Kassy Becerra RN  Trust Relationship/Rapport:   care explained   choices provided   questions answered   questions encouraged   thoughts/feelings acknowledged  Goal: Readiness for Transition of Care  Outcome: Progressing   Goal Outcome Evaluation:  Plan of Care Reviewed With: patient        Progress: no change  Outcome Evaluation: Pt admitted for nausea and vomiting for several days. Unable to advance diet much due to nausea. Denies vomiting or diarrhea. PO KCL switched to IV KCL due to inability to take pills. Pt had 4 runs of KCL overnight, K+ normal at 3.9 today. HR & BP has been running high overnight, provider aware. Will CTM closely.

## 2025-04-22 NOTE — PROGRESS NOTES
PING GONZALEZ Attestation Note    I supervised care provided by the midlevel provider.    The FATUMA and I have discussed this patient's history, physical exam, and treatment plan. I have reviewed the documentation and personally had a face to face interaction with the patient  I affirm the documentation and agree with the treatment and plan. I provided a substantive portion of the care of this patient.  I personally performed the physical exam, in its entirety.  My personal findings are documented in below:    History:  59-year-old female admitted to the observation unit for intractable nausea vomiting with electrolyte abnormalities reports this morning feeling vastly improved with no further nausea vomiting and certainly no development of diarrhea.    Physical Exam:  General: No acute distress.  HENT: NCAT  Eyes: no scleral icterus.  Neck: Painless range of motion  CV: Pink warm and well-perfused throughout  Respiratory: No distress or increased work of breathing  Abdomen: soft, no focal tenderness or rigidity.  Benign  Musculoskeletal: no deformity.  Neuro: Alert, speech fluent and easily intelligible  Skin: warm, dry.    Assessment and Plan:  Intractable nausea and vomiting with electrolyte abnormalities: Improved symptomatically.  Hyponatremia slightly worse though potassium has resolved.  Nephrology consulted for assistance with further management of hyponatremia.  Patient eager for discharge.

## 2025-04-22 NOTE — CONSULTS
[facility] Consult Note      Patient Name: Sonal De Dios  : 1965  MRN: [id number]  Primary Care Physician:  Ammy Holman APRN  Referring Physician: No ref. provider found  Date of admission: 2025    Subjective     Reason for Consult: Acute on chronic hyponatremia    HPI:   Sonal De Dios is a 59 y.o. female with history of type 2 diabetes, and hyponatremia, who presented to the hospital yesterday for nausea, vomiting, and fever x 3 days.  Upon arrival, sodium 130, potassium 2.8, CO2 19.4 with AG 18.6.  UA bland except for 2+ protein and pyuria.  Patient was given 1 L LR bolus and IV KCl.  Today, sodium went further down to 125.  Therefore, nephrology was consulted.    In the past 3 days, patient has been drinking some fluids, although unable to eat any food.  Reports having mild chest pain, shortness of breath, and dizziness/lightheadedness at home, now the symptoms have improved.  Denies taking any NSAIDs.  Patient does take a large dose of gabapentin at home (1400 mg daily).    Review of Systems:   14 point review of systems is otherwise negative except for mentioned above on HPI    Personal History     Past Medical History:   Diagnosis Date    Diabetes mellitus     Gestational diabetes     Pneumonia        Past Surgical History:   Procedure Laterality Date    ENDOMETRIAL ABLATION         Family History: family history includes Diabetes in her mother.    Social History:  reports that she has never smoked. She has never used smokeless tobacco. She reports that she does not drink alcohol and does not use drugs.    Home Medications:  Prior to Admission medications    Medication Sig Start Date End Date Taking? Authorizing Provider   gabapentin (NEURONTIN) 400 MG capsule TAKE 1 CAPSULE BY MOUTH TWICE DAILY AT 8 AM AND AT 2 PM  Patient taking differently: Take 1 capsule by mouth 2 (Two) Times a Day. TAKE 1 CAPSULE BY MOUTH TWICE DAILY AT 8 AM AND AT  2 PM 1/8/25  Yes Ammy Holman APRN   gabapentin (NEURONTIN) 600 MG tablet Take 1 tablet by mouth Every Night. 11/19/24  Yes Ammy Holman APRN   Insulin Glargine (Lantus SoloStar) 100 UNIT/ML injection pen Inject 15 units under the skin in the morning and 13 units under the skin in the evening.  Patient taking differently: Inject  under the skin into the appropriate area as directed See Admin Instructions. Inject 15 units under the skin in the morning and 13 units under the skin in the evening. 4/8/25  Yes Ammy Holman APRN   Insulin Lispro, 1 Unit Dial, (HUMALOG) 100 UNIT/ML solution pen-injector INJECT 5 UNITS UNDER THE SKIN BEFORE BREAKFAST, 6 UNITS BEFORE LUNCH AND 8 UNITS BEFORE DINNER  Patient taking differently: Inject  under the skin into the appropriate area as directed 3 (Three) Times a Day With Meals. Pt doses based on CARBOHYDRATE intake with meals 3/12/25  Yes Ammy Holman APRN   Continuous Glucose Sensor (Dexcom G7 Sensor) misc Use 1 Application Every 10 (Ten) Days. 2/12/25   Ammy Holman APRN   glucose blood (OneTouch Verio) test strip 1 each by Other route 5 (Five) Times a Day. Use as instructed 11/29/23   Ammy Holman APRN   glucose blood test strip Use to test blood sugar 5 times daily. USE Onetouch Ultra Blue test strips. Dx E11.65 1/14/25   Ammy Holman APRN   Insulin Pen Needle (BD Pen Needle Alysha U/F) 32G X 4 MM misc Check glucose three times daily as directed 1/11/24   Ammy Holman APRN   lisinopril (PRINIVIL,ZESTRIL) 5 MG tablet Take 1 tablet by mouth Daily.  Patient not taking: Reported on 4/21/2025 2/6/25   Ammy Holman APRN   Semaglutide, 1 MG/DOSE, (Ozempic, 1 MG/DOSE,) 4 MG/3ML solution pen-injector Inject  under the skin into the appropriate area as directed 1 (One) Time Per Week.  Patient not taking: Reported on 4/21/2025    Provider, MD Tyron        Allergies:  Allergies   Allergen Reactions    Codeine GI Intolerance       Objective     Vitals:   Temp:  [98.1 °F (36.7 °C)-98.8 °F (37.1 °C)] 98.1 °F (36.7 °C)  Heart Rate:  [] 104  Resp:  [14-18] 16  BP: (164-182)/(84-95) 168/91    Intake/Output Summary (Last 24 hours) at 4/22/2025 1014  Last data filed at 4/22/2025 0635  Gross per 24 hour   Intake 3784.99 ml   Output 0 ml   Net 3784.99 ml       Physical Exam:   Constitutional: Awake, alert, no acute distress.  HEENT: Sclera anicteric, no conjunctival injection  Neck: No JVD  Respiratory: Clear to auscultation bilaterally, breathing effort nonlabored  Cardiovascular: RR, tachycardia, no murmurs, no rubs, no carotid bruit  Gastrointestinal: Normoactive bowels, abdomen is soft, nontender and nondistended  : No palpable bladder  Musculoskeletal: No edema, no clubbing or cyanosis  Psychiatric: Appropriate affect, cooperative  Neurologic: Oriented x3, moving all extremities, normal speech and mental status  Skin: Warm and dry       Scheduled Meds:     sodium chloride, 10 mL, Intravenous, Q12H      IV Meds:   sodium chloride, 100 mL/hr, Last Rate: 100 mL/hr (04/22/25 0635)        Results Reviewed:   I have personally reviewed the results from the time of this admission to 4/22/2025 10:14 EDT     Lab Results   Component Value Date    GLUCOSE 67 04/22/2025    CALCIUM 9.0 04/22/2025     (L) 04/22/2025    K 3.9 04/22/2025    CO2 20.9 (L) 04/22/2025    CL 95 (L) 04/22/2025    BUN 18 04/22/2025    CREATININE 0.65 04/22/2025    EGFRIFAFRI 133 07/22/2021    EGFRIFNONA 76 12/02/2021    BCR 27.7 (H) 04/22/2025    ANIONGAP 9.1 04/22/2025      Lab Results   Component Value Date    MG 2.0 04/21/2025    PHOS 2.8 04/21/2025    ALBUMIN 4.1 04/22/2025           Assessment / Plan       Intractable nausea and vomiting      ASSESSMENT:  Acute on chronic hyponatremia, multifactorial: Hypovolemia secondary to nausea and vomiting, poor solute intake, hypotonic fluid (LR),  large gabapentin use, and ADH release caused by nausea and vomiting.  Sodium further down to 125 this morning.  Renal function normal at baseline.  Other electrolytes within acceptable range, urine osmolality 612 and the urine sodium was 187 uric acid 4.1,  I do not believe it is SIADH since the patient had volume depletion  Proteinuria protein to creatinine ratio was 773 mg/g secondary to diabetic kidney disease  CKD stage I with proteinuria and preserved GFR.  Secondary to diabetic kidney disease  Hypokalemia, secondary to nausea and vomiting and poor oral intake.  Potassium today 3.9 after replacement  High anion gap metabolic acidosis, secondary to starvation ketosis.  Lactate normal.  The anion gap closed after IVF.Total CO2 20.9 and anion gap is 9.1  Intractable nausea and vomiting, managed by primary team  Type 2 diabetes, managed by primary team  Hypertension, not on antihypertensives at home      PLAN:  Continue IV NS at 100 mL/h for now  Check orthostatic blood pressure  Surveillance labs  Check chemistry this afternoon and adjust IV fluid as needed.      I reviewed the chart and other providers notes and I reviewed labs.  I discussed the case with the patient and she voiced good understanding.      Thank you for involving us in the care of Sonal Barragan Va.  Please feel free to call with any questions.    Julian Sandhu MD  04/22/25  10:14 EDT    [facility]  [phone]      Please note that portions of this note were completed with a voice recognition program.

## 2025-04-22 NOTE — PLAN OF CARE
Goal Outcome Evaluation:              Outcome Evaluation: Pt. to stay over night for observation. Nephrology following. IV fluids infusing. Pt AOX4 and able to verbalized needs. Pt does not have any other questions at this time.

## 2025-04-22 NOTE — PROGRESS NOTES
ED OBSERVATION PROGRESS/DISCHARGE SUMMARY    Date of Admission: 4/21/2025   LOS: 0 days   PCP: Ammy Holman APRN    Final Diagnosis nausea/vomiting      Subjective     Hospital Outcome:     Snoal De Dios is a 59 y.o. female presents with nausea, vomiting x4 days, without abdominal pain or diarrhea.  She denies any sick contacts.  In the ED WBC is 12.31, likely reactive.  UA is positive for 3+ ketones and 2+ protein.  RVP is negative.  Incidentally she was noted to have serum sodium 130.     04/22/25  IV hydration overnight, she remains afebrile reports she feels little bit better.  No acute distress or new complaints overnight.  Patient overall feels much better.  Serum sodium dropped from 130 to 125 despite IV normal saline.  Nephrology saw and evaluated the patient and recommended continued IV fluids and rechecking labs during the afternoon.  Recheck labs and sodium improved to 127.  Nephrology recommended continued IV fluids no change to rate, and giving a one-time dose of KCl 40 mg oral and recheck labs in the morning.    Review of Systems:   Constitutional:  No weight changes, fever, or chills. No night sweats, no fatigue, no malaise.    Cardiovascular:  No chest pain, no palpitations, no edema.      Respiratory:  No cough, no smoke exposure, no dyspnea, no orthopnea.   Gastrointestinal: + Nausea, vomiting. No constipation, or GI discomfort. No reflux pain, no anorexia, no dysphagia. No hematochezia or melena.    Neuro:  No weakness, no numbness, no paresthesias, no loss of consciousness, no syncope, no dizziness, no headache.     Objective   Physical Exam:   Constitutional: Awake, alert. Well developed for age. Nontoxic appearing.   Eyes: PERRL, sclerae anicteric, no conjunctival injection.  EOMI  HENT: NCAT, mucous membranes moist, normal hearing  Neck: Supple, nontender, trachea midline  Respiratory: Clear to auscultation bilaterally, nonlabored respirations on room air  Cardiovascular:  RRR, no murmurs, palpable pedal pulses bilaterally. No appreciable edema.   Gastrointestinal: Positive bowel sounds, soft, nontender, not distended.   Musculoskeletal: No bilateral ankle edema, no clubbing or cyanosis to extremities. No obvious deformities.   Psychiatric: Appropriate affect, cooperative. Converses appropriately for age.   Neurologic: Oriented x 3, strength symmetric in all extremities. Cranial nerves grossly intact to confrontation, speech clear  Skin: No rashes, skin intact.     Results Review:    I have reviewed the labs, radiology results and diagnostic studies.    Results from last 7 days   Lab Units 04/22/25  0407   WBC 10*3/mm3 12.31*   HEMOGLOBIN g/dL 12.2   HEMATOCRIT % 36.8   PLATELETS 10*3/mm3 338     Results from last 7 days   Lab Units 04/22/25  0407 04/21/25  1007   SODIUM mmol/L 125* 130*   POTASSIUM mmol/L 3.9 2.8*   CHLORIDE mmol/L 95* 92*   CO2 mmol/L 20.9* 19.4*   BUN mg/dL 18 26*   CREATININE mg/dL 0.65 0.75   CALCIUM mg/dL 9.0 9.3   BILIRUBIN mg/dL 0.6 0.7   ALK PHOS U/L 72 89   ALT (SGPT) U/L 11 11   AST (SGOT) U/L 14 14   GLUCOSE mg/dL 67 123*     Imaging Results (Last 24 Hours)       Procedure Component Value Units Date/Time    XR Chest 1 View [[id number]] Collected: 04/21/25 1052     Updated: 04/21/25 1056    Narrative:      XR CHEST 1 VW-     HISTORY: Female who is 59 years-old, cough     TECHNIQUE: Frontal view of the chest     COMPARISON: 8/10/2023     FINDINGS: Heart, mediastinum and pulmonary vasculature are unremarkable.  No focal pulmonary consolidation, pleural effusion, or pneumothorax. No  acute osseous process.       Impression:      No evidence for acute pulmonary process. Follow-up as  clinical indications persist.     This report was finalized on 4/21/2025 10:53 AM by Dr. Ovidio Briggs M.D on Workstation: [id number]               I have reviewed the medications.     Discharge Medications        Continue These Medications        Instructions Start Date   BD Pen  Needle Alysha U/F 32G X 4 MM misc  Generic drug: Insulin Pen Needle   Check glucose three times daily as directed      Dexcom G7 Sensor misc   1 Application, Not Applicable, Every 10 Days             ASK your doctor about these medications        Instructions Start Date   gabapentin 600 MG tablet  Commonly known as: NEURONTIN   600 mg, Oral, Nightly      gabapentin 400 MG capsule  Commonly known as: NEURONTIN   TAKE 1 CAPSULE BY MOUTH TWICE DAILY AT 8 AM AND AT 2 PM      Insulin Lispro (1 Unit Dial) 100 UNIT/ML solution pen-injector  Commonly known as: HUMALOG   INJECT 5 UNITS UNDER THE SKIN BEFORE BREAKFAST, 6 UNITS BEFORE LUNCH AND 8 UNITS BEFORE DINNER      Lantus SoloStar 100 UNIT/ML injection pen  Generic drug: Insulin Glargine   Inject 15 units under the skin in the morning and 13 units under the skin in the evening.      lisinopril 5 MG tablet  Commonly known as: PRINIVIL,ZESTRIL   5 mg, Oral, Daily      OneTouch Verio test strip  Generic drug: glucose blood   1 each, Other, 5 Times Daily, Use as instructed      glucose blood test strip   Use to test blood sugar 5 times daily. USE Onetouch Ultra Blue test strips. Dx E11.65      Ozempic (1 MG/DOSE) 4 MG/3ML solution pen-injector  Generic drug: Semaglutide (1 MG/DOSE)  Ask about: Which instructions should I use?   Weekly              ---------------------------------------------------------------------------------------------  Assessment & Plan   Assessment/Problem List    Intractable nausea and vomiting      Plan:    Intractable nausea vomiting  Leukocytosis  - Telemetry monitoring  - WBCs 13.29-possibly reactive secondary to vomiting  - Lipase 19  - RVP negative  - Antiemetics as needed  - Lactate negative, blood cultures pending     Hyponatremia  -Decreased from 130 to 125 overnight, despite IV hydration with normal saline  -Check urine osmolality and sodium; nephrology reviewed  - Nephrology saw and evaluated the patient and recommended continued IV fluids  and rechecking labs during the afternoon.  Recheck labs and sodium improved to 127.  Nephrology recommended continued IV fluids no change to rate, and giving a one-time dose of KCl 40 mg oral and recheck labs in the morning.      Hypokalemia  - Correct per protocol     VTE Prophylaxis:  Mechanical VTE prophylaxis orders are present.       Disposition: Dependent on hospital course    Follow-up after Discharge: Dependent on hospital course    This note will serve as a progress note    Betty Cristobal PA-C 04/22/25 07:20 EDT    I have worn appropriate PPE during this patient encounter, sanitized my hands both with entering and exiting patient's room.      55 minutes has been spent by [facility] providers in the care of this patient while under observation status

## 2025-04-23 ENCOUNTER — READMISSION MANAGEMENT (OUTPATIENT)
Dept: CALL CENTER | Facility: HOSPITAL | Age: 60
End: 2025-04-23
Payer: COMMERCIAL

## 2025-04-23 VITALS
HEART RATE: 99 BPM | SYSTOLIC BLOOD PRESSURE: 157 MMHG | BODY MASS INDEX: 28.61 KG/M2 | OXYGEN SATURATION: 99 % | WEIGHT: 167.6 LBS | HEIGHT: 64 IN | RESPIRATION RATE: 18 BRPM | DIASTOLIC BLOOD PRESSURE: 83 MMHG | TEMPERATURE: 99 F

## 2025-04-23 LAB
ALBUMIN SERPL-MCNC: 4.1 G/DL (ref 3.5–5.2)
ALBUMIN/GLOB SERPL: 1.6 G/DL
ALP SERPL-CCNC: 77 U/L (ref 39–117)
ALT SERPL W P-5'-P-CCNC: 12 U/L (ref 1–33)
ANION GAP SERPL CALCULATED.3IONS-SCNC: 14 MMOL/L (ref 5–15)
AST SERPL-CCNC: 13 U/L (ref 1–32)
BASOPHILS # BLD AUTO: 0.03 10*3/MM3 (ref 0–0.2)
BASOPHILS NFR BLD AUTO: 0.3 % (ref 0–1.5)
BILIRUB SERPL-MCNC: 0.8 MG/DL (ref 0–1.2)
BUN SERPL-MCNC: 13 MG/DL (ref 8–23)
BUN/CREAT SERPL: 21.7 (ref 7–25)
CALCIUM SPEC-SCNC: 9 MG/DL (ref 8.6–10.5)
CHLORIDE SERPL-SCNC: 95 MMOL/L (ref 98–107)
CO2 SERPL-SCNC: 19 MMOL/L (ref 22–29)
CREAT SERPL-MCNC: 0.6 MG/DL (ref 0.57–1)
DEPRECATED RDW RBC AUTO: 41.4 FL (ref 37–54)
EGFRCR SERPLBLD CKD-EPI 2021: 102.9 ML/MIN/1.73
EOSINOPHIL # BLD AUTO: 0.02 10*3/MM3 (ref 0–0.4)
EOSINOPHIL NFR BLD AUTO: 0.2 % (ref 0.3–6.2)
ERYTHROCYTE [DISTWIDTH] IN BLOOD BY AUTOMATED COUNT: 13.1 % (ref 12.3–15.4)
GLOBULIN UR ELPH-MCNC: 2.5 GM/DL
GLUCOSE SERPL-MCNC: 78 MG/DL (ref 65–99)
HCT VFR BLD AUTO: 37.8 % (ref 34–46.6)
HGB BLD-MCNC: 12.8 G/DL (ref 12–15.9)
IMM GRANULOCYTES # BLD AUTO: 0.05 10*3/MM3 (ref 0–0.05)
IMM GRANULOCYTES NFR BLD AUTO: 0.5 % (ref 0–0.5)
LYMPHOCYTES # BLD AUTO: 2.69 10*3/MM3 (ref 0.7–3.1)
LYMPHOCYTES NFR BLD AUTO: 25.4 % (ref 19.6–45.3)
MAGNESIUM SERPL-MCNC: 2 MG/DL (ref 1.6–2.4)
MCH RBC QN AUTO: 29.4 PG (ref 26.6–33)
MCHC RBC AUTO-ENTMCNC: 33.9 G/DL (ref 31.5–35.7)
MCV RBC AUTO: 86.9 FL (ref 79–97)
MONOCYTES # BLD AUTO: 0.9 10*3/MM3 (ref 0.1–0.9)
MONOCYTES NFR BLD AUTO: 8.5 % (ref 5–12)
NEUTROPHILS NFR BLD AUTO: 6.9 10*3/MM3 (ref 1.7–7)
NEUTROPHILS NFR BLD AUTO: 65.1 % (ref 42.7–76)
NRBC BLD AUTO-RTO: 0 /100 WBC (ref 0–0.2)
PHOSPHATE SERPL-MCNC: 2.4 MG/DL (ref 2.5–4.5)
PLATELET # BLD AUTO: 355 10*3/MM3 (ref 140–450)
PMV BLD AUTO: 10.1 FL (ref 6–12)
POTASSIUM SERPL-SCNC: 3.7 MMOL/L (ref 3.5–5.2)
PROT SERPL-MCNC: 6.6 G/DL (ref 6–8.5)
RBC # BLD AUTO: 4.35 10*6/MM3 (ref 3.77–5.28)
SODIUM SERPL-SCNC: 128 MMOL/L (ref 136–145)
URATE SERPL-MCNC: 4 MG/DL (ref 2.4–5.7)
WBC NRBC COR # BLD AUTO: 10.59 10*3/MM3 (ref 3.4–10.8)

## 2025-04-23 PROCEDURE — 84550 ASSAY OF BLOOD/URIC ACID: CPT

## 2025-04-23 PROCEDURE — G0378 HOSPITAL OBSERVATION PER HR: HCPCS

## 2025-04-23 PROCEDURE — 80053 COMPREHEN METABOLIC PANEL: CPT

## 2025-04-23 PROCEDURE — 25810000003 SODIUM CHLORIDE 0.9 % SOLUTION

## 2025-04-23 PROCEDURE — 85025 COMPLETE CBC W/AUTO DIFF WBC: CPT

## 2025-04-23 PROCEDURE — 83735 ASSAY OF MAGNESIUM: CPT

## 2025-04-23 PROCEDURE — 84100 ASSAY OF PHOSPHORUS: CPT

## 2025-04-23 RX ORDER — ONDANSETRON 4 MG/1
4 TABLET, ORALLY DISINTEGRATING ORAL EVERY 8 HOURS PRN
Qty: 30 TABLET | Refills: 0 | Status: SHIPPED | OUTPATIENT
Start: 2025-04-23

## 2025-04-23 RX ORDER — LOSARTAN POTASSIUM 50 MG/1
50 TABLET ORAL
Status: DISCONTINUED | OUTPATIENT
Start: 2025-04-23 | End: 2025-04-23 | Stop reason: HOSPADM

## 2025-04-23 RX ORDER — LOSARTAN POTASSIUM 50 MG/1
50 TABLET ORAL
Qty: 30 TABLET | Refills: 1 | Status: SHIPPED | OUTPATIENT
Start: 2025-04-23

## 2025-04-23 RX ADMIN — LOSARTAN POTASSIUM 50 MG: 50 TABLET, FILM COATED ORAL at 08:34

## 2025-04-23 RX ADMIN — SODIUM CHLORIDE 100 ML/HR: 9 INJECTION, SOLUTION INTRAVENOUS at 03:28

## 2025-04-23 NOTE — CASE MANAGEMENT/SOCIAL WORK
Case Management Discharge Note      Final Note: Home via private vehicle         Selected Continued Care - Discharged on 4/23/2025 Admission date: 4/21/2025 - Discharge disposition: Home or Self Care      Destination    No services have been selected for the patient.                Durable Medical Equipment    No services have been selected for the patient.                Dialysis/Infusion    No services have been selected for the patient.                Home Medical Care    No services have been selected for the patient.                Therapy    No services have been selected for the patient.                Community Resources    No services have been selected for the patient.                Community & DME    No services have been selected for the patient.                    Transportation Services  Private: Car    Final Discharge Disposition Code: 01 - home or self-care   detailed exam

## 2025-04-23 NOTE — DISCHARGE SUMMARY
ED OBSERVATION PROGRESS/DISCHARGE SUMMARY    Date of Admission: 4/21/2025   LOS: 0 days   PCP: Ammy Holman APRN    Final Diagnosis nausea with vomiting, hyponatremia      Subjective     Hospital Outcome:     Sonal De Dios is a 59 y.o. female presents with nausea, vomiting x4 days, without abdominal pain or diarrhea.  She denies any sick contacts.  In the ED WBC is 12.31, likely reactive.  UA is positive for 3+ ketones and 2+ protein.  RVP is negative.  Incidentally she was noted to have serum sodium 130.      04/22/25  IV hydration overnight, she remains afebrile reports she feels little bit better.  No acute distress or new complaints overnight.  Patient overall feels much better.  Serum sodium dropped from 130 to 125 despite IV normal saline.  Nephrology saw and evaluated the patient and recommended continued IV fluids and rechecking labs during the afternoon.  Recheck labs and sodium improved to 127.  Nephrology recommended continued IV fluids no change to rate, and giving a one-time dose of KCl 40 mg oral and recheck labs in the morning.     04/23/25  Sodium continues to trend up slowly today.  125 --> 127 --> 129 maintaining on IV fluids.  Patient was seen and evaluated by Dr. Sandhu with the nephrology team who recommends starting patient on losartan 50 mg daily and following up in the office with him in 1 week, cleared for discharge home today.  Patient reports she is tolerating p.o. well, agreeable to discharge home    Review of Systems:   Constitutional:  No weight changes, fever, or chills. No night sweats, no fatigue, no malaise.    Cardiovascular:  No chest pain, no palpitations, no edema.      Respiratory:  No cough, no smoke exposure, no dyspnea, no orthopnea.   Gastrointestinal:  No nausea, vomiting, or diarrhea. No constipation, or GI discomfort. No reflux pain, no anorexia, no dysphagia. No hematochezia or melena.    Neuro:  No weakness, no numbness, no paresthesias, no loss  of consciousness, no syncope, no dizziness, no headache.     Objective   Physical Exam:   Constitutional: Awake, alert. Well developed for age. Nontoxic appearing.   Eyes: PERRL x2, sclerae anicteric, no conjunctival injection. No EOM abnormalities noted.   HENT: NCAT, mucous membranes moist,   Neck: Supple, no thyromegaly, no lymphadenopathy, trachea midline  Respiratory: Clear to auscultation bilaterally, nonlabored respirations   Cardiovascular: RRR, no murmurs, rubs, or gallops, palpable pedal pulses bilaterally. No appreciable edema.   Gastrointestinal: Positive bowel sounds, soft, nontender, not distended.   Musculoskeletal: No bilateral ankle edema, no clubbing or cyanosis to extremities. No obvious deformities.   Psychiatric: Appropriate affect, cooperative. Converses appropriately for age.   Neurologic: Oriented x 3, strength symmetric in all extremities. Cranial nerves grossly intact to confrontation, speech clear  Skin: No rashes, skin intact.     Results Review:    I have reviewed the labs, radiology results and diagnostic studies.    Results from last 7 days   Lab Units 04/22/25  0407   WBC 10*3/mm3 12.31*   HEMOGLOBIN g/dL 12.2   HEMATOCRIT % 36.8   PLATELETS 10*3/mm3 338     Results from last 7 days   Lab Units 04/22/25 2015 04/22/25  1439 04/22/25  0407 04/21/25  1007   SODIUM mmol/L 129* 127* 125* 130*   POTASSIUM mmol/L 4.2 3.5 3.9 2.8*   CHLORIDE mmol/L 96* 97* 95* 92*   CO2 mmol/L 18.5* 20.7* 20.9* 19.4*   BUN mg/dL 13 15 18 26*   CREATININE mg/dL 0.67 0.66 0.65 0.75   CALCIUM mg/dL 9.0 9.0 9.0 9.3   BILIRUBIN mg/dL  --   --  0.6 0.7   ALK PHOS U/L  --   --  72 89   ALT (SGPT) U/L  --   --  11 11   AST (SGOT) U/L  --   --  14 14   GLUCOSE mg/dL 73 62* 67 123*     Imaging Results (Last 24 Hours)       ** No results found for the last 24 hours. **            I have reviewed the medications.     Discharge Medications        Continue These Medications        Instructions Start Date   BD Pen Needle  Alysha U/F 32G X 4 MM misc  Generic drug: Insulin Pen Needle   Check glucose three times daily as directed      Dexcom G7 Sensor misc   1 Application, Not Applicable, Every 10 Days             ASK your doctor about these medications        Instructions Start Date   gabapentin 600 MG tablet  Commonly known as: NEURONTIN   600 mg, Oral, Nightly      gabapentin 400 MG capsule  Commonly known as: NEURONTIN   TAKE 1 CAPSULE BY MOUTH TWICE DAILY AT 8 AM AND AT 2 PM      Insulin Lispro (1 Unit Dial) 100 UNIT/ML solution pen-injector  Commonly known as: HUMALOG   INJECT 5 UNITS UNDER THE SKIN BEFORE BREAKFAST, 6 UNITS BEFORE LUNCH AND 8 UNITS BEFORE DINNER      Lantus SoloStar 100 UNIT/ML injection pen  Generic drug: Insulin Glargine   Inject 15 units under the skin in the morning and 13 units under the skin in the evening.      lisinopril 5 MG tablet  Commonly known as: PRINIVIL,ZESTRIL   5 mg, Oral, Daily      OneTouch Verio test strip  Generic drug: glucose blood   1 each, Other, 5 Times Daily, Use as instructed      glucose blood test strip   Use to test blood sugar 5 times daily. USE Onetouch Ultra Blue test strips. Dx E11.65      Ozempic (1 MG/DOSE) 4 MG/3ML solution pen-injector  Generic drug: Semaglutide (1 MG/DOSE)  Ask about: Which instructions should I use?   Weekly              ---------------------------------------------------------------------------------------------  Assessment & Plan   Assessment/Problem List    Intractable nausea and vomiting      Plan:    Intractable nausea vomiting-resolved  Leukocytosis  - Telemetry monitoring uneventful  - Initial leukocytosis of 12.3, resolved at 10.5 this morning, thought to be secondary to vomiting  - Lipase 19  - RVP negative  - Patient tolerating p.o. without difficulty  - Lactate negative, blood cultures negative at 24 hours     Hyponatremia  -Decreased from 130 to 125 overnight, despite IV hydration with normal saline  - Nephrology consult, cleared for discharge  home, see MD note     Hypokalemia  - Resolved following electrolyte replacement protocol    Disposition: Home    Follow-up after Discharge: PCP and nephrology    This note will serve as a discharge summary    SAMANTHA Ibarra 04/23/25 04:19 EDT    I have worn appropriate PPE during this patient encounter, sanitized my hands both with entering and exiting patient's room.      33 minutes has been spent by [facility] providers in the care of this patient while under observation status

## 2025-04-23 NOTE — PROGRESS NOTES
[facility] Progress Note      Patient Name: Sonal De Dios  : 1965  MRN: [id number]  Primary Care Physician:  Ammy Holman APRN  Date of admission: 2025    Subjective     Interval History:   Follow-up hyponatremia.    The patient is feeling much better, denies any chest pain or shortness of air, no orthopnea or PND, no nausea or vomiting.  No lightheadedness    Review of Systems:   As noted above    Objective     Vitals:   Temp:  [97.7 °F (36.5 °C)-98.6 °F (37 °C)] 97.7 °F (36.5 °C)  Heart Rate:  [] 110  Resp:  [17-18] 18  BP: (107-178)/() 156/99    Intake/Output Summary (Last 24 hours) at 2025 0743  Last data filed at 2025 0328  Gross per 24 hour   Intake 1946.67 ml   Output 0 ml   Net 1946.67 ml       Physical Exam:    General Appearance: alert, awake, no acute distress   Skin: warm and dry  HEENT: oral mucosa normal, nonicteric sclera  Neck: No JVD  Lungs: CTA, unlabored breathing  Heart: RRR, normal S1 and S2  Abdomen: soft, nontender, nondistended  : no palpable bladder  Extremities: no edema, cyanosis or clubbing  Neuro: normal speech and mental status     Scheduled Meds:     sodium chloride, 10 mL, Intravenous, Q12H      IV Meds:   sodium chloride, 100 mL/hr, Last Rate: 100 mL/hr (25)        Results Reviewed:   I have personally reviewed the results from the time of this admission to 2025 07:43 EDT     Results from last 7 days   Lab Units 25  0308 25  2015 25  1439 25  0407 25  1007   SODIUM mmol/L 128* 129* 127* 125* 130*   POTASSIUM mmol/L 3.7 4.2 3.5 3.9 2.8*   CHLORIDE mmol/L 95* 96* 97* 95* 92*   CO2 mmol/L 19.0* 18.5* 20.7* 20.9* 19.4*   BUN mg/dL 13 13 15 18 26*   CREATININE mg/dL 0.60 0.67 0.66 0.65 0.75   CALCIUM mg/dL 9.0 9.0 9.0 9.0 9.3   BILIRUBIN mg/dL 0.8  --   --  0.6 0.7   ALK PHOS U/L 77  --   --  72 89   ALT (SGPT) U/L 12  --   --  11 11   AST (SGOT) U/L 13   --   --  14 14   GLUCOSE mg/dL 78 73 62* 67 123*       Estimated Creatinine Clearance: 99.5 mL/min (by C-G formula based on SCr of 0.6 mg/dL).    Results from last 7 days   Lab Units 04/23/25  0308 04/22/25  1439 04/21/25  1007   MAGNESIUM mg/dL 2.0  --  2.0   PHOSPHORUS mg/dL 2.4* 2.6 2.8       Results from last 7 days   Lab Units 04/23/25  0308 04/22/25  0636   URIC ACID mg/dL 4.0 4.0       Results from last 7 days   Lab Units 04/23/25  0308 04/22/25  0407 04/21/25  1007   WBC 10*3/mm3 10.59 12.31* 13.29*   HEMOGLOBIN g/dL 12.8 12.2 14.1   PLATELETS 10*3/mm3 355 338 390             Assessment / Plan     ASSESSMENT:  Acute on chronic hyponatremia, multifactorial: Hypovolemia secondary to nausea and vomiting, poor solute intake,, sodium up to 128, creatinine 0.6, volume status improved significantly  Proteinuria protein to creatinine ratio was 773 mg/g secondary to diabetic kidney disease  CKD stage I with proteinuria and preserved GFR.  Secondary to diabetic kidney disease  Hypokalemia, secondary to nausea and vomiting and poor oral intake.  Potassium today 3.9 after replacement  Normal anion gap metabolic acidosis, total CO2 19 and anion gap is 14 improved  Intractable nausea and vomiting, managed by primary team  Type 2 diabetes, managed by primary team  Hypertension, not on antihypertensives at home, not well-controlled    PLAN:  Start losartan 50 mg daily  Discontinue IV fluid  Patient could be discharged from the renal standpoint and will plan follow-up in my office within 1 week    I reviewed the chart and other providers notes, reviewed labs.  I discussed the case with the patient and she voiced good understanding  Copied text in this note has been reviewed and is accurate as of 04/23/25.       Thank you for involving us in the care of Sonal Barragan Va.  Please feel free to call with any questions.    Julian Sandhu MD  04/23/25  07:43 EDT    [facility]  [phone]    Please note that portions of this note were completed with a voice recognition program.

## 2025-04-23 NOTE — PLAN OF CARE
Goal Outcome Evaluation:              Outcome Evaluation: Pt. 59 yr old AOX4 and able to verbalized needs. IV removed. Discharged summary provided and education completed. Pt. instructed to  her medication at her pharmacy of choice. Pt. instructed to follow up with her PCP and Nephrology in 1 week. Pt gathered all her belongings and she did not have any other questions at the time of discharged. Pt left observation observation unit via private vehicle.

## 2025-04-23 NOTE — PLAN OF CARE
Problem: Adult Inpatient Plan of Care  Goal: Plan of Care Review  Outcome: Progressing  Flowsheets (Taken 4/23/2025 0550)  Progress: improving  Outcome Evaluation: Nausea resolved, no acute distress overnight. BP remains elevated, provider notified and aware. Monitoring labs. Neph following.  Plan of Care Reviewed With: patient  Goal: Patient-Specific Goal (Individualized)  Outcome: Progressing  Goal: Absence of Hospital-Acquired Illness or Injury  Outcome: Progressing  Intervention: Identify and Manage Fall Risk  Recent Flowsheet Documentation  Taken 4/23/2025 0324 by Kassy Becerra RN  Safety Promotion/Fall Prevention:   nonskid shoes/slippers when out of bed   room organization consistent   safety round/check completed  Taken 4/23/2025 0200 by Kassy Becerra RN  Safety Promotion/Fall Prevention:   room organization consistent   safety round/check completed   nonskid shoes/slippers when out of bed  Taken 4/23/2025 0000 by Kassy Becerra RN  Safety Promotion/Fall Prevention:   clutter free environment maintained   room organization consistent   safety round/check completed  Taken 4/22/2025 2200 by Kassy Becerra RN  Safety Promotion/Fall Prevention:   room organization consistent   safety round/check completed  Taken 4/22/2025 2000 by Kassy Becerra RN  Safety Promotion/Fall Prevention:   clutter free environment maintained   nonskid shoes/slippers when out of bed   room organization consistent   safety round/check completed  Intervention: Prevent Skin Injury  Recent Flowsheet Documentation  Taken 4/23/2025 0324 by Kassy Becerra RN  Body Position: position changed independently  Taken 4/23/2025 0200 by Kassy Becerra RN  Body Position: position changed independently  Taken 4/23/2025 0000 by Kassy Becerra RN  Body Position: position changed independently  Taken 4/22/2025 2200 by Kassy Becerra RN  Body Position: position changed independently  Taken 4/22/2025 2000 by Kassy Becerra  RN  Body Position: position changed independently  Intervention: Prevent and Manage VTE (Venous Thromboembolism) Risk  Recent Flowsheet Documentation  Taken 4/22/2025 2000 by Kassy Becerra RN  VTE Prevention/Management: patient refused intervention  Intervention: Prevent Infection  Recent Flowsheet Documentation  Taken 4/23/2025 0324 by Kassy Becerra RN  Infection Prevention:   equipment surfaces disinfected   hand hygiene promoted   personal protective equipment utilized   rest/sleep promoted   single patient room provided  Taken 4/23/2025 0000 by Kassy Becerra RN  Infection Prevention:   equipment surfaces disinfected   hand hygiene promoted   personal protective equipment utilized   rest/sleep promoted   single patient room provided  Taken 4/22/2025 2000 by Kassy Becerra RN  Infection Prevention:   equipment surfaces disinfected   hand hygiene promoted   personal protective equipment utilized   rest/sleep promoted   single patient room provided  Goal: Optimal Comfort and Wellbeing  Outcome: Progressing  Goal: Readiness for Transition of Care  Outcome: Progressing   Goal Outcome Evaluation:  Plan of Care Reviewed With: patient        Progress: improving  Outcome Evaluation: Nausea resolved, no acute distress overnight. BP remains elevated, provider notified and aware. Monitoring labs. Neph following.

## 2025-04-23 NOTE — OUTREACH NOTE
Prep Survey      Flowsheet Row Responses   [facility] patient discharged from? [place]   Is LACE score < 7 ? Yes   Eligibility [facility]   Date of Admission 04/21/25   Date of Discharge 04/23/25   Discharge Disposition Home or Self Care   Discharge diagnosis Intractable nausea vomiting-resolved  Leukocytosis   Does the patient have one of the following disease processes/diagnoses(primary or secondary)? Other   Does the patient have Home health ordered? No   Is there a DME ordered? No   Prep survey completed? Yes            TEZ JACOBS - Registered Nurse

## 2025-04-24 ENCOUNTER — TRANSITIONAL CARE MANAGEMENT TELEPHONE ENCOUNTER (OUTPATIENT)
Dept: CALL CENTER | Facility: HOSPITAL | Age: 60
End: 2025-04-24
Payer: COMMERCIAL

## 2025-04-24 NOTE — OUTREACH NOTE
Call Center TCM Note      Flowsheet Row Responses   [facility] patient discharged from? [facility]   Does the patient have one of the following disease processes/diagnoses(primary or secondary)? Other   TCM attempt successful? Yes   Call start time 0857   Call end time 0858   Discharge diagnosis Intractable nausea vomiting-resolved  Leukocytosis   Meds reviewed with patient/caregiver? Yes   Is the patient having any side effects they believe may be caused by any medication additions or changes? No   Does the patient have all medications ordered at discharge? Yes   Is the patient taking all medications as directed (includes completed medication regime)? Yes   Comments No HOSP DC FU appts available that meets tcm guidelines. TCM call complete.   Does the patient have an appointment with their PCP within 7-14 days of discharge? No appointments available   Nursing Interventions Routed TCM call to PCP office   Has home health visited the patient within 72 hours of discharge? N/A   Psychosocial issues? No   Did the patient receive a copy of their discharge instructions? Yes   Nursing interventions Reviewed instructions with patient   What is the patient's perception of their health status since discharge? Improving   Is the patient/caregiver able to teach back signs and symptoms related to disease process for when to call PCP? Yes   Is the patient/caregiver able to teach back signs and symptoms related to disease process for when to call 911? Yes   Is the patient/caregiver able to teach back the hierarchy of who to call/visit for symptoms/problems? PCP, Specialist, Home health nurse, Urgent Care, ED, 911 Yes   TCM call completed? Yes   Wrap up additional comments Pt reports improvement. Was able to keep food down this am.   Call end time 0858            MARQUIS WASHINGTON - Registered Nurse    4/24/2025, 08:58 EDT

## 2025-04-26 LAB
BACTERIA SPEC AEROBE CULT: NORMAL
BACTERIA SPEC AEROBE CULT: NORMAL

## 2025-05-02 ENCOUNTER — OFFICE VISIT (OUTPATIENT)
Dept: FAMILY MEDICINE CLINIC | Facility: CLINIC | Age: 60
End: 2025-05-02
Payer: COMMERCIAL

## 2025-05-02 VITALS
HEART RATE: 99 BPM | BODY MASS INDEX: 26.63 KG/M2 | OXYGEN SATURATION: 96 % | SYSTOLIC BLOOD PRESSURE: 134 MMHG | DIASTOLIC BLOOD PRESSURE: 82 MMHG | HEIGHT: 64 IN | WEIGHT: 156 LBS

## 2025-05-02 DIAGNOSIS — K59.00 CONSTIPATION, UNSPECIFIED CONSTIPATION TYPE: ICD-10-CM

## 2025-05-02 DIAGNOSIS — E11.65 TYPE 2 DIABETES MELLITUS WITH HYPERGLYCEMIA, WITH LONG-TERM CURRENT USE OF INSULIN: ICD-10-CM

## 2025-05-02 DIAGNOSIS — R11.2 NAUSEA AND VOMITING, UNSPECIFIED VOMITING TYPE: Primary | ICD-10-CM

## 2025-05-02 DIAGNOSIS — E87.1 HYPONATREMIA: ICD-10-CM

## 2025-05-02 DIAGNOSIS — Z79.4 TYPE 2 DIABETES MELLITUS WITH HYPERGLYCEMIA, WITH LONG-TERM CURRENT USE OF INSULIN: ICD-10-CM

## 2025-05-02 RX ORDER — METOCLOPRAMIDE 5 MG/1
5 TABLET ORAL 3 TIMES DAILY PRN
Qty: 30 TABLET | Refills: 1 | Status: SHIPPED | OUTPATIENT
Start: 2025-05-02

## 2025-05-02 NOTE — PROGRESS NOTES
"Chief Complaint  Hospital Follow Up Visit    Subjective        Sonal De Dios presents to [facility]  History of Present Illness  Very pleasant patient here today unfortunately recent evaluation in the hospital, for acute vomiting, hyponatremia, she had to see nephrology,  And thankfully she improved with fluids,  This was shortly after taking Ozempic 0.25 she resumed she had periods doing well with this but she has at least 3 episodes of acute severe nausea vomiting with hyponatremia  I think this was the first hospitalization,   She was given losartan, and to follow-up with nephrology but she was little concerned about taking it and has not started losartan  Blood pressure is good today,  She is feeling better no chest pain shortness breath weakness    Still has her gallbladder  No history of gastroparesis  She does have constipation with the medication            Objective   Vital Signs:  /82 (BP Location: Right arm, Patient Position: Sitting, Cuff Size: Large Adult)   Pulse 99   Ht 162.6 cm (64\")   Wt 70.8 kg (156 lb)   SpO2 96%   BMI 26.78 kg/m²   Estimated body mass index is 26.78 kg/m² as calculated from the following:    Height as of this encounter: 162.6 cm (64\").    Weight as of this encounter: 70.8 kg (156 lb).            Physical Exam  Vitals reviewed.   Constitutional:       Appearance: Normal appearance. She is well-developed. She is not ill-appearing or diaphoretic.      Comments: Pleasant appears well.   HENT:      Head: Normocephalic.      Nose: Nose normal.   Eyes:      General: No scleral icterus.     Conjunctiva/sclera: Conjunctivae normal.      Pupils: Pupils are equal, round, and reactive to light.   Neck:      Thyroid: No thyromegaly.      Vascular: No JVD.   Cardiovascular:      Rate and Rhythm: Normal rate and regular rhythm.      Heart sounds: Normal heart sounds. No murmur heard.     No friction rub. No gallop.   Pulmonary:      Effort: " Pulmonary effort is normal. No respiratory distress.      Breath sounds: Normal breath sounds. No stridor. No wheezing or rales.   Abdominal:      General: Bowel sounds are normal. There is no distension.      Palpations: Abdomen is soft. There is no mass.      Tenderness: There is no abdominal tenderness.      Hernia: No hernia is present.      Comments: No hepatosplenomegaly, no ascites,   Musculoskeletal:         General: No tenderness.      Cervical back: Neck supple.   Lymphadenopathy:      Cervical: No cervical adenopathy.   Skin:     General: Skin is warm and dry.      Capillary Refill: Capillary refill takes less than 2 seconds.      Findings: No erythema or rash.   Neurological:      General: No focal deficit present.      Mental Status: She is alert and oriented to person, place, and time. Mental status is at baseline.      Deep Tendon Reflexes: Reflexes are normal and symmetric.   Psychiatric:         Mood and Affect: Mood normal.         Behavior: Behavior normal.         Thought Content: Thought content normal.         Judgment: Judgment normal.        Result Review :                Assessment and Plan   Diagnoses and all orders for this visit:    1. Nausea and vomiting, unspecified vomiting type (Primary)    2. Hyponatremia    3. Type 2 diabetes mellitus with hyperglycemia, with long-term current use of insulin  -     Tirzepatide 2.5 MG/0.5ML solution auto-injector; Inject 2.5 mg under the skin into the appropriate area as directed 1 (One) Time Per Week.  Dispense: 2 mL; Refill: 0    4. Constipation, unspecified constipation type    Other orders  -     metoclopramide (Reglan) 5 MG tablet; Take 1 tablet by mouth 3 (Three) Times a Day As Needed (Nausea vomiting, as needed).  Dispense: 30 tablet; Refill: 1             Follow Up   Return in about 3 months (around 8/2/2025).  Patient was given instructions and counseling regarding her condition or for health maintenance advice. Please see specific  information pulled into the AVS if appropriate.     Patient Instructions   Discharge instruction      Discontinue Ozempic  In a couple weeks as long as you are doing well Mounjaro low dose 0.25 weekly  We will keep you at a low dose as long as you are doing well    If nausea vomiting take generic Reglan 3 times a day as needed  Push fluids with electrolytes make sure you have some around  Pedialyte excetra ginger ale    Any severe persistent vomiting and low pressure over the hospital is likely you will get hyponatremia again and this is not acceptable prevention is key    Normal hydration normal fluids 64 ounces approximately daily,  Follow-up nephrology just to make sure everything is good,    After you get the prescription filled, send a message to me for further instructions    And I would want a weekly follow-up for a bit after starting    For plan to prevent constipation or any aggravated constipation associated with these GLP-1 agonist medications,    Increased constipation abdominal fullness can increase the risk of nausea vomiting  MiraLAX 1 capful large glass of water daily    If needed Milk of magnesia 30 cc  Daily for a few days if needed increase to 2 or 3 times a day if not responsive      Rarely glycerin suppository or fleets enema       In the future down the road when things are going well and you not acutely constipated  Continue MiraLAX but add slowly start with 1 teaspoon namebrand Metamucil

## 2025-05-02 NOTE — PATIENT INSTRUCTIONS
Discharge instruction      Discontinue Ozempic  In a couple weeks as long as you are doing well Mounjaro low dose 0.25 weekly  We will keep you at a low dose as long as you are doing well    If nausea vomiting take generic Reglan 3 times a day as needed  Push fluids with electrolytes make sure you have some around  Pedialyte excetra ginger ale    Any severe persistent vomiting and low pressure over the hospital is likely you will get hyponatremia again and this is not acceptable prevention is key    Normal hydration normal fluids 64 ounces approximately daily,  Follow-up nephrology just to make sure everything is good,    After you get the prescription filled, send a message to me for further instructions    And I would want a weekly follow-up for a bit after starting    For plan to prevent constipation or any aggravated constipation associated with these GLP-1 agonist medications,    Increased constipation abdominal fullness can increase the risk of nausea vomiting  MiraLAX 1 capful large glass of water daily    If needed Milk of magnesia 30 cc  Daily for a few days if needed increase to 2 or 3 times a day if not responsive      Rarely glycerin suppository or fleets enema       In the future down the road when things are going well and you not acutely constipated  Continue MiraLAX but add slowly start with 1 teaspoon namebrand Metamucil

## 2025-06-11 DIAGNOSIS — E11.59 TYPE 2 DIABETES MELLITUS WITH OTHER CIRCULATORY COMPLICATION, WITH LONG-TERM CURRENT USE OF INSULIN: ICD-10-CM

## 2025-06-11 DIAGNOSIS — Z79.4 TYPE 2 DIABETES MELLITUS WITH OTHER CIRCULATORY COMPLICATION, WITH LONG-TERM CURRENT USE OF INSULIN: ICD-10-CM

## 2025-06-17 ENCOUNTER — OFFICE VISIT (OUTPATIENT)
Dept: FAMILY MEDICINE CLINIC | Facility: CLINIC | Age: 60
End: 2025-06-17
Payer: COMMERCIAL

## 2025-06-17 VITALS
BODY MASS INDEX: 28.32 KG/M2 | HEIGHT: 64 IN | HEART RATE: 89 BPM | WEIGHT: 165.9 LBS | SYSTOLIC BLOOD PRESSURE: 126 MMHG | DIASTOLIC BLOOD PRESSURE: 72 MMHG | OXYGEN SATURATION: 98 %

## 2025-06-17 DIAGNOSIS — Z79.4 TYPE 2 DIABETES MELLITUS WITH OTHER CIRCULATORY COMPLICATION, WITH LONG-TERM CURRENT USE OF INSULIN: Primary | ICD-10-CM

## 2025-06-17 DIAGNOSIS — I10 PRIMARY HYPERTENSION: ICD-10-CM

## 2025-06-17 DIAGNOSIS — E11.59 TYPE 2 DIABETES MELLITUS WITH OTHER CIRCULATORY COMPLICATION, WITH LONG-TERM CURRENT USE OF INSULIN: Primary | ICD-10-CM

## 2025-06-17 DIAGNOSIS — G62.9 NEUROPATHY: ICD-10-CM

## 2025-06-17 LAB
ALBUMIN SERPL-MCNC: 4.7 G/DL (ref 3.5–5.2)
ALBUMIN/GLOB SERPL: 2 G/DL
ALP SERPL-CCNC: 91 U/L (ref 39–117)
ALT SERPL-CCNC: 9 U/L (ref 1–33)
AST SERPL-CCNC: 17 U/L (ref 1–32)
BILIRUB SERPL-MCNC: 0.4 MG/DL (ref 0–1.2)
BUN SERPL-MCNC: 21 MG/DL (ref 8–23)
BUN/CREAT SERPL: 22.6 (ref 7–25)
CALCIUM SERPL-MCNC: 9.7 MG/DL (ref 8.6–10.5)
CHLORIDE SERPL-SCNC: 103 MMOL/L (ref 98–107)
CO2 SERPL-SCNC: 20.9 MMOL/L (ref 22–29)
CREAT SERPL-MCNC: 0.93 MG/DL (ref 0.57–1)
EGFRCR SERPLBLD CKD-EPI 2021: 70.5 ML/MIN/1.73
GLOBULIN SER CALC-MCNC: 2.4 GM/DL
GLUCOSE SERPL-MCNC: 116 MG/DL (ref 65–99)
HBA1C MFR BLD: 6.6 % (ref 4.8–5.6)
POTASSIUM SERPL-SCNC: 4.6 MMOL/L (ref 3.5–5.2)
PROT SERPL-MCNC: 7.1 G/DL (ref 6–8.5)
SODIUM SERPL-SCNC: 137 MMOL/L (ref 136–145)

## 2025-06-17 PROCEDURE — 99214 OFFICE O/P EST MOD 30 MIN: CPT | Performed by: NURSE PRACTITIONER

## 2025-06-17 RX ORDER — GABAPENTIN 600 MG/1
600 TABLET ORAL NIGHTLY
Qty: 90 TABLET | Refills: 1 | Status: SHIPPED | OUTPATIENT
Start: 2025-06-17

## 2025-06-17 RX ORDER — ONDANSETRON 4 MG/1
4 TABLET, ORALLY DISINTEGRATING ORAL EVERY 8 HOURS PRN
Qty: 30 TABLET | Refills: 0 | Status: SHIPPED | OUTPATIENT
Start: 2025-06-17

## 2025-06-17 RX ORDER — LOSARTAN POTASSIUM 25 MG/1
25 TABLET ORAL DAILY
Qty: 90 TABLET | Refills: 1 | Status: SHIPPED | OUTPATIENT
Start: 2025-06-17

## 2025-06-17 RX ORDER — GABAPENTIN 600 MG/1
600 TABLET ORAL NIGHTLY
Qty: 90 TABLET | OUTPATIENT
Start: 2025-06-17

## 2025-06-17 NOTE — PROGRESS NOTES
Chief Complaint  Peripheral Neuropathy    Subjective        Sonal De Dios presents to [facility]  Peripheral Neuropathy    History of Present Illness  The patient presents for evaluation of nausea, constipation, diabetes mellitus, hypertension, and night sweats.    She experienced recurrent episodes of nausea in December 2024, initially attributed to influenza, and later suspected to be COVID-19. A similar episode occurred around [facility], prompting a hospital visit due to concerns about the frequency of these episodes. Despite extensive testing over a 2-day period, no specific cause was identified, although dehydration was noted. She consulted with a nephrologist who found no renal abnormalities but expressed concern about her sodium levels. She suspects that Ozempic may be contributing to her nausea and has discontinued its use. She was prescribed Mounjaro as an alternative but is hesitant to start it due to potential side effects. She was also prescribed metoclopramide but has not taken it.    She reports chronic constipation, with bowel movements occurring only once a week. She was advised to take MiraLAX daily, which has improved her bowel regularity and reduced bloating.    Her blood glucose levels tend to spike during episodes of nausea, remaining elevated for approximately 2 days before she can bring them down. She continues to use Humalog insulin.    She has been on gabapentin for 8 years and is considering reducing the dosage. She has obtained a medical card, which she believes has been beneficial.    She has discontinued losartan due to dizziness and suspected hypotension. She was previously on lisinopril.    She is experiencing night sweats and has never been on antidepressants.    She has had a mammogram done in the past and is due for another one. She had a colonoscopy done when she was 52 or 53 years old.    Objective   Vital Signs:  /72 (BP Location: Right  "arm, Patient Position: Sitting, Cuff Size: Large Adult)   Pulse 89   Ht 162.6 cm (64\")   Wt 75.3 kg (165 lb 14.4 oz)   SpO2 98%   BMI 28.48 kg/m²   Estimated body mass index is 28.48 kg/m² as calculated from the following:    Height as of this encounter: 162.6 cm (64\").    Weight as of this encounter: 75.3 kg (165 lb 14.4 oz).             Physical Exam  Constitutional:       General: She is not in acute distress.     Appearance: She is well-developed. She is not diaphoretic.   Cardiovascular:      Rate and Rhythm: Normal rate and regular rhythm.      Heart sounds: Normal heart sounds. No murmur heard.     No friction rub. No gallop.   Pulmonary:      Effort: Pulmonary effort is normal. No respiratory distress.      Breath sounds: Normal breath sounds. No wheezing or rales.   Musculoskeletal:      Cervical back: Neck supple.   Skin:     General: Skin is warm and dry.   Neurological:      Mental Status: She is alert and oriented to person, place, and time.       Physical Exam      Result Review :          Results      Assessment & Plan  1. Nausea.  - Recurrent nausea attributed to Ozempic, which has been discontinued.  - Blood sugar levels will be monitored; Mounjaro will be considered if levels become unmanageable.  - Continued use of Humalog (insulin lispro) as needed.    2. Constipation.  - Chronic constipation with bowel movements only once a week.  - MiraLAX daily has improved bowel movements and reduced bloating.    3. Diabetes mellitus.  - Blood sugar levels spike during episodes of nausea, difficult to control even with increased insulin.  - Current insulin regimen will be continued; close monitoring of blood sugar levels.  - A1c test will be conducted today to ensure levels are not trending upward.    4. Hypertension.  - Losartan discontinued due to dizziness and concerns about low blood pressure.  - Blood pressure currently stable without medication.  - Prescription for losartan 25 mg will be provided " to protect kidneys, especially given diabetic status.    5. Night sweats.  - Frequent night sweats likely due to hormonal changes.  - Potential use of Paxil 10 mg discussed; patient prefers to monitor symptoms for now.    6. Medication management.  - Gabapentin 400 mg twice a day and 600 mg at bedtime for 8 years; regimen will be continued as effective.  - Refill for gabapentin will be sent to pharmacy.    7. Health maintenance.  - Overdue for a mammogram; patient will schedule one.  - Cologuard test discussed but deferred for now.  - Urine drug screen will be conducted today.           Assessment and Plan     Diagnoses and all orders for this visit:    1. Type 2 diabetes mellitus with other circulatory complication, with long-term current use of insulin (Primary)  -     ToxASSURE Select 13 Discrete -; Future  -     gabapentin (NEURONTIN) 600 MG tablet; Take 1 tablet by mouth Every Night.  Dispense: 90 tablet; Refill: 1  -     Comprehensive metabolic panel  -     Hemoglobin A1c    2. Primary hypertension    3. Neuropathy    Other orders  -     losartan (Cozaar) 25 MG tablet; Take 1 tablet by mouth Daily.  Dispense: 90 tablet; Refill: 1      TRUE query complete. Treatment plan to include limited course of prescribed  controlled substance. Risks including addiction, benefits, and alternatives presented to patient.    Contract signed and updated       Follow Up     No follow-ups on file.  Patient was given instructions and counseling regarding her condition or for health maintenance advice. Please see specific information pulled into the AVS if appropriate.       Patient or patient representative verbalized consent for the use of Ambient Listening during the visit with  SAMANTHA Perdomo for chart documentation. 6/17/2025  09:41 EDT

## 2025-07-03 DIAGNOSIS — Z79.4 TYPE 2 DIABETES MELLITUS WITH OTHER CIRCULATORY COMPLICATION, WITH LONG-TERM CURRENT USE OF INSULIN: ICD-10-CM

## 2025-07-03 DIAGNOSIS — E11.59 TYPE 2 DIABETES MELLITUS WITH OTHER CIRCULATORY COMPLICATION, WITH LONG-TERM CURRENT USE OF INSULIN: ICD-10-CM

## 2025-07-04 RX ORDER — ACYCLOVIR 400 MG/1
TABLET ORAL
Qty: 9 EACH | Refills: 3 | Status: SHIPPED | OUTPATIENT
Start: 2025-07-04

## 2025-07-23 DIAGNOSIS — E11.59 TYPE 2 DIABETES MELLITUS WITH OTHER CIRCULATORY COMPLICATION, WITH LONG-TERM CURRENT USE OF INSULIN: Primary | ICD-10-CM

## 2025-07-23 DIAGNOSIS — Z79.4 TYPE 2 DIABETES MELLITUS WITH OTHER CIRCULATORY COMPLICATION, WITH LONG-TERM CURRENT USE OF INSULIN: Primary | ICD-10-CM

## 2025-08-21 RX ORDER — ONDANSETRON 4 MG/1
4 TABLET, ORALLY DISINTEGRATING ORAL EVERY 8 HOURS PRN
Qty: 30 TABLET | Refills: 0 | Status: SHIPPED | OUTPATIENT
Start: 2025-08-21

## 2025-08-23 DIAGNOSIS — E11.59 TYPE 2 DIABETES MELLITUS WITH OTHER CIRCULATORY COMPLICATION, WITH LONG-TERM CURRENT USE OF INSULIN: ICD-10-CM

## 2025-08-23 DIAGNOSIS — Z79.4 TYPE 2 DIABETES MELLITUS WITH OTHER CIRCULATORY COMPLICATION, WITH LONG-TERM CURRENT USE OF INSULIN: ICD-10-CM

## 2025-08-26 RX ORDER — GABAPENTIN 400 MG/1
CAPSULE ORAL
Qty: 180 CAPSULE | Refills: 1 | Status: SHIPPED | OUTPATIENT
Start: 2025-08-26